# Patient Record
Sex: FEMALE | Race: WHITE | Employment: OTHER | URBAN - NONMETROPOLITAN AREA
[De-identification: names, ages, dates, MRNs, and addresses within clinical notes are randomized per-mention and may not be internally consistent; named-entity substitution may affect disease eponyms.]

---

## 2017-01-31 ENCOUNTER — OFFICE VISIT (OUTPATIENT)
Dept: PRIMARY CARE CLINIC | Age: 82
End: 2017-01-31
Payer: MEDICARE

## 2017-01-31 VITALS
HEART RATE: 83 BPM | TEMPERATURE: 98.4 F | BODY MASS INDEX: 22.15 KG/M2 | WEIGHT: 125 LBS | RESPIRATION RATE: 18 BRPM | HEIGHT: 63 IN | OXYGEN SATURATION: 97 % | SYSTOLIC BLOOD PRESSURE: 138 MMHG | DIASTOLIC BLOOD PRESSURE: 76 MMHG

## 2017-01-31 DIAGNOSIS — Z86.718 HISTORY OF BLOOD CLOTS: ICD-10-CM

## 2017-01-31 DIAGNOSIS — M79.604 PAIN OF RIGHT LOWER EXTREMITY: Primary | ICD-10-CM

## 2017-01-31 PROCEDURE — 99214 OFFICE O/P EST MOD 30 MIN: CPT | Performed by: FAMILY MEDICINE

## 2017-01-31 PROCEDURE — G8427 DOCREV CUR MEDS BY ELIG CLIN: HCPCS | Performed by: FAMILY MEDICINE

## 2017-01-31 PROCEDURE — 1090F PRES/ABSN URINE INCON ASSESS: CPT | Performed by: FAMILY MEDICINE

## 2017-01-31 PROCEDURE — G8419 CALC BMI OUT NRM PARAM NOF/U: HCPCS | Performed by: FAMILY MEDICINE

## 2017-01-31 PROCEDURE — G8484 FLU IMMUNIZE NO ADMIN: HCPCS | Performed by: FAMILY MEDICINE

## 2017-01-31 PROCEDURE — 1123F ACP DISCUSS/DSCN MKR DOCD: CPT | Performed by: FAMILY MEDICINE

## 2017-01-31 PROCEDURE — 4040F PNEUMOC VAC/ADMIN/RCVD: CPT | Performed by: FAMILY MEDICINE

## 2017-01-31 PROCEDURE — 1036F TOBACCO NON-USER: CPT | Performed by: FAMILY MEDICINE

## 2017-01-31 RX ORDER — PILOCARPINE HYDROCHLORIDE 10 MG/ML
1 SOLUTION/ DROPS OPHTHALMIC 2 TIMES DAILY
COMMUNITY
End: 2018-08-01

## 2017-01-31 ASSESSMENT — ENCOUNTER SYMPTOMS
DIARRHEA: 0
COUGH: 0
EYE DISCHARGE: 0
VOMITING: 0
WHEEZING: 0
NAUSEA: 0
ABDOMINAL PAIN: 0
COLOR CHANGE: 0
BACK PAIN: 0

## 2017-03-29 ENCOUNTER — OFFICE VISIT (OUTPATIENT)
Dept: PRIMARY CARE CLINIC | Age: 82
End: 2017-03-29
Payer: MEDICARE

## 2017-03-29 VITALS
WEIGHT: 125 LBS | SYSTOLIC BLOOD PRESSURE: 120 MMHG | HEART RATE: 52 BPM | TEMPERATURE: 98.3 F | OXYGEN SATURATION: 98 % | DIASTOLIC BLOOD PRESSURE: 72 MMHG | BODY MASS INDEX: 22.15 KG/M2 | HEIGHT: 63 IN | RESPIRATION RATE: 16 BRPM

## 2017-03-29 DIAGNOSIS — N28.9 FUNCTION KIDNEY DECREASED: ICD-10-CM

## 2017-03-29 DIAGNOSIS — L57.0 MULTIPLE ACTINIC KERATOSES: Primary | ICD-10-CM

## 2017-03-29 LAB
ANION GAP SERPL CALCULATED.3IONS-SCNC: 18 MMOL/L (ref 7–19)
BUN BLDV-MCNC: 22 MG/DL (ref 8–23)
CALCIUM SERPL-MCNC: 9.9 MG/DL (ref 8.8–10.2)
CHLORIDE BLD-SCNC: 101 MMOL/L (ref 98–111)
CO2: 26 MMOL/L (ref 22–29)
CREAT SERPL-MCNC: 0.9 MG/DL (ref 0.5–0.9)
GFR NON-AFRICAN AMERICAN: 59
GLUCOSE BLD-MCNC: 98 MG/DL (ref 74–109)
POTASSIUM SERPL-SCNC: 4.1 MMOL/L (ref 3.5–5)
SODIUM BLD-SCNC: 145 MMOL/L (ref 136–145)

## 2017-03-29 PROCEDURE — 17000 DESTRUCT PREMALG LESION: CPT | Performed by: FAMILY MEDICINE

## 2017-03-29 PROCEDURE — 1090F PRES/ABSN URINE INCON ASSESS: CPT | Performed by: FAMILY MEDICINE

## 2017-03-29 PROCEDURE — 17003 DESTRUCT PREMALG LES 2-14: CPT | Performed by: FAMILY MEDICINE

## 2017-03-29 PROCEDURE — 4040F PNEUMOC VAC/ADMIN/RCVD: CPT | Performed by: FAMILY MEDICINE

## 2017-03-29 PROCEDURE — 1036F TOBACCO NON-USER: CPT | Performed by: FAMILY MEDICINE

## 2017-03-29 PROCEDURE — G8484 FLU IMMUNIZE NO ADMIN: HCPCS | Performed by: FAMILY MEDICINE

## 2017-03-29 PROCEDURE — 1123F ACP DISCUSS/DSCN MKR DOCD: CPT | Performed by: FAMILY MEDICINE

## 2017-03-29 PROCEDURE — G8427 DOCREV CUR MEDS BY ELIG CLIN: HCPCS | Performed by: FAMILY MEDICINE

## 2017-03-29 PROCEDURE — 99212 OFFICE O/P EST SF 10 MIN: CPT | Performed by: FAMILY MEDICINE

## 2017-03-29 PROCEDURE — G8419 CALC BMI OUT NRM PARAM NOF/U: HCPCS | Performed by: FAMILY MEDICINE

## 2017-03-29 PROCEDURE — 36415 COLL VENOUS BLD VENIPUNCTURE: CPT | Performed by: FAMILY MEDICINE

## 2017-04-10 RX ORDER — METOPROLOL TARTRATE 50 MG/1
TABLET, FILM COATED ORAL
Qty: 90 TABLET | Refills: 3 | Status: SHIPPED | OUTPATIENT
Start: 2017-04-10 | End: 2018-04-12 | Stop reason: SDUPTHER

## 2017-04-25 RX ORDER — ZOLPIDEM TARTRATE 5 MG/1
TABLET ORAL
Qty: 90 TABLET | Refills: 1 | Status: SHIPPED | OUTPATIENT
Start: 2017-04-25 | End: 2018-01-18 | Stop reason: SDUPTHER

## 2017-05-03 ENCOUNTER — OFFICE VISIT (OUTPATIENT)
Dept: PRIMARY CARE CLINIC | Age: 82
End: 2017-05-03
Payer: MEDICARE

## 2017-05-03 VITALS
TEMPERATURE: 98.3 F | HEART RATE: 100 BPM | WEIGHT: 132 LBS | RESPIRATION RATE: 20 BRPM | SYSTOLIC BLOOD PRESSURE: 118 MMHG | DIASTOLIC BLOOD PRESSURE: 78 MMHG | HEIGHT: 63 IN | BODY MASS INDEX: 23.39 KG/M2

## 2017-05-03 DIAGNOSIS — G89.29 CHRONIC PAIN OF RIGHT KNEE: Primary | ICD-10-CM

## 2017-05-03 DIAGNOSIS — L72.0 EPIDERMAL CYST OF FACE: ICD-10-CM

## 2017-05-03 DIAGNOSIS — M25.561 CHRONIC PAIN OF RIGHT KNEE: Primary | ICD-10-CM

## 2017-05-03 PROCEDURE — 99213 OFFICE O/P EST LOW 20 MIN: CPT | Performed by: FAMILY MEDICINE

## 2017-05-03 PROCEDURE — 1036F TOBACCO NON-USER: CPT | Performed by: FAMILY MEDICINE

## 2017-05-03 PROCEDURE — 1090F PRES/ABSN URINE INCON ASSESS: CPT | Performed by: FAMILY MEDICINE

## 2017-05-03 PROCEDURE — 4040F PNEUMOC VAC/ADMIN/RCVD: CPT | Performed by: FAMILY MEDICINE

## 2017-05-03 PROCEDURE — G8427 DOCREV CUR MEDS BY ELIG CLIN: HCPCS | Performed by: FAMILY MEDICINE

## 2017-05-03 PROCEDURE — G8420 CALC BMI NORM PARAMETERS: HCPCS | Performed by: FAMILY MEDICINE

## 2017-05-03 PROCEDURE — 1123F ACP DISCUSS/DSCN MKR DOCD: CPT | Performed by: FAMILY MEDICINE

## 2017-05-06 ASSESSMENT — ENCOUNTER SYMPTOMS: RESPIRATORY NEGATIVE: 1

## 2017-05-26 RX ORDER — ATORVASTATIN CALCIUM 10 MG/1
TABLET, FILM COATED ORAL
Qty: 90 TABLET | Refills: 2 | Status: SHIPPED | OUTPATIENT
Start: 2017-05-26 | End: 2018-02-19 | Stop reason: SDUPTHER

## 2017-08-02 RX ORDER — GABAPENTIN 100 MG/1
CAPSULE ORAL
Qty: 180 CAPSULE | Refills: 2 | Status: SHIPPED | OUTPATIENT
Start: 2017-08-02 | End: 2018-01-18

## 2017-08-23 ENCOUNTER — OFFICE VISIT (OUTPATIENT)
Dept: PRIMARY CARE CLINIC | Age: 82
End: 2017-08-23
Payer: MEDICARE

## 2017-08-23 VITALS
HEIGHT: 63 IN | HEART RATE: 60 BPM | DIASTOLIC BLOOD PRESSURE: 80 MMHG | WEIGHT: 127.5 LBS | RESPIRATION RATE: 18 BRPM | OXYGEN SATURATION: 96 % | TEMPERATURE: 97.6 F | SYSTOLIC BLOOD PRESSURE: 112 MMHG | BODY MASS INDEX: 22.59 KG/M2

## 2017-08-23 DIAGNOSIS — Z91.81 AT HIGH RISK FOR FALLS: ICD-10-CM

## 2017-08-23 DIAGNOSIS — L57.0 ACTINIC KERATOSES: ICD-10-CM

## 2017-08-23 DIAGNOSIS — R26.89 LOSS OF BALANCE: Primary | ICD-10-CM

## 2017-08-23 PROCEDURE — 1123F ACP DISCUSS/DSCN MKR DOCD: CPT | Performed by: FAMILY MEDICINE

## 2017-08-23 PROCEDURE — G8427 DOCREV CUR MEDS BY ELIG CLIN: HCPCS | Performed by: FAMILY MEDICINE

## 2017-08-23 PROCEDURE — 99213 OFFICE O/P EST LOW 20 MIN: CPT | Performed by: FAMILY MEDICINE

## 2017-08-23 PROCEDURE — 1036F TOBACCO NON-USER: CPT | Performed by: FAMILY MEDICINE

## 2017-08-23 PROCEDURE — 1090F PRES/ABSN URINE INCON ASSESS: CPT | Performed by: FAMILY MEDICINE

## 2017-08-23 PROCEDURE — G8420 CALC BMI NORM PARAMETERS: HCPCS | Performed by: FAMILY MEDICINE

## 2017-08-23 PROCEDURE — 4040F PNEUMOC VAC/ADMIN/RCVD: CPT | Performed by: FAMILY MEDICINE

## 2017-08-23 ASSESSMENT — PATIENT HEALTH QUESTIONNAIRE - PHQ9
2. FEELING DOWN, DEPRESSED OR HOPELESS: 0
SUM OF ALL RESPONSES TO PHQ9 QUESTIONS 1 & 2: 0
SUM OF ALL RESPONSES TO PHQ QUESTIONS 1-9: 0
1. LITTLE INTEREST OR PLEASURE IN DOING THINGS: 0

## 2017-08-26 ASSESSMENT — ENCOUNTER SYMPTOMS: RESPIRATORY NEGATIVE: 1

## 2017-09-06 ENCOUNTER — HOSPITAL ENCOUNTER (OUTPATIENT)
Dept: MRI IMAGING | Age: 82
Discharge: HOME | End: 2017-09-06
Payer: MEDICARE

## 2017-09-06 DIAGNOSIS — R26.89 LOSS OF BALANCE: ICD-10-CM

## 2017-09-06 DIAGNOSIS — R90.89 ABNORMAL FINDING ON MRI OF BRAIN: Primary | ICD-10-CM

## 2017-09-06 DIAGNOSIS — Z91.81 AT HIGH RISK FOR FALLS: ICD-10-CM

## 2017-09-06 LAB
GFR NON-AFRICAN AMERICAN: 42
PERFORMED ON: ABNORMAL
POC CREATININE: 1.2 MG/DL (ref 0.3–1.3)
POC SAMPLE TYPE: ABNORMAL

## 2017-09-06 PROCEDURE — 70553 MRI BRAIN STEM W/O & W/DYE: CPT

## 2017-09-06 PROCEDURE — 6360000004 HC RX CONTRAST MEDICATION: Performed by: FAMILY MEDICINE

## 2017-09-06 PROCEDURE — A9577 INJ MULTIHANCE: HCPCS | Performed by: FAMILY MEDICINE

## 2017-09-06 PROCEDURE — 82565 ASSAY OF CREATININE: CPT

## 2017-09-06 RX ADMIN — GADOBENATE DIMEGLUMINE 10 ML: 529 INJECTION, SOLUTION INTRAVENOUS at 14:06

## 2017-09-12 ENCOUNTER — NURSE ONLY (OUTPATIENT)
Dept: PRIMARY CARE CLINIC | Age: 82
End: 2017-09-12
Payer: MEDICARE

## 2017-09-12 DIAGNOSIS — E78.00 PURE HYPERCHOLESTEROLEMIA: ICD-10-CM

## 2017-09-12 DIAGNOSIS — E03.9 ACQUIRED HYPOTHYROIDISM: ICD-10-CM

## 2017-09-12 DIAGNOSIS — I10 ESSENTIAL HYPERTENSION: Primary | ICD-10-CM

## 2017-09-12 LAB
ALBUMIN SERPL-MCNC: 4.1 G/DL (ref 3.5–5.2)
ALP BLD-CCNC: 115 U/L (ref 35–104)
ALT SERPL-CCNC: 12 U/L (ref 5–33)
ANION GAP SERPL CALCULATED.3IONS-SCNC: 14 MMOL/L (ref 7–19)
AST SERPL-CCNC: 18 U/L (ref 5–32)
BILIRUB SERPL-MCNC: 0.6 MG/DL (ref 0.2–1.2)
BUN BLDV-MCNC: 19 MG/DL (ref 8–23)
CALCIUM SERPL-MCNC: 9.9 MG/DL (ref 8.8–10.2)
CHLORIDE BLD-SCNC: 100 MMOL/L (ref 98–111)
CHOLESTEROL, TOTAL: 182 MG/DL (ref 160–199)
CO2: 29 MMOL/L (ref 22–29)
CREAT SERPL-MCNC: 0.9 MG/DL (ref 0.5–0.9)
GFR NON-AFRICAN AMERICAN: 59
GLUCOSE BLD-MCNC: 103 MG/DL (ref 74–109)
HCT VFR BLD CALC: 46.1 % (ref 37–47)
HDLC SERPL-MCNC: 81 MG/DL (ref 65–121)
HEMOGLOBIN: 14.9 G/DL (ref 12–16)
LDL CHOLESTEROL CALCULATED: 79 MG/DL
MCH RBC QN AUTO: 32.5 PG (ref 27–31)
MCHC RBC AUTO-ENTMCNC: 32.3 G/DL (ref 33–37)
MCV RBC AUTO: 100.4 FL (ref 81–99)
PDW BLD-RTO: 13.4 % (ref 11.5–14.5)
PLATELET # BLD: 283 K/UL (ref 130–400)
PMV BLD AUTO: 10.1 FL (ref 9.4–12.3)
POTASSIUM SERPL-SCNC: 4.4 MMOL/L (ref 3.5–5)
RBC # BLD: 4.59 M/UL (ref 4.2–5.4)
SODIUM BLD-SCNC: 143 MMOL/L (ref 136–145)
T4 FREE: 1.1 NG/ML (ref 0.9–1.7)
TOTAL PROTEIN: 7.2 G/DL (ref 6.6–8.7)
TRIGL SERPL-MCNC: 112 MG/DL (ref 150–199)
TSH SERPL DL<=0.05 MIU/L-ACNC: 5.51 UIU/ML (ref 0.27–4.2)
WBC # BLD: 8.9 K/UL (ref 4.8–10.8)

## 2017-09-12 PROCEDURE — 36415 COLL VENOUS BLD VENIPUNCTURE: CPT | Performed by: FAMILY MEDICINE

## 2017-09-15 RX ORDER — LEVOTHYROXINE SODIUM 0.07 MG/1
TABLET ORAL
Qty: 90 TABLET | Refills: 1 | Status: SHIPPED | OUTPATIENT
Start: 2017-09-15 | End: 2017-11-13 | Stop reason: SDUPTHER

## 2017-09-22 ENCOUNTER — HOSPITAL ENCOUNTER (OUTPATIENT)
Dept: MRI IMAGING | Age: 82
Discharge: HOME | End: 2017-09-22
Payer: MEDICARE

## 2017-09-22 DIAGNOSIS — R90.89 ABNORMAL FINDING ON MRI OF BRAIN: ICD-10-CM

## 2017-09-22 DIAGNOSIS — R26.89 LOSS OF BALANCE: ICD-10-CM

## 2017-09-22 PROCEDURE — 6360000004 HC RX CONTRAST MEDICATION: Performed by: FAMILY MEDICINE

## 2017-09-22 PROCEDURE — A9577 INJ MULTIHANCE: HCPCS | Performed by: FAMILY MEDICINE

## 2017-09-22 PROCEDURE — 72156 MRI NECK SPINE W/O & W/DYE: CPT

## 2017-09-22 RX ADMIN — GADOBENATE DIMEGLUMINE 12 ML: 529 INJECTION, SOLUTION INTRAVENOUS at 13:21

## 2017-09-25 DIAGNOSIS — R93.89 ABNORMAL MRI: Primary | ICD-10-CM

## 2017-10-23 ENCOUNTER — NURSE ONLY (OUTPATIENT)
Dept: PRIMARY CARE CLINIC | Age: 82
End: 2017-10-23
Payer: MEDICARE

## 2017-10-23 DIAGNOSIS — Z23 NEED FOR INFLUENZA VACCINATION: Primary | ICD-10-CM

## 2017-10-23 PROCEDURE — G0008 ADMIN INFLUENZA VIRUS VAC: HCPCS | Performed by: FAMILY MEDICINE

## 2017-10-23 PROCEDURE — 90688 IIV4 VACCINE SPLT 0.5 ML IM: CPT | Performed by: FAMILY MEDICINE

## 2017-11-08 ENCOUNTER — OFFICE VISIT (OUTPATIENT)
Dept: PRIMARY CARE CLINIC | Age: 82
End: 2017-11-08
Payer: MEDICARE

## 2017-11-08 VITALS
BODY MASS INDEX: 22.22 KG/M2 | SYSTOLIC BLOOD PRESSURE: 135 MMHG | HEART RATE: 73 BPM | OXYGEN SATURATION: 93 % | DIASTOLIC BLOOD PRESSURE: 82 MMHG | TEMPERATURE: 97.3 F | HEIGHT: 63 IN | WEIGHT: 125.4 LBS | RESPIRATION RATE: 18 BRPM

## 2017-11-08 DIAGNOSIS — Z00.00 ROUTINE GENERAL MEDICAL EXAMINATION AT A HEALTH CARE FACILITY: Primary | ICD-10-CM

## 2017-11-08 DIAGNOSIS — I10 ESSENTIAL HYPERTENSION: ICD-10-CM

## 2017-11-08 DIAGNOSIS — R42 DIZZINESS: ICD-10-CM

## 2017-11-08 DIAGNOSIS — Z23 NEED FOR PNEUMOCOCCAL VACCINATION: ICD-10-CM

## 2017-11-08 PROCEDURE — 36415 COLL VENOUS BLD VENIPUNCTURE: CPT | Performed by: FAMILY MEDICINE

## 2017-11-08 PROCEDURE — G0439 PPPS, SUBSEQ VISIT: HCPCS | Performed by: FAMILY MEDICINE

## 2017-11-08 PROCEDURE — 90670 PCV13 VACCINE IM: CPT | Performed by: FAMILY MEDICINE

## 2017-11-08 PROCEDURE — G0009 ADMIN PNEUMOCOCCAL VACCINE: HCPCS | Performed by: FAMILY MEDICINE

## 2017-11-08 ASSESSMENT — LIFESTYLE VARIABLES
HOW OFTEN DO YOU HAVE A DRINK CONTAINING ALCOHOL: 0
HOW OFTEN DO YOU HAVE A DRINK CONTAINING ALCOHOL: 0

## 2017-11-08 ASSESSMENT — PATIENT HEALTH QUESTIONNAIRE - PHQ9: SUM OF ALL RESPONSES TO PHQ QUESTIONS 1-9: 0

## 2017-11-08 ASSESSMENT — ANXIETY QUESTIONNAIRES: GAD7 TOTAL SCORE: 0

## 2017-11-08 NOTE — PROGRESS NOTES
Yes  Body mass index is 22.21 kg/m². Health Habits/Nutrition Interventions:  · Inadequate physical activity:  She does not exercise because if she gets out and walks a lot she is at increased risk of falling because of the dizziness. Hearing/Vision  Do you or your family notice any trouble with your hearing?: No  Do you have difficulty driving, watching TV, or doing any of your daily activities because of your eyesight?: (!) Yes  Have you had an eye exam within the past year?: Yes  Hearing/Vision Interventions:  · She has seen her eye doctor. Her family will call if it worsens and they will follow-up with him.     Safety  Do you have working smoke detectors?: Yes  Have all throw rugs been removed or fastened?: (!) No  Do you have non-slip mats in all bathtubs?: Yes  Do all of your stairways have a railing or banister?: Yes  Are your doorways, halls and stairs free of clutter?: Yes  Do you always fasten your seatbelt when you are in a car?: Yes  Safety Interventions:  · Home safety tips provided    ADLs  In the past 7 days, did you need help from others to perform any of the following everyday activities?: (!) Walking/Balance  In the past 7 days, did you need help from others to take care of any of the following?: None  ADL Interventions:  · Patient declines any further evaluation/treatment for this issue    Personalized Preventive Plan   Current Health Maintenance Status  Immunization History   Administered Date(s) Administered    DT 01/01/2005    Influenza Virus Vaccine 10/10/2012, 10/13/2014, 10/09/2015    Influenza, Intradermal, Preservative free 10/17/2013    Influenza, Joy Martinez, 3 Years and older, IM 10/23/2017    Influenza, Quadv, 3 yrs and older, IM, Preservative Free 10/12/2016    Pneumococcal 13-valent Conjugate ([id number]) 11/08/2017    Pneumococcal Polysaccharide ([id number]) 08/30/2013    Td 07/15/2015        Health Maintenance   Topic Date Due    DTaP/Tdap/Td vaccine (1 - Tdap) 07/16/2015   

## 2017-11-09 LAB
ANION GAP SERPL CALCULATED.3IONS-SCNC: 10 MMOL/L (ref 7–19)
BUN BLDV-MCNC: 23 MG/DL (ref 8–23)
CALCIUM SERPL-MCNC: 9.8 MG/DL (ref 8.8–10.2)
CHLORIDE BLD-SCNC: 102 MMOL/L (ref 98–111)
CO2: 31 MMOL/L (ref 22–29)
CREAT SERPL-MCNC: 1 MG/DL (ref 0.5–0.9)
GFR NON-AFRICAN AMERICAN: 52
GLUCOSE BLD-MCNC: 115 MG/DL (ref 74–109)
HCT VFR BLD CALC: 44.6 % (ref 37–47)
HEMOGLOBIN: 14.2 G/DL (ref 12–16)
MCH RBC QN AUTO: 31.8 PG (ref 27–31)
MCHC RBC AUTO-ENTMCNC: 31.8 G/DL (ref 33–37)
MCV RBC AUTO: 99.8 FL (ref 81–99)
PDW BLD-RTO: 12.8 % (ref 11.5–14.5)
PLATELET # BLD: 304 K/UL (ref 130–400)
PMV BLD AUTO: 10.5 FL (ref 9.4–12.3)
POTASSIUM SERPL-SCNC: 4.5 MMOL/L (ref 3.5–5)
RBC # BLD: 4.47 M/UL (ref 4.2–5.4)
SODIUM BLD-SCNC: 143 MMOL/L (ref 136–145)
WBC # BLD: 8.7 K/UL (ref 4.8–10.8)

## 2017-11-13 RX ORDER — LEVOTHYROXINE SODIUM 0.07 MG/1
TABLET ORAL
Qty: 90 TABLET | Refills: 3 | Status: SHIPPED | OUTPATIENT
Start: 2017-11-13 | End: 2018-02-05 | Stop reason: SDUPTHER

## 2018-01-18 RX ORDER — GABAPENTIN 300 MG/1
300 CAPSULE ORAL NIGHTLY
COMMUNITY
End: 2018-01-18 | Stop reason: SDUPTHER

## 2018-01-18 RX ORDER — ZOLPIDEM TARTRATE 5 MG/1
TABLET ORAL
Qty: 30 TABLET | Refills: 2 | Status: SHIPPED | OUTPATIENT
Start: 2018-01-18 | End: 2018-02-19 | Stop reason: SDUPTHER

## 2018-01-18 RX ORDER — GABAPENTIN 300 MG/1
300 CAPSULE ORAL NIGHTLY
Qty: 30 CAPSULE | Refills: 2 | Status: SHIPPED | OUTPATIENT
Start: 2018-01-18 | End: 2018-02-19 | Stop reason: SDUPTHER

## 2018-02-07 RX ORDER — LEVOTHYROXINE SODIUM 0.07 MG/1
TABLET ORAL
Qty: 90 TABLET | Refills: 0 | Status: SHIPPED | OUTPATIENT
Start: 2018-02-07 | End: 2018-02-19 | Stop reason: SDUPTHER

## 2018-02-08 ENCOUNTER — NURSE ONLY (OUTPATIENT)
Dept: PRIMARY CARE CLINIC | Age: 83
End: 2018-02-08
Payer: MEDICARE

## 2018-02-08 DIAGNOSIS — I10 ESSENTIAL HYPERTENSION: ICD-10-CM

## 2018-02-08 DIAGNOSIS — E03.9 ACQUIRED HYPOTHYROIDISM: Primary | ICD-10-CM

## 2018-02-08 LAB
ALBUMIN SERPL-MCNC: 3.9 G/DL (ref 3.5–5.2)
ALP BLD-CCNC: 127 U/L (ref 35–104)
ALT SERPL-CCNC: 11 U/L (ref 5–33)
ANION GAP SERPL CALCULATED.3IONS-SCNC: 16 MMOL/L (ref 7–19)
AST SERPL-CCNC: 16 U/L (ref 5–32)
BILIRUB SERPL-MCNC: 0.4 MG/DL (ref 0.2–1.2)
BUN BLDV-MCNC: 26 MG/DL (ref 8–23)
CALCIUM SERPL-MCNC: 9.9 MG/DL (ref 8.8–10.2)
CHLORIDE BLD-SCNC: 104 MMOL/L (ref 98–111)
CO2: 26 MMOL/L (ref 22–29)
CREAT SERPL-MCNC: 0.9 MG/DL (ref 0.5–0.9)
GFR NON-AFRICAN AMERICAN: 59
GLUCOSE BLD-MCNC: 100 MG/DL (ref 74–109)
POTASSIUM SERPL-SCNC: 4.5 MMOL/L (ref 3.5–5)
SODIUM BLD-SCNC: 146 MMOL/L (ref 136–145)
T4 TOTAL: 9.9 UG/DL (ref 4.5–11.7)
TOTAL PROTEIN: 6.8 G/DL (ref 6.6–8.7)
TSH SERPL DL<=0.05 MIU/L-ACNC: 0.02 UIU/ML (ref 0.27–4.2)

## 2018-02-08 PROCEDURE — 36415 COLL VENOUS BLD VENIPUNCTURE: CPT | Performed by: FAMILY MEDICINE

## 2018-02-19 RX ORDER — LEVOTHYROXINE SODIUM 0.05 MG/1
TABLET ORAL
Qty: 90 TABLET | Refills: 0 | Status: SHIPPED | OUTPATIENT
Start: 2018-02-19 | End: 2018-04-30 | Stop reason: SDUPTHER

## 2018-02-19 RX ORDER — ZOLPIDEM TARTRATE 5 MG/1
TABLET ORAL
Qty: 30 TABLET | Refills: 2 | Status: SHIPPED | OUTPATIENT
Start: 2018-02-19 | End: 2018-09-24 | Stop reason: SDUPTHER

## 2018-02-19 RX ORDER — GABAPENTIN 300 MG/1
300 CAPSULE ORAL NIGHTLY
Qty: 30 CAPSULE | Refills: 2 | Status: SHIPPED | OUTPATIENT
Start: 2018-02-19 | End: 2018-03-21 | Stop reason: ALTCHOICE

## 2018-02-20 RX ORDER — ATORVASTATIN CALCIUM 10 MG/1
TABLET, FILM COATED ORAL
Qty: 90 TABLET | Refills: 2 | Status: SHIPPED | OUTPATIENT
Start: 2018-02-20 | End: 2018-07-30 | Stop reason: SDUPTHER

## 2018-03-14 ENCOUNTER — OFFICE VISIT (OUTPATIENT)
Dept: PRIMARY CARE CLINIC | Age: 83
End: 2018-03-14
Payer: MEDICARE

## 2018-03-14 VITALS
HEART RATE: 76 BPM | TEMPERATURE: 98.3 F | BODY MASS INDEX: 22.32 KG/M2 | HEIGHT: 63 IN | OXYGEN SATURATION: 98 % | SYSTOLIC BLOOD PRESSURE: 145 MMHG | DIASTOLIC BLOOD PRESSURE: 88 MMHG | RESPIRATION RATE: 18 BRPM | WEIGHT: 126 LBS

## 2018-03-14 DIAGNOSIS — I48.20 CHRONIC ATRIAL FIBRILLATION (HCC): ICD-10-CM

## 2018-03-14 DIAGNOSIS — T50.905A ADVERSE EFFECT OF DRUG, INITIAL ENCOUNTER: ICD-10-CM

## 2018-03-14 DIAGNOSIS — R29.6 FALLS FREQUENTLY: Primary | ICD-10-CM

## 2018-03-14 PROCEDURE — 4040F PNEUMOC VAC/ADMIN/RCVD: CPT | Performed by: FAMILY MEDICINE

## 2018-03-14 PROCEDURE — G8482 FLU IMMUNIZE ORDER/ADMIN: HCPCS | Performed by: FAMILY MEDICINE

## 2018-03-14 PROCEDURE — 1090F PRES/ABSN URINE INCON ASSESS: CPT | Performed by: FAMILY MEDICINE

## 2018-03-14 PROCEDURE — 1123F ACP DISCUSS/DSCN MKR DOCD: CPT | Performed by: FAMILY MEDICINE

## 2018-03-14 PROCEDURE — G8427 DOCREV CUR MEDS BY ELIG CLIN: HCPCS | Performed by: FAMILY MEDICINE

## 2018-03-14 PROCEDURE — G8420 CALC BMI NORM PARAMETERS: HCPCS | Performed by: FAMILY MEDICINE

## 2018-03-14 PROCEDURE — 93000 ELECTROCARDIOGRAM COMPLETE: CPT | Performed by: FAMILY MEDICINE

## 2018-03-14 PROCEDURE — 99213 OFFICE O/P EST LOW 20 MIN: CPT | Performed by: FAMILY MEDICINE

## 2018-03-14 PROCEDURE — 1036F TOBACCO NON-USER: CPT | Performed by: FAMILY MEDICINE

## 2018-03-14 RX ORDER — SILICONE ADHESIVE 1.5" X 3"
1 SHEET (EA) TOPICAL 2 TIMES DAILY
COMMUNITY

## 2018-03-15 NOTE — PROGRESS NOTES
normal. Cognition and memory are normal.   Vitals reviewed. Assessment:      1. Falls frequently    2. Chronic atrial fibrillation (HCC)    3. Adverse effect of drug, initial encounter            Plan:      MEDICATIONS:  No orders of the defined types were placed in this encounter. ORDERS:  Orders Placed This Encounter   Procedures   [address] Ariana Simpson MD    EKG 12 Lead     EKG is equivocal. I feel that she may be in atrial fibrillation. We are going to try to get her in to see Dr. Sharron Cardoso within the next week or so. If she develops chest pain, shortness of breath or diaphoresis she is to go immediately to the emergency room. I think she already took care of some of her loss of balance by stopping the gabapentin. I feel like this was probably a side effect. If she develops new symptoms she is to let me know. Follow-up:  Return if symptoms worsen or fail to improve. PATIENT INSTRUCTIONS:  Patient Instructions       Patient Education        How to Get Up Safely After a Fall: Care Instructions  Your Care Instructions    If you have injuries, health problems, or other reasons that may make it easy for you to fall at home, it is a good idea to learn how to get up safely after a fall. Learning how to get up correctly can help you avoid making an injury worse. Also, knowing what to do if you cannot get up can help you stay safe until help arrives. Follow-up care is a key part of your treatment and safety. Be sure to make and go to all appointments, and call your doctor if you are having problems. It's also a good idea to know your test results and keep a list of the medicines you take. How can you care for yourself after a fall? If you think you can get up  First lie still for a few minutes and think about how you feel. If your body feels okay and you think you can get up safely, follow the rest of the steps below:  1.  Look for a chair or other piece of furniture that is close to you. 2. Roll onto your side and rest. Roll by turning your head in the direction you want to roll, move your shoulder and arm, then hip and leg in the same direction. 3. Lie still for a moment to let your blood pressure adjust.  4. Slowly push your upper body up, lift your head, and take a moment to rest.  5. Slowly get up on your hands and knees, and crawl to the chair or other stable piece of furniture. 6. Put your hands on the chair. 7. Move one foot forward, and place it flat on the floor. Your other leg should be bent with the knee on the floor. 8. Rise slowly, turn your body, and sit in the chair. Stay seated for a bit and think about how you feel. Call for help. Even if you feel okay, let someone know what happened to you. You might not know that you have a serious injury. If you cannot get up  1. If you think you are injured after a fall or you cannot get up, try not to panic. 2. Call out for help. 3. If you have a phone within reach or you have an emergency call device, use it to call for help. 4. If you do not have a phone within reach, try to slide yourself toward it. If you cannot get to the phone, try to slide toward a door or window or a place where you think you can be heard. 5. [facility] or use an object to make noise so someone might hear you. 6. If you can reach something that you can use for a pillow, place it under your head. Try to stay warm by covering yourself with a blanket or clothing while you wait for help. When should you call for help? Call 911 anytime you think you may need emergency care. For example, call if:  ? · You passed out (lost consciousness). ? · You cannot get up after a fall. ? · You have severe pain. ?Call your doctor now or seek immediate medical care if:  ? · You have new or worse pain. ? · You are dizzy or lightheaded. ? · You hit your head. ? Watch closely for changes in your health, and be sure to contact your doctor if:  ? · You do not get better as expected. Where can you learn more? Go to [email or web address] and sign in to your [employer] account. Enter E417 in the [facility] box to learn more about \"How to Get Up Safely After a Fall: Care Instructions. \"     If you do not have an account, please click on the \"Sign Up Now\" link. Current as of: May 12, 2017  Content Version: 11.5  © 1764-2780 Healthwise, Incorporated. Care instructions adapted under license by [facility] ([facility]). If you have questions about a medical condition or this instruction, always ask your healthcare professional. [address] any warranty or liability for your use of this information.

## 2018-03-21 ENCOUNTER — OFFICE VISIT (OUTPATIENT)
Dept: CARDIOLOGY | Age: 83
End: 2018-03-21
Payer: MEDICARE

## 2018-03-21 VITALS
DIASTOLIC BLOOD PRESSURE: 64 MMHG | HEART RATE: 70 BPM | SYSTOLIC BLOOD PRESSURE: 122 MMHG | HEIGHT: 63 IN | BODY MASS INDEX: 21.97 KG/M2 | WEIGHT: 124 LBS

## 2018-03-21 DIAGNOSIS — I48.0 PAROXYSMAL ATRIAL FIBRILLATION (HCC): Primary | ICD-10-CM

## 2018-03-21 DIAGNOSIS — R06.02 SHORTNESS OF BREATH: ICD-10-CM

## 2018-03-21 PROCEDURE — 99213 OFFICE O/P EST LOW 20 MIN: CPT | Performed by: INTERNAL MEDICINE

## 2018-03-21 PROCEDURE — 93000 ELECTROCARDIOGRAM COMPLETE: CPT | Performed by: INTERNAL MEDICINE

## 2018-03-21 ASSESSMENT — ENCOUNTER SYMPTOMS
SHORTNESS OF BREATH: 1
BACK PAIN: 0
STRIDOR: 0
NAUSEA: 0
BLOOD IN STOOL: 0
ABDOMINAL DISTENTION: 0
CHOKING: 0
CHEST TIGHTNESS: 0
WHEEZING: 0
APNEA: 0

## 2018-03-21 NOTE — PATIENT INSTRUCTIONS
treatment for an irregular heartbeat is working. How can you prepare for the test?   Before the test, talk to your doctor about all your health problems. Tell him or her about all the medicines and vitamins you take. Take a shower or bath before the discs are put onto your chest. You must not get the discs wet during the test.   Wear a loose blouse or shirt. Do not wear jewelry or clothes with metal buttons or lata. If you are a woman, do not wear an underwire bra. What happens before the test?   Areas of your chest may be shaved and cleaned. The electrode discs are attached to your chest with a paste or gel. You wear the monitor on a strap over your shoulder or around your waist. It uses batteries and does not weigh much. What happens during the test?   You need to record your activities and symptoms. You will write down the time your symptoms started. And you will write down what type of activity you were doing. You can use the clock on the monitor to help you keep track of the time your symptoms started. When you sleep, try to stay on your back with the monitor at your side. This will prevent the discs from coming off. What else should you know about the test?   When you wear the monitor, try to stay away from magnets, metal detectors, high-voltage areas, garage door openers, microwave ovens, and electric blankets. Do not use an electric toothbrush or shaver. The discs may make your skin itch a little. And your skin may look or feel irritated after the discs are removed. How long does the test take? You usually wear the monitor for 24 to 48 hours. What happens after the test?   You may return to the doctor's office or hospital to have the discs removed. Or you may be able to take them off yourself. You will return the Holter monitor to your doctor's office or hospital.   Malachi Encarnaicon can go back to your usual activities right away.

## 2018-03-21 NOTE — PROGRESS NOTES
MRN: [id number] Todays Date: 3/21/2018  Last Appointment: Visit date not found  Analy Hanson is a 80 y.o. patient  : 1928  Referring Provider: Rohit Marks MD  PCP: Анна Leach MD    History of Present Illness:   81 y/o lady with history of PAF returns with chief complaint of dizziness and fatigue with an irregular rhythm on her EKG.    Results for orders placed or performed in visit on 18   Comprehensive Metabolic Panel   Result Value Ref Range    Sodium 146 (H) 136 - 145 mmol/L    Potassium 4.5 3.5 - 5.0 mmol/L    Chloride 104 98 - 111 mmol/L    CO2 26 22 - 29 mmol/L    Anion Gap 16 7 - 19 mmol/L    Glucose 100 74 - 109 mg/dL    BUN 26 (H) 8 - 23 mg/dL    CREATININE 0.9 0.5 - 0.9 mg/dL    GFR Non- 59 (A) >60    Calcium 9.9 8.8 - 10.2 mg/dL    Total Protein 6.8 6.6 - 8.7 g/dL    Alb 3.9 3.5 - 5.2 g/dL    Total Bilirubin 0.4 0.2 - 1.2 mg/dL    Alkaline Phosphatase 127 (H) 35 - 104 U/L    ALT 11 5 - 33 U/L    AST 16 5 - 32 U/L   TSH without Reflex   Result Value Ref Range    TSH 0.017 (L) 0.270 - 4.200 uIU/mL   T4   Result Value Ref Range    T4, Total 9.9 4.5 - 11.7 ug/dL       Risk Factors:  Family History   Problem Relation Age of Onset    Heart Disease Mother     Lung Cancer Father      Counseling given: Not Answered    HTN: controlled  Lab Results   Component Value Date    CHOL 182 2017    CHOL 159 (L) 2016    CHOL 177 2014     Lab Results   Component Value Date    TRIG 112 (L) 2017    TRIG 91 (L) 2016    TRIG 87 2014     Lab Results   Component Value Date    HDL 81 2017    HDL 75 2016    HDL 84 2014     Lab Results   Component Value Date    LDLCALC 79 2017    LDLCALC 66 2016     No results found for: LABVLDL, VLDL  No results found for: CHOLHDLRATIO    No results found for: LABA1C  No results found for: EAG  Diabetes: No  none    Past Medical History:   Diagnosis Date    Burning mouth syndrome     Chronic Pulse 70   Ht 5' 3\" (1.6 m)   Wt 124 lb (56.2 kg)   BMI 21.97 kg/m²   Physical Exam   Constitutional: She is oriented to person, place, and time. She appears well-developed and well-nourished. No distress. HENT:   Head: Normocephalic. Eyes: Conjunctivae and EOM are normal. Pupils are equal, round, and reactive to light. Neck: No JVD present. Carotid bruit is not present. No thyromegaly present. No JVD at 45 degrees   Cardiovascular: Normal rate, regular rhythm, normal heart sounds, intact distal pulses and normal pulses. PMI is not displaced. Exam reveals no gallop and no friction rub. No murmur heard. Rhythm mostly regular with an occasional extrasystole   Pulmonary/Chest: No respiratory distress. She has no wheezes. She has no rales. She exhibits no tenderness. Abdominal: Soft. Bowel sounds are normal. She exhibits no distension and no mass. There is no tenderness. There is no rebound and no guarding. Musculoskeletal: She exhibits no deformity. Neurological: She is alert and oriented to person, place, and time. Skin: Skin is warm. No rash noted. She is not diaphoretic. No erythema. No pallor. Psychiatric: She has a normal mood and affect. Her behavior is normal. Judgment and thought content normal.   Vitals reviewed. No results found for this visit on 03/21/18.     Lab Results   Component Value Date    CHOL 182 09/12/2017    CHOL 159 (L) 05/12/2016    CHOL 177 05/14/2014     Lab Results   Component Value Date    TRIG 112 (L) 09/12/2017    TRIG 91 (L) 05/12/2016    TRIG 87 05/14/2014     Lab Results   Component Value Date    HDL 81 09/12/2017    HDL 75 05/12/2016    HDL 84 05/14/2014     Lab Results   Component Value Date    LDLCALC 79 09/12/2017    [address] 66 05/12/2016       Orders Placed This Encounter   Procedures    Holter Monitor 24 Hour     Standing Status:   Future     Standing Expiration Date:   3/21/2019     Order Specific Question:   Reason for Exam?     Answer: Irregular heart rate    EKG 12 lead     Order Specific Question:   Reason for Exam?     Answer: Other    ECHO Complete 2D W Doppler W Color     Standing Status:   Future     Standing Expiration Date:   3/21/2019     Order Specific Question:   Reason for exam:     Answer:   dyspnea     No orders of the defined types were placed in this encounter. ECG interpretation from today:   Sinus rhythm with irregular baseline and frequent PACs. First degree AVB (242 msec). IVCD of LBBB-type. Assessment / Plan:  Osiris 59-year-old lady who returns for dryness of her dizziness and an abnormal EKG. History of hypertension and paroxysmal atrial fibrillation. Intolerant of several drugs utilized to address in the past.  At present takes only metoprolol. Dizziness apparently was significantly worsened by gabapentin though still occasionally troublesome. No presyncope or syncope. Has had some sharp stabbing nonexertional chest discomfort. EKG reveals an abnormal baseline but interpretation is that of sinus rhythm with occasional PACs. We will obtain an echocardiogram to look at her ventricle [because of fatigue] and her left atrium. Additionally we'll obtain a 24-hour Holter monitor to exclude any recurrent atrial fibrillation.       Electronically sign by Ro Aguillon MD 3/21/2018 3:33 PM

## 2018-03-27 ENCOUNTER — HOSPITAL ENCOUNTER (OUTPATIENT)
Dept: NON INVASIVE DIAGNOSTICS | Age: 83
Discharge: HOME | End: 2018-03-27
Payer: MEDICARE

## 2018-03-27 DIAGNOSIS — I48.0 PAROXYSMAL ATRIAL FIBRILLATION (HCC): ICD-10-CM

## 2018-03-27 DIAGNOSIS — R06.02 SHORTNESS OF BREATH: ICD-10-CM

## 2018-03-27 LAB
LV EF: 58 %
LVEF MODALITY: NORMAL

## 2018-03-27 PROCEDURE — 93226 XTRNL ECG REC<48 HR SCAN A/R: CPT

## 2018-03-27 PROCEDURE — 93225 XTRNL ECG REC<48 HRS REC: CPT

## 2018-03-27 PROCEDURE — 93306 TTE W/DOPPLER COMPLETE: CPT

## 2018-04-11 ENCOUNTER — OFFICE VISIT (OUTPATIENT)
Dept: CARDIOLOGY | Age: 83
End: 2018-04-11
Payer: MEDICARE

## 2018-04-11 VITALS
DIASTOLIC BLOOD PRESSURE: 70 MMHG | HEIGHT: 63 IN | BODY MASS INDEX: 22.15 KG/M2 | HEART RATE: 68 BPM | SYSTOLIC BLOOD PRESSURE: 130 MMHG | WEIGHT: 125 LBS

## 2018-04-11 DIAGNOSIS — I48.0 PAROXYSMAL ATRIAL FIBRILLATION (HCC): Primary | ICD-10-CM

## 2018-04-11 PROCEDURE — 93000 ELECTROCARDIOGRAM COMPLETE: CPT | Performed by: INTERNAL MEDICINE

## 2018-04-11 PROCEDURE — 99213 OFFICE O/P EST LOW 20 MIN: CPT | Performed by: INTERNAL MEDICINE

## 2018-04-12 RX ORDER — VALSARTAN AND HYDROCHLOROTHIAZIDE 80; 12.5 MG/1; MG/1
TABLET, FILM COATED ORAL
Qty: 90 TABLET | Refills: 3 | Status: SHIPPED | OUTPATIENT
Start: 2018-04-12 | End: 2018-07-25 | Stop reason: CLARIF

## 2018-04-12 RX ORDER — METOPROLOL TARTRATE 50 MG/1
50 TABLET, FILM COATED ORAL DAILY
Qty: 90 TABLET | Refills: 3 | Status: SHIPPED | OUTPATIENT
Start: 2018-04-12 | End: 2018-08-01 | Stop reason: DRUGHIGH

## 2018-04-13 ENCOUNTER — OFFICE VISIT (OUTPATIENT)
Dept: PRIMARY CARE CLINIC | Age: 83
End: 2018-04-13
Payer: MEDICARE

## 2018-04-13 VITALS
WEIGHT: 127 LBS | RESPIRATION RATE: 18 BRPM | BODY MASS INDEX: 22.5 KG/M2 | OXYGEN SATURATION: 98 % | DIASTOLIC BLOOD PRESSURE: 75 MMHG | HEIGHT: 63 IN | TEMPERATURE: 97 F | HEART RATE: 68 BPM | SYSTOLIC BLOOD PRESSURE: 121 MMHG

## 2018-04-13 DIAGNOSIS — L60.0 INGROWN LEFT BIG TOENAIL: Primary | ICD-10-CM

## 2018-04-13 PROCEDURE — 11730 AVULSION NAIL PLATE SIMPLE 1: CPT | Performed by: FAMILY MEDICINE

## 2018-04-13 RX ORDER — CEPHALEXIN 500 MG/1
CAPSULE ORAL
Qty: 20 CAPSULE | Refills: 0 | Status: SHIPPED | OUTPATIENT
Start: 2018-04-13 | End: 2018-05-08

## 2018-04-17 ASSESSMENT — ENCOUNTER SYMPTOMS: RESPIRATORY NEGATIVE: 1

## 2018-04-30 RX ORDER — LEVOTHYROXINE SODIUM 0.05 MG/1
TABLET ORAL
Qty: 90 TABLET | Refills: 3 | Status: SHIPPED | OUTPATIENT
Start: 2018-04-30 | End: 2018-08-01

## 2018-05-08 ENCOUNTER — OFFICE VISIT (OUTPATIENT)
Dept: PRIMARY CARE CLINIC | Age: 83
End: 2018-05-08
Payer: MEDICARE

## 2018-05-08 VITALS
RESPIRATION RATE: 16 BRPM | BODY MASS INDEX: 22.34 KG/M2 | OXYGEN SATURATION: 97 % | DIASTOLIC BLOOD PRESSURE: 70 MMHG | TEMPERATURE: 97.2 F | WEIGHT: 126.1 LBS | SYSTOLIC BLOOD PRESSURE: 110 MMHG | HEART RATE: 64 BPM

## 2018-05-08 DIAGNOSIS — I10 ESSENTIAL HYPERTENSION: ICD-10-CM

## 2018-05-08 DIAGNOSIS — M89.8X1 PAIN OF RIGHT SCAPULA: Primary | ICD-10-CM

## 2018-05-08 DIAGNOSIS — E03.9 ACQUIRED HYPOTHYROIDISM: ICD-10-CM

## 2018-05-08 PROBLEM — R06.02 SHORTNESS OF BREATH: Status: RESOLVED | Noted: 2018-03-21 | Resolved: 2018-05-08

## 2018-05-08 LAB
ANION GAP SERPL CALCULATED.3IONS-SCNC: 15 MMOL/L (ref 7–19)
BUN BLDV-MCNC: 21 MG/DL (ref 8–23)
CALCIUM SERPL-MCNC: 10.2 MG/DL (ref 8.8–10.2)
CHLORIDE BLD-SCNC: 102 MMOL/L (ref 98–111)
CO2: 27 MMOL/L (ref 22–29)
CREAT SERPL-MCNC: 0.9 MG/DL (ref 0.5–0.9)
GFR NON-AFRICAN AMERICAN: 59
GLUCOSE BLD-MCNC: 98 MG/DL (ref 74–109)
POTASSIUM SERPL-SCNC: 4.1 MMOL/L (ref 3.5–5)
SODIUM BLD-SCNC: 144 MMOL/L (ref 136–145)
T4 TOTAL: 8.2 UG/DL (ref 4.5–11.7)
TSH SERPL DL<=0.05 MIU/L-ACNC: 0.64 UIU/ML (ref 0.27–4.2)

## 2018-05-08 PROCEDURE — 99213 OFFICE O/P EST LOW 20 MIN: CPT | Performed by: FAMILY MEDICINE

## 2018-05-08 PROCEDURE — G8420 CALC BMI NORM PARAMETERS: HCPCS | Performed by: FAMILY MEDICINE

## 2018-05-08 PROCEDURE — G8427 DOCREV CUR MEDS BY ELIG CLIN: HCPCS | Performed by: FAMILY MEDICINE

## 2018-05-08 PROCEDURE — 1090F PRES/ABSN URINE INCON ASSESS: CPT | Performed by: FAMILY MEDICINE

## 2018-05-08 PROCEDURE — 1123F ACP DISCUSS/DSCN MKR DOCD: CPT | Performed by: FAMILY MEDICINE

## 2018-05-08 PROCEDURE — 1036F TOBACCO NON-USER: CPT | Performed by: FAMILY MEDICINE

## 2018-05-08 PROCEDURE — 36415 COLL VENOUS BLD VENIPUNCTURE: CPT | Performed by: FAMILY MEDICINE

## 2018-05-08 PROCEDURE — 4040F PNEUMOC VAC/ADMIN/RCVD: CPT | Performed by: FAMILY MEDICINE

## 2018-05-08 RX ORDER — ZOLPIDEM TARTRATE 5 MG/1
5 TABLET ORAL NIGHTLY PRN
COMMUNITY
Start: 2018-04-27 | End: 2018-05-31 | Stop reason: SDUPTHER

## 2018-05-08 ASSESSMENT — ENCOUNTER SYMPTOMS
RESPIRATORY NEGATIVE: 1
BACK PAIN: 1

## 2018-05-31 DIAGNOSIS — F51.01 PRIMARY INSOMNIA: Primary | ICD-10-CM

## 2018-06-01 RX ORDER — ZOLPIDEM TARTRATE 5 MG/1
TABLET ORAL
Qty: 15 TABLET | Refills: 0 | Status: SHIPPED | OUTPATIENT
Start: 2018-06-01 | End: 2018-07-01

## 2018-06-15 ENCOUNTER — OFFICE VISIT (OUTPATIENT)
Dept: PRIMARY CARE CLINIC | Age: 83
End: 2018-06-15
Payer: MEDICARE

## 2018-06-15 VITALS
WEIGHT: 127 LBS | DIASTOLIC BLOOD PRESSURE: 76 MMHG | HEART RATE: 87 BPM | OXYGEN SATURATION: 98 % | HEIGHT: 63 IN | RESPIRATION RATE: 18 BRPM | BODY MASS INDEX: 22.5 KG/M2 | TEMPERATURE: 97 F | SYSTOLIC BLOOD PRESSURE: 125 MMHG

## 2018-06-15 DIAGNOSIS — M79.2 NEUROPATHIC PAIN, LEG, RIGHT: Primary | ICD-10-CM

## 2018-06-15 DIAGNOSIS — S76.312A LEFT HAMSTRING MUSCLE STRAIN, INITIAL ENCOUNTER: ICD-10-CM

## 2018-06-15 PROCEDURE — 1090F PRES/ABSN URINE INCON ASSESS: CPT | Performed by: FAMILY MEDICINE

## 2018-06-15 PROCEDURE — 1123F ACP DISCUSS/DSCN MKR DOCD: CPT | Performed by: FAMILY MEDICINE

## 2018-06-15 PROCEDURE — 4040F PNEUMOC VAC/ADMIN/RCVD: CPT | Performed by: FAMILY MEDICINE

## 2018-06-15 PROCEDURE — 1036F TOBACCO NON-USER: CPT | Performed by: FAMILY MEDICINE

## 2018-06-15 PROCEDURE — G8420 CALC BMI NORM PARAMETERS: HCPCS | Performed by: FAMILY MEDICINE

## 2018-06-15 PROCEDURE — 99213 OFFICE O/P EST LOW 20 MIN: CPT | Performed by: FAMILY MEDICINE

## 2018-06-15 PROCEDURE — G8427 DOCREV CUR MEDS BY ELIG CLIN: HCPCS | Performed by: FAMILY MEDICINE

## 2018-06-15 RX ORDER — GABAPENTIN 400 MG/1
CAPSULE ORAL
Qty: 30 CAPSULE | Refills: 1 | Status: SHIPPED | OUTPATIENT
Start: 2018-06-15 | End: 2018-08-01

## 2018-06-21 ENCOUNTER — TELEPHONE (OUTPATIENT)
Dept: PRIMARY CARE CLINIC | Age: 83
End: 2018-06-21

## 2018-06-23 ASSESSMENT — ENCOUNTER SYMPTOMS: RESPIRATORY NEGATIVE: 1

## 2018-06-25 DIAGNOSIS — M54.50 CHRONIC BILATERAL LOW BACK PAIN WITHOUT SCIATICA: Primary | ICD-10-CM

## 2018-06-25 DIAGNOSIS — G89.29 CHRONIC BILATERAL LOW BACK PAIN WITHOUT SCIATICA: Primary | ICD-10-CM

## 2018-06-25 RX ORDER — TRAMADOL HYDROCHLORIDE 50 MG/1
TABLET ORAL
Qty: 12 TABLET | Refills: 0 | Status: SHIPPED | OUTPATIENT
Start: 2018-06-25 | End: 2018-07-07

## 2018-07-16 RX ORDER — LEVOTHYROXINE SODIUM 75 MCG
TABLET ORAL
Qty: 90 TABLET | Refills: 0 | Status: SHIPPED | OUTPATIENT
Start: 2018-07-16 | End: 2018-09-04 | Stop reason: SDUPTHER

## 2018-07-25 RX ORDER — LOSARTAN POTASSIUM AND HYDROCHLOROTHIAZIDE 12.5; 5 MG/1; MG/1
TABLET ORAL
Qty: 30 TABLET | Refills: 3 | Status: SHIPPED | OUTPATIENT
Start: 2018-07-25 | End: 2018-08-01

## 2018-07-30 RX ORDER — ATORVASTATIN CALCIUM 10 MG/1
10 TABLET, FILM COATED ORAL DAILY
Qty: 90 TABLET | Refills: 3 | Status: SHIPPED | OUTPATIENT
Start: 2018-07-30 | End: 2018-09-21 | Stop reason: SDUPTHER

## 2018-08-01 ENCOUNTER — OFFICE VISIT (OUTPATIENT)
Dept: PRIMARY CARE CLINIC | Age: 83
End: 2018-08-01
Payer: MEDICARE

## 2018-08-01 VITALS
SYSTOLIC BLOOD PRESSURE: 125 MMHG | TEMPERATURE: 97.8 F | HEART RATE: 64 BPM | DIASTOLIC BLOOD PRESSURE: 85 MMHG | BODY MASS INDEX: 22.64 KG/M2 | WEIGHT: 127.8 LBS | HEIGHT: 63 IN | RESPIRATION RATE: 16 BRPM | OXYGEN SATURATION: 98 %

## 2018-08-01 DIAGNOSIS — I10 ESSENTIAL HYPERTENSION: Primary | ICD-10-CM

## 2018-08-01 PROCEDURE — G8427 DOCREV CUR MEDS BY ELIG CLIN: HCPCS | Performed by: FAMILY MEDICINE

## 2018-08-01 PROCEDURE — 1101F PT FALLS ASSESS-DOCD LE1/YR: CPT | Performed by: FAMILY MEDICINE

## 2018-08-01 PROCEDURE — 1090F PRES/ABSN URINE INCON ASSESS: CPT | Performed by: FAMILY MEDICINE

## 2018-08-01 PROCEDURE — 1123F ACP DISCUSS/DSCN MKR DOCD: CPT | Performed by: FAMILY MEDICINE

## 2018-08-01 PROCEDURE — 4040F PNEUMOC VAC/ADMIN/RCVD: CPT | Performed by: FAMILY MEDICINE

## 2018-08-01 PROCEDURE — 99213 OFFICE O/P EST LOW 20 MIN: CPT | Performed by: FAMILY MEDICINE

## 2018-08-01 PROCEDURE — G8420 CALC BMI NORM PARAMETERS: HCPCS | Performed by: FAMILY MEDICINE

## 2018-08-01 PROCEDURE — 1036F TOBACCO NON-USER: CPT | Performed by: FAMILY MEDICINE

## 2018-08-01 RX ORDER — METOPROLOL TARTRATE 50 MG/1
50 TABLET, FILM COATED ORAL 2 TIMES DAILY
Qty: 60 TABLET | Refills: 3 | Status: SHIPPED
Start: 2018-08-01 | End: 2018-09-21 | Stop reason: SDUPTHER

## 2018-08-01 NOTE — PATIENT INSTRUCTIONS
Please stop the losartan hydrochlorothiazide in the morning. Take the metoprolol 50 mg 1 tablet twice a day. Monitor your blood pressure and call me with the values on Friday.

## 2018-08-02 ASSESSMENT — ENCOUNTER SYMPTOMS: RESPIRATORY NEGATIVE: 1

## 2018-08-03 ENCOUNTER — TELEPHONE (OUTPATIENT)
Dept: PRIMARY CARE CLINIC | Age: 83
End: 2018-08-03

## 2018-08-03 NOTE — PROGRESS NOTES
Subjective:      Patient ID: Juan Jose Hamilton is a 80 y.o. female who comes in today with her son and daughter-in-law stating that her blood pressure is \"running all over the place\". HPI. It may be in the 130/60 to 70s in the morning but by afternoon it may be 140/85 up to 160/107. This worries her but she doesn't necessarily feel bad. Review of her medications reveals that she is taking Lopressor 50 mg but only at night. She is taking her Hyzaar in the morning. We added the Hyzaar but she says she doesn't think it is doing much. She denies any chest pain or shortness of breath. She would also like me to write a letter to her insurance company explaining why she needs to be able to get her medications locally. Review of Systems   Constitutional: Negative. Eyes: Negative for visual disturbance. Respiratory: Negative. Cardiovascular: Negative. Musculoskeletal: Positive for arthralgias. Neurological: Negative for light-headedness and headaches. Hematological: Negative. Psychiatric/Behavioral: Negative. Objective:   Physical Exam   Constitutional: She is oriented to person, place, and time. She appears well-developed and well-nourished. No distress. HENT:   Head: Normocephalic. Right Ear: External ear normal.   Left Ear: External ear normal.   Mouth/Throat: Oropharynx is clear and moist.   Eyes: Conjunctivae are normal. Pupils are equal, round, and reactive to light. Neck: Normal range of motion. Neck supple. No JVD present. Cardiovascular: Normal rate, regular rhythm and normal heart sounds. Pulmonary/Chest: Effort normal and breath sounds normal.   Abdominal: Soft. Bowel sounds are normal.   Musculoskeletal: Normal range of motion. She exhibits no edema. Lymphadenopathy:     She has no cervical adenopathy. Neurological: She is alert and oriented to person, place, and time. Skin: Skin is warm and dry. Psychiatric: She has a normal mood and affect.  Her behavior is normal. Judgment and thought content normal.   Vitals reviewed. Assessment:      1. Essential hypertension            Plan:      MEDICATIONS:  Orders Placed This Encounter   Medications    metoprolol tartrate (LOPRESSOR) 50 MG tablet     Sig: Take 1 tablet by mouth 2 times daily     Dispense:  60 tablet     Refill:  3       ORDERS:  No orders of the defined types were placed in this encounter. If her blood pressure is well controlled on this she is to let me know. If not we will add the losartan HCTZ back. I think she may need the metoprolol twice a day. Follow-up:  Return if symptoms worsen or fail to improve. PATIENT INSTRUCTIONS:  Patient Instructions   Please stop the losartan hydrochlorothiazide in the morning. Take the metoprolol 50 mg 1 tablet twice a day. Monitor your blood pressure and call me with the values on Friday.

## 2018-08-15 DIAGNOSIS — F51.01 PRIMARY INSOMNIA: Primary | ICD-10-CM

## 2018-08-15 RX ORDER — ZOLPIDEM TARTRATE 5 MG/1
5 TABLET ORAL NIGHTLY PRN
Qty: 30 TABLET | Refills: 2 | Status: SHIPPED | OUTPATIENT
Start: 2018-08-15 | End: 2018-09-24 | Stop reason: SDUPTHER

## 2018-08-15 RX ORDER — ZOLPIDEM TARTRATE 5 MG/1
5 TABLET ORAL NIGHTLY PRN
COMMUNITY
End: 2018-08-15 | Stop reason: SDUPTHER

## 2018-08-18 ENCOUNTER — HOSPITAL ENCOUNTER (EMERGENCY)
Age: 83
Discharge: HOME | End: 2018-08-18
Attending: EMERGENCY MEDICINE
Payer: MEDICARE

## 2018-08-18 VITALS
TEMPERATURE: 98.4 F | BODY MASS INDEX: 22.68 KG/M2 | HEIGHT: 63 IN | OXYGEN SATURATION: 98 % | WEIGHT: 128 LBS | HEART RATE: 90 BPM | SYSTOLIC BLOOD PRESSURE: 155 MMHG | RESPIRATION RATE: 16 BRPM | DIASTOLIC BLOOD PRESSURE: 75 MMHG

## 2018-08-18 DIAGNOSIS — I10 ESSENTIAL HYPERTENSION: Primary | ICD-10-CM

## 2018-08-18 PROCEDURE — 99283 EMERGENCY DEPT VISIT LOW MDM: CPT

## 2018-08-18 PROCEDURE — 93005 ELECTROCARDIOGRAM TRACING: CPT

## 2018-08-18 PROCEDURE — 99285 EMERGENCY DEPT VISIT HI MDM: CPT | Performed by: EMERGENCY MEDICINE

## 2018-08-18 RX ORDER — LOSARTAN POTASSIUM 25 MG/1
50 TABLET ORAL DAILY
Qty: 30 TABLET | Refills: 3 | Status: SHIPPED | OUTPATIENT
Start: 2018-08-18 | End: 2018-08-24 | Stop reason: SDUPTHER

## 2018-08-18 RX ORDER — VALSARTAN 80 MG/1
80 TABLET ORAL DAILY
Status: DISCONTINUED | OUTPATIENT
Start: 2018-08-18 | End: 2018-08-19 | Stop reason: HOSPADM

## 2018-08-18 RX ADMIN — VALSARTAN 80 MG: 80 TABLET ORAL at 21:24

## 2018-08-18 ASSESSMENT — ENCOUNTER SYMPTOMS
NAUSEA: 0
EYE PAIN: 0
SHORTNESS OF BREATH: 0
CHEST TIGHTNESS: 0
ABDOMINAL PAIN: 0
ABDOMINAL DISTENTION: 0
TROUBLE SWALLOWING: 0
COUGH: 0
RHINORRHEA: 0
BACK PAIN: 0
CONSTIPATION: 0
SORE THROAT: 0
VOMITING: 0
COLOR CHANGE: 0
PHOTOPHOBIA: 0
WHEEZING: 0
DIARRHEA: 0

## 2018-08-19 NOTE — ED PROVIDER NOTES
[facility]  eMERGENCY dEPARTMENT eNCOUnter      Pt Name: Compa Coronel  MRN: [id number]  [place] 4/21/1928  Date of evaluation: 8/18/2018  Provider: Eugenie Velázquez MD    CHIEF COMPLAINT       Chief Complaint   Patient presents with    Hypertension     Pt to ED with c/o HTN with dizziness fro approx 2 weewks          HISTORY OF PRESENT ILLNESS   (Location/Symptom, Timing/Onset, Context/Setting, Quality, Duration, Modifying Factors, Severity)  Note limiting factors. Compa Coronel is a 80 y.o. female who presents to the emergency department for elevated BP, and dizziness. Patient tells me that her blood pressure is elevated above her normal. She was sent a letter by the pharmaceutical coming up proximally 2 weeks telling her to stop taking her Diovan. Without consulting her physician patient took herself off the [address] When she did consult her physician or physician agreed with the decision. Since that time though her blood pressure has been trending upward in that 2 week timeframe. She is complaining of episodes of dizziness and some uncoordination. This evening her blood pressure is elevated at 193/98. She states that her head feels \"funny\", but she is not expressing any weakness, slurred speech, or vision changes. Patient has not had any chest pain, headache or shortness of breath. HPI    Nursing Notes were reviewed. REVIEW OF SYSTEMS    (2-9 systems for level 4, 10 or more for level 5)     Review of Systems   Constitutional: Negative for activity change, appetite change, chills, fatigue and fever. HENT: Negative for congestion, ear pain, rhinorrhea, sore throat and trouble swallowing. Eyes: Negative for photophobia, pain and visual disturbance. Respiratory: Negative for cough, chest tightness, shortness of breath and wheezing. Cardiovascular: Negative for chest pain, palpitations and leg swelling.    Gastrointestinal: Negative for abdominal distention, abdominal pain, constipation, the Diovan that she was on since the Diovan was recalled. I told her she wouldn't to follow up with her cardiologist as well as her PCP. After careful review of history, physical, and supporting data, there is very low suspicion for a life or limb threatening cause of the patients HTN. The patient's sxs have improved from presentation and appears clinically well. Patient reexamined prior to discharge and appears improved. Patient has been encouraged to follow up with and to return to the ED with any lack of improvement or worsening symptoms. The patient';s questions regarding the work up and plan were invited and answered. The patient/guardian states understanding and agreement with the plan. Patient Progress  Patient progress: stable      Reassessment      CONSULTS:  None    PROCEDURES:  Unless otherwise noted below, none     Procedures    FINAL IMPRESSION      1. Essential hypertension          DISPOSITION/PLAN   DISPOSITION Decision To Discharge 08/18/2018 10:00:35 PM      PATIENT REFERRED TO:  No follow-up provider specified.     DISCHARGE MEDICATIONS:  Discharge Medication List as of 8/18/2018 10:01 PM      START taking these medications    Details   losartan (COZAAR) 25 MG tablet Take 2 tablets by mouth daily, Disp-30 tablet, R-3Print                (Please note that portions of this note were completed with a voice recognition program.  Efforts were made to edit the dictations but occasionally words are mis-transcribed.)    Madhu Mehta MD (electronically signed)  Attending Emergency Physician          Rufus Duarte MD  08/18/18 [id number]

## 2018-08-21 ENCOUNTER — OFFICE VISIT (OUTPATIENT)
Dept: PRIMARY CARE CLINIC | Age: 83
End: 2018-08-21
Payer: MEDICARE

## 2018-08-21 VITALS
HEIGHT: 63 IN | OXYGEN SATURATION: 97 % | HEART RATE: 74 BPM | RESPIRATION RATE: 16 BRPM | WEIGHT: 128 LBS | DIASTOLIC BLOOD PRESSURE: 86 MMHG | TEMPERATURE: 97.6 F | BODY MASS INDEX: 22.68 KG/M2 | SYSTOLIC BLOOD PRESSURE: 130 MMHG

## 2018-08-21 DIAGNOSIS — I10 ESSENTIAL HYPERTENSION: Primary | ICD-10-CM

## 2018-08-21 PROCEDURE — G8427 DOCREV CUR MEDS BY ELIG CLIN: HCPCS | Performed by: FAMILY MEDICINE

## 2018-08-21 PROCEDURE — 1123F ACP DISCUSS/DSCN MKR DOCD: CPT | Performed by: FAMILY MEDICINE

## 2018-08-21 PROCEDURE — 1101F PT FALLS ASSESS-DOCD LE1/YR: CPT | Performed by: FAMILY MEDICINE

## 2018-08-21 PROCEDURE — 99213 OFFICE O/P EST LOW 20 MIN: CPT | Performed by: FAMILY MEDICINE

## 2018-08-21 PROCEDURE — G8420 CALC BMI NORM PARAMETERS: HCPCS | Performed by: FAMILY MEDICINE

## 2018-08-21 PROCEDURE — 1090F PRES/ABSN URINE INCON ASSESS: CPT | Performed by: FAMILY MEDICINE

## 2018-08-21 PROCEDURE — 1036F TOBACCO NON-USER: CPT | Performed by: FAMILY MEDICINE

## 2018-08-21 PROCEDURE — 4040F PNEUMOC VAC/ADMIN/RCVD: CPT | Performed by: FAMILY MEDICINE

## 2018-08-21 ASSESSMENT — ENCOUNTER SYMPTOMS
COUGH: 0
VOMITING: 0
COLOR CHANGE: 0
ABDOMINAL PAIN: 0
DIARRHEA: 0
WHEEZING: 0
NAUSEA: 0
BACK PAIN: 0
EYE DISCHARGE: 0

## 2018-08-21 NOTE — PROGRESS NOTES
Constitutional: Negative for activity change, appetite change and fever. HENT: Negative for congestion and nosebleeds. Eyes: Negative for discharge. Respiratory: Negative for cough and wheezing. Cardiovascular: Negative for chest pain and leg swelling. Gastrointestinal: Negative for abdominal pain, diarrhea, nausea and vomiting. Genitourinary: Negative for difficulty urinating, frequency and urgency. Musculoskeletal: Negative for back pain and gait problem. Skin: Negative for color change and rash. Neurological: Negative for dizziness and headaches. Hematological: Does not bruise/bleed easily. Psychiatric/Behavioral: Negative for sleep disturbance and suicidal ideas. OBJECTIVE:    Physical Exam   Constitutional: She is oriented to person, place, and time. She appears well-developed. No distress. HENT:   Head: Normocephalic and atraumatic. Neck: Normal range of motion. Neck supple. Cardiovascular: Normal rate, regular rhythm and normal heart sounds. No murmur heard. Pulmonary/Chest: Effort normal and breath sounds normal. No respiratory distress. Neurological: She is alert and oriented to person, place, and time. Skin: Skin is warm and dry. She is not diaphoretic. Psychiatric: She has a normal mood and affect. Her behavior is normal. Judgment and thought content normal.      /86   Pulse 74   Temp 97.6 °F (36.4 °C)   Resp 16   Ht 5' 3\" (1.6 m)   Wt 128 lb (58.1 kg)   SpO2 97%   BMI 22.67 kg/m²      ASSESSMENT:    Francia Alvarez was seen today for hypertension. Diagnoses and all orders for this visit:    Essential hypertension        PLAN:    Continue current blood pressure medications. Encouraged her to continue to monitor her blood pressure and if it is going up in the afternoons discussed that we might have her take her metoprolol twice a day. Continue losartan for now.      EMR Dragon/transcription disclaimer:  Much of this encounter note is electronic

## 2018-08-22 LAB
EKG P AXIS: NORMAL DEGREES
EKG P-R INTERVAL: NORMAL MS
EKG Q-T INTERVAL: 430 MS
EKG QRS DURATION: 114 MS
EKG QTC CALCULATION (BAZETT): 442 MS
EKG T AXIS: 80 DEGREES

## 2018-08-24 RX ORDER — LOSARTAN POTASSIUM 25 MG/1
50 TABLET ORAL DAILY
Qty: 90 TABLET | Refills: 3 | Status: SHIPPED | OUTPATIENT
Start: 2018-08-24 | End: 2018-09-21 | Stop reason: ALTCHOICE

## 2018-09-04 RX ORDER — LEVOTHYROXINE SODIUM 0.07 MG/1
75 TABLET ORAL DAILY
Qty: 90 TABLET | Refills: 3 | Status: SHIPPED | OUTPATIENT
Start: 2018-09-04 | End: 2018-09-14 | Stop reason: SDUPTHER

## 2018-09-14 ENCOUNTER — OFFICE VISIT (OUTPATIENT)
Dept: PRIMARY CARE CLINIC | Age: 83
End: 2018-09-14
Payer: MEDICARE

## 2018-09-14 VITALS
BODY MASS INDEX: 22.5 KG/M2 | OXYGEN SATURATION: 99 % | HEART RATE: 83 BPM | SYSTOLIC BLOOD PRESSURE: 150 MMHG | RESPIRATION RATE: 16 BRPM | WEIGHT: 127 LBS | TEMPERATURE: 99.1 F | DIASTOLIC BLOOD PRESSURE: 80 MMHG

## 2018-09-14 DIAGNOSIS — I10 ESSENTIAL HYPERTENSION: Primary | ICD-10-CM

## 2018-09-14 PROCEDURE — G8420 CALC BMI NORM PARAMETERS: HCPCS | Performed by: FAMILY MEDICINE

## 2018-09-14 PROCEDURE — 1101F PT FALLS ASSESS-DOCD LE1/YR: CPT | Performed by: FAMILY MEDICINE

## 2018-09-14 PROCEDURE — 99213 OFFICE O/P EST LOW 20 MIN: CPT | Performed by: FAMILY MEDICINE

## 2018-09-14 PROCEDURE — 4040F PNEUMOC VAC/ADMIN/RCVD: CPT | Performed by: FAMILY MEDICINE

## 2018-09-14 PROCEDURE — 1123F ACP DISCUSS/DSCN MKR DOCD: CPT | Performed by: FAMILY MEDICINE

## 2018-09-14 PROCEDURE — 1090F PRES/ABSN URINE INCON ASSESS: CPT | Performed by: FAMILY MEDICINE

## 2018-09-14 PROCEDURE — 1036F TOBACCO NON-USER: CPT | Performed by: FAMILY MEDICINE

## 2018-09-14 PROCEDURE — G8427 DOCREV CUR MEDS BY ELIG CLIN: HCPCS | Performed by: FAMILY MEDICINE

## 2018-09-14 RX ORDER — LEVOTHYROXINE SODIUM 0.07 MG/1
TABLET ORAL
Qty: 90 TABLET | Refills: 3 | Status: SHIPPED | OUTPATIENT
Start: 2018-09-14 | End: 2018-09-21 | Stop reason: SDUPTHER

## 2018-09-14 ASSESSMENT — PATIENT HEALTH QUESTIONNAIRE - PHQ9
1. LITTLE INTEREST OR PLEASURE IN DOING THINGS: 0
2. FEELING DOWN, DEPRESSED OR HOPELESS: 0
SUM OF ALL RESPONSES TO PHQ QUESTIONS 1-9: 0
SUM OF ALL RESPONSES TO PHQ QUESTIONS 1-9: 0
SUM OF ALL RESPONSES TO PHQ9 QUESTIONS 1 & 2: 0

## 2018-09-16 ASSESSMENT — ENCOUNTER SYMPTOMS: RESPIRATORY NEGATIVE: 1

## 2018-09-21 ENCOUNTER — OFFICE VISIT (OUTPATIENT)
Dept: PRIMARY CARE CLINIC | Age: 83
End: 2018-09-21
Payer: MEDICARE

## 2018-09-21 VITALS
BODY MASS INDEX: 21.97 KG/M2 | WEIGHT: 124 LBS | TEMPERATURE: 96.4 F | HEART RATE: 68 BPM | DIASTOLIC BLOOD PRESSURE: 68 MMHG | RESPIRATION RATE: 16 BRPM | HEIGHT: 63 IN | SYSTOLIC BLOOD PRESSURE: 122 MMHG

## 2018-09-21 DIAGNOSIS — I10 ESSENTIAL HYPERTENSION: Primary | ICD-10-CM

## 2018-09-21 PROCEDURE — 4040F PNEUMOC VAC/ADMIN/RCVD: CPT | Performed by: FAMILY MEDICINE

## 2018-09-21 PROCEDURE — 1101F PT FALLS ASSESS-DOCD LE1/YR: CPT | Performed by: FAMILY MEDICINE

## 2018-09-21 PROCEDURE — G8427 DOCREV CUR MEDS BY ELIG CLIN: HCPCS | Performed by: FAMILY MEDICINE

## 2018-09-21 PROCEDURE — 1036F TOBACCO NON-USER: CPT | Performed by: FAMILY MEDICINE

## 2018-09-21 PROCEDURE — 99213 OFFICE O/P EST LOW 20 MIN: CPT | Performed by: FAMILY MEDICINE

## 2018-09-21 PROCEDURE — G8420 CALC BMI NORM PARAMETERS: HCPCS | Performed by: FAMILY MEDICINE

## 2018-09-21 PROCEDURE — 1090F PRES/ABSN URINE INCON ASSESS: CPT | Performed by: FAMILY MEDICINE

## 2018-09-21 PROCEDURE — 1123F ACP DISCUSS/DSCN MKR DOCD: CPT | Performed by: FAMILY MEDICINE

## 2018-09-21 RX ORDER — METOPROLOL TARTRATE 50 MG/1
50 TABLET, FILM COATED ORAL 2 TIMES DAILY
Qty: 180 TABLET | Refills: 3 | Status: SHIPPED | OUTPATIENT
Start: 2018-09-21 | End: 2019-04-16 | Stop reason: DRUGHIGH

## 2018-09-21 RX ORDER — LOSARTAN POTASSIUM AND HYDROCHLOROTHIAZIDE 12.5; 5 MG/1; MG/1
1 TABLET ORAL DAILY
COMMUNITY
Start: 2018-09-15 | End: 2018-10-02 | Stop reason: SDUPTHER

## 2018-09-21 RX ORDER — LEVOTHYROXINE SODIUM 0.07 MG/1
TABLET ORAL
Qty: 90 TABLET | Refills: 3 | Status: SHIPPED | OUTPATIENT
Start: 2018-09-21 | End: 2019-01-11 | Stop reason: SDUPTHER

## 2018-09-21 RX ORDER — ATORVASTATIN CALCIUM 10 MG/1
TABLET, FILM COATED ORAL
Qty: 90 TABLET | Refills: 3 | Status: SHIPPED | OUTPATIENT
Start: 2018-09-21 | End: 2018-09-24 | Stop reason: SDUPTHER

## 2018-09-22 ASSESSMENT — ENCOUNTER SYMPTOMS: RESPIRATORY NEGATIVE: 1

## 2018-09-23 NOTE — PROGRESS NOTES
tablet by mouth for high cholesterol     Dispense:  90 tablet     Refill:  3    levothyroxine (SYNTHROID) 75 MCG tablet     Si tablet by mouth once a day for low thyroid     Dispense:  90 tablet     Refill:  3    metoprolol tartrate (LOPRESSOR) 50 MG tablet     Sig: Take 1 tablet by mouth 2 times daily     Dispense:  180 tablet     Refill:  3       ORDERS:  No orders of the defined types were placed in this encounter. She is doing very well. She has an appointment in November for a physical. Continue current medications until then. Follow-up:  Return if symptoms worsen or fail to improve. PATIENT INSTRUCTIONS:  Patient Instructions   We are committed to providing you with the best care possible. In order to help us achieve these goals please remember to bring all medications, herbal products, and over the counter supplements with you to each visit. If your provider has ordered testing for you, please be sure to follow up with our office if you have not received results within 7 days after the testing took place. *If you receive a survey after visiting one of our offices, please take time to share your experience concerning your physician office visit. These surveys are confidential and no health information about you is shared. We are eager to improve for you and we are counting on your feedback to help make that happen.                 Mahendra Stokes MD

## 2018-09-24 DIAGNOSIS — F51.01 PRIMARY INSOMNIA: Primary | ICD-10-CM

## 2018-09-24 RX ORDER — ATORVASTATIN CALCIUM 10 MG/1
TABLET, FILM COATED ORAL
Qty: 90 TABLET | Refills: 3 | Status: SHIPPED | OUTPATIENT
Start: 2018-09-24 | End: 2018-09-24 | Stop reason: SDUPTHER

## 2018-09-24 RX ORDER — ZOLPIDEM TARTRATE 5 MG/1
TABLET ORAL
Qty: 30 TABLET | Refills: 2 | Status: SHIPPED | OUTPATIENT
Start: 2018-09-24 | End: 2018-12-23

## 2018-09-24 RX ORDER — ATORVASTATIN CALCIUM 10 MG/1
TABLET, FILM COATED ORAL
Qty: 90 TABLET | Refills: 3 | Status: SHIPPED | OUTPATIENT
Start: 2018-09-24 | End: 2019-09-16 | Stop reason: SDUPTHER

## 2018-10-01 ENCOUNTER — OFFICE VISIT (OUTPATIENT)
Dept: CARDIOLOGY | Age: 83
End: 2018-10-01
Payer: MEDICARE

## 2018-10-01 VITALS
SYSTOLIC BLOOD PRESSURE: 132 MMHG | HEART RATE: 55 BPM | HEIGHT: 63 IN | BODY MASS INDEX: 22.15 KG/M2 | WEIGHT: 125 LBS | DIASTOLIC BLOOD PRESSURE: 80 MMHG

## 2018-10-01 DIAGNOSIS — I10 ESSENTIAL HYPERTENSION: Primary | ICD-10-CM

## 2018-10-01 DIAGNOSIS — I48.0 PAROXYSMAL ATRIAL FIBRILLATION (HCC): ICD-10-CM

## 2018-10-01 PROCEDURE — 99211 OFF/OP EST MAY X REQ PHY/QHP: CPT | Performed by: INTERNAL MEDICINE

## 2018-10-01 PROCEDURE — G8420 CALC BMI NORM PARAMETERS: HCPCS | Performed by: INTERNAL MEDICINE

## 2018-10-01 PROCEDURE — G8427 DOCREV CUR MEDS BY ELIG CLIN: HCPCS | Performed by: INTERNAL MEDICINE

## 2018-10-01 RX ORDER — AMLODIPINE BESYLATE 5 MG/1
2.5 TABLET ORAL DAILY
Qty: 30 TABLET | Refills: 0 | Status: SHIPPED | OUTPATIENT
Start: 2018-10-01 | End: 2019-02-12 | Stop reason: SDUPTHER

## 2018-10-01 NOTE — LETTER
Dear Andrew Stoddard MD,     Patient Active Problem List   Diagnosis    Atrial fibrillation ([facility] [address] [facility]    Low back pain    Essential hypertension    Chronic insomnia    Burning mouth syndrome    Glaucoma    Pulmonary embolism ([facility] [address]    Pure hypercholesterolemia    Acquired hypothyroidism         Mrs. Jerrod Holloway returns for blood pressure follow-up. After the last change made her pressures have clearly improved. She does however most frequently when pressures in the 140s to 150 in the afternoons. Consequently I am going to give her a trial of amlodipine 2.5 mg daily at noon. She is to let us know what her pressures run and how this is tolerated.       Sincerely yours,    Gretchen Arroyo MD  [facility]

## 2018-10-02 RX ORDER — LOSARTAN POTASSIUM AND HYDROCHLOROTHIAZIDE 12.5; 5 MG/1; MG/1
TABLET ORAL
Qty: 180 TABLET | Refills: 3 | Status: SHIPPED | OUTPATIENT
Start: 2018-10-02 | End: 2019-09-16 | Stop reason: SDUPTHER

## 2018-10-15 ENCOUNTER — NURSE ONLY (OUTPATIENT)
Dept: PRIMARY CARE CLINIC | Age: 83
End: 2018-10-15
Payer: MEDICARE

## 2018-10-15 DIAGNOSIS — Z23 NEED FOR INFLUENZA VACCINATION: Primary | ICD-10-CM

## 2018-10-15 PROCEDURE — 90688 IIV4 VACCINE SPLT 0.5 ML IM: CPT | Performed by: FAMILY MEDICINE

## 2018-10-15 PROCEDURE — G0008 ADMIN INFLUENZA VIRUS VAC: HCPCS | Performed by: FAMILY MEDICINE

## 2018-11-21 ENCOUNTER — OFFICE VISIT (OUTPATIENT)
Dept: PRIMARY CARE CLINIC | Age: 83
End: 2018-11-21
Payer: MEDICARE

## 2018-11-21 VITALS
TEMPERATURE: 96.3 F | RESPIRATION RATE: 20 BRPM | DIASTOLIC BLOOD PRESSURE: 74 MMHG | HEART RATE: 90 BPM | HEIGHT: 63 IN | SYSTOLIC BLOOD PRESSURE: 110 MMHG | OXYGEN SATURATION: 96 % | BODY MASS INDEX: 21.77 KG/M2 | WEIGHT: 122.85 LBS

## 2018-11-21 DIAGNOSIS — F51.04 CHRONIC INSOMNIA: ICD-10-CM

## 2018-11-21 DIAGNOSIS — E03.9 ACQUIRED HYPOTHYROIDISM: ICD-10-CM

## 2018-11-21 DIAGNOSIS — I10 ESSENTIAL HYPERTENSION: ICD-10-CM

## 2018-11-21 DIAGNOSIS — M54.50 CHRONIC RIGHT-SIDED LOW BACK PAIN WITHOUT SCIATICA: ICD-10-CM

## 2018-11-21 DIAGNOSIS — G89.29 CHRONIC RIGHT-SIDED LOW BACK PAIN WITHOUT SCIATICA: ICD-10-CM

## 2018-11-21 DIAGNOSIS — M79.604 RIGHT LEG PAIN: Primary | ICD-10-CM

## 2018-11-21 DIAGNOSIS — E78.00 PURE HYPERCHOLESTEROLEMIA: ICD-10-CM

## 2018-11-21 PROBLEM — M17.9 OSTEOARTHRITIS OF KNEE: Status: ACTIVE | Noted: 2018-11-21

## 2018-11-21 LAB
ALBUMIN SERPL-MCNC: 4.3 G/DL (ref 3.5–5.2)
ALP BLD-CCNC: 140 U/L (ref 35–104)
ALT SERPL-CCNC: 14 U/L (ref 5–33)
ANION GAP SERPL CALCULATED.3IONS-SCNC: 14 MMOL/L (ref 7–19)
AST SERPL-CCNC: 17 U/L (ref 5–32)
BILIRUB SERPL-MCNC: 0.5 MG/DL (ref 0.2–1.2)
BUN BLDV-MCNC: 24 MG/DL (ref 8–23)
CALCIUM SERPL-MCNC: 9.9 MG/DL (ref 8.8–10.2)
CHLORIDE BLD-SCNC: 105 MMOL/L (ref 98–111)
CHOLESTEROL, TOTAL: 165 MG/DL (ref 160–199)
CO2: 25 MMOL/L (ref 22–29)
CREAT SERPL-MCNC: 0.9 MG/DL (ref 0.5–0.9)
GFR NON-AFRICAN AMERICAN: 59
GLUCOSE BLD-MCNC: 98 MG/DL (ref 74–109)
HCT VFR BLD CALC: 43.3 % (ref 37–47)
HDLC SERPL-MCNC: 70 MG/DL (ref 65–121)
HEMOGLOBIN: 14 G/DL (ref 12–16)
LDL CHOLESTEROL CALCULATED: 71 MG/DL
MCH RBC QN AUTO: 31.1 PG (ref 27–31)
MCHC RBC AUTO-ENTMCNC: 32.3 G/DL (ref 33–37)
MCV RBC AUTO: 96.2 FL (ref 81–99)
PDW BLD-RTO: 12.9 % (ref 11.5–14.5)
PLATELET # BLD: 278 K/UL (ref 130–400)
PMV BLD AUTO: 10.1 FL (ref 9.4–12.3)
POTASSIUM SERPL-SCNC: 3.9 MMOL/L (ref 3.5–5)
RBC # BLD: 4.5 M/UL (ref 4.2–5.4)
SODIUM BLD-SCNC: 144 MMOL/L (ref 136–145)
T4 TOTAL: 10.6 UG/DL (ref 4.5–11.7)
TOTAL PROTEIN: 7.2 G/DL (ref 6.6–8.7)
TRIGL SERPL-MCNC: 122 MG/DL (ref 0–149)
TSH SERPL DL<=0.05 MIU/L-ACNC: 0.03 UIU/ML (ref 0.27–4.2)
WBC # BLD: 8.9 K/UL (ref 4.8–10.8)

## 2018-11-21 PROCEDURE — G8482 FLU IMMUNIZE ORDER/ADMIN: HCPCS | Performed by: FAMILY MEDICINE

## 2018-11-21 PROCEDURE — G8420 CALC BMI NORM PARAMETERS: HCPCS | Performed by: FAMILY MEDICINE

## 2018-11-21 PROCEDURE — 99214 OFFICE O/P EST MOD 30 MIN: CPT | Performed by: FAMILY MEDICINE

## 2018-11-21 PROCEDURE — 1036F TOBACCO NON-USER: CPT | Performed by: FAMILY MEDICINE

## 2018-11-21 PROCEDURE — 4040F PNEUMOC VAC/ADMIN/RCVD: CPT | Performed by: FAMILY MEDICINE

## 2018-11-21 PROCEDURE — 1090F PRES/ABSN URINE INCON ASSESS: CPT | Performed by: FAMILY MEDICINE

## 2018-11-21 PROCEDURE — 36415 COLL VENOUS BLD VENIPUNCTURE: CPT | Performed by: FAMILY MEDICINE

## 2018-11-21 PROCEDURE — 1123F ACP DISCUSS/DSCN MKR DOCD: CPT | Performed by: FAMILY MEDICINE

## 2018-11-21 PROCEDURE — 1101F PT FALLS ASSESS-DOCD LE1/YR: CPT | Performed by: FAMILY MEDICINE

## 2018-11-21 PROCEDURE — G8427 DOCREV CUR MEDS BY ELIG CLIN: HCPCS | Performed by: FAMILY MEDICINE

## 2018-11-21 RX ORDER — DOXEPIN HYDROCHLORIDE 25 MG/1
CAPSULE ORAL
Qty: 30 CAPSULE | Refills: 3 | Status: SHIPPED | OUTPATIENT
Start: 2018-11-21 | End: 2019-06-12 | Stop reason: DRUGHIGH

## 2018-11-26 ENCOUNTER — TELEPHONE (OUTPATIENT)
Dept: PRIMARY CARE CLINIC | Age: 83
End: 2018-11-26

## 2018-11-26 DIAGNOSIS — R26.89 LOSS OF BALANCE: ICD-10-CM
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
## 2018-11-29 ENCOUNTER — TELEPHONE (OUTPATIENT)
Dept: NEUROSURGERY | Age: 83
End: 2018-11-29

## 2018-12-01 ASSESSMENT — ENCOUNTER SYMPTOMS
RESPIRATORY NEGATIVE: 1
BACK PAIN: 1
GASTROINTESTINAL NEGATIVE: 1

## 2018-12-05 DIAGNOSIS — M79.604 RIGHT LEG PAIN: ICD-10-CM

## 2019-01-11 RX ORDER — LEVOTHYROXINE SODIUM 0.07 MG/1
TABLET ORAL
Qty: 90 TABLET | Refills: 3 | Status: SHIPPED | OUTPATIENT
Start: 2019-01-11 | End: 2020-03-26

## 2019-01-21 ENCOUNTER — OFFICE VISIT (OUTPATIENT)
Dept: NEUROSURGERY | Age: 84
End: 2019-01-21
Payer: MEDICARE

## 2019-01-21 VITALS
HEIGHT: 63 IN | OXYGEN SATURATION: 98 % | HEART RATE: 79 BPM | BODY MASS INDEX: 22.43 KG/M2 | WEIGHT: 126.6 LBS | SYSTOLIC BLOOD PRESSURE: 126 MMHG | DIASTOLIC BLOOD PRESSURE: 74 MMHG

## 2019-01-21 DIAGNOSIS — R29.6 FALLS FREQUENTLY: Primary | ICD-10-CM

## 2019-01-21 PROCEDURE — 1036F TOBACCO NON-USER: CPT | Performed by: NURSE PRACTITIONER

## 2019-01-21 PROCEDURE — G8420 CALC BMI NORM PARAMETERS: HCPCS | Performed by: NURSE PRACTITIONER

## 2019-01-21 PROCEDURE — G8427 DOCREV CUR MEDS BY ELIG CLIN: HCPCS | Performed by: NURSE PRACTITIONER

## 2019-01-21 PROCEDURE — G8482 FLU IMMUNIZE ORDER/ADMIN: HCPCS | Performed by: NURSE PRACTITIONER

## 2019-01-21 PROCEDURE — 4040F PNEUMOC VAC/ADMIN/RCVD: CPT | Performed by: NURSE PRACTITIONER

## 2019-01-21 PROCEDURE — 99204 OFFICE O/P NEW MOD 45 MIN: CPT | Performed by: NURSE PRACTITIONER

## 2019-01-21 PROCEDURE — 1123F ACP DISCUSS/DSCN MKR DOCD: CPT | Performed by: NURSE PRACTITIONER

## 2019-01-21 PROCEDURE — 1101F PT FALLS ASSESS-DOCD LE1/YR: CPT | Performed by: NURSE PRACTITIONER

## 2019-01-21 PROCEDURE — 1090F PRES/ABSN URINE INCON ASSESS: CPT | Performed by: NURSE PRACTITIONER

## 2019-01-21 RX ORDER — ZOLPIDEM TARTRATE 5 MG/1
1 TABLET ORAL NIGHTLY
COMMUNITY
Start: 2019-01-04 | End: 2019-03-06

## 2019-02-08 ENCOUNTER — HOSPITAL ENCOUNTER (OUTPATIENT)
Dept: MRI IMAGING | Age: 84
Discharge: HOME | End: 2019-02-08
Payer: MEDICARE

## 2019-02-08 DIAGNOSIS — R29.6 FALLS FREQUENTLY: ICD-10-CM

## 2019-02-08 LAB
GFR NON-AFRICAN AMERICAN: 47
PERFORMED ON: ABNORMAL
POC CREATININE: 1.1 MG/DL (ref 0.3–1.3)
POC SAMPLE TYPE: ABNORMAL

## 2019-02-08 PROCEDURE — 70553 MRI BRAIN STEM W/O & W/DYE: CPT

## 2019-02-08 PROCEDURE — 82565 ASSAY OF CREATININE: CPT

## 2019-02-08 PROCEDURE — A9577 INJ MULTIHANCE: HCPCS | Performed by: NURSE PRACTITIONER

## 2019-02-08 PROCEDURE — 6360000004 HC RX CONTRAST MEDICATION: Performed by: NURSE PRACTITIONER

## 2019-02-08 RX ADMIN — GADOBENATE DIMEGLUMINE 11 ML: 529 INJECTION, SOLUTION INTRAVENOUS at 13:21

## 2019-02-12 ENCOUNTER — OFFICE VISIT (OUTPATIENT)
Dept: CARDIOLOGY | Age: 84
End: 2019-02-12
Payer: MEDICARE

## 2019-02-12 VITALS
SYSTOLIC BLOOD PRESSURE: 146 MMHG | DIASTOLIC BLOOD PRESSURE: 84 MMHG | HEIGHT: 63 IN | WEIGHT: 125 LBS | HEART RATE: 78 BPM | BODY MASS INDEX: 22.15 KG/M2

## 2019-02-12 DIAGNOSIS — I10 ESSENTIAL HYPERTENSION: Primary | ICD-10-CM

## 2019-02-12 PROCEDURE — 93000 ELECTROCARDIOGRAM COMPLETE: CPT | Performed by: NURSE PRACTITIONER

## 2019-02-12 PROCEDURE — 99213 OFFICE O/P EST LOW 20 MIN: CPT | Performed by: NURSE PRACTITIONER

## 2019-02-12 RX ORDER — AMLODIPINE BESYLATE 5 MG/1
5 TABLET ORAL DAILY
Qty: 60 TABLET | Refills: 3 | Status: SHIPPED | OUTPATIENT
Start: 2019-02-12 | End: 2019-03-06 | Stop reason: SDUPTHER

## 2019-02-22 ENCOUNTER — TELEPHONE (OUTPATIENT)
Dept: CARDIOLOGY | Age: 84
End: 2019-02-22

## 2019-03-01 ENCOUNTER — TELEPHONE (OUTPATIENT)
Dept: CARDIOLOGY | Age: 84
End: 2019-03-01

## 2019-03-01 ENCOUNTER — TELEPHONE (OUTPATIENT)
Dept: PRIMARY CARE CLINIC | Age: 84
End: 2019-03-01

## 2019-03-06 ENCOUNTER — OFFICE VISIT (OUTPATIENT)
Dept: PRIMARY CARE CLINIC | Age: 84
End: 2019-03-06
Payer: MEDICARE

## 2019-03-06 VITALS
BODY MASS INDEX: 21.97 KG/M2 | OXYGEN SATURATION: 98 % | WEIGHT: 124 LBS | HEART RATE: 59 BPM | SYSTOLIC BLOOD PRESSURE: 138 MMHG | HEIGHT: 63 IN | DIASTOLIC BLOOD PRESSURE: 78 MMHG | TEMPERATURE: 97.8 F

## 2019-03-06 DIAGNOSIS — I10 ESSENTIAL HYPERTENSION: Primary | ICD-10-CM

## 2019-03-06 DIAGNOSIS — I48.0 PAROXYSMAL ATRIAL FIBRILLATION (HCC): ICD-10-CM

## 2019-03-06 DIAGNOSIS — M79.604 RIGHT LEG PAIN: ICD-10-CM

## 2019-03-06 LAB
ANION GAP SERPL CALCULATED.3IONS-SCNC: 11 MMOL/L (ref 7–19)
BUN BLDV-MCNC: 16 MG/DL (ref 8–23)
CALCIUM SERPL-MCNC: 9.5 MG/DL (ref 8.8–10.2)
CHLORIDE BLD-SCNC: 105 MMOL/L (ref 98–111)
CO2: 29 MMOL/L (ref 22–29)
CREAT SERPL-MCNC: 0.8 MG/DL (ref 0.5–0.9)
GFR NON-AFRICAN AMERICAN: >60
GLUCOSE BLD-MCNC: 101 MG/DL (ref 74–109)
POTASSIUM SERPL-SCNC: 4.1 MMOL/L (ref 3.5–5)
SODIUM BLD-SCNC: 145 MMOL/L (ref 136–145)

## 2019-03-06 PROCEDURE — 1090F PRES/ABSN URINE INCON ASSESS: CPT | Performed by: FAMILY MEDICINE

## 2019-03-06 PROCEDURE — G8482 FLU IMMUNIZE ORDER/ADMIN: HCPCS | Performed by: FAMILY MEDICINE

## 2019-03-06 PROCEDURE — 1036F TOBACCO NON-USER: CPT | Performed by: FAMILY MEDICINE

## 2019-03-06 PROCEDURE — 4040F PNEUMOC VAC/ADMIN/RCVD: CPT | Performed by: FAMILY MEDICINE

## 2019-03-06 PROCEDURE — 1123F ACP DISCUSS/DSCN MKR DOCD: CPT | Performed by: FAMILY MEDICINE

## 2019-03-06 PROCEDURE — G8427 DOCREV CUR MEDS BY ELIG CLIN: HCPCS | Performed by: FAMILY MEDICINE

## 2019-03-06 PROCEDURE — 99213 OFFICE O/P EST LOW 20 MIN: CPT | Performed by: FAMILY MEDICINE

## 2019-03-06 PROCEDURE — 1101F PT FALLS ASSESS-DOCD LE1/YR: CPT | Performed by: FAMILY MEDICINE

## 2019-03-06 PROCEDURE — G8420 CALC BMI NORM PARAMETERS: HCPCS | Performed by: FAMILY MEDICINE

## 2019-03-06 RX ORDER — AMLODIPINE BESYLATE 5 MG/1
TABLET ORAL
Qty: 45 TABLET | Refills: 2 | Status: SHIPPED | OUTPATIENT
Start: 2019-03-06 | End: 2019-08-27

## 2019-03-06 ASSESSMENT — PATIENT HEALTH QUESTIONNAIRE - PHQ9
SUM OF ALL RESPONSES TO PHQ QUESTIONS 1-9: 0
SUM OF ALL RESPONSES TO PHQ QUESTIONS 1-9: 0
SUM OF ALL RESPONSES TO PHQ9 QUESTIONS 1 & 2: 0
1. LITTLE INTEREST OR PLEASURE IN DOING THINGS: 0
2. FEELING DOWN, DEPRESSED OR HOPELESS: 0

## 2019-03-08 ENCOUNTER — TELEPHONE (OUTPATIENT)
Dept: CARDIOLOGY | Age: 84
End: 2019-03-08

## 2019-03-09 PROBLEM — I48.0 PAROXYSMAL ATRIAL FIBRILLATION (HCC): Status: ACTIVE | Noted: 2019-03-09

## 2019-03-09 ASSESSMENT — ENCOUNTER SYMPTOMS
GASTROINTESTINAL NEGATIVE: 1
RESPIRATORY NEGATIVE: 1

## 2019-04-16 ENCOUNTER — OFFICE VISIT (OUTPATIENT)
Dept: CARDIOLOGY | Age: 84
End: 2019-04-16
Payer: MEDICARE

## 2019-04-16 VITALS
HEIGHT: 63 IN | SYSTOLIC BLOOD PRESSURE: 152 MMHG | BODY MASS INDEX: 23.21 KG/M2 | WEIGHT: 131 LBS | DIASTOLIC BLOOD PRESSURE: 98 MMHG | HEART RATE: 62 BPM

## 2019-04-16 DIAGNOSIS — R94.31 ABNORMAL EKG: ICD-10-CM

## 2019-04-16 DIAGNOSIS — E78.00 PURE HYPERCHOLESTEROLEMIA: ICD-10-CM

## 2019-04-16 DIAGNOSIS — R53.83 OTHER FATIGUE: ICD-10-CM

## 2019-04-16 DIAGNOSIS — R06.09 DOE (DYSPNEA ON EXERTION): ICD-10-CM

## 2019-04-16 DIAGNOSIS — I10 ESSENTIAL HYPERTENSION: Primary | ICD-10-CM

## 2019-04-16 DIAGNOSIS — I48.0 PAROXYSMAL ATRIAL FIBRILLATION (HCC): ICD-10-CM

## 2019-04-16 PROCEDURE — 4040F PNEUMOC VAC/ADMIN/RCVD: CPT | Performed by: NURSE PRACTITIONER

## 2019-04-16 PROCEDURE — G8427 DOCREV CUR MEDS BY ELIG CLIN: HCPCS | Performed by: NURSE PRACTITIONER

## 2019-04-16 PROCEDURE — 1036F TOBACCO NON-USER: CPT | Performed by: NURSE PRACTITIONER

## 2019-04-16 PROCEDURE — 1090F PRES/ABSN URINE INCON ASSESS: CPT | Performed by: NURSE PRACTITIONER

## 2019-04-16 PROCEDURE — G8420 CALC BMI NORM PARAMETERS: HCPCS | Performed by: NURSE PRACTITIONER

## 2019-04-16 PROCEDURE — 93000 ELECTROCARDIOGRAM COMPLETE: CPT | Performed by: NURSE PRACTITIONER

## 2019-04-16 PROCEDURE — 99213 OFFICE O/P EST LOW 20 MIN: CPT | Performed by: NURSE PRACTITIONER

## 2019-04-16 PROCEDURE — 1123F ACP DISCUSS/DSCN MKR DOCD: CPT | Performed by: NURSE PRACTITIONER

## 2019-04-16 RX ORDER — METOPROLOL TARTRATE 50 MG/1
50 TABLET, FILM COATED ORAL 2 TIMES DAILY
COMMUNITY
End: 2019-12-06

## 2019-04-16 NOTE — PROGRESS NOTES
Dear Andrew Todd MD & Orin Murdock MD,    Thank you for allowing me to participate in the care of Ms. Hayder Davison. She presents today at the [address] [place] [address] in the [facility]. As you know, Ms. Mukesh Salas is a 80 y.o. female with history of hypertension, hyperlipidemia, glaucoma, hypothyroidism and PAF who presents with the chief complaint of 6 month follow up. She is a patient of Dr. Jarad Sales. HTN-Blood pressures at home are well controlled (see scanned doc). She had run in at dentist this am and has been upset today. HLD-PCP manages. PAF-denies palpitations but states she could never feel when she was out in the past.   C/O SOB and Fatigue with exertion that is getting worse. She noticed this change as gradual. She is wanting to get out and do yard work. Daughter states she has declined over the last year or so but states since she is wanting to get out and do more, she is more verbal now. Last Echo March of 2018 mild MR, mildly dilated left atrium, preserved LV function and normal size, diastolic dysfunction. She otherwise denies chest pain, PND, orthopnea, syncope or near syncope. She has no other complaints. Review of Systems    Constitutional: Negative for fever, chills, diaphoresis, activity change, appetite change,  and unexpected weight change. +worsening fatigue  Eyes: Negative for photophobia, pain, redness and visual disturbance. Respiratory: Negative for apnea, cough, chest tightness, +worsening shortness of breath, wheezing and stridor. Cardiovascular: Negative for chest pain, palpitations and leg swelling. Gastrointestinal: Negative for abdominal distention. Genitourinary: Negative for dysuria, urgency and frequency. Musculoskeletal: Negative for myalgias, arthralgias and gait problem. Skin: Negative for color change, pallor, rash and wound.    Neurological: Negative for dizziness, tremors, speech difficulty, weakness and numbness. Hematological: Does not bruise/bleed easily. Psychiatric/Behavioral: Negative. Past Medical History:   Diagnosis Date    Burning mouth syndrome     Chronic insomnia     Glaucoma     Hyperlipidemia     Hypertension     Hypothyroidism     Low back pain     Pure hypercholesterolemia 5/10/2016       Past Surgical History:   Procedure Laterality Date    APPENDECTOMY      BREAST BIOPSY      CARPAL TUNNEL RELEASE      COLONOSCOPY  2005        ERCP      ? Dr.Whitney Carrasco [place] HEEL SPUR SURGERY      HIP ARTHROPLASTY Left January 30, 2014    HYSTERECTOMY      REVISION TOTAL HIP ARTHROPLASTY Left February 4, 2014    UPPER GASTROINTESTINAL ENDOSCOPY      UPPER GASTROINTESTINAL ENDOSCOPY  12/2011           Family History   Problem Relation Age of Onset    Heart Disease Mother     Lung Cancer Father        Social History     Socioeconomic History    Marital status:       Spouse name: Not on file    Number of children: 2    Years of education: Not on file    Highest education level: Not on file   Occupational History    Occupation: Homemaker   Social Needs    Financial resource strain: Not on file    Food insecurity:     Worry: Not on file     Inability: Not on file    Transportation needs:     Medical: Not on file     Non-medical: Not on file   Tobacco Use    Smoking status: Never Smoker    Smokeless tobacco: Never Used   Substance and Sexual Activity    Alcohol use: No    Drug use: No    Sexual activity: Not Currently   Lifestyle    Physical activity:     Days per week: Not on file     Minutes per session: Not on file    Stress: Not on file   Relationships    Social connections:     Talks on phone: Not on file     Gets together: Not on file     Attends Islam service: Not on file     Active member of club or organization: Not on file     Attends meetings of clubs or organizations: Not on file     Relationship status: Not on file   [facility] Cardiovascular: Normal rate, regular rhythm, normal heart sounds. no murmur ascultated. No gallop and no friction rub.  no carotid bruits. no peripheral edema. Pulmonary/Chest:  Lungs clear to auscultation bilaterally without evidence of respiratory distress. She without wheezes. She without rales or ronchi. Musculoskeletal: Normal range of motion. Gait is normal no assitive device. Neurological: She is alert and oriented to person, place, and time. Skin: Skin is warm and dry without rash or pallor. Psychiatric: She has a normal mood and affect. Her behavior is normal. Thought content normal.     Lab Results   Component Value Date    CREATININE 0.8 03/06/2019    CREATININE 1.1 02/08/2019    CREATININE 0.9 11/21/2018    CREATININE 0.9 05/08/2018    CREATININE 1.0 12/20/2011    HGB 14.0 11/21/2018    HGB 14.2 11/09/2017    HGB 14.9 09/12/2017       ECG 04/16/19  Normal sinus rhythm and T wave changes compared to prior EKG Feb 2019     TTE 3/27/2018    Summary   Mitral annular calcification is present.   Mitral valve leaflet mobility is normal .   Mild mitral regurgitation is present.   Individual aortic valve leaflets are not clearly visualized.  Kelly Decent thickened aortic valve leaflets with preserved leaflet mobility.   Aortic valve appears to be tricuspid.  Kelly Decent dilated left atrium.   The left ventricle was not well visualized.   Normal left ventricular size with preserved LV function and an estimated   ejection fraction of approximately 55-60%.  E/A flow reversal noted. Suggestive of diastolic dysfunction.      Signature      ----------------------------------------------------------------   Electronically signed by Olivia Garcia MD(Interpreting   [id number]) on 03/29/2018 07:51 AM      Assessment    1. Essential hypertension    2. Paroxysmal atrial fibrillation (HCC)    3. Pure hypercholesterolemia    4. Abnormal EKG    5. GAY (dyspnea on exertion)    6.  Other fatigue

## 2019-04-16 NOTE — PATIENT INSTRUCTIONS
[place] at the [address] [facility] and [address] [facility] located on the first floor of [place] [address] through hospital main entrance and turn immediately to your left. Date: Thur May 2nd 8:00 A. M for 8 :30 A. M    Lexiscan Stress Test      Lexiscan (regadenoson injection) is a prescription drug given through an IV line that increases blood flow through the arteries of the heart during a cardiac nuclear stress test.     There are two parts to a Lexiscan stress test: the rest portion and the exercise portion. For the rest portion, a radioactive tracer is injected into your arm through the IV. After 30 to 60 minutes, the process of imaging will begin. A nuclear camera will be placed on your chest area and images are taken for the next 15 to 20 minutes. For the exercise portion, a nurse will attach EKG electrodes to your chest to monitor your heart rate. The drug Larissa Less is administered to simulate stress on the heart. Your heart rhythm will then be monitored for the next few minutes. Your blood pressure will also be monitored throughout the exercise portion. [place] through the exercise portion, a second round of radioactive tracer is injected into your body. Your heart rate and EKG will be monitored for another few minutes after administering the drug. Test Preparation:     Bring a list of your current medications. Do not take any of your medications the morning of the test, but bring all morning medications with you as you will take them after the stress portion of the test is completed.  Do not eat Bananas 24 hours prior to test.     No caffeine 24 hours prior to the testing. This includes: coffee, pop/soda, chocolate, cold medications, etc.  Any product that might contain caffeine.  No nicotine or alcohol 12 hours prior to your test.    Nothing to eat or drink 4-6 hours prior to appointment time.   It is okay to drink small amounts of water during the four hours prior to the test.   Nitroglycerin patches must be taken off 1 hour before testing.  Wear comfortable clothing.  Please refrain from any strenuous exercise or activities the day before your test, or the day of your test.   The Nuclear Lexiscan Stress test takes about 2 ½ to 3 hours to complete. If for any reason you are unable to keep this appointment, please contact Outpatient Scheduling, [phone], as soon as possible to reschedule.

## 2019-05-02 ENCOUNTER — HOSPITAL ENCOUNTER (OUTPATIENT)
Dept: NON INVASIVE DIAGNOSTICS | Age: 84
Discharge: HOME | End: 2019-05-02
Payer: MEDICARE

## 2019-05-02 ENCOUNTER — HOSPITAL ENCOUNTER (OUTPATIENT)
Dept: NUCLEAR MEDICINE | Age: 84
Discharge: HOME | End: 2019-05-04
Payer: MEDICARE

## 2019-05-02 DIAGNOSIS — R53.83 OTHER FATIGUE: ICD-10-CM

## 2019-05-02 DIAGNOSIS — R94.31 ABNORMAL EKG: ICD-10-CM

## 2019-05-02 DIAGNOSIS — R06.09 DOE (DYSPNEA ON EXERTION): ICD-10-CM

## 2019-05-02 PROCEDURE — 78452 HT MUSCLE IMAGE SPECT MULT: CPT

## 2019-05-02 PROCEDURE — 93017 CV STRESS TEST TRACING ONLY: CPT

## 2019-05-02 PROCEDURE — A9500 TC99M SESTAMIBI: HCPCS | Performed by: NURSE PRACTITIONER

## 2019-05-02 PROCEDURE — 3430000000 HC RX DIAGNOSTIC RADIOPHARMACEUTICAL: Performed by: NURSE PRACTITIONER

## 2019-05-02 PROCEDURE — 6360000002 HC RX W HCPCS: Performed by: INTERNAL MEDICINE

## 2019-05-02 RX ADMIN — REGADENOSON 0.4 MG: 0.08 INJECTION, SOLUTION INTRAVENOUS at 09:30

## 2019-05-02 RX ADMIN — TETRAKIS(2-METHOXYISOBUTYLISOCYANIDE)COPPER(I) TETRAFLUOROBORATE 30 MILLICURIE: 1 INJECTION, POWDER, LYOPHILIZED, FOR SOLUTION INTRAVENOUS at 11:12

## 2019-05-02 RX ADMIN — TETRAKIS(2-METHOXYISOBUTYLISOCYANIDE)COPPER(I) TETRAFLUOROBORATE 10 MILLICURIE: 1 INJECTION, POWDER, LYOPHILIZED, FOR SOLUTION INTRAVENOUS at 11:12

## 2019-05-08 LAB
LV EF: 86 %
LVEF MODALITY: NORMAL

## 2019-05-09 ENCOUNTER — TELEPHONE (OUTPATIENT)
Dept: CARDIOLOGY | Age: 84
End: 2019-05-09

## 2019-05-09 NOTE — TELEPHONE ENCOUNTER
Called and spoke to patient, gave the following results per Sima Nuñez;    Darryle Brave, APRN P [facility] Staff             Please notify negative or normal Lexiscan with normal wall motion and EF. Patient voiced understanding, said she would be at appt next week to review details.

## 2019-05-14 ENCOUNTER — OFFICE VISIT (OUTPATIENT)
Dept: CARDIOLOGY | Age: 84
End: 2019-05-14
Payer: MEDICARE

## 2019-05-14 VITALS
DIASTOLIC BLOOD PRESSURE: 84 MMHG | HEART RATE: 64 BPM | BODY MASS INDEX: 22.86 KG/M2 | HEIGHT: 63 IN | SYSTOLIC BLOOD PRESSURE: 136 MMHG | WEIGHT: 129 LBS

## 2019-05-14 DIAGNOSIS — I51.89 DIASTOLIC DYSFUNCTION: ICD-10-CM

## 2019-05-14 DIAGNOSIS — I48.0 PAROXYSMAL ATRIAL FIBRILLATION (HCC): ICD-10-CM

## 2019-05-14 DIAGNOSIS — E78.00 PURE HYPERCHOLESTEROLEMIA: ICD-10-CM

## 2019-05-14 DIAGNOSIS — I10 ESSENTIAL HYPERTENSION: Primary | ICD-10-CM

## 2019-05-14 PROCEDURE — 99213 OFFICE O/P EST LOW 20 MIN: CPT | Performed by: NURSE PRACTITIONER

## 2019-05-14 PROCEDURE — 4040F PNEUMOC VAC/ADMIN/RCVD: CPT | Performed by: NURSE PRACTITIONER

## 2019-05-14 PROCEDURE — G8427 DOCREV CUR MEDS BY ELIG CLIN: HCPCS | Performed by: NURSE PRACTITIONER

## 2019-05-14 PROCEDURE — 1090F PRES/ABSN URINE INCON ASSESS: CPT | Performed by: NURSE PRACTITIONER

## 2019-05-14 PROCEDURE — G8420 CALC BMI NORM PARAMETERS: HCPCS | Performed by: NURSE PRACTITIONER

## 2019-05-14 PROCEDURE — 1036F TOBACCO NON-USER: CPT | Performed by: NURSE PRACTITIONER

## 2019-05-14 PROCEDURE — 1123F ACP DISCUSS/DSCN MKR DOCD: CPT | Performed by: NURSE PRACTITIONER

## 2019-05-14 NOTE — PROGRESS NOTES
Dear Drs. No ref. provider found Xavier Bagley MD,    Thank you for allowing me to participate in the care of Ms. Radha Salazar. She presents today at the [address] [place] [address] in the [facility]. As you know, Ms. Johana Kussmaul is a 80 y.o. female with history of hypertension, hyperlipidemia, glaucoma, hypothyroidism and PAF who presents with the chief complaint of one month follow up [facility]. She is a patient of Dr. Genell Cheadle. Patient was seen by myself last month at which time she had worsening fatigue and shortness of breath. A Mary scan was ordered which was grossly negative. She continues to have fatigue and SOB that is no worse nor any better. She continues to keep active. She mowed her yard and weed eated the day after her lexiscan. Per last note:   C/O SOB and Fatigue with exertion that is getting worse. She noticed this change as gradual. She is wanting to get out and do yard work. Daughter states she has declined over the last year or so but states since she is wanting to get out and do more, she is more verbal now. Last Echo March of 2018 mild MR, mildly dilated left atrium, preserved LV function and normal size, diastolic dysfunction. She otherwise denies chest pain, PND, orthopnea, syncope or near syncope. She has no other complaints. Review of Systems    Constitutional: Negative for fever, chills, diaphoresis, activity change, appetite change,  and unexpected weight change. +worsening fatigue  Eyes: Negative for photophobia, pain, redness and visual disturbance. Respiratory: Negative for apnea, cough, chest tightness, +worsening shortness of breath, wheezing and stridor. Cardiovascular: Negative for chest pain, palpitations and leg swelling. Gastrointestinal: Negative for abdominal distention. Genitourinary: Negative for dysuria, urgency and frequency. Musculoskeletal: Negative for myalgias, arthralgias and gait problem.    Skin: Negative for color change, pallor, rash and wound. Neurological: Negative for dizziness, tremors, speech difficulty, weakness and numbness. Hematological: Does not bruise/bleed easily. Psychiatric/Behavioral: Negative. Past Medical History:   Diagnosis Date    Burning mouth syndrome     Chronic insomnia     Glaucoma     Hyperlipidemia     Hypertension     Hypothyroidism     Low back pain     Pure hypercholesterolemia 5/10/2016       Past Surgical History:   Procedure Laterality Date    APPENDECTOMY      BREAST BIOPSY      CARPAL TUNNEL RELEASE      COLONOSCOPY  2005        ERCP      ? Dr.Whitney Larry [place] HEEL SPUR SURGERY      HIP ARTHROPLASTY Left January 30, 2014    HYSTERECTOMY      REVISION TOTAL HIP ARTHROPLASTY Left February 4, 2014    UPPER GASTROINTESTINAL ENDOSCOPY      UPPER GASTROINTESTINAL ENDOSCOPY  12/2011           Family History   Problem Relation Age of Onset    Heart Disease Mother     Lung Cancer Father        Social History     Socioeconomic History    Marital status:       Spouse name: Not on file    Number of children: 2    Years of education: Not on file    Highest education level: Not on file   Occupational History    Occupation: Homemaker   Social Needs    Financial resource strain: Not on file    Food insecurity:     Worry: Not on file     Inability: Not on file    Transportation needs:     Medical: Not on file     Non-medical: Not on file   Tobacco Use    Smoking status: Never Smoker    Smokeless tobacco: Never Used   Substance and Sexual Activity    Alcohol use: No    Drug use: No    Sexual activity: Not Currently   Lifestyle    Physical activity:     Days per week: Not on file     Minutes per session: Not on file    Stress: Not on file   Relationships    Social connections:     Talks on phone: Not on file     Gets together: Not on file     Attends Sikh service: Not on file     Active member of club or organization: Not on file     Attends meetings of clubs or organizations: Not on file     Relationship status: Not on file    Intimate partner violence:     Fear of current or ex partner: Not on file     Emotionally abused: Not on file     Physically abused: Not on file     Forced sexual activity: Not on file   Other Topics Concern    Not on file   Social History Narrative    Not on file       Allergies   Allergen Reactions    Gabapentin Swelling    Stadol [Butorphanol Tartrate]          Current Outpatient Medications:     metoprolol tartrate (LOPRESSOR) 50 MG tablet, Take 50 mg by mouth 2 times daily, Disp: , Rfl:     amLODIPine (NORVASC) 5 MG tablet, Take half tablet by mouth at night. (Patient taking differently: Take 2.5 mg by mouth daily ), Disp: 45 tablet, Rfl: 2    aspirin 81 MG tablet, aspirin  daily, Disp: , Rfl:     levothyroxine (SYNTHROID) 75 MCG tablet, 1 tablet by mouth once a day for low thyroid, Disp: 90 tablet, Rfl: 3    doxepin (SINEQUAN) 25 MG capsule, 1 tablet by mouth as needed for sleep, Disp: 30 capsule, Rfl: 3    losartan-hydrochlorothiazide (HYZAAR) 50-12.5 MG per tablet, 1 tablet by mouth twice a day for labile hypertension, Disp: 180 tablet, Rfl: 3    atorvastatin (LIPITOR) 10 MG tablet, 1 tablet by mouth for high cholesterol daily, Disp: 90 tablet, Rfl: 3    Probiotic Product (PROBIOTIC-10) CAPS, Take by mouth once, Disp: , Rfl:     sodium chloride (AIDA 128) 5 % ophthalmic solution, Place 1 drop into both eyes 2 times daily, Disp: , Rfl:     timolol (TIMOPTIC) 0.5 % ophthalmic solution, , Disp: , Rfl:     ZIOPTAN 0.0015 % SOLN, , Disp: , Rfl:     PE:  Vitals:    05/14/19 1015   BP: 136/84   Pulse: 64       Estimated body mass index is 22.85 kg/m² as calculated from the following:    Height as of this encounter: 5' 3\" (1.6 m). Weight as of this encounter: 129 lb (58.5 kg). Constitutional: She is oriented to person, place, and time.  She appears well-developed and well-nourished in no acute distress. Head: Normocephalic and atraumatic. Neck:  Neck supple without JVD present. Cardiovascular: Normal rate, regular rhythm, normal heart sounds. no murmur ascultated. No gallop and no friction rub.  no carotid bruits. no peripheral edema. Pulmonary/Chest:  Lungs clear to auscultation bilaterally without evidence of respiratory distress. She without wheezes. She without rales or ronchi. Musculoskeletal: Normal range of motion. Gait is normal no assitive device. Neurological: She is alert and oriented to person, place, and time. Skin: Skin is warm and dry without rash or pallor. Psychiatric: She has a normal mood and affect. Her behavior is normal. Thought content normal.     Lab Results   Component Value Date    CREATININE 0.8 03/06/2019    CREATININE 1.1 02/08/2019    CREATININE 0.9 11/21/2018    CREATININE 0.9 05/08/2018    CREATININE 1.0 12/20/2011    HGB 14.0 11/21/2018    HGB 14.2 11/09/2017    HGB 14.9 09/12/2017     Lexiscan :   Conclusions:   Grossly negative Cardiolyte for the presence of ischemia or infarction   with normal wall motion and ejection fraction at rest.   Signed by Dr Olivier Acevedo on 5/8/2019 7:36 PM         TTE 3/27/2018    Summary   Mitral annular calcification is present.   Mitral valve leaflet mobility is normal .   Mild mitral regurgitation is present.   Individual aortic valve leaflets are not clearly visualized.  Babak Lee thickened aortic valve leaflets with preserved leaflet mobility.   Aortic valve appears to be tricuspid.  Babak Lee dilated left atrium.   The left ventricle was not well visualized.   Normal left ventricular size with preserved LV function and an estimated   ejection fraction of approximately 55-60%.  E/A flow reversal noted.  Suggestive of diastolic dysfunction.      Signature      ----------------------------------------------------------------   Electronically signed by Jordan Pandya MD(Interpreting   [id number]) on 03/29/2018 07:51 AM      Assessment    1. Essential hypertension    2. Pure hypercholesterolemia    3. Paroxysmal atrial fibrillation (HCC)    4. Diastolic dysfunction          Plan:  HTN-controlled, no change  HLD-on statin, pcp manages   PAF-on BB  EKG changes, worsening SOB, Worsening Fatigue- Mary scan was negative    Disposition - RTC in 6 months on day Dr. Cook Heart in office or sooner if needed    Please do not hesitate to contact me for any questions or concerns. Sincerely yours,    LACI Govea    This dictation was generated by voice recognition computer software. Although all attempts are made to edit dictation for accuracy, there may be errors in the transcription that are not intended.

## 2019-05-14 NOTE — Clinical Note
FYI- patient had wanted to see you and for you to go over results. I assured her I would let you know she had been seen and worked up.

## 2019-05-16 ENCOUNTER — CARE COORDINATION (OUTPATIENT)
Dept: CARE COORDINATION | Age: 84
End: 2019-05-16

## 2019-05-16 NOTE — CARE COORDINATION
This CHW had this patient's medical record jotted on a post-it note. No other documentation. Telephoned the patient as follow-up. Asked patient how she was doing. She told this CHW she was \"doin' great for a 80year old woman. \"  Patient said she was preparing to \"go mow and weed eat. \"    Asked patient about her medication costs and transportation. She stated not an issue. Wished patient well.     Submitted by Addy/EMIR

## 2019-06-04 ENCOUNTER — OFFICE VISIT (OUTPATIENT)
Dept: PRIMARY CARE CLINIC | Age: 84
End: 2019-06-04
Payer: MEDICARE

## 2019-06-04 VITALS
OXYGEN SATURATION: 97 % | HEART RATE: 62 BPM | HEIGHT: 63 IN | DIASTOLIC BLOOD PRESSURE: 75 MMHG | SYSTOLIC BLOOD PRESSURE: 125 MMHG | WEIGHT: 128.2 LBS | TEMPERATURE: 97 F | BODY MASS INDEX: 22.71 KG/M2 | RESPIRATION RATE: 18 BRPM

## 2019-06-04 DIAGNOSIS — F51.04 CHRONIC INSOMNIA: ICD-10-CM

## 2019-06-04 DIAGNOSIS — E78.00 PURE HYPERCHOLESTEROLEMIA: ICD-10-CM

## 2019-06-04 DIAGNOSIS — E03.9 ACQUIRED HYPOTHYROIDISM: ICD-10-CM

## 2019-06-04 DIAGNOSIS — Z00.00 ROUTINE GENERAL MEDICAL EXAMINATION AT A HEALTH CARE FACILITY: Primary | ICD-10-CM

## 2019-06-04 DIAGNOSIS — I10 ESSENTIAL HYPERTENSION: ICD-10-CM

## 2019-06-04 LAB
ALBUMIN SERPL-MCNC: 4.4 G/DL (ref 3.5–5.2)
ALP BLD-CCNC: 149 U/L (ref 35–104)
ALT SERPL-CCNC: 15 U/L (ref 5–33)
ANION GAP SERPL CALCULATED.3IONS-SCNC: 20 MMOL/L (ref 7–19)
AST SERPL-CCNC: 18 U/L (ref 5–32)
BILIRUB SERPL-MCNC: 0.5 MG/DL (ref 0.2–1.2)
BUN BLDV-MCNC: 21 MG/DL (ref 8–23)
CALCIUM SERPL-MCNC: 10.2 MG/DL (ref 8.2–9.6)
CHLORIDE BLD-SCNC: 105 MMOL/L (ref 98–111)
CHOLESTEROL, TOTAL: 171 MG/DL (ref 160–199)
CO2: 23 MMOL/L (ref 22–29)
CREAT SERPL-MCNC: 0.9 MG/DL (ref 0.5–0.9)
GFR NON-AFRICAN AMERICAN: 59
GLUCOSE BLD-MCNC: 106 MG/DL (ref 74–109)
HCT VFR BLD CALC: 43.9 % (ref 37–47)
HDLC SERPL-MCNC: 74 MG/DL (ref 65–121)
HEMOGLOBIN: 14.3 G/DL (ref 12–16)
LDL CHOLESTEROL CALCULATED: 73 MG/DL
MCH RBC QN AUTO: 32.9 PG (ref 27–31)
MCHC RBC AUTO-ENTMCNC: 32.6 G/DL (ref 33–37)
MCV RBC AUTO: 100.9 FL (ref 81–99)
PDW BLD-RTO: 13.1 % (ref 11.5–14.5)
PLATELET # BLD: 288 K/UL (ref 130–400)
PMV BLD AUTO: 10.3 FL (ref 9.4–12.3)
POTASSIUM SERPL-SCNC: 3.9 MMOL/L (ref 3.5–5)
RBC # BLD: 4.35 M/UL (ref 4.2–5.4)
SODIUM BLD-SCNC: 148 MMOL/L (ref 136–145)
T4 TOTAL: 9.4 UG/DL (ref 4.5–11.7)
TOTAL PROTEIN: 7.3 G/DL (ref 6.6–8.7)
TRIGL SERPL-MCNC: 118 MG/DL (ref 0–149)
TSH SERPL DL<=0.05 MIU/L-ACNC: 0.02 UIU/ML (ref 0.27–4.2)
WBC # BLD: 9.6 K/UL (ref 4.8–10.8)

## 2019-06-04 PROCEDURE — G0439 PPPS, SUBSEQ VISIT: HCPCS | Performed by: FAMILY MEDICINE

## 2019-06-04 PROCEDURE — G8420 CALC BMI NORM PARAMETERS: HCPCS | Performed by: FAMILY MEDICINE

## 2019-06-04 PROCEDURE — 36415 COLL VENOUS BLD VENIPUNCTURE: CPT | Performed by: FAMILY MEDICINE

## 2019-06-04 PROCEDURE — 1123F ACP DISCUSS/DSCN MKR DOCD: CPT | Performed by: FAMILY MEDICINE

## 2019-06-04 PROCEDURE — 1036F TOBACCO NON-USER: CPT | Performed by: FAMILY MEDICINE

## 2019-06-04 PROCEDURE — 1090F PRES/ABSN URINE INCON ASSESS: CPT | Performed by: FAMILY MEDICINE

## 2019-06-04 PROCEDURE — 99213 OFFICE O/P EST LOW 20 MIN: CPT | Performed by: FAMILY MEDICINE

## 2019-06-04 PROCEDURE — G8427 DOCREV CUR MEDS BY ELIG CLIN: HCPCS | Performed by: FAMILY MEDICINE

## 2019-06-04 PROCEDURE — 4040F PNEUMOC VAC/ADMIN/RCVD: CPT | Performed by: FAMILY MEDICINE

## 2019-06-04 ASSESSMENT — PATIENT HEALTH QUESTIONNAIRE - PHQ9
SUM OF ALL RESPONSES TO PHQ QUESTIONS 1-9: 0
SUM OF ALL RESPONSES TO PHQ QUESTIONS 1-9: 0

## 2019-06-04 ASSESSMENT — LIFESTYLE VARIABLES: HOW OFTEN DO YOU HAVE A DRINK CONTAINING ALCOHOL: 0

## 2019-06-04 ASSESSMENT — ANXIETY QUESTIONNAIRES: GAD7 TOTAL SCORE: 0

## 2019-06-04 NOTE — PATIENT INSTRUCTIONS
Personalized Preventive Plan for Skye Ridley - 6/4/2019  Medicare offers a range of preventive health benefits. Some of the tests and screenings are paid in full while other may be subject to a deductible, co-insurance, and/or copay. Some of these benefits include a comprehensive review of your medical history including lifestyle, illnesses that may run in your family, and various assessments and screenings as appropriate. After reviewing your medical record and screening and assessments performed today your provider may have ordered immunizations, labs, imaging, and/or referrals for you. A list of these orders (if applicable) as well as your Preventive Care list are included within your After Visit Summary for your review. Other Preventive Recommendations:    · A preventive eye exam performed by an eye specialist is recommended every 1-2 years to screen for glaucoma; cataracts, macular degeneration, and other eye disorders. · A preventive dental visit is recommended every 6 months. · Try to get at least 150 minutes of exercise per week or 10,000 steps per day on a pedometer . · Order or download the FREE \"Exercise & Physical Activity: Your Everyday Guide\" from The "[employer]. Call 1-871.315.8678 or search The "[employer] online. · You need 4273-3594 mg of calcium and 1174-0186 IU of vitamin D per day. It is possible to meet your calcium requirement with diet alone, but a vitamin D supplement is usually necessary to meet this goal.  · When exposed to the sun, use a sunscreen that protects against both UVA and UVB radiation with an SPF of 30 or greater. Reapply every 2 to 3 hours or after sweating, drying off with a towel, or swimming. · Always wear a seat belt when traveling in a car. Always wear a helmet when riding a bicycle or motorcycle.     Keeping Home a [facility]       As we get older, changes in balance, gait, strength, vision, hearing, and cognition make even the most youthful senior more prone to accidents. Falls are one of the leading health risks for older people. This increased risk of falling is related to:   Aging process (eg, decreased muscle strength, slowed reflexes)   Higher incidence of chronic health problems (eg, arthritis, diabetes) that may limit mobility, agility or sensory awareness   Side effects of medicine (eg, dizziness, blurred vision)especially medicines like prescription pain medicines and drugs used to treat mental health conditions   Depending on the brittleness of your bones, the consequences of a fall can be serious and long lasting. Home Life   Research by the Association of Aging [facility]) shows that some home accidents among older adults can be prevented by making simple lifestyle changes and basic modifications and repairs to the home environment. Here are some lifestyle changes that experts recommend:   Have your hearing and vision checked regularly. Be sure to wear prescription glasses that are right for you. Speak to your doctor or pharmacist about the possible side effects of your medicines. A number of medicines can cause dizziness. If you have problems with sleep, talk to your doctor. Limit your intake of alcohol. If necessary, use a cane or walker to help maintain your balance. Wear supportive, rubber-soled shoes, even at home. If you live in a region that gets wintry weather, you may want to put special cleats on your shoes to prevent you from slipping on the snow and ice. Exercise regularly to help maintain muscle tone, agility, and balance. Always hold the banister when going up or down stairs. Also, use  bars when getting in or out of the bath or shower, or using the toilet. To avoid dizziness, get up slowly from a lying down position. Sit up first, dangling your legs for a minute or two before rising to a standing position.    Overall Home Safety Check   According to the Consumer Product Safety Commision's \"Older Consumer Home Safety Checklist,\" it is important to check for potential hazards in each room. And remember, proper lighting is an essential factor in home safety. If you cannot see clearly, you are more likely to fall. Important questions to ask yourself include:   Are lamp, electric, extension, and telephone cords placed out of the flow of traffic and maintained in good condition? Have frayed cords been replaced? Are all small rugs and runners slip resistant? If not, you can secure them to the floor with a special double-sided carpet tape. Are smoke detectors properly locatedone on every floor of your home and one outside of every sleeping area? Are they in good working order? Are batteries replaced at least once a year? Do you have a well-maintained carbon monoxide detector outside every sleeping are in your home? Does your furniture layout leave plenty of space to maneuver between and around chairs, tables, beds, and sofas? Are hallways, stairs and passages between rooms well lit? Can you reach a lamp without getting out of bed? Are floor surfaces well maintained? Shag rugs, high-pile carpeting, tile floors, and polished wood floors can be particularly slippery. Stairs should always have handrails and be carpeted or fitted with a non-skid tread. Is your telephone easily reachable. Is the cord safely tucked away? Room by Room   According to the Association of Aging, bathrooms and huyen are the two most potentially hazardous rooms in your home. In the Kitchen    Be sure your stove is in proper working order and always make sure burners and the oven are off before you go out or go to sleep. Keep pots on the back burners, turn handles away from the front of the stove, and keep stove clean and free of grease build-up. Kitchen ventilation systems and range exhausts should be working properly.     Keep flammable objects such as towels and pot holders away from the cooking area except when in use. Make sure kitchen curtains are tied back. Move cords and appliances away from the sink and hot surfaces. If extension cords are needed, install wiring guides so they do not hang over the sink, range, or working areas. Look for coffee pots, kettles and toaster ovens with automatic shut-offs. Keep a mop handy in the kitchen so you can wipe up spills instantly. You should also have a small fire extinguisher. Arrange your kitchen with frequently used items on lower shelves to avoid the need to stand on a stepstool to reach them. Make sure countertops are well-lit to avoid injuries while cutting and preparing food. In the Bathroom    Use a non-slip mat or decals in the tub and shower, since wet, soapy tile or porcelain surfaces are extremely slippery. Make sure bathroom rugs are non-skid or tape them firmly to the floor. Bathtubs should have at least one, preferably two, grab bars, firmly attached to structural supports in the wall. (Do not use built-in soap holders or glass shower doors as grab bars.)    Tub seats fitted with non-slip material on the legs allow you to wash sitting down. For people with limited mobility, bathtub transfer benches allow you to slide safely into the tub. Raised toilet seats and toilet safety rails are helpful for those with knee or hip problems. In the [facility]    Make sure you use a nightlight and that the area around your bed is clear of potential obstacles. Be careful with electric blankets and never go to sleep with a heating pad, which can cause serious burns even if on a low setting. Use fire-resistant mattress covers and pillows, and NEVER smoke in bed. Keep a phone next to the bed that is programmed to dial 911 at the push of a button. If you have a chronic condition, you may want to sign on with an automatic call-in service.  Typically the system includes a small pendant that connects directly to an emergency medical

## 2019-06-04 NOTE — PROGRESS NOTES
Subjective:      Patient ID: Shonna Pedroza is a 80 y.o. female who came in today for Medicare annual wellness but she has multiple problems we follow including hypertension, hypercholesterolemia and hypothyroidism. HPI. She denies any problems with her atorvastatin. She's having no memory problems or leg cramps. She still has dizziness but says she has good days and bad days. She is complaining of right shin pain. She did see the orthopedist but the creams they gave her did not help. Dr. Ella Walsh will give her shots in her right knee and that helps her shin pain for a few weeks. She still having trouble sleeping on doxepin 25 mg. She is no longer taking her amlodipine. She denies any chest pain or shortness of breath. Review of Systems   Constitutional: Positive for activity change. HENT: Negative. Eyes: Negative for visual disturbance. Respiratory: Negative. Cardiovascular: Negative. Gastrointestinal: Negative. Musculoskeletal: Positive for arthralgias and myalgias. Neurological: Positive for dizziness and light-headedness. Negative for weakness. Hematological: Negative for adenopathy. Bruises/bleeds easily. Psychiatric/Behavioral: Negative. Objective:   Physical Exam   Constitutional: She is oriented to person, place, and time. She appears well-developed and well-nourished. No distress. HENT:   Head: Normocephalic. Right Ear: External ear normal.   Left Ear: External ear normal.   Mouth/Throat: Oropharynx is clear and moist.   Eyes: Pupils are equal, round, and reactive to light. Conjunctivae are normal.   Neck: Normal range of motion. Neck supple. No JVD present. Cardiovascular: Normal rate, regular rhythm, normal heart sounds and intact distal pulses. Pulmonary/Chest: Effort normal and breath sounds normal.   Abdominal: Soft. Bowel sounds are normal.   Musculoskeletal: Normal range of motion. She exhibits no edema.    Her right shin appears normal with no rash, increased warmth, mass or redness. Dorsalis pedis pulses 1+ and equal. Chronic changes of osteoarthritis of hands. Lymphadenopathy:     She has no cervical adenopathy. Neurological: She is alert and oriented to person, place, and time. Skin: Skin is warm and dry. Psychiatric: She has a normal mood and affect. Her behavior is normal. Judgment and thought content normal.   Vitals reviewed. Assessment:      1. Routine general medical examination at a health care facility    2. Essential hypertension    3. Pure hypercholesterolemia    4. Acquired hypothyroidism    5. Chronic insomnia            Plan:      MEDICATIONS:  No orders of the defined types were placed in this encounter. Continue current medications. We will refill when needed. ORDERS:  Orders Placed This Encounter   Procedures    Comprehensive Metabolic Panel    Lipid Panel    TSH without Reflex    T4    CBC     We will contact her only get results of her blood work. We did discuss diet and exercise. If her dizziness worsens or she starts to fall more she is to call. I wonder if her shin pain is referred from her knee. If it worsens she should follow-up with Dr. Agustin Ro. Follow-up:  Return in 1 year (on 2020) for Medicare Annual Wellness Visit in 1 year. Boby Velasquez MD    EMR Dragon/transcription disclaimer:  Muchof this encounter note is electronic transcription/translation of spoken language to printed texts. The electronic translation of spoken language may be erroneous, or at times, nonsensical words or phrases may beinadvertently transcribed. Although I have reviewed the note for such errors, some may still exist.Medicare Annual Wellness Visit  Name: Naresh Schmitt Date: 2019   MRN: [id number] Sex: Female   Age: 80 y.o.  Ethnicity: Non-/Non    : 1928 Race: Rosalba Gipson is here for Medicare AWV    Screenings for behavioral, psychosocial and functional/safety risks, and cognitive dysfunction are all negative except as indicated below. These results, as well as other patient data from the [address] form, are documented in Flowsheets linked to this Encounter. Allergies   Allergen Reactions    Gabapentin Swelling    Stadol [Butorphanol Tartrate]      Prior to Visit Medications    Medication Sig Taking? Authorizing Provider   metoprolol tartrate (LOPRESSOR) 50 MG tablet Take 50 mg by mouth 2 times daily Yes Historical Provider, MD   amLODIPine (NORVASC) 5 MG tablet Take half tablet by mouth at night. Patient taking differently: Take 2.5 mg by mouth daily  Yes Laine Bueno MD   aspirin 81 MG tablet aspirin   daily Yes Historical Provider, MD   levothyroxine (SYNTHROID) 75 MCG tablet 1 tablet by mouth once a day for low thyroid Yes Laine Bueno MD   doxepin (SINEQUAN) 25 MG capsule 1 tablet by mouth as needed for sleep Yes Laine Bueno MD   losartan-hydrochlorothiazide (HYZAAR) 50-12.5 MG per tablet 1 tablet by mouth twice a day for labile hypertension Yes Laine Bueno MD   atorvastatin (LIPITOR) 10 MG tablet 1 tablet by mouth for high cholesterol daily Yes Gonzalo Vega MD   Probiotic Product (PROBIOTIC-10) CAPS Take by mouth once Yes Historical Provider, MD   sodium chloride (AIDA 128) 5 % ophthalmic solution Place 1 drop into both eyes 2 times daily Yes Historical Provider, MD   timolol (TIMOPTIC) 0.5 % ophthalmic solution  Yes Historical Provider, MD   ZIOPTAN 0.0015 % SOLN  Yes Historical Provider, MD     Past Medical History:   Diagnosis Date    Burning mouth syndrome     Chronic insomnia     Glaucoma     Hyperlipidemia     Hypertension     Hypothyroidism     Low back pain     Pure hypercholesterolemia 5/10/2016     Past Surgical History:   Procedure Laterality Date    APPENDECTOMY      BREAST BIOPSY      CARPAL TUNNEL RELEASE      COLONOSCOPY  2005        ERCP      ?  [facility] HEEL SPUR SURGERY      HIP ARTHROPLASTY Left January 30, 2014    HYSTERECTOMY      REVISION TOTAL HIP ARTHROPLASTY Left February 4, 2014    UPPER GASTROINTESTINAL ENDOSCOPY      UPPER GASTROINTESTINAL ENDOSCOPY  12/2011         Family History   Problem Relation Age of Onset    Heart Disease Mother    Patience Favorite Father        CareTeam (Including outside providers/suppliers regularly involved in providing care):   Patient Care Team:  Vu Singh MD as PCP - Rosalie Miranda MD as PCP - [facility] Empaneled Provider  Sheilda Lombard, MD as Consulting Physician (Cardiology)  LACI Denise as Advanced Practice Nurse (Neurology)    Wt Readings from Last 3 Encounters:   06/04/19 128 lb 3.2 oz (58.2 kg)   05/14/19 129 lb (58.5 kg)   04/16/19 131 lb (59.4 kg)     Vitals:    06/04/19 0938   BP: 125/75   Site: Left Upper Arm   Position: Sitting   Cuff Size: Medium Adult   Pulse: 62   Resp: 18   Temp: 97 °F (36.1 °C)   TempSrc: Temporal   SpO2: 97%   Weight: 128 lb 3.2 oz (58.2 kg)   Height: 5' 3\" (1.6 m)     Body mass index is 22.71 kg/m². Based upon direct observation of the patient, evaluation of cognition reveals recent and remote memory intact. Patient's complete Health Risk Assessment and screening values have been reviewed and are found in Flowsheets. The following problems were reviewed today and where indicated follow up appointments were made and/or referrals ordered.     Positive Risk Factor Screenings with Interventions:     Safety:  Safety  Do you have working smoke detectors?: Yes  Have all throw rugs been removed or fastened?: (!) No  Do you have non-slip mats in all bathtubs?: Yes  Do all of your stairways have a railing or banister?: Yes  Are your doorways, halls and stairs free of clutter?: Yes  Do you always fasten your seatbelt when you are in a car?: Yes  Safety Interventions:  · Home safety tips provided    Personalized Preventive Plan   Current Health Maintenance Status  Immunization History   Administered Date(s) Administered    DT 01/01/2005    Influenza Virus Vaccine 10/10/2012, 10/13/2014, 10/09/2015    Influenza, Intradermal, Preservative free 10/17/2013    Influenza, [place], 3 Years and older, IM (Fluzone 3 yrs and older or Afluria 5 yrs and older) 10/23/2017, 10/15/2018    Influenza, [place], 3 yrs and older, IM, PF (Fluzone 3 yrs and older or Afluria 5 yrs and older) 10/12/2016    Pneumococcal 13-valent Conjugate ([id number]) 11/08/2017    Pneumococcal Polysaccharide ([id number]) 08/30/2013    Td, unspecified formulation 07/15/2015        Health Maintenance   Topic Date Due    Shingles Vaccine (1 of 2) 04/21/1978    TSH testing  11/21/2019    Potassium monitoring  03/06/2020    Creatinine monitoring  03/06/2020    DTaP/Tdap/Td vaccine (3 - Tdap) 07/15/2025    Flu vaccine  Completed    Pneumococcal 65+ years Vaccine  Completed     Recommendations for Preventive Services Due: see orders and patient instructions/AVS.  .   Recommended screening schedule for the next 5-10 years is provided to the patient in written form: see Patient Instructions/AVS.

## 2019-06-09 ASSESSMENT — ENCOUNTER SYMPTOMS
GASTROINTESTINAL NEGATIVE: 1
RESPIRATORY NEGATIVE: 1

## 2019-06-11 ENCOUNTER — TELEPHONE (OUTPATIENT)
Dept: PRIMARY CARE CLINIC | Age: 84
End: 2019-06-11

## 2019-06-11 NOTE — TELEPHONE ENCOUNTER
Pt Daughter N  called stating Pt was last week and discussed increasing sleep med.   Pt states it works and needs new dose sent to Pharmacy

## 2019-06-12 RX ORDER — DOXEPIN HYDROCHLORIDE 50 MG/1
CAPSULE ORAL
Qty: 90 CAPSULE | Refills: 3 | Status: SHIPPED | OUTPATIENT
Start: 2019-06-12 | End: 2020-07-24

## 2019-07-08 ENCOUNTER — OFFICE VISIT (OUTPATIENT)
Dept: PRIMARY CARE CLINIC | Age: 84
End: 2019-07-08
Payer: MEDICARE

## 2019-07-08 VITALS
SYSTOLIC BLOOD PRESSURE: 128 MMHG | TEMPERATURE: 96.7 F | OXYGEN SATURATION: 97 % | HEIGHT: 63 IN | BODY MASS INDEX: 23.21 KG/M2 | RESPIRATION RATE: 18 BRPM | DIASTOLIC BLOOD PRESSURE: 78 MMHG | WEIGHT: 131 LBS | HEART RATE: 67 BPM

## 2019-07-08 DIAGNOSIS — R60.9 PERIPHERAL EDEMA: ICD-10-CM

## 2019-07-08 DIAGNOSIS — R26.89 LOSS OF BALANCE: Primary | ICD-10-CM

## 2019-07-08 DIAGNOSIS — M25.472 ANKLE SWELLING, LEFT: ICD-10-CM

## 2019-07-08 DIAGNOSIS — E87.0 HYPERNATREMIA: ICD-10-CM

## 2019-07-08 PROCEDURE — G8420 CALC BMI NORM PARAMETERS: HCPCS | Performed by: FAMILY MEDICINE

## 2019-07-08 PROCEDURE — 4040F PNEUMOC VAC/ADMIN/RCVD: CPT | Performed by: FAMILY MEDICINE

## 2019-07-08 PROCEDURE — 1123F ACP DISCUSS/DSCN MKR DOCD: CPT | Performed by: FAMILY MEDICINE

## 2019-07-08 PROCEDURE — 99214 OFFICE O/P EST MOD 30 MIN: CPT | Performed by: FAMILY MEDICINE

## 2019-07-08 PROCEDURE — G8427 DOCREV CUR MEDS BY ELIG CLIN: HCPCS | Performed by: FAMILY MEDICINE

## 2019-07-08 PROCEDURE — 1036F TOBACCO NON-USER: CPT | Performed by: FAMILY MEDICINE

## 2019-07-08 PROCEDURE — 1090F PRES/ABSN URINE INCON ASSESS: CPT | Performed by: FAMILY MEDICINE

## 2019-07-17 NOTE — PROGRESS NOTES
The results are in Pt chart.
Oropharynx is clear and moist.   Eyes: Pupils are equal, round, and reactive to light. Conjunctivae are normal.   Neck: Normal range of motion. Neck supple. No JVD present. Carotid bruit is not present. Cardiovascular: Normal rate, regular rhythm and normal heart sounds. No murmur heard. Pulmonary/Chest: Effort normal and breath sounds normal. No respiratory distress. Musculoskeletal: Normal range of motion. She exhibits edema (1+ edema in ankles). Lymphadenopathy:     She has no cervical adenopathy. Neurological: She is alert and oriented to person, place, and time. She displays normal reflexes. No cranial nerve deficit or sensory deficit. She exhibits normal muscle tone. Coordination abnormal.   She does have difficulty walking down the ramsay and is  off balance, touches walls to stay upright   Skin: Skin is warm, dry and intact. Psychiatric: She has a normal mood and affect. Her speech is normal and behavior is normal. Judgment and thought content normal. Cognition and memory are normal.   Vitals reviewed. Assessment:      1. Loss of balance    2. Peripheral edema    3. Ankle swelling, left    4. Hypernatremia            Plan:      MEDICATIONS:  No orders of the defined types were placed in this encounter. Continue current medications. We will refill when needed. ORDERS:  Orders Placed This Encounter   Procedures    XR ANKLE LEFT (2 VIEWS)    Basic Metabolic Panel    External Referral To Neurology   I will check a BMP because I want to look at her renal function again along with her sodium. I am going to call to [facility] and find a neurologist that specializes in balance problems. we will x-ray her left ankle at the swelling is still there. I advised her to elevate it for at least an hour every afternoon and put on a compression stocking. She really doesn't want to wear that.     I encouraged her to use her walker until we figure out if there is anything that can be done about

## 2019-08-12 ENCOUNTER — HOSPITAL ENCOUNTER (OUTPATIENT)
Dept: NON INVASIVE DIAGNOSTICS | Age: 84
Discharge: HOME | End: 2019-08-12
Payer: MEDICARE

## 2019-08-12 ENCOUNTER — OFFICE VISIT (OUTPATIENT)
Dept: PRIMARY CARE CLINIC | Age: 84
End: 2019-08-12
Payer: MEDICARE

## 2019-08-12 VITALS
SYSTOLIC BLOOD PRESSURE: 106 MMHG | TEMPERATURE: 98.1 F | WEIGHT: 129 LBS | HEART RATE: 71 BPM | OXYGEN SATURATION: 92 % | HEIGHT: 63 IN | BODY MASS INDEX: 22.86 KG/M2 | DIASTOLIC BLOOD PRESSURE: 70 MMHG | RESPIRATION RATE: 22 BRPM

## 2019-08-12 DIAGNOSIS — M25.472 ANKLE SWELLING, LEFT: ICD-10-CM

## 2019-08-12 DIAGNOSIS — R60.0 LEG EDEMA, LEFT: Primary | ICD-10-CM

## 2019-08-12 DIAGNOSIS — R60.0 LEG EDEMA, LEFT: ICD-10-CM

## 2019-08-12 DIAGNOSIS — I48.0 PAROXYSMAL ATRIAL FIBRILLATION (HCC): ICD-10-CM

## 2019-08-12 LAB
ALBUMIN SERPL-MCNC: 4 G/DL (ref 3.5–5.2)
ALP BLD-CCNC: 163 U/L (ref 35–104)
ALT SERPL-CCNC: 10 U/L (ref 5–33)
ANION GAP SERPL CALCULATED.3IONS-SCNC: 14 MMOL/L (ref 7–19)
AST SERPL-CCNC: 15 U/L (ref 5–32)
BASOPHILS ABSOLUTE: 0.1 K/UL (ref 0–0.2)
BASOPHILS RELATIVE PERCENT: 0.5 % (ref 0–1)
BILIRUB SERPL-MCNC: 0.4 MG/DL (ref 0.2–1.2)
BUN BLDV-MCNC: 22 MG/DL (ref 8–23)
CALCIUM SERPL-MCNC: 9.7 MG/DL (ref 8.2–9.6)
CHLORIDE BLD-SCNC: 105 MMOL/L (ref 98–111)
CO2: 26 MMOL/L (ref 22–29)
CREAT SERPL-MCNC: 0.9 MG/DL (ref 0.5–0.9)
EOSINOPHILS ABSOLUTE: 0.6 K/UL (ref 0–0.6)
EOSINOPHILS RELATIVE PERCENT: 6 % (ref 0–5)
GFR NON-AFRICAN AMERICAN: 59
GLUCOSE BLD-MCNC: 92 MG/DL (ref 74–109)
HCT VFR BLD CALC: 42.1 % (ref 37–47)
HEMOGLOBIN: 13.5 G/DL (ref 12–16)
LYMPHOCYTES ABSOLUTE: 3 K/UL (ref 1.1–4.5)
LYMPHOCYTES RELATIVE PERCENT: 31.2 % (ref 20–40)
MCH RBC QN AUTO: 32 PG (ref 27–31)
MCHC RBC AUTO-ENTMCNC: 32.1 G/DL (ref 33–37)
MCV RBC AUTO: 99.8 FL (ref 81–99)
MONOCYTES ABSOLUTE: 0.8 K/UL (ref 0–0.9)
MONOCYTES RELATIVE PERCENT: 8.7 % (ref 0–10)
NEUTROPHILS ABSOLUTE: 5.2 K/UL (ref 1.5–7.5)
NEUTROPHILS RELATIVE PERCENT: 53.3 % (ref 50–65)
PDW BLD-RTO: 12.5 % (ref 11.5–14.5)
PLATELET # BLD: 272 K/UL (ref 130–400)
PMV BLD AUTO: 10.5 FL (ref 9.4–12.3)
POTASSIUM SERPL-SCNC: 3.9 MMOL/L (ref 3.5–5)
PRO-BNP: 1147 PG/ML (ref 0–1800)
RBC # BLD: 4.22 M/UL (ref 4.2–5.4)
SODIUM BLD-SCNC: 145 MMOL/L (ref 136–145)
TOTAL PROTEIN: 7.1 G/DL (ref 6.6–8.7)
WBC # BLD: 9.7 K/UL (ref 4.8–10.8)

## 2019-08-12 PROCEDURE — 36415 COLL VENOUS BLD VENIPUNCTURE: CPT | Performed by: FAMILY MEDICINE

## 2019-08-12 PROCEDURE — 1090F PRES/ABSN URINE INCON ASSESS: CPT | Performed by: FAMILY MEDICINE

## 2019-08-12 PROCEDURE — G8427 DOCREV CUR MEDS BY ELIG CLIN: HCPCS | Performed by: FAMILY MEDICINE

## 2019-08-12 PROCEDURE — 1036F TOBACCO NON-USER: CPT | Performed by: FAMILY MEDICINE

## 2019-08-12 PROCEDURE — 93971 EXTREMITY STUDY: CPT

## 2019-08-12 PROCEDURE — 4040F PNEUMOC VAC/ADMIN/RCVD: CPT | Performed by: FAMILY MEDICINE

## 2019-08-12 PROCEDURE — 1123F ACP DISCUSS/DSCN MKR DOCD: CPT | Performed by: FAMILY MEDICINE

## 2019-08-12 PROCEDURE — 99213 OFFICE O/P EST LOW 20 MIN: CPT | Performed by: FAMILY MEDICINE

## 2019-08-12 PROCEDURE — G8420 CALC BMI NORM PARAMETERS: HCPCS | Performed by: FAMILY MEDICINE

## 2019-08-13 ASSESSMENT — ENCOUNTER SYMPTOMS
BACK PAIN: 0
NAUSEA: 0
DIARRHEA: 0
COLOR CHANGE: 0
WHEEZING: 0
VOMITING: 0
EYE DISCHARGE: 0
COUGH: 0
ABDOMINAL PAIN: 0

## 2019-08-13 NOTE — PROGRESS NOTES
SUBJECTIVE:    Patient ID: Espinoza Bran is a 80 y.o. female. HPI:   Patient presents today with complaints of left leg swelling. She states that this started a few days ago. She states it is mostly located from her mid shin down. She states that she has had a history of blood clots in the past.  She has had pulmonary emboli. She states that she is also had a left hip surgery but this was 5 or 6 years ago. She states that she is not having any pain in that leg. She states that she has not injured that leg that she is aware of. She denies any shortness of breath. She does have a history of paroxysmal A. Fib. She states that she has had a stress test and everything was good on the stress test.  This was done a few months ago. She does see cardiology. She states that she is urinating normally. She denies any chest pain. Past Medical History:   Diagnosis Date    Burning mouth syndrome     Chronic insomnia     Glaucoma     Hyperlipidemia     Hypertension     Hypothyroidism     Low back pain     Pure hypercholesterolemia 5/10/2016      Current Outpatient Medications   Medication Sig Dispense Refill    doxepin (SINEQUAN) 50 MG capsule 1 capsule by mouth at bedtime for chronic insomnia 90 capsule 3    metoprolol tartrate (LOPRESSOR) 50 MG tablet Take 50 mg by mouth 2 times daily      amLODIPine (NORVASC) 5 MG tablet Take half tablet by mouth at night.  (Patient taking differently: Take 2.5 mg by mouth daily ) 45 tablet 2    aspirin 81 MG tablet aspirin   daily      levothyroxine (SYNTHROID) 75 MCG tablet 1 tablet by mouth once a day for low thyroid 90 tablet 3    losartan-hydrochlorothiazide (HYZAAR) 50-12.5 MG per tablet 1 tablet by mouth twice a day for labile hypertension 180 tablet 3    atorvastatin (LIPITOR) 10 MG tablet 1 tablet by mouth for high cholesterol daily 90 tablet 3    Probiotic Product (PROBIOTIC-10) CAPS Take by mouth once      sodium chloride (AIDA 128) 5 % ophthalmic 22.85 kg/m²      ASSESSMENT:    Woodrow Baker was seen today for leg swelling. Diagnoses and all orders for this visit:    Leg edema, left  -     US DUP LOWER EXTREMITY LEFT VIJAYA; Future  -     Brain Natriuretic Peptide  -     CBC Auto Differential  -     Comprehensive Metabolic Panel    Ankle swelling, left  -     Brain Natriuretic Peptide  -     CBC Auto Differential  -     Comprehensive Metabolic Panel    Paroxysmal atrial fibrillation (HCC)  -     Brain Natriuretic Peptide  -     CBC Auto Differential  -     Comprehensive Metabolic Panel        PLAN:    See lab orders. We'll notify results. Follow-up with us if symptoms are not improving. Will notify of results of U/S as well. EMR Dragon/transcription disclaimer:  Much of this encounter note is electronic transcription/translation of spoken language toprinted texts. The electronic translation of spoken language may be erroneous, or at times, nonsensical words or phrases may be inadvertently transcribed.   Although I have reviewed the note for such errors, some may stillexist.

## 2019-08-27 ENCOUNTER — OFFICE VISIT (OUTPATIENT)
Dept: CARDIOLOGY | Age: 84
End: 2019-08-27
Payer: MEDICARE

## 2019-08-27 VITALS
BODY MASS INDEX: 23.04 KG/M2 | SYSTOLIC BLOOD PRESSURE: 150 MMHG | HEIGHT: 63 IN | HEART RATE: 66 BPM | DIASTOLIC BLOOD PRESSURE: 70 MMHG | WEIGHT: 130 LBS

## 2019-08-27 DIAGNOSIS — I10 ESSENTIAL HYPERTENSION: ICD-10-CM

## 2019-08-27 DIAGNOSIS — I48.0 PAROXYSMAL ATRIAL FIBRILLATION (HCC): ICD-10-CM

## 2019-08-27 DIAGNOSIS — R60.0 LOWER EXTREMITY EDEMA: Primary | ICD-10-CM

## 2019-08-27 PROCEDURE — 1090F PRES/ABSN URINE INCON ASSESS: CPT | Performed by: INTERNAL MEDICINE

## 2019-08-27 PROCEDURE — G8427 DOCREV CUR MEDS BY ELIG CLIN: HCPCS | Performed by: INTERNAL MEDICINE

## 2019-08-27 PROCEDURE — 1036F TOBACCO NON-USER: CPT | Performed by: INTERNAL MEDICINE

## 2019-08-27 PROCEDURE — 4040F PNEUMOC VAC/ADMIN/RCVD: CPT | Performed by: INTERNAL MEDICINE

## 2019-08-27 PROCEDURE — 0296T PR EXT ECG > 48HR TO 21 DAY RCRD W/CONECT INTL RCRD: CPT | Performed by: INTERNAL MEDICINE

## 2019-08-27 PROCEDURE — G8420 CALC BMI NORM PARAMETERS: HCPCS | Performed by: INTERNAL MEDICINE

## 2019-08-27 PROCEDURE — 1123F ACP DISCUSS/DSCN MKR DOCD: CPT | Performed by: INTERNAL MEDICINE

## 2019-08-27 PROCEDURE — 93000 ELECTROCARDIOGRAM COMPLETE: CPT | Performed by: INTERNAL MEDICINE

## 2019-08-27 PROCEDURE — 99213 OFFICE O/P EST LOW 20 MIN: CPT | Performed by: INTERNAL MEDICINE

## 2019-08-27 RX ORDER — AMLODIPINE BESYLATE 2.5 MG/1
2.5 TABLET ORAL
Qty: 30 TABLET | Refills: 3
Start: 2019-08-27 | End: 2020-03-03

## 2019-09-16 RX ORDER — LOSARTAN POTASSIUM AND HYDROCHLOROTHIAZIDE 12.5; 5 MG/1; MG/1
TABLET ORAL
Qty: 180 TABLET | Refills: 3 | Status: SHIPPED | OUTPATIENT
Start: 2019-09-16 | End: 2020-03-06 | Stop reason: SDUPTHER

## 2019-09-16 RX ORDER — ATORVASTATIN CALCIUM 10 MG/1
TABLET, FILM COATED ORAL
Qty: 90 TABLET | Refills: 3 | Status: SHIPPED | OUTPATIENT
Start: 2019-09-16 | End: 2020-08-24

## 2019-09-27 ENCOUNTER — TELEPHONE (OUTPATIENT)
Dept: CARDIOLOGY | Age: 84
End: 2019-09-27

## 2019-10-01 ENCOUNTER — OFFICE VISIT (OUTPATIENT)
Dept: PRIMARY CARE CLINIC | Age: 84
End: 2019-10-01
Payer: MEDICARE

## 2019-10-01 VITALS
TEMPERATURE: 98.2 F | RESPIRATION RATE: 18 BRPM | BODY MASS INDEX: 22.64 KG/M2 | DIASTOLIC BLOOD PRESSURE: 68 MMHG | HEART RATE: 84 BPM | WEIGHT: 127.8 LBS | HEIGHT: 63 IN | OXYGEN SATURATION: 98 % | SYSTOLIC BLOOD PRESSURE: 110 MMHG

## 2019-10-01 DIAGNOSIS — Z23 NEED FOR INFLUENZA VACCINATION: ICD-10-CM

## 2019-10-01 DIAGNOSIS — L98.9 SKIN LESION OF CHEEK: Primary | ICD-10-CM

## 2019-10-01 PROCEDURE — 1036F TOBACCO NON-USER: CPT | Performed by: FAMILY MEDICINE

## 2019-10-01 PROCEDURE — G0008 ADMIN INFLUENZA VIRUS VAC: HCPCS | Performed by: FAMILY MEDICINE

## 2019-10-01 PROCEDURE — G8427 DOCREV CUR MEDS BY ELIG CLIN: HCPCS | Performed by: FAMILY MEDICINE

## 2019-10-01 PROCEDURE — 4040F PNEUMOC VAC/ADMIN/RCVD: CPT | Performed by: FAMILY MEDICINE

## 2019-10-01 PROCEDURE — 1090F PRES/ABSN URINE INCON ASSESS: CPT | Performed by: FAMILY MEDICINE

## 2019-10-01 PROCEDURE — 99212 OFFICE O/P EST SF 10 MIN: CPT | Performed by: FAMILY MEDICINE

## 2019-10-01 PROCEDURE — 90686 IIV4 VACC NO PRSV 0.5 ML IM: CPT | Performed by: FAMILY MEDICINE

## 2019-10-01 PROCEDURE — 1123F ACP DISCUSS/DSCN MKR DOCD: CPT | Performed by: FAMILY MEDICINE

## 2019-10-01 PROCEDURE — G8420 CALC BMI NORM PARAMETERS: HCPCS | Performed by: FAMILY MEDICINE

## 2019-10-01 PROCEDURE — G8482 FLU IMMUNIZE ORDER/ADMIN: HCPCS | Performed by: FAMILY MEDICINE

## 2019-10-17 DIAGNOSIS — G89.29 CHRONIC RIGHT-SIDED LOW BACK PAIN WITHOUT SCIATICA: ICD-10-CM

## 2019-10-17 DIAGNOSIS — R26.89 LOSS OF BALANCE: Primary | ICD-10-CM
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
## 2019-10-17 DIAGNOSIS — M54.50 CHRONIC RIGHT-SIDED LOW BACK PAIN WITHOUT SCIATICA: ICD-10-CM

## 2019-11-21 DIAGNOSIS — I10 ESSENTIAL HYPERTENSION: ICD-10-CM

## 2019-11-21 RX ORDER — AMLODIPINE BESYLATE 5 MG/1
2.5 TABLET ORAL DAILY
Qty: 90 TABLET | Refills: 3 | Status: SHIPPED | OUTPATIENT
Start: 2019-11-21 | End: 2020-11-02

## 2019-12-06 RX ORDER — METOPROLOL TARTRATE 50 MG/1
50 TABLET, FILM COATED ORAL 2 TIMES DAILY
Qty: 180 TABLET | Refills: 3 | Status: SHIPPED | OUTPATIENT
Start: 2019-12-06 | End: 2020-11-02

## 2020-02-09 ENCOUNTER — APPOINTMENT (OUTPATIENT)
Dept: CT IMAGING | Facility: HOSPITAL | Age: 85
End: 2020-02-09

## 2020-02-09 ENCOUNTER — HOSPITAL ENCOUNTER (EMERGENCY)
Facility: HOSPITAL | Age: 85
Discharge: HOME | End: 2020-02-09
Attending: EMERGENCY MEDICINE | Admitting: EMERGENCY MEDICINE

## 2020-02-09 VITALS
TEMPERATURE: 98.3 F | WEIGHT: 126 LBS | DIASTOLIC BLOOD PRESSURE: 99 MMHG | OXYGEN SATURATION: 97 % | SYSTOLIC BLOOD PRESSURE: 129 MMHG | RESPIRATION RATE: 16 BRPM | HEART RATE: 76 BPM | HEIGHT: 61 IN | BODY MASS INDEX: 23.79 KG/M2

## 2020-02-09 DIAGNOSIS — G44.209 ACUTE NON INTRACTABLE TENSION-TYPE HEADACHE: Primary | ICD-10-CM

## 2020-02-09 LAB
ALBUMIN SERPL-MCNC: 4.2 G/DL (ref 3.5–5.2)
ALBUMIN/GLOB SERPL: 1.5 G/DL
ALP SERPL-CCNC: 135 U/L (ref 39–117)
ALT SERPL W P-5'-P-CCNC: 10 U/L (ref 1–33)
ANION GAP SERPL CALCULATED.3IONS-SCNC: 12 MMOL/L (ref 5–15)
APTT PPP: 31.9 SECONDS (ref 24.1–35)
AST SERPL-CCNC: 16 U/L (ref 1–32)
BASOPHILS # BLD AUTO: 0.05 10*3/MM3 (ref 0–0.2)
BASOPHILS NFR BLD AUTO: 0.4 % (ref 0–1.5)
BILIRUB SERPL-MCNC: 0.5 MG/DL (ref 0.2–1.2)
BUN BLD-MCNC: 17 MG/DL (ref 8–23)
BUN/CREAT SERPL: 21.8 (ref 7–25)
CALCIUM SPEC-SCNC: 9.6 MG/DL (ref 8.2–9.6)
CHLORIDE SERPL-SCNC: 104 MMOL/L (ref 98–107)
CO2 SERPL-SCNC: 26 MMOL/L (ref 22–29)
CREAT BLD-MCNC: 0.78 MG/DL (ref 0.57–1)
DEPRECATED RDW RBC AUTO: 41.2 FL (ref 37–54)
EOSINOPHIL # BLD AUTO: 0.6 10*3/MM3 (ref 0–0.4)
EOSINOPHIL NFR BLD AUTO: 5.2 % (ref 0.3–6.2)
ERYTHROCYTE [DISTWIDTH] IN BLOOD BY AUTOMATED COUNT: 12.5 % (ref 12.3–15.4)
ERYTHROCYTE [SEDIMENTATION RATE] IN BLOOD: 10 MM/HR (ref 0–20)
GFR SERPL CREATININE-BSD FRML MDRD: 69 ML/MIN/1.73
GLOBULIN UR ELPH-MCNC: 2.8 GM/DL
GLUCOSE BLD-MCNC: 103 MG/DL (ref 65–99)
HCT VFR BLD AUTO: 40.9 % (ref 34–46.6)
HGB BLD-MCNC: 14.1 G/DL (ref 12–15.9)
IMM GRANULOCYTES # BLD AUTO: 0.03 10*3/MM3 (ref 0–0.05)
IMM GRANULOCYTES NFR BLD AUTO: 0.3 % (ref 0–0.5)
INR PPP: 0.88 (ref 0.91–1.09)
LYMPHOCYTES # BLD AUTO: 3.97 10*3/MM3 (ref 0.7–3.1)
LYMPHOCYTES NFR BLD AUTO: 34.3 % (ref 19.6–45.3)
MCH RBC QN AUTO: 31.7 PG (ref 26.6–33)
MCHC RBC AUTO-ENTMCNC: 34.5 G/DL (ref 31.5–35.7)
MCV RBC AUTO: 91.9 FL (ref 79–97)
MONOCYTES # BLD AUTO: 0.78 10*3/MM3 (ref 0.1–0.9)
MONOCYTES NFR BLD AUTO: 6.7 % (ref 5–12)
NEUTROPHILS # BLD AUTO: 6.13 10*3/MM3 (ref 1.7–7)
NEUTROPHILS NFR BLD AUTO: 53.1 % (ref 42.7–76)
NRBC BLD AUTO-RTO: 0 /100 WBC (ref 0–0.2)
PLATELET # BLD AUTO: 276 10*3/MM3 (ref 140–450)
PMV BLD AUTO: 9.6 FL (ref 6–12)
POTASSIUM BLD-SCNC: 3.6 MMOL/L (ref 3.5–5.2)
PROT SERPL-MCNC: 7 G/DL (ref 6–8.5)
PROTHROMBIN TIME: 12.2 SECONDS (ref 11.9–14.6)
RBC # BLD AUTO: 4.45 10*6/MM3 (ref 3.77–5.28)
SODIUM BLD-SCNC: 142 MMOL/L (ref 136–145)
WBC NRBC COR # BLD: 11.56 10*3/MM3 (ref 3.4–10.8)

## 2020-02-09 PROCEDURE — 85730 THROMBOPLASTIN TIME PARTIAL: CPT | Performed by: EMERGENCY MEDICINE

## 2020-02-09 PROCEDURE — 96374 THER/PROPH/DIAG INJ IV PUSH: CPT

## 2020-02-09 PROCEDURE — 85025 COMPLETE CBC W/AUTO DIFF WBC: CPT | Performed by: EMERGENCY MEDICINE

## 2020-02-09 PROCEDURE — 80053 COMPREHEN METABOLIC PANEL: CPT | Performed by: EMERGENCY MEDICINE

## 2020-02-09 PROCEDURE — 25010000002 KETOROLAC TROMETHAMINE PER 15 MG: Performed by: EMERGENCY MEDICINE

## 2020-02-09 PROCEDURE — 85651 RBC SED RATE NONAUTOMATED: CPT | Performed by: EMERGENCY MEDICINE

## 2020-02-09 PROCEDURE — 25010000002 ONDANSETRON PER 1 MG: Performed by: EMERGENCY MEDICINE

## 2020-02-09 PROCEDURE — 99284 EMERGENCY DEPT VISIT MOD MDM: CPT

## 2020-02-09 PROCEDURE — 96375 TX/PRO/DX INJ NEW DRUG ADDON: CPT

## 2020-02-09 PROCEDURE — 85610 PROTHROMBIN TIME: CPT | Performed by: EMERGENCY MEDICINE

## 2020-02-09 PROCEDURE — 25010000002 MORPHINE SULFATE (PF) 2 MG/ML SOLUTION: Performed by: EMERGENCY MEDICINE

## 2020-02-09 PROCEDURE — 25010000002 METOCLOPRAMIDE PER 10 MG: Performed by: EMERGENCY MEDICINE

## 2020-02-09 PROCEDURE — 70450 CT HEAD/BRAIN W/O DYE: CPT

## 2020-02-09 RX ORDER — ONDANSETRON 4 MG/1
4 TABLET, ORALLY DISINTEGRATING ORAL EVERY 8 HOURS PRN
Qty: 12 TABLET | Refills: 0 | OUTPATIENT
Start: 2020-02-09 | End: 2022-12-02

## 2020-02-09 RX ORDER — ACETAMINOPHEN AND CODEINE PHOSPHATE 300; 30 MG/1; MG/1
1 TABLET ORAL EVERY 6 HOURS PRN
Qty: 12 TABLET | Refills: 0 | Status: ON HOLD | OUTPATIENT
Start: 2020-02-09 | End: 2023-01-02

## 2020-02-09 RX ORDER — KETOROLAC TROMETHAMINE 15 MG/ML
15 INJECTION, SOLUTION INTRAMUSCULAR; INTRAVENOUS ONCE
Status: COMPLETED | OUTPATIENT
Start: 2020-02-09 | End: 2020-02-09

## 2020-02-09 RX ORDER — ONDANSETRON 2 MG/ML
4 INJECTION INTRAMUSCULAR; INTRAVENOUS ONCE
Status: COMPLETED | OUTPATIENT
Start: 2020-02-09 | End: 2020-02-09

## 2020-02-09 RX ORDER — MORPHINE SULFATE 2 MG/ML
2 INJECTION, SOLUTION INTRAMUSCULAR; INTRAVENOUS ONCE
Status: COMPLETED | OUTPATIENT
Start: 2020-02-09 | End: 2020-02-09

## 2020-02-09 RX ORDER — METOCLOPRAMIDE HYDROCHLORIDE 5 MG/ML
5 INJECTION INTRAMUSCULAR; INTRAVENOUS ONCE
Status: COMPLETED | OUTPATIENT
Start: 2020-02-09 | End: 2020-02-09

## 2020-02-09 RX ADMIN — KETOROLAC TROMETHAMINE 15 MG: 15 INJECTION, SOLUTION INTRAMUSCULAR; INTRAVENOUS at 13:02

## 2020-02-09 RX ADMIN — METOCLOPRAMIDE 5 MG: 5 INJECTION, SOLUTION INTRAMUSCULAR; INTRAVENOUS at 13:03

## 2020-02-09 RX ADMIN — MORPHINE SULFATE 2 MG: 2 INJECTION, SOLUTION INTRAMUSCULAR; INTRAVENOUS at 14:37

## 2020-02-09 RX ADMIN — ONDANSETRON HYDROCHLORIDE 4 MG: 2 SOLUTION INTRAMUSCULAR; INTRAVENOUS at 14:37

## 2020-02-09 NOTE — ED PROVIDER NOTES
Subjective   This 89-year-old independent female patient present today because of a headache which started yesterday around noon and is just been constant and dull since that time.  Yesterday she also noticed her blood pressure was elevated and took some extra blood pressure medications.  She normally takes 4 pills for blood pressure daily and states that since yesterday she has taken 7 of her blood pressure pills.  Patient denies visual problem except she has photophobia.  Her family states she has been having worsening trouble with her vision in the last 24 hours.  She also seemed unsteady walking according to the family.  She was nauseated but no vomiting.  The pain is located in the occipital and bitemporal region.  She is not had any urinary symptoms she denied chest pain shortness of breath coughing sinus congestion or any kind of problem with her bowels.          Review of Systems   Constitutional: Positive for activity change and appetite change. Negative for chills and fever.   HENT: Negative for congestion, rhinorrhea and sinus pain.    Eyes: Positive for photophobia and visual disturbance.   Respiratory: Negative for cough, chest tightness and shortness of breath.    Cardiovascular: Negative for chest pain and palpitations.   Gastrointestinal: Negative for abdominal pain, constipation, diarrhea, nausea and vomiting.   Genitourinary: Negative.    Musculoskeletal: Negative.    Neurological: Positive for headaches.   Psychiatric/Behavioral: Negative.        No past medical history on file.    Allergies   Allergen Reactions   • Stadol Ns [Butorphanol] Anaphylaxis       No past surgical history on file.    No family history on file.    Social History     Socioeconomic History   • Marital status:      Spouse name: Not on file   • Number of children: Not on file   • Years of education: Not on file   • Highest education level: Not on file           Objective   Physical Exam   Constitutional: She is  oriented to person, place, and time. She appears well-developed and well-nourished.   HENT:   Head: Normocephalic and atraumatic.   Right Ear: External ear normal.   Left Ear: External ear normal.   Eyes: Pupils are equal, round, and reactive to light. EOM are normal.   Patient has had bilateral cataract surgery.   Neck: Normal range of motion. Neck supple.   Cardiovascular: Normal rate, regular rhythm and normal heart sounds.   Pulmonary/Chest: Effort normal and breath sounds normal.   Abdominal: Soft. Bowel sounds are normal. There is no tenderness.   Musculoskeletal: Normal range of motion.   Neurological: She is alert and oriented to person, place, and time. No cranial nerve deficit or sensory deficit. She exhibits normal muscle tone. Coordination normal.   Skin: Skin is warm and dry. Capillary refill takes less than 2 seconds.   Psychiatric: She has a normal mood and affect.   Nursing note and vitals reviewed.      Procedures           ED Course                                               MDM  Number of Diagnoses or Management Options  Diagnosis management comments: The patient's headache was not relieved initially with Toradol and Reglan.  So added for morphine and some Zofran which did relieve the headache and then she had some abdominal cramps.  This point she was given times milligrams of Tylenol p.o.  Her head CT came back normal.  Her sed rate was 10 and the rest of her labs were essentially normal.  She finally got complete relief of her headache and her stomach cramps eased up though she feels ready to go home.  She does have a history of prior migraines although she says this did not feel like a migraine.  She feels ready to go home.      Final diagnoses:   Acute non intractable tension-type headache            Moreno Magdaleno DO  02/09/20 9490

## 2020-02-10 ENCOUNTER — TELEPHONE (OUTPATIENT)
Dept: PRIMARY CARE CLINIC | Age: 85
End: 2020-02-10

## 2020-02-10 NOTE — TELEPHONE ENCOUNTER
Pt DIL states pt has finally agreed to a Walker and would like to have an Rx for one. Call Son Singh.

## 2020-03-03 ENCOUNTER — OFFICE VISIT (OUTPATIENT)
Dept: CARDIOLOGY | Age: 85
End: 2020-03-03
Payer: MEDICARE

## 2020-03-03 VITALS
HEART RATE: 68 BPM | WEIGHT: 128.2 LBS | SYSTOLIC BLOOD PRESSURE: 122 MMHG | DIASTOLIC BLOOD PRESSURE: 68 MMHG | BODY MASS INDEX: 22.71 KG/M2 | HEIGHT: 63 IN

## 2020-03-03 PROCEDURE — 1090F PRES/ABSN URINE INCON ASSESS: CPT | Performed by: NURSE PRACTITIONER

## 2020-03-03 PROCEDURE — G8427 DOCREV CUR MEDS BY ELIG CLIN: HCPCS | Performed by: NURSE PRACTITIONER

## 2020-03-03 PROCEDURE — 1036F TOBACCO NON-USER: CPT | Performed by: NURSE PRACTITIONER

## 2020-03-03 PROCEDURE — 4040F PNEUMOC VAC/ADMIN/RCVD: CPT | Performed by: NURSE PRACTITIONER

## 2020-03-03 PROCEDURE — G8482 FLU IMMUNIZE ORDER/ADMIN: HCPCS | Performed by: NURSE PRACTITIONER

## 2020-03-03 PROCEDURE — 93000 ELECTROCARDIOGRAM COMPLETE: CPT | Performed by: NURSE PRACTITIONER

## 2020-03-03 PROCEDURE — 99214 OFFICE O/P EST MOD 30 MIN: CPT | Performed by: NURSE PRACTITIONER

## 2020-03-03 PROCEDURE — G8420 CALC BMI NORM PARAMETERS: HCPCS | Performed by: NURSE PRACTITIONER

## 2020-03-03 PROCEDURE — 1123F ACP DISCUSS/DSCN MKR DOCD: CPT | Performed by: NURSE PRACTITIONER

## 2020-03-06 RX ORDER — LOSARTAN POTASSIUM AND HYDROCHLOROTHIAZIDE 12.5; 5 MG/1; MG/1
TABLET ORAL
Qty: 180 TABLET | Refills: 3 | Status: SHIPPED | OUTPATIENT
Start: 2020-03-06 | End: 2020-03-10 | Stop reason: ALTCHOICE

## 2020-03-10 ENCOUNTER — TELEPHONE (OUTPATIENT)
Dept: PRIMARY CARE CLINIC | Age: 85
End: 2020-03-10

## 2020-03-10 RX ORDER — OLMESARTAN MEDOXOMIL AND HYDROCHLOROTHIAZIDE 20/12.5 20; 12.5 MG/1; MG/1
TABLET ORAL
Qty: 90 TABLET | Refills: 1 | Status: SHIPPED | OUTPATIENT
Start: 2020-03-10 | End: 2020-10-06 | Stop reason: ALTCHOICE

## 2020-03-10 NOTE — TELEPHONE ENCOUNTER
[facility] called stating Losartan/HCTZ is on Manufacture backorder.   Needs replacement

## 2020-03-20 ENCOUNTER — TELEPHONE (OUTPATIENT)
Dept: PRIMARY CARE CLINIC | Age: 85
End: 2020-03-20

## 2020-03-20 NOTE — TELEPHONE ENCOUNTER
Patient's son Michael Guallpa called and had a question about the patient's medication that they picked up at the pharmacy. Please call him back at [phone]. Thank you.

## 2020-03-26 RX ORDER — LEVOTHYROXINE SODIUM 0.07 MG/1
TABLET ORAL
Qty: 90 TABLET | Refills: 3 | Status: SHIPPED | OUTPATIENT
Start: 2020-03-26 | End: 2020-11-21 | Stop reason: DRUGHIGH

## 2020-04-01 ENCOUNTER — TELEPHONE (OUTPATIENT)
Dept: CARDIOLOGY | Age: 85
End: 2020-04-01

## 2020-06-08 ENCOUNTER — OFFICE VISIT (OUTPATIENT)
Dept: PRIMARY CARE CLINIC | Age: 85
End: 2020-06-08
Payer: MEDICARE

## 2020-06-08 VITALS
HEART RATE: 62 BPM | HEIGHT: 63 IN | TEMPERATURE: 97 F | OXYGEN SATURATION: 97 % | DIASTOLIC BLOOD PRESSURE: 78 MMHG | SYSTOLIC BLOOD PRESSURE: 128 MMHG | WEIGHT: 128 LBS | RESPIRATION RATE: 18 BRPM | BODY MASS INDEX: 22.68 KG/M2

## 2020-06-08 LAB
ALBUMIN SERPL-MCNC: 4.1 G/DL (ref 3.5–5.2)
ALP BLD-CCNC: 164 U/L (ref 35–104)
ALT SERPL-CCNC: 14 U/L (ref 5–33)
ANION GAP SERPL CALCULATED.3IONS-SCNC: 14 MMOL/L (ref 7–19)
AST SERPL-CCNC: 17 U/L (ref 5–32)
BILIRUB SERPL-MCNC: 0.5 MG/DL (ref 0.2–1.2)
BILIRUBIN, POC: NORMAL
BLOOD URINE, POC: NORMAL
BUN BLDV-MCNC: 15 MG/DL (ref 8–23)
CALCIUM SERPL-MCNC: 9.5 MG/DL (ref 8.2–9.6)
CHLORIDE BLD-SCNC: 103 MMOL/L (ref 98–111)
CHOLESTEROL, TOTAL: 153 MG/DL (ref 160–199)
CLARITY, POC: CLEAR
CO2: 26 MMOL/L (ref 22–29)
COLOR, POC: YELLOW
CREAT SERPL-MCNC: 0.9 MG/DL (ref 0.5–0.9)
GFR NON-AFRICAN AMERICAN: 59
GLUCOSE BLD-MCNC: 97 MG/DL (ref 74–109)
GLUCOSE URINE, POC: NORMAL
HCT VFR BLD CALC: 43.5 % (ref 37–47)
HDLC SERPL-MCNC: 75 MG/DL (ref 65–121)
HEMOGLOBIN: 13.7 G/DL (ref 12–16)
KETONES, POC: NORMAL
LDL CHOLESTEROL CALCULATED: 60 MG/DL
LEUKOCYTE EST, POC: NORMAL
MCH RBC QN AUTO: 31.4 PG (ref 27–31)
MCHC RBC AUTO-ENTMCNC: 31.5 G/DL (ref 33–37)
MCV RBC AUTO: 99.8 FL (ref 81–99)
NITRITE, POC: NORMAL
PDW BLD-RTO: 12.9 % (ref 11.5–14.5)
PH, POC: NORMAL
PLATELET # BLD: 284 K/UL (ref 130–400)
PMV BLD AUTO: 9.8 FL (ref 9.4–12.3)
POTASSIUM SERPL-SCNC: 3.9 MMOL/L (ref 3.5–5)
PROTEIN, POC: NORMAL
RBC # BLD: 4.36 M/UL (ref 4.2–5.4)
SODIUM BLD-SCNC: 143 MMOL/L (ref 136–145)
SPECIFIC GRAVITY, POC: NORMAL
T4 FREE: 1.6 NG/DL (ref 0.93–1.7)
TOTAL PROTEIN: 6.7 G/DL (ref 6.6–8.7)
TRIGL SERPL-MCNC: 89 MG/DL (ref 0–149)
TSH SERPL DL<=0.05 MIU/L-ACNC: 0.02 UIU/ML (ref 0.27–4.2)
UROBILINOGEN, POC: NORMAL
WBC # BLD: 8.9 K/UL (ref 4.8–10.8)

## 2020-06-08 PROCEDURE — 36415 COLL VENOUS BLD VENIPUNCTURE: CPT | Performed by: FAMILY MEDICINE

## 2020-06-08 PROCEDURE — G8420 CALC BMI NORM PARAMETERS: HCPCS | Performed by: FAMILY MEDICINE

## 2020-06-08 PROCEDURE — 1036F TOBACCO NON-USER: CPT | Performed by: FAMILY MEDICINE

## 2020-06-08 PROCEDURE — 4040F PNEUMOC VAC/ADMIN/RCVD: CPT | Performed by: FAMILY MEDICINE

## 2020-06-08 PROCEDURE — 99214 OFFICE O/P EST MOD 30 MIN: CPT | Performed by: FAMILY MEDICINE

## 2020-06-08 PROCEDURE — G8427 DOCREV CUR MEDS BY ELIG CLIN: HCPCS | Performed by: FAMILY MEDICINE

## 2020-06-08 PROCEDURE — 1123F ACP DISCUSS/DSCN MKR DOCD: CPT | Performed by: FAMILY MEDICINE

## 2020-06-08 PROCEDURE — G0439 PPPS, SUBSEQ VISIT: HCPCS | Performed by: FAMILY MEDICINE

## 2020-06-08 PROCEDURE — 1090F PRES/ABSN URINE INCON ASSESS: CPT | Performed by: FAMILY MEDICINE

## 2020-06-08 PROCEDURE — 81002 URINALYSIS NONAUTO W/O SCOPE: CPT | Performed by: FAMILY MEDICINE

## 2020-06-08 ASSESSMENT — ENCOUNTER SYMPTOMS
COUGH: 0
SHORTNESS OF BREATH: 0
ABDOMINAL PAIN: 0
CHEST TIGHTNESS: 0

## 2020-06-08 ASSESSMENT — LIFESTYLE VARIABLES: HOW OFTEN DO YOU HAVE A DRINK CONTAINING ALCOHOL: 0

## 2020-06-08 ASSESSMENT — PATIENT HEALTH QUESTIONNAIRE - PHQ9
SUM OF ALL RESPONSES TO PHQ QUESTIONS 1-9: 0
SUM OF ALL RESPONSES TO PHQ QUESTIONS 1-9: 0

## 2020-06-08 NOTE — PROGRESS NOTES
SUBJECTIVE:    Lulu Waters is 80 y. o.female who comes in complaining of Medicare AWV   .       HPI: She came in for a Medicare annual wellness but she has other problems we follow including hypercholesterolemia, hypertension and hypothyroidism. She is still dealing with falls. She says it is hard to use her walker when you are out mowing, weed eating and working in the garden. She is trying to use her walker in the house. Occasionally her atrial fibrillation will flareup. She is usually asymptomatic. 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 She has never been a good sleeper but does not need anything else for it. Her balance is still bad but she is not willing to do anything else about it. 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 She is also complaining of increasing pain in her left thumb. Movement is hard. ROM decreased. Past Medical History:   Diagnosis Date    Burning mouth syndrome     Chronic insomnia     Glaucoma     Hyperlipidemia     Hypertension     Hypothyroidism     Low back pain     Pure hypercholesterolemia 5/10/2016           Allergies   Allergen Reactions    Gabapentin Swelling    Morphine     Stadol [Butorphanol Tartrate]        Social History     Socioeconomic History    Marital status:       Spouse name: Not on file    Number of children: 2    Years of education: Not on file    Highest education level: Not on file   Occupational History    Occupation: Homemaker   Social Needs    Financial resource strain: Not on file    Food insecurity     Worry: Not on file     Inability: Not on file   [employer] needs     Medical: Not on file     Non-medical: Not on file   Tobacco Use    Smoking status: Never Smoker    Smokeless tobacco: Never Used   Substance and Sexual Activity    Alcohol use: No    Drug use: No    Sexual activity: Not Currently   Lifestyle    Physical If you have problems with sleep, talk to your doctor. Limit your intake of alcohol. If necessary, use a cane or walker to help maintain your balance. Wear supportive, rubber-soled shoes, even at home. If you live in a region that gets wintry weather, you may want to put special cleats on your shoes to prevent you from slipping on the snow and ice. Exercise regularly to help maintain muscle tone, agility, and balance. Always hold the banister when going up or down stairs. Also, use  bars when getting in or out of the bath or shower, or using the toilet. To avoid dizziness, get up slowly from a lying down position. Sit up first, dangling your legs for a minute or two before rising to a standing position. Overall Home Safety Check   According to the Consumer Product Safety Commision's \"Older Consumer Home Safety Checklist,\" it is important to check for potential hazards in each room. And remember, proper lighting is an essential factor in home safety. If you cannot see clearly, you are more likely to fall. Important questions to ask yourself include:   Are lamp, electric, extension, and telephone cords placed out of the flow of traffic and maintained in good condition? Have frayed cords been replaced? Are all small rugs and runners slip resistant? If not, you can secure them to the floor with a special double-sided carpet tape. Are smoke detectors properly locatedone on every floor of your home and one outside of every sleeping area? Are they in good working order? Are batteries replaced at least once a year? Do you have a well-maintained carbon monoxide detector outside every sleeping are in your home? Does your furniture layout leave plenty of space to maneuver between and around chairs, tables, beds, and sofas? Are hallways, stairs and passages between rooms well lit? Can you reach a lamp without getting out of bed? Are floor surfaces well maintained?  Shag rugs, high-pile carpeting, people with limited mobility, bathtub transfer benches allow you to slide safely into the tub. Raised toilet seats and toilet safety rails are helpful for those with knee or hip problems. In the [facility]    Make sure you use a nightlight and that the area around your bed is clear of potential obstacles. Be careful with electric blankets and never go to sleep with a heating pad, which can cause serious burns even if on a low setting. Use fire-resistant mattress covers and pillows, and NEVER smoke in bed. Keep a phone next to the bed that is programmed to dial 911 at the push of a button. If you have a chronic condition, you may want to sign on with an automatic call-in service. Typically the system includes a small pendant that connects directly to an emergency medical voice-response system. You should also make arrangements to stay in contact with someonefriend, neighbor, family memberon a regular schedule. Fire Prevention   According to the [employer]. (Smoke Alarms for Every) [address] [place] [address], senior citizens are one of the two highest risk groups for death and serious injuries due to residential fires. When cooking, wear short-sleeved items, never a bulky long-sleeved robe. The [facility]'s Safety Checklist for Older Consumers emphasizes the importance of checking basements, garages, workshops and storage areas for fire hazards, such as volatile liquids, piles of old rags or clothing and overloaded circuits. Never smoke in bed or when lying down on a couch or recliner chair. Small portable electric or kerosene heaters are responsible for many home fires and should be used cautiously if at all. If you do use one, be sure to keep them away from flammable materials. In case of fire, make sure you have a pre-established emergency exit plan. Have a professional check your fireplace and other fuel-burning appliances yearly.     [employer]   Baby boomers entering the barron years

## 2020-06-08 NOTE — PATIENT INSTRUCTIONS
Personalized Preventive Plan for Areli Biswas - 6/8/2020  Medicare offers a range of preventive health benefits. Some of the tests and screenings are paid in full while other may be subject to a deductible, co-insurance, and/or copay. Some of these benefits include a comprehensive review of your medical history including lifestyle, illnesses that may run in your family, and various assessments and screenings as appropriate. After reviewing your medical record and screening and assessments performed today your provider may have ordered immunizations, labs, imaging, and/or referrals for you. A list of these orders (if applicable) as well as your Preventive Care list are included within your After Visit Summary for your review. Other Preventive Recommendations:    · A preventive eye exam performed by an eye specialist is recommended every 1-2 years to screen for glaucoma; cataracts, macular degeneration, and other eye disorders. · A preventive dental visit is recommended every 6 months. · Try to get at least 150 minutes of exercise per week or 10,000 steps per day on a pedometer . · Order or download the FREE \"Exercise & Physical Activity: Your Everyday Guide\" from The [employer]. Call 0-867.641.2690 or search The [employer] online. · You need 3883-2315 mg of calcium and 7546-6699 IU of vitamin D per day. It is possible to meet your calcium requirement with diet alone, but a vitamin D supplement is usually necessary to meet this goal.  · When exposed to the sun, use a sunscreen that protects against both UVA and UVB radiation with an SPF of 30 or greater. Reapply every 2 to 3 hours or after sweating, drying off with a towel, or swimming. · Always wear a seat belt when traveling in a car. Always wear a helmet when riding a bicycle or motorcycle.     Keeping Home a [facility]       As we get older, changes in balance, gait, strength, vision, hearing, and cognition Commision's \"Older Consumer Home Safety Checklist,\" it is important to check for potential hazards in each room. And remember, proper lighting is an essential factor in home safety. If you cannot see clearly, you are more likely to fall. Important questions to ask yourself include:   Are lamp, electric, extension, and telephone cords placed out of the flow of traffic and maintained in good condition? Have frayed cords been replaced? Are all small rugs and runners slip resistant? If not, you can secure them to the floor with a special double-sided carpet tape. Are smoke detectors properly locatedone on every floor of your home and one outside of every sleeping area? Are they in good working order? Are batteries replaced at least once a year? Do you have a well-maintained carbon monoxide detector outside every sleeping are in your home? Does your furniture layout leave plenty of space to maneuver between and around chairs, tables, beds, and sofas? Are hallways, stairs and passages between rooms well lit? Can you reach a lamp without getting out of bed? Are floor surfaces well maintained? Shag rugs, high-pile carpeting, tile floors, and polished wood floors can be particularly slippery. Stairs should always have handrails and be carpeted or fitted with a non-skid tread. Is your telephone easily reachable. Is the cord safely tucked away? Room by Room   According to the Association of Aging, bathrooms and huyen are the two most potentially hazardous rooms in your home. In the Kitchen    Be sure your stove is in proper working order and always make sure burners and the oven are off before you go out or go to sleep. Keep pots on the back burners, turn handles away from the front of the stove, and keep stove clean and free of grease build-up. Kitchen ventilation systems and range exhausts should be working properly.     Keep flammable objects such as towels and pot holders away from the cooking area except when in use. Make sure kitchen curtains are tied back. Move cords and appliances away from the sink and hot surfaces. If extension cords are needed, install wiring guides so they do not hang over the sink, range, or working areas. Look for coffee pots, kettles and toaster ovens with automatic shut-offs. Keep a mop handy in the kitchen so you can wipe up spills instantly. You should also have a small fire extinguisher. Arrange your kitchen with frequently used items on lower shelves to avoid the need to stand on a stepstool to reach them. Make sure countertops are well-lit to avoid injuries while cutting and preparing food. In the Bathroom    Use a non-slip mat or decals in the tub and shower, since wet, soapy tile or porcelain surfaces are extremely slippery. Make sure bathroom rugs are non-skid or tape them firmly to the floor. Bathtubs should have at least one, preferably two, grab bars, firmly attached to structural supports in the wall. (Do not use built-in soap holders or glass shower doors as grab bars.)    Tub seats fitted with non-slip material on the legs allow you to wash sitting down. For people with limited mobility, bathtub transfer benches allow you to slide safely into the tub. Raised toilet seats and toilet safety rails are helpful for those with knee or hip problems. In the [facility]    Make sure you use a nightlight and that the area around your bed is clear of potential obstacles. Be careful with electric blankets and never go to sleep with a heating pad, which can cause serious burns even if on a low setting. Use fire-resistant mattress covers and pillows, and NEVER smoke in bed. Keep a phone next to the bed that is programmed to dial 911 at the push of a button. If you have a chronic condition, you may want to sign on with an automatic call-in service.  Typically the system includes a small pendant that connects directly to an emergency medical

## 2020-06-15 ENCOUNTER — TELEPHONE (OUTPATIENT)
Dept: CARDIOLOGY | Age: 85
End: 2020-06-15

## 2020-06-15 NOTE — TELEPHONE ENCOUNTER
Called to reschedule 09/01 Dr Jose Mckay apt, left message for patient to ret call & reschedule.  Per pt called daughter in law elizabeth(transportation)

## 2020-06-17 ENCOUNTER — TELEPHONE (OUTPATIENT)
Dept: PRIMARY CARE CLINIC | Age: 85
End: 2020-06-17

## 2020-06-17 NOTE — TELEPHONE ENCOUNTER
Brie Larios daughter in law called requesting print out of all medications. wants to  in office.  Please call Etta Smith to clarify

## 2020-07-24 RX ORDER — DOXEPIN HYDROCHLORIDE 50 MG/1
CAPSULE ORAL
Qty: 90 CAPSULE | Refills: 3 | Status: SHIPPED | OUTPATIENT
Start: 2020-07-24 | End: 2021-04-19

## 2020-08-24 RX ORDER — ATORVASTATIN CALCIUM 10 MG/1
TABLET, FILM COATED ORAL
Qty: 90 TABLET | Refills: 3 | Status: SHIPPED | OUTPATIENT
Start: 2020-08-24 | End: 2021-08-09

## 2020-10-06 ENCOUNTER — OFFICE VISIT (OUTPATIENT)
Dept: CARDIOLOGY | Age: 85
End: 2020-10-06
Payer: MEDICARE

## 2020-10-06 VITALS
WEIGHT: 128 LBS | HEIGHT: 65 IN | BODY MASS INDEX: 21.33 KG/M2 | HEART RATE: 74 BPM | SYSTOLIC BLOOD PRESSURE: 112 MMHG | DIASTOLIC BLOOD PRESSURE: 62 MMHG

## 2020-10-06 PROCEDURE — 93000 ELECTROCARDIOGRAM COMPLETE: CPT | Performed by: INTERNAL MEDICINE

## 2020-10-06 PROCEDURE — G8484 FLU IMMUNIZE NO ADMIN: HCPCS | Performed by: INTERNAL MEDICINE

## 2020-10-06 PROCEDURE — 1036F TOBACCO NON-USER: CPT | Performed by: INTERNAL MEDICINE

## 2020-10-06 PROCEDURE — 1123F ACP DISCUSS/DSCN MKR DOCD: CPT | Performed by: INTERNAL MEDICINE

## 2020-10-06 PROCEDURE — 1090F PRES/ABSN URINE INCON ASSESS: CPT | Performed by: INTERNAL MEDICINE

## 2020-10-06 PROCEDURE — 99212 OFFICE O/P EST SF 10 MIN: CPT | Performed by: INTERNAL MEDICINE

## 2020-10-06 PROCEDURE — G8427 DOCREV CUR MEDS BY ELIG CLIN: HCPCS | Performed by: INTERNAL MEDICINE

## 2020-10-06 PROCEDURE — 4040F PNEUMOC VAC/ADMIN/RCVD: CPT | Performed by: INTERNAL MEDICINE

## 2020-10-06 PROCEDURE — G8420 CALC BMI NORM PARAMETERS: HCPCS | Performed by: INTERNAL MEDICINE

## 2020-10-06 RX ORDER — LOSARTAN POTASSIUM AND HYDROCHLOROTHIAZIDE 12.5; 5 MG/1; MG/1
1 TABLET ORAL 2 TIMES DAILY
COMMUNITY
End: 2021-04-21

## 2020-10-06 NOTE — PATIENT INSTRUCTIONS
[facility] and [address] located on the first floor of [place] [address] through hospital main entrance and turn immediately to your left. Date/Time:     Patient's contact number:  [phone] (home)     Echocardiogram -  No prep. Takes approximately 30 min. An echocardiogram uses sound waves to produce images of your heart. This commonly used test allows your doctor to see how your heart is beating and pumping blood. Your doctor can use the images from an echocardiogram to identify various abnormalities in the heart muscle and valves. This test has 2 parts:   Ø You will be asked to disrobe from the waist up and given a gown to wear. The technologist will then hook up an EKG monitor to you for the entire exam.   Ø You will then have an ultrasound of your heart (echocardiogram) to assess the heart muscle, heart valves and heart function. You may eat and take any medicines before the exam.     If you need to change your appointment, please call outpatient scheduling at [phone].

## 2020-10-08 ENCOUNTER — HOSPITAL ENCOUNTER (OUTPATIENT)
Dept: NON INVASIVE DIAGNOSTICS | Age: 85
Discharge: HOME | End: 2020-10-08
Payer: MEDICARE

## 2020-10-08 ENCOUNTER — APPOINTMENT (OUTPATIENT)
Dept: GENERAL RADIOLOGY | Age: 85
End: 2020-10-08
Payer: MEDICARE

## 2020-10-08 ENCOUNTER — HOSPITAL ENCOUNTER (EMERGENCY)
Age: 85
Discharge: HOME | End: 2020-10-08
Attending: EMERGENCY MEDICINE
Payer: MEDICARE

## 2020-10-08 VITALS
DIASTOLIC BLOOD PRESSURE: 77 MMHG | SYSTOLIC BLOOD PRESSURE: 154 MMHG | WEIGHT: 128 LBS | BODY MASS INDEX: 22.68 KG/M2 | HEIGHT: 63 IN | HEART RATE: 82 BPM | RESPIRATION RATE: 16 BRPM | OXYGEN SATURATION: 98 % | TEMPERATURE: 98.8 F

## 2020-10-08 LAB
LV EF: 55 %
LVEF MODALITY: NORMAL

## 2020-10-08 PROCEDURE — 72170 X-RAY EXAM OF PELVIS: CPT

## 2020-10-08 PROCEDURE — 6360000002 HC RX W HCPCS: Performed by: EMERGENCY MEDICINE

## 2020-10-08 PROCEDURE — 99999 PR OFFICE/OUTPT VISIT,PROCEDURE ONLY: CPT | Performed by: EMERGENCY MEDICINE

## 2020-10-08 PROCEDURE — 96372 THER/PROPH/DIAG INJ SC/IM: CPT

## 2020-10-08 PROCEDURE — 72100 X-RAY EXAM L-S SPINE 2/3 VWS: CPT

## 2020-10-08 PROCEDURE — 93306 TTE W/DOPPLER COMPLETE: CPT

## 2020-10-08 PROCEDURE — 99281 EMR DPT VST MAYX REQ PHY/QHP: CPT

## 2020-10-08 PROCEDURE — 99282 EMERGENCY DEPT VISIT SF MDM: CPT

## 2020-10-08 RX ORDER — HYDROCODONE BITARTRATE AND ACETAMINOPHEN 5; 325 MG/1; MG/1
1 TABLET ORAL EVERY 6 HOURS PRN
Qty: 10 TABLET | Refills: 0 | Status: SHIPPED | OUTPATIENT
Start: 2020-10-08 | End: 2020-10-11

## 2020-10-08 RX ORDER — DEXAMETHASONE SODIUM PHOSPHATE 10 MG/ML
8 INJECTION, SOLUTION INTRAMUSCULAR; INTRAVENOUS ONCE
Status: COMPLETED | OUTPATIENT
Start: 2020-10-08 | End: 2020-10-08

## 2020-10-08 RX ORDER — KETOROLAC TROMETHAMINE 30 MG/ML
15 INJECTION, SOLUTION INTRAMUSCULAR; INTRAVENOUS ONCE
Status: COMPLETED | OUTPATIENT
Start: 2020-10-08 | End: 2020-10-08

## 2020-10-08 RX ORDER — MEPERIDINE HYDROCHLORIDE 50 MG/ML
25 INJECTION INTRAMUSCULAR; INTRAVENOUS; SUBCUTANEOUS ONCE
Status: COMPLETED | OUTPATIENT
Start: 2020-10-08 | End: 2020-10-08

## 2020-10-08 RX ADMIN — MEPERIDINE HYDROCHLORIDE 25 MG: 50 INJECTION, SOLUTION INTRAMUSCULAR; INTRAVENOUS; SUBCUTANEOUS at 13:38

## 2020-10-08 RX ADMIN — DEXAMETHASONE SODIUM PHOSPHATE 8 MG: 10 INJECTION, SOLUTION INTRAMUSCULAR; INTRAVENOUS at 13:37

## 2020-10-08 RX ADMIN — KETOROLAC TROMETHAMINE 15 MG: 30 INJECTION, SOLUTION INTRAMUSCULAR at 13:38

## 2020-10-08 ASSESSMENT — PAIN SCALES - GENERAL
PAINLEVEL_OUTOF10: 8
PAINLEVEL_OUTOF10: 8

## 2020-10-08 NOTE — ED PROVIDER NOTES
[facility] - [facility] EMERGENCY DEPT  eMERGENCY dEPARTMENT eNCOUnter      Pt Name: Vi Tolentino  MRN: [id number]  [place] 4/21/1928  Date of evaluation: 10/8/2020  Provider: Jez Culver MD    CHIEF COMPLAINT       Chief Complaint   Patient presents with    Back Pain     has hx if back issues         HISTORY OF PRESENT ILLNESS   (Location/Symptom, Timing/Onset,Context/Setting, Quality, Duration, Modifying Factors, Severity)  Note limiting factors. Vi Tolentino is a 80 y.o. female who presents to the emergency department with back pain. Pt points at her R SI joint. She can stand and walk. Has flares like this every \"decade or so. \"     Needs some pain meds to sleep. No fever no fall or trauma. Hurts to walk but not in hip but in SI joint. Her lower back not really painful, urine and stool normal    No hematuria or dysuria    Family here with her. The history is provided by the patient and a relative. NursingNotes were reviewed. REVIEW OF SYSTEMS    (2-9 systems for level 4, 10 or more for level 5)     Review of Systems   Constitutional: Negative for fever. Respiratory: Negative for shortness of breath. Cardiovascular: Negative for chest pain. Gastrointestinal: Negative for abdominal pain and vomiting. Genitourinary: Negative for dysuria, pelvic pain and vaginal pain. Musculoskeletal: Positive for gait problem. Negative for back pain and neck stiffness. Skin: Negative for rash. Neurological: Negative for weakness. Psychiatric/Behavioral: Negative for confusion. A complete review of systems was performed and is negative except as noted above in the HPI.        PAST MEDICAL HISTORY     Past Medical History:   Diagnosis Date    Burning mouth syndrome     Chronic insomnia     Glaucoma     Hyperlipidemia     Hypertension     Hypothyroidism     Low back pain     Pure hypercholesterolemia 5/10/2016         SURGICAL HISTORY       Past Surgical History:   Procedure Laterality Date    APPENDECTOMY      BREAST BIOPSY      CARPAL TUNNEL RELEASE      COLONOSCOPY  2005        ERCP      ? Dr.Whitney Najera Maker HEEL SPUR SURGERY      HIP ARTHROPLASTY Left January 30, 2014    HYSTERECTOMY      REVISION TOTAL HIP ARTHROPLASTY Left February 4, 2014    UPPER GASTROINTESTINAL ENDOSCOPY      UPPER GASTROINTESTINAL ENDOSCOPY  12/2011    [address]         CURRENT MEDICATIONS       Discharge Medication List as of 10/8/2020  1:59 PM      CONTINUE these medications which have NOT CHANGED    Details   losartan-hydroCHLOROthiazide (HYZAAR) 50-12.5 MG per tablet Take 1 tablet by mouth 2 times dailyHistorical Med      atorvastatin (LIPITOR) 10 MG tablet TAKE 1 TABLET DAILY FOR    HIGH CHOLESTEROL, Disp-90 tablet,R-3Normal      doxepin (SINEQUAN) 50 MG capsule TAKE 1 CAPSULE AT BEDTIME  FOR CHRONIC INSOMNIA, Disp-90 capsule,R-3Normal      levothyroxine (SYNTHROID) 75 MCG tablet TAKE 1 TABLET DAILY FOR LOWTHYROID, Disp-90 tablet, R-3Normal      metoprolol tartrate (LOPRESSOR) 50 MG tablet Take 1 tablet by mouth 2 times daily, Disp-180 tablet, R-3Normal      amLODIPine (NORVASC) 5 MG tablet Take 0.5 tablets by mouth daily, Disp-90 tablet, R-3Normal      aspirin 81 MG tablet aspirin   dailyHistorical Med      Probiotic Product (PROBIOTIC-10) CAPS Take by mouth onceHistorical Med      sodium chloride (AIDA 128) 5 % ophthalmic solution Place 1 drop into both eyes 2 times dailyHistorical Med      timolol (TIMOPTIC) 0.5 % ophthalmic solution Historical Med      ZIOPTAN 0.0015 % SOLN Historical Med             ALLERGIES     Gabapentin; Morphine; and Stadol [butorphanol tartrate]    FAMILY HISTORY       Family History   Problem Relation Age of Onset    Heart Disease Mother     Lung Cancer Father           SOCIAL HISTORY       Social History     Socioeconomic History    Marital status:       Spouse name: None    Number of children: 2    Years of education: None    Highest education level: None   Occupational History    Occupation: Homemaker   Social Needs    Financial resource strain: None    Food insecurity     Worry: None     Inability: None    Transportation needs     Medical: None     Non-medical: None   Tobacco Use    Smoking status: Never Smoker    Smokeless tobacco: Never Used   Substance and Sexual Activity    Alcohol use: No    Drug use: No    Sexual activity: Not Currently   Lifestyle    Physical activity     Days per week: None     Minutes per session: None    Stress: None   Relationships    Social connections     Talks on phone: None     Gets together: None     Attends Confucianist service: None     Active member of club or organization: None     Attends meetings of clubs or organizations: None     Relationship status: None    Intimate partner violence     Fear of current or ex partner: None     Emotionally abused: None     Physically abused: None     Forced sexual activity: None   Other Topics Concern    None   Social History Narrative    None       SCREENINGS    [place] Coma Scale  Eye Opening: Spontaneous  Best Verbal Response: Oriented  Best Motor Response: Obeys commands  [place] Coma Scale Score: 15        PHYSICAL EXAM    (up to 7 for level 4, 8 or more for level 5)     ED Triage Vitals [10/08/20 1248]   BP Temp Temp src Pulse Resp SpO2 Height Weight   (!) 167/79 98.8 °F (37.1 °C) -- 84 18 98 % 5' 3\" (1.6 m) 128 lb (58.1 kg)       Physical Exam  Vitals signs and nursing note reviewed. Constitutional:       General: She is not in acute distress. Appearance: Normal appearance. She is not ill-appearing, toxic-appearing or diaphoretic. Comments: Well appearing   HENT:      Head: Normocephalic and atraumatic. Eyes:      Extraocular Movements: Extraocular movements intact. Pupils: Pupils are equal, round, and reactive to light. Neck:      Musculoskeletal: Normal range of motion and neck supple.    Cardiovascular:      Rate and Rhythm: Normal rate and regular rhythm. Pulmonary:      Effort: Pulmonary effort is normal.      Breath sounds: Normal breath sounds. Abdominal:      General: Abdomen is flat. Tenderness: There is no abdominal tenderness. Skin:     General: Skin is warm and dry. Capillary Refill: Capillary refill takes less than 2 seconds. Neurological:      General: No focal deficit present. Mental Status: She is alert and oriented to person, place, and time. Comments: Pt stood and walked very well about 40 feet and there is no hip pain, she hurts in R si joint    Lumbar spine not tender   Psychiatric:         Mood and Affect: Mood normal.         Behavior: Behavior normal.         DIAGNOSTIC RESULTS     EKG: All EKG's are interpreted by the Emergency Department Physician who either signs or Co-signs this chart in the absence of a cardiologist.        RADIOLOGY:   Non-plain film images such as CT, Ultrasound and MRI are read by the radiologist. Plainradiographic images are visualized and preliminarily interpreted by the emergency physician with the below findings:        Interpretation per the Radiologist below, if available at the time of this note:    XR LUMBAR SPINE (2-3 VIEWS)   Final Result   No acute fracture or malalignment. Diffuse osteopenia. A small calcification in the right paravertebral area which may   represent a right ureteral calculus? . Further evaluation may be   obtained. Signed by Dr Derrick Pitt on 10/8/2020 1:18 PM      XR PELVIS (1-2 VIEWS)   Final Result   1. Evidence of previous left total hip arthroplasty with no prosthesis   loosening, fracture or acute abnormality. 2. Advanced osteoarthritis of the right hip. Signed by Dr Duane Engel. Adrianna on 10/8/2020 1:18 PM            ED BEDSIDE ULTRASOUND:   Performed by ED Physician - none    LABS:  Labs Reviewed - No data to display    All other labs were within normal range or not returned as of this dictation.     EMERGENCY DEPARTMENT COURSE and DIFFERENTIALDIAGNOSIS/MDM:   Vitals:    Vitals:    10/08/20 1248 10/08/20 1412   BP: (!) 167/79 (!) 154/77   Pulse: 84 82   Resp: 18 16   Temp: 98.8 °F (37.1 °C)    SpO2: 98% 98%   Weight: 128 lb (58.1 kg)    Height: 5' 3\" (1.6 m)        MDM  Number of Diagnoses or Management Options  Lumbar pain:   SI (sacroiliac) pain:   Diagnosis management comments: Given age did some imaging to see if there was obvious fx or compression    Her lumbar spine actually looks pretty good for 90s    She doesn't have a kidney stone clinically    This is not a hip issue    Is either SI joint issue or sciatica     Treat with some meds see if improves    If not follow up ortho    If returns to er do ct scan or pelvis and or lumbar spine and obtain labs but I think this is reasonable for today and hopefully she improves; pt in agreement and so is family     They just want something for pain        Amount and/or Complexity of Data Reviewed  Tests in the radiology section of CPT®: reviewed and ordered  Obtain history from someone other than the patient: yes    Patient Progress  Patient progress: stable        CONSULTS:  None    PROCEDURES:  Unless otherwise notedbelow, none     Procedures    FINAL IMPRESSION     1. SI (sacroiliac) pain    2. Lumbar pain          DISPOSITION/PLAN   DISPOSITION Decision To Discharge 10/08/2020 01:47:49 PM      PATIENT REFERRED TO:  Sumit Raymundo MD  [address] [place], [place] [id number] [phone]    Schedule an appointment as soon as possible for a visit in 1 week      Alli Muir MD  [place] [address]  [phone]    Schedule an appointment as soon as possible for a visit in 1 week  if not better or return to er      DISCHARGE MEDICATIONS:  Discharge Medication List as of 10/8/2020  1:59 PM      START taking these medications    Details   HYDROcodone-acetaminophen (NORCO) 5-325 MG per tablet Take 1 tablet by mouth every 6 hours as needed for Pain for up to 3 days. Intended supply: 3 days. Take lowest dose possible to manage pain, Disp-10 tablet,R-0Print           Attestation: The Prescription Monitoring Report for this patient was reviewed today. Dafne Mitchell [id number]) Nanda Capps MD)  Periodic Controlled Substance Monitoring: Possible medication side effects, risk of tolerance/dependence & alternative treatments discussed., No signs of potential drug abuse or diversion identified.  Nanda Capps MD)    (Please note that portions of this note were completed with a voice recognition program.  Efforts were made to edit the dictations butoccasionally words are mis-transcribed.)    Nanda Capps MD (electronically signed)  AttendingEmergency Physician         Nanda Capps MD  10/10/20 4253

## 2020-10-10 ASSESSMENT — ENCOUNTER SYMPTOMS
ABDOMINAL PAIN: 0
VOMITING: 0
BACK PAIN: 0
SHORTNESS OF BREATH: 0

## 2020-10-12 ENCOUNTER — OFFICE VISIT (OUTPATIENT)
Dept: PRIMARY CARE CLINIC | Age: 85
End: 2020-10-12
Payer: MEDICARE

## 2020-10-12 VITALS
BODY MASS INDEX: 25.09 KG/M2 | SYSTOLIC BLOOD PRESSURE: 122 MMHG | WEIGHT: 127.8 LBS | RESPIRATION RATE: 16 BRPM | OXYGEN SATURATION: 98 % | HEIGHT: 60 IN | HEART RATE: 52 BPM | TEMPERATURE: 97.3 F | DIASTOLIC BLOOD PRESSURE: 86 MMHG

## 2020-10-12 LAB
BILIRUBIN, POC: ABNORMAL
BLOOD URINE, POC: ABNORMAL
CLARITY, POC: CLEAR
COLOR, POC: YELLOW
GLUCOSE URINE, POC: ABNORMAL
KETONES, POC: ABNORMAL
LEUKOCYTE EST, POC: ABNORMAL
NITRITE, POC: ABNORMAL
PH, POC: 5
PROTEIN, POC: ABNORMAL
SPECIFIC GRAVITY, POC: 1020
UROBILINOGEN, POC: 0.2

## 2020-10-12 PROCEDURE — 1090F PRES/ABSN URINE INCON ASSESS: CPT | Performed by: FAMILY MEDICINE

## 2020-10-12 PROCEDURE — G0008 ADMIN INFLUENZA VIRUS VAC: HCPCS | Performed by: FAMILY MEDICINE

## 2020-10-12 PROCEDURE — 1036F TOBACCO NON-USER: CPT | Performed by: FAMILY MEDICINE

## 2020-10-12 PROCEDURE — 96372 THER/PROPH/DIAG INJ SC/IM: CPT | Performed by: FAMILY MEDICINE

## 2020-10-12 PROCEDURE — 4040F PNEUMOC VAC/ADMIN/RCVD: CPT | Performed by: FAMILY MEDICINE

## 2020-10-12 PROCEDURE — G8484 FLU IMMUNIZE NO ADMIN: HCPCS | Performed by: FAMILY MEDICINE

## 2020-10-12 PROCEDURE — G8420 CALC BMI NORM PARAMETERS: HCPCS | Performed by: FAMILY MEDICINE

## 2020-10-12 PROCEDURE — 1123F ACP DISCUSS/DSCN MKR DOCD: CPT | Performed by: FAMILY MEDICINE

## 2020-10-12 PROCEDURE — 81002 URINALYSIS NONAUTO W/O SCOPE: CPT | Performed by: FAMILY MEDICINE

## 2020-10-12 PROCEDURE — 90694 VACC AIIV4 NO PRSRV 0.5ML IM: CPT | Performed by: FAMILY MEDICINE

## 2020-10-12 PROCEDURE — G8427 DOCREV CUR MEDS BY ELIG CLIN: HCPCS | Performed by: FAMILY MEDICINE

## 2020-10-12 PROCEDURE — 99213 OFFICE O/P EST LOW 20 MIN: CPT | Performed by: FAMILY MEDICINE

## 2020-10-12 RX ORDER — TRIAMCINOLONE ACETONIDE 40 MG/ML
40 INJECTION, SUSPENSION INTRA-ARTICULAR; INTRAMUSCULAR ONCE
Status: COMPLETED | OUTPATIENT
Start: 2020-10-12 | End: 2020-10-12

## 2020-10-12 RX ORDER — KETOROLAC TROMETHAMINE 30 MG/ML
30 INJECTION, SOLUTION INTRAMUSCULAR; INTRAVENOUS ONCE
Status: COMPLETED | OUTPATIENT
Start: 2020-10-12 | End: 2020-10-12

## 2020-10-12 RX ADMIN — TRIAMCINOLONE ACETONIDE 40 MG: 40 INJECTION, SUSPENSION INTRA-ARTICULAR; INTRAMUSCULAR at 12:01

## 2020-10-12 RX ADMIN — KETOROLAC TROMETHAMINE 30 MG: 30 INJECTION, SOLUTION INTRAMUSCULAR; INTRAVENOUS at 12:00

## 2020-10-12 NOTE — PROGRESS NOTES
SUBJECTIVE:    Patient ID: Darryl Clemente is a 80 y.o. female. HPI:   Back Pain: Patient is seen today for complaints of low back pain. She states that it started a few weeks ago and was on the left side. She states that it is now moved to the right side. She does have frequent falls. She states that she has not had any fall with injury that she is aware of in the last couple of weeks. She states the pain got so bad that she was seen in the ER last night. They did x-rays which showed no acute fractures. They recommended that she follow-up with us to discuss possible CT or MRI. She states that she is having difficulty getting up due to the pain. She states that her mobility is limited because of the pain. They did give her a prescription for pain medication however she has not had it filled because she had an appointment scheduled here. She is not having any pelvic numbness or tingling. She denies any numbness or tingling in her legs or feet. She states that she is not having any trouble with bowel or bladder incontinence.   She has never had back surgeries before but has had self-limited issues with back pain in the past.        Past Medical History:   Diagnosis Date    Burning mouth syndrome     Chronic insomnia     Glaucoma     Hyperlipidemia     Hypertension     Hypothyroidism     Low back pain     Pure hypercholesterolemia 5/10/2016      Current Outpatient Medications   Medication Sig Dispense Refill    losartan-hydroCHLOROthiazide (HYZAAR) 50-12.5 MG per tablet Take 1 tablet by mouth 2 times daily      atorvastatin (LIPITOR) 10 MG tablet TAKE 1 TABLET DAILY FOR    HIGH CHOLESTEROL 90 tablet 3    doxepin (SINEQUAN) 50 MG capsule TAKE 1 CAPSULE AT BEDTIME  FOR CHRONIC INSOMNIA 90 capsule 3    levothyroxine (SYNTHROID) 75 MCG tablet TAKE 1 TABLET DAILY FOR LOWTHYROID 90 tablet 3    metoprolol tartrate (LOPRESSOR) 50 MG tablet Take 1 tablet by mouth 2 times daily 180 tablet 3    amLODIPine (NORVASC) 5 MG tablet Take 0.5 tablets by mouth daily 90 tablet 3    aspirin 81 MG tablet aspirin   daily      Probiotic Product (PROBIOTIC-10) CAPS Take by mouth once      sodium chloride (AIDA 128) 5 % ophthalmic solution Place 1 drop into both eyes 2 times daily      timolol (TIMOPTIC) 0.5 % ophthalmic solution       ZIOPTAN 0.0015 % SOLN        No current facility-administered medications for this visit. Allergies   Allergen Reactions    Gabapentin Swelling    Morphine     Stadol [Butorphanol Tartrate]        Review of Systems   Constitutional: Negative for activity change, appetite change and fever. HENT: Negative for congestion and nosebleeds. Eyes: Negative for discharge. Respiratory: Negative for cough and wheezing. Cardiovascular: Negative for chest pain and leg swelling. Gastrointestinal: Negative for abdominal pain, diarrhea, nausea and vomiting. Genitourinary: Negative for difficulty urinating, frequency and urgency. Musculoskeletal: Positive for arthralgias and back pain. Negative for gait problem. Skin: Negative for color change and rash. Neurological: Negative for dizziness and headaches. Hematological: Does not bruise/bleed easily. Psychiatric/Behavioral: Negative for sleep disturbance and suicidal ideas. OBJECTIVE:     Physical Exam  Vitals signs reviewed. Constitutional:       General: She is not in acute distress. Appearance: Normal appearance. She is well-developed. She is not diaphoretic. HENT:      Head: Normocephalic and atraumatic. Right Ear: External ear normal.      Left Ear: External ear normal.   Neck:      Musculoskeletal: Normal range of motion and neck supple. Cardiovascular:      Rate and Rhythm: Normal rate and regular rhythm. Pulses: Normal pulses. Heart sounds: Normal heart sounds. No murmur. Pulmonary:      Effort: Pulmonary effort is normal. No respiratory distress.       Breath sounds: Normal breath sounds. Skin:     General: Skin is warm and dry. Neurological:      General: No focal deficit present. Mental Status: She is alert and oriented to person, place, and time. Mental status is at baseline. Psychiatric:         Mood and Affect: Mood normal.         Behavior: Behavior normal.         Thought Content: Thought content normal.         Judgment: Judgment normal.        /86 (Site: Right Upper Arm, Position: Sitting, Cuff Size: Medium Adult)   Pulse 52   Temp 97.3 °F (36.3 °C) (Temporal)   Resp 16   Ht 5' (1.524 m)   Wt 127 lb 12.8 oz (58 kg)   SpO2 98%   BMI 24.96 kg/m²      ASSESSMENT:    Maryjane Parham was seen today for back pain. Diagnoses and all orders for this visit:    Low back pain, unspecified back pain laterality, unspecified chronicity, unspecified whether sciatica present  -     POCT urinalysis dipstick  -     triamcinolone acetonide (KENALOG-40) injection 40 mg  -     ketorolac (TORADOL) injection 30 mg    Need for influenza vaccination  -     INFLUENZA, QUADV, ADJUVANTED, 65 YRS =, IM, PF, PREFILL SYR, 0.5ML (FLUAD)        PLAN:    Toradol and Kenalog injection were given in office today. I reviewed her ER records today. We are going to give her symptoms until the middle of the week and if she is not better she is to call us and discussed that we may need to look at getting a CT or an MRI at that point. I given her stretching exercises to do at home. EMR Dragon/transcription disclaimer:  Much of this encounter note is electronic transcription/translation of spoken language toprinted texts. The electronic translation of spoken language may be erroneous, or at times, nonsensical words or phrases may be inadvertently transcribed.   Although I have reviewed the note for such errors, some may stillexist.

## 2020-10-12 NOTE — PATIENT INSTRUCTIONS
Patient Education        Back Stretches: Exercises  Introduction  Here are some examples of exercises for stretching your back. Start each exercise slowly. Ease off the exercise if you start to have pain. Your doctor or physical therapist will tell you when you can start these exercises and which ones will work best for you. How to do the exercises  Overhead stretch   1. Stand comfortably with your feet shoulder-width apart. 2. Looking straight ahead, raise both arms over your head and reach toward the ceiling. Do not allow your head to tilt back. 3. Hold for 15 to 30 seconds, then lower your arms to your sides. 4. Repeat 2 to 4 times. Side stretch   1. Stand comfortably with your feet shoulder-width apart. 2. Raise one arm over your head, and then lean to the other side. 3. Slide your hand down your leg as you let the weight of your arm gently stretch your side muscles. Hold for 15 to 30 seconds. 4. Repeat 2 to 4 times on each side. Press-up   1. Lie on your stomach, supporting your body with your forearms. 2. Press your elbows down into the floor to raise your upper back. As you do this, relax your stomach muscles and allow your back to arch without using your back muscles. As your press up, do not let your hips or pelvis come off the floor. 3. Hold for 15 to 30 seconds, then relax. 4. Repeat 2 to 4 times. Relax and rest   1. Lie on your back with a rolled towel under your neck and a pillow under your knees. Extend your arms comfortably to your sides. 2. Relax and breathe normally. 3. Remain in this position for about 10 minutes. 4. If you can, do this 2 or 3 times each day. Follow-up care is a key part of your treatment and safety. Be sure to make and go to all appointments, and call your doctor if you are having problems. It's also a good idea to know your test results and keep a list of the medicines you take. Where can you learn more? Go to https://sierra.healthBooksmart Technologies. org and sign in to your Atigeo account. Enter H305 in the Bazaart box to learn more about \"Back Stretches: Exercises. \"     If you do not have an account, please click on the \"Sign Up Now\" link. Current as of: March 2, 2020               Content Version: 12.6  © 2531-7741 [employer]. Care instructions adapted under license by [facility] ([facility]). If you have questions about a medical condition or this instruction, always ask your healthcare professional. [address] any warranty or liability for your use of this information. Patient Education        Low Back Pain: Exercises  Introduction  Here are some examples of exercises for you to try. The exercises may be suggested for a condition or for rehabilitation. Start each exercise slowly. Ease off the exercises if you start to have pain. You will be told when to start these exercises and which ones will work best for you. How to do the exercises  Press-up   5. Lie on your stomach, supporting your body with your forearms. 6. Press your elbows down into the floor to raise your upper back. As you do this, relax your stomach muscles and allow your back to arch without using your back muscles. As your press up, do not let your hips or pelvis come off the floor. 7. Hold for 15 to 30 seconds, then relax. 8. Repeat 2 to 4 times. Alternate arm and leg (bird dog) exercise   Do this exercise slowly. Try to keep your body straight at all times, and do not let one hip drop lower than the other. 5. Start on the floor, on your hands and knees. 6. Tighten your belly muscles. 7. Raise one leg off the floor, and hold it straight out behind you. Be careful not to let your hip drop down, because that will twist your trunk. 8. Hold for about 6 seconds, then lower your leg and switch to the other leg. 9. Repeat 8 to 12 times on each leg. 10. Over time, work up to holding for 10 to 30 seconds each time.   11. If you feel until your shoulders, hips, and knees are all in a straight line. 3. Hold for about 6 seconds as you continue to breathe normally, and then slowly lower your hips back down to the floor and rest for up to 10 seconds. 4. Do 8 to 12 repetitions. Hamstring stretch in doorway   1. Lie on your back in a doorway, with one leg through the open door. 2. Slide your leg up the wall to straighten your knee. You should feel a gentle stretch down the back of your leg. 3. Hold the stretch for at least 15 to 30 seconds. Do not arch your back, point your toes, or bend either knee. Keep one heel touching the floor and the other heel touching the wall. 4. Repeat with your other leg. 5. Do 2 to 4 times for each leg. Hip flexor stretch   1. Kneel on the floor with one knee bent and one leg behind you. Place your forward knee over your foot. Keep your other knee touching the floor. 2. Slowly push your hips forward until you feel a stretch in the upper thigh of your rear leg. 3. Hold the stretch for at least 15 to 30 seconds. Repeat with your other leg. 4. Do 2 to 4 times on each side. Wall sit   1. Stand with your back 10 to 12 inches away from a wall. 2. Lean into the wall until your back is flat against it. 3. Slowly slide down until your knees are slightly bent, pressing your lower back into the wall. 4. Hold for about 6 seconds, then slide back up the wall. 5. Repeat 8 to 12 times. Follow-up care is a key part of your treatment and safety. Be sure to make and go to all appointments, and call your doctor if you are having problems. It's also a good idea to know your test results and keep a list of the medicines you take. Where can you learn more? Go to https://PharmAbcinepeAGILE customer insight.aSmallWorld. org and sign in to your [employer] account. Enter J181 in the [employer] Technologies box to learn more about \"Low Back Pain: Exercises. \"     If you do not have an account, please click on the \"Sign Up Now\" link.   Current as of: March 2, 2020               Content Version: 12.6  © 2006-2020 [employer]. Care instructions adapted under license by [facility] ([facility]). If you have questions about a medical condition or this instruction, always ask your healthcare professional. [address] any warranty or liability for your use of this information. Patient Education        Sacroiliac Pain: Exercises  Introduction  Here are some examples of exercises for you to try. The exercises may be suggested for a condition or for rehabilitation. Start each exercise slowly. Ease off the exercises if you start to have pain. You will be told when to start these exercises and which ones will work best for you. How to do the exercises  Knee-to-chest stretch   9. Do not do the knee-to-chest exercise if it causes or increases back or leg pain. 10. Lie on your back with your knees bent and your feet flat on the floor. You can put a small pillow under your head and neck if it is more comfortable. 11. Grasp your hands under one knee and bring the knee to your chest, keeping the other foot flat on the floor. 12. Keep your lower back pressed to the floor. Hold for at least 15 to 30 seconds. 13. Relax and lower the knee to the starting position. Repeat with the other leg. 14. Repeat 2 to 4 times with each leg. 15. To get more stretch, keep your other leg flat on the floor while pulling your knee to your chest.    Bridging   12. Lie on your back with both knees bent. Your knees should be bent about 90 degrees. 15. Tighten your belly muscles by pulling in your belly button toward your spine. Then push your feet into the floor, squeeze your buttocks, and lift your hips off the floor until your shoulders, hips, and knees are all in a straight line. 14. Hold for about 6 seconds as you continue to breathe normally, and then slowly lower your hips back down to the floor and rest for up to 10 seconds.   15. Repeat 8 to 12 times.    Hip extension   11. Get down on your hands and knees on the floor. 12. Keeping your back and neck straight, lift one leg straight out behind you. When you lift your leg, keep your hips level. Don't let your back twist, and don't let your hip drop toward the floor. 13. Hold for 6 seconds. Repeat 8 to 12 times with each leg. 14. If you feel steady and strong when you do this exercise, you can make it more difficult. To do this, when you lift your leg, also lift the opposite arm straight out in front of you. For example, lift the left leg and the right arm at the same time. (This is sometimes called the \"bird dog exercise. \") Hold for 6 seconds, and repeat 8 to 12 times on each side. Clamshell   11. Lie on your side with a pillow under your head. Keep your feet and knees together and your knees bent. 12. Raise your top knee, but keep your feet together. Do not let your hips roll back. Your legs should open up like a clamshell. 13. Hold for 6 seconds. 14. Slowly lower your knee back down. Rest for 10 seconds. 15. Repeat 8 to 12 times. 16. Switch to your other side and repeat steps 1 through 5. Hamstring wall stretch   5. Lie on your back in a doorway, with one leg through the open door. 6. Slide your affected leg up the wall to straighten your knee. You should feel a gentle stretch down the back of your leg. 7. Hold the stretch for at least 1 minute to begin. Then try to lengthen the time you hold the stretch to as long as 6 minutes. 8. Switch legs, and repeat steps 1 through 3.  9. Repeat 2 to 4 times. 10. If you do not have a place to do this exercise in a doorway, there is another way to do it:  11. Lie on your back, and bend one knee. 12. Loop a towel under the ball and toes of that foot, and hold the ends of the towel in your hands. 13. Straighten your knee, and slowly pull back on the towel. You should feel a gentle stretch down the back of your leg.   14. Switch legs, and repeat steps 1 through 3.  15. Repeat 2 to 4 times. 5. Do not arch your back. 6. Do not bend either knee. 7. Keep one heel touching the floor and the other heel touching the wall. Do not point your toes. Lower abdominal strengthening   6. Lie on your back with your knees bent and your feet flat on the floor. 7. Tighten your belly muscles by pulling your belly button in toward your spine. 8. Lift one foot off the floor and bring your knee toward your chest, so that your knee is straight above your hip and your leg is bent like the letter \"L. \"  9. Lift the other knee up to the same position. 10. Lower one leg at a time to the starting position. 11. Keep alternating legs until you have lifted each leg 8 to 12 times. 12. Be sure to keep your belly muscles tight and your back still as you are moving your legs. Be sure to breathe normally. Piriformis stretch   5. Lie on your back with your legs straight. 6. Lift your affected leg, and bend your knee. With your opposite hand, reach across your body, and then gently pull your knee toward your opposite shoulder. 7. Hold the stretch for 15 to 30 seconds. 8. Switch legs and repeat steps 1 through 3.  9. Repeat 2 to 4 times. Follow-up care is a key part of your treatment and safety. Be sure to make and go to all appointments, and call your doctor if you are having problems. It's also a good idea to know your test results and keep a list of the medicines you take. Where can you learn more? Go to https://InstaJoberika.Poikos. org and sign in to your Invodo account. Enter I174 in the [employer] box to learn more about \"Sacroiliac Pain: Exercises. \"     If you do not have an account, please click on the \"Sign Up Now\" link. Current as of: March 2, 2020               Content Version: 12.6  © 5361-1611 [employer]orporated. Care instructions adapted under license by [facility] ([facility]).  If you have questions about a medical condition or this instruction,

## 2020-10-13 ASSESSMENT — ENCOUNTER SYMPTOMS
EYE DISCHARGE: 0
ABDOMINAL PAIN: 0
BACK PAIN: 1
COLOR CHANGE: 0
DIARRHEA: 0
NAUSEA: 0
COUGH: 0
VOMITING: 0
WHEEZING: 0

## 2020-10-29 ENCOUNTER — TELEPHONE (OUTPATIENT)
Dept: PRIMARY CARE CLINIC | Age: 85
End: 2020-10-29

## 2020-10-29 ENCOUNTER — TRANSCRIBE ORDERS (OUTPATIENT)
Dept: ADMINISTRATIVE | Facility: HOSPITAL | Age: 85
End: 2020-10-29

## 2020-10-29 DIAGNOSIS — M54.40 CHRONIC LOW BACK PAIN WITH SCIATICA, SCIATICA LATERALITY UNSPECIFIED, UNSPECIFIED BACK PAIN LATERALITY: Primary | ICD-10-CM

## 2020-10-29 DIAGNOSIS — G89.29 CHRONIC LOW BACK PAIN WITH SCIATICA, SCIATICA LATERALITY UNSPECIFIED, UNSPECIFIED BACK PAIN LATERALITY: Primary | ICD-10-CM

## 2020-10-29 NOTE — TELEPHONE ENCOUNTER
Pt daughter in law states that pt was seen for back pain and had injections that helped some, but now her back is hurting again.  She has had a back x-ray, but pt daughter in law feels there needs to be another scan done or something

## 2020-11-02 ENCOUNTER — HOSPITAL ENCOUNTER (OUTPATIENT)
Dept: MRI IMAGING | Facility: HOSPITAL | Age: 85
Discharge: HOME | End: 2020-11-02
Admitting: FAMILY MEDICINE

## 2020-11-02 DIAGNOSIS — G89.29 CHRONIC LOW BACK PAIN WITH SCIATICA, SCIATICA LATERALITY UNSPECIFIED, UNSPECIFIED BACK PAIN LATERALITY: ICD-10-CM

## 2020-11-02 DIAGNOSIS — M54.40 CHRONIC LOW BACK PAIN WITH SCIATICA, SCIATICA LATERALITY UNSPECIFIED, UNSPECIFIED BACK PAIN LATERALITY: ICD-10-CM

## 2020-11-02 PROCEDURE — 72148 MRI LUMBAR SPINE W/O DYE: CPT

## 2020-11-02 RX ORDER — METHYLPREDNISOLONE 4 MG/1
TABLET ORAL
Qty: 1 KIT | Refills: 0 | Status: SHIPPED | OUTPATIENT
Start: 2020-11-02 | End: 2020-11-20 | Stop reason: ALTCHOICE

## 2020-11-02 RX ORDER — AMLODIPINE BESYLATE 5 MG/1
TABLET ORAL
Qty: 45 TABLET | Refills: 3 | Status: SHIPPED | OUTPATIENT
Start: 2020-11-02 | End: 2021-04-26 | Stop reason: SDUPTHER

## 2020-11-02 RX ORDER — METOPROLOL TARTRATE 50 MG/1
TABLET, FILM COATED ORAL
Qty: 180 TABLET | Refills: 3 | Status: SHIPPED | OUTPATIENT
Start: 2020-11-02 | End: 2021-07-02

## 2020-11-03 ENCOUNTER — OFFICE VISIT (OUTPATIENT)
Dept: PRIMARY CARE CLINIC | Age: 85
End: 2020-11-03
Payer: MEDICARE

## 2020-11-03 VITALS
TEMPERATURE: 97.2 F | SYSTOLIC BLOOD PRESSURE: 124 MMHG | HEIGHT: 60 IN | HEART RATE: 82 BPM | WEIGHT: 127 LBS | DIASTOLIC BLOOD PRESSURE: 70 MMHG | OXYGEN SATURATION: 96 % | BODY MASS INDEX: 24.94 KG/M2

## 2020-11-03 LAB
APPEARANCE FLUID: NORMAL
BACTERIA: NEGATIVE /HPF
BILIRUBIN URINE: ABNORMAL
BILIRUBIN, POC: NORMAL
BLOOD URINE, POC: NORMAL
BLOOD, URINE: NEGATIVE
CLARITY, POC: NORMAL
CLARITY: ABNORMAL
COLOR, POC: NORMAL
COLOR: ABNORMAL
CRYSTALS, UA: ABNORMAL /HPF
EPITHELIAL CELLS, UA: 2 /HPF (ref 0–5)
GLUCOSE URINE, POC: NORMAL
GLUCOSE URINE: NEGATIVE MG/DL
HYALINE CASTS: 19 /HPF (ref 0–8)
KETONES, POC: NORMAL
KETONES, URINE: ABNORMAL MG/DL
LEUKOCYTE EST, POC: NORMAL
LEUKOCYTE ESTERASE, URINE: ABNORMAL
NITRITE, POC: NORMAL
NITRITE, URINE: NEGATIVE
PH UA: 5 (ref 5–8)
PH, POC: 5
PROTEIN UA: ABNORMAL MG/DL
PROTEIN, POC: NORMAL
RBC UA: 6 /HPF (ref 0–4)
SPECIFIC GRAVITY UA: 1.03 (ref 1–1.03)
SPECIFIC GRAVITY, POC: 1.03
UROBILINOGEN, POC: NORMAL
UROBILINOGEN, URINE: 1 E.U./DL
WBC UA: 87 /HPF (ref 0–5)

## 2020-11-03 PROCEDURE — 81003 URINALYSIS AUTO W/O SCOPE: CPT | Performed by: NURSE PRACTITIONER

## 2020-11-03 PROCEDURE — 99213 OFFICE O/P EST LOW 20 MIN: CPT | Performed by: NURSE PRACTITIONER

## 2020-11-03 RX ORDER — TIZANIDINE 2 MG/1
2 TABLET ORAL NIGHTLY PRN
Qty: 10 TABLET | Refills: 0 | Status: SHIPPED | OUTPATIENT
Start: 2020-11-03 | End: 2021-04-19

## 2020-11-03 RX ORDER — PHENAZOPYRIDINE HYDROCHLORIDE 100 MG/1
100 TABLET, FILM COATED ORAL 3 TIMES DAILY PRN
Qty: 9 TABLET | Refills: 0 | Status: SHIPPED | OUTPATIENT
Start: 2020-11-03 | End: 2020-11-06

## 2020-11-03 RX ORDER — NITROFURANTOIN MACROCRYSTALS 100 MG/1
100 CAPSULE ORAL 4 TIMES DAILY
Qty: 28 CAPSULE | Refills: 0 | Status: SHIPPED | OUTPATIENT
Start: 2020-11-03 | End: 2020-11-10

## 2020-11-03 RX ORDER — HYDROCODONE BITARTRATE AND ACETAMINOPHEN 7.5; 325 MG/1; MG/1
1 TABLET ORAL EVERY 6 HOURS PRN
Qty: 28 TABLET | Refills: 0 | Status: SHIPPED | OUTPATIENT
Start: 2020-11-03 | End: 2020-11-10

## 2020-11-03 ASSESSMENT — ENCOUNTER SYMPTOMS
SHORTNESS OF BREATH: 0
COUGH: 0
RHINORRHEA: 1
WHEEZING: 0
VOMITING: 0
ABDOMINAL DISTENTION: 0
DIARRHEA: 0
NAUSEA: 0
ABDOMINAL PAIN: 0
EYE PAIN: 0
BACK PAIN: 1
EYE ITCHING: 0
CONSTIPATION: 1
SORE THROAT: 0
EYE DISCHARGE: 0

## 2020-11-03 NOTE — PROGRESS NOTES
[facility] PHYSICIAN SERVICES  [facility]  [address] [place] [address]  [address] [place] [address]  Dept: [phone]  Dept Fax: [phone]  Loc: [phone]    Monica Vance is a 80 y.o. female who presents today for her medical conditions/complaints as noted below. Monica Vance is c/o of Lower Back Pain (Pt is having severe lower back pain x 3 weeks. Pt denies any injury. )        HPI:     HPI       Chief Complaint   Patient presents with    Lower Back Pain     Pt is having severe lower back pain x 3 weeks. Pt denies any injury. pt presents today with ongoing severe back pain that started on the left and then shifted to the right. She has had xray and MRI which indicated no acute injury. She reports having lifelong constipation that has worsened recently. She has been taking 3 senocot and miralax but still is not going well. She reports no change to her urination patterns. Past Medical History:   Diagnosis Date    Burning mouth syndrome     Chronic insomnia     Glaucoma     Hyperlipidemia     Hypertension     Hypothyroidism     Low back pain     Pure hypercholesterolemia 5/10/2016      Past Surgical History:   Procedure Laterality Date    APPENDECTOMY      BREAST BIOPSY      CARPAL TUNNEL RELEASE      COLONOSCOPY  2005        ERCP      ?   Jennie El HEEL SPUR SURGERY      HIP ARTHROPLASTY Left January 30, 2014    HYSTERECTOMY      REVISION TOTAL HIP ARTHROPLASTY Left February 4, 2014    UPPER GASTROINTESTINAL ENDOSCOPY      UPPER GASTROINTESTINAL ENDOSCOPY  12/2011           Vitals 11/3/2020 10/12/2020 10/8/2020 10/8/2020 10/8/2020 35/6/4423   SYSTOLIC 743 745 644 - - 295   DIASTOLIC 70 86 77 - - 79   Site Left Upper Arm Right Upper Arm - - - -   Position Sitting Sitting - - - -   Cuff Size Medium Adult Medium Adult - - - -   Pulse 82 52 82 - - 84   Temp 97.2 97.3 - - - 98.8   Resp - 16 16 - - 18   SpO2 96 98 98 - - 98   Weight 127 lb 127 lb 12.8 oz - - - 128 lb   Height 5' 0\" 5' 0\" - - - 5' 3\"   Body mass index 24.8 kg/m2 24.96 kg/m2 - - - 22.67 kg/m2   Pain Level - - - 8 8 -   Some recent data might be hidden       Family History   Problem Relation Age of Onset    Heart Disease Mother     Lung Cancer Father        Social History     Tobacco Use    Smoking status: Never Smoker    Smokeless tobacco: Never Used   Substance Use Topics    Alcohol use: No      Current Outpatient Medications   Medication Sig Dispense Refill    nitrofurantoin (MACRODANTIN) 100 MG capsule Take 1 capsule by mouth 4 times daily for 7 days 28 capsule 0    phenazopyridine (PYRIDIUM) 100 MG tablet Take 1 tablet by mouth 3 times daily as needed for Pain 9 tablet 0    tiZANidine (ZANAFLEX) 2 MG tablet Take 1 tablet by mouth nightly as needed (low back pain) 10 tablet 0    HYDROcodone-acetaminophen (NORCO) 7.5-325 MG per tablet Take 1 tablet by mouth every 6 hours as needed for Pain for up to 7 days. Intended supply: 7 days.  Take lowest dose possible to manage pain 28 tablet 0    metoprolol tartrate (LOPRESSOR) 50 MG tablet TAKE 1 TABLET TWICE A  tablet 3    amLODIPine (NORVASC) 5 MG tablet TAKE 1/2 TABLET DAILY 45 tablet 3    methylPREDNISolone (MEDROL DOSEPACK) 4 MG tablet Use as directed for inflammation and pain 1 kit 0    losartan-hydroCHLOROthiazide (HYZAAR) 50-12.5 MG per tablet Take 1 tablet by mouth 2 times daily      atorvastatin (LIPITOR) 10 MG tablet TAKE 1 TABLET DAILY FOR    HIGH CHOLESTEROL 90 tablet 3    doxepin (SINEQUAN) 50 MG capsule TAKE 1 CAPSULE AT BEDTIME  FOR CHRONIC INSOMNIA 90 capsule 3    levothyroxine (SYNTHROID) 75 MCG tablet TAKE 1 TABLET DAILY FOR LOWTHYROID 90 tablet 3    aspirin 81 MG tablet aspirin   daily      Probiotic Product (PROBIOTIC-10) CAPS Take by mouth once      sodium chloride (AIDA 128) 5 % ophthalmic solution Place 1 drop into both eyes 2 times daily      timolol (TIMOPTIC) 0.5 % ophthalmic solution       ZIOPTAN back pain laterality, unspecified chronicity, unspecified whether sciatica present  POCT Urinalysis no Micro    Urinalysis Reflex to Culture   2. Urinary tract infection without hematuria, site unspecified     3. Constipation, unspecified constipation type     4. At high risk for falls           Plan:   More than 50% of the time was spent counseling and coordinating care for a total time of 15 min face to face. 1. MRI negative for acute issues. Norco 7.5 mg   tizanidine 2 mg nightly. Heat and ice to low back. Voltaren cream to low back as directed. 2. Macrodantin as directed until done. Pyridium three times a day as needed. 3. Miralax three times a day until BM is clear. Then decrease to daily while on pain medication. Patient given educational materials -see patient instructions. Discussed use, benefit, and side effects of prescribed medications. All patient questions answered. Pt voiced understanding. Reviewed health maintenance. Instructed to continue currentmedications, diet and exercise. Patient agreed with treatment plan. Follow up as directed. MEDICATIONS:  Orders Placed This Encounter   Medications    nitrofurantoin (MACRODANTIN) 100 MG capsule     Sig: Take 1 capsule by mouth 4 times daily for 7 days     Dispense:  28 capsule     Refill:  0    phenazopyridine (PYRIDIUM) 100 MG tablet     Sig: Take 1 tablet by mouth 3 times daily as needed for Pain     Dispense:  9 tablet     Refill:  0    tiZANidine (ZANAFLEX) 2 MG tablet     Sig: Take 1 tablet by mouth nightly as needed (low back pain)     Dispense:  10 tablet     Refill:  0         ORDERS:  Orders Placed This Encounter   Procedures    Urinalysis Reflex to Culture    POCT Urinalysis no Micro       Follow-up:  Return in about 2 weeks (around 11/17/2020). PATIENT INSTRUCTIONS:  Patient Instructions   Voltaren cream apply to back as directed. Continue steroids as directed. Use alternating heat and ice. MIralax three times a day with plenty of water till bowel movements are clear. Then back down to once a day while on pain medication . Electronically signed by LACI Houston NP on 11/3/2020 at 3:25 PM    EMR Dragon/transcription disclaimer:  Much of thisencounter note is electronic transcription/translation of spoken language to printed texts. The electronic translation of spoken language may be erroneous, or at times, nonsensical words or phrases may be inadvertentlytranscribed. Although I have reviewed the note for such errors, some may still exist.  On the basis of positive falls risk screening, assessment and plan is as follows: home safety tips provided.

## 2020-11-03 NOTE — PATIENT INSTRUCTIONS
Voltaren cream apply to back as directed. Continue steroids as directed. Use alternating heat and ice. MIralax three times a day with plenty of water till bowel movements are clear. Then back down to once a day while on pain medication .

## 2020-11-05 LAB — URINE CULTURE, ROUTINE: NORMAL

## 2020-11-05 RX ORDER — OLMESARTAN MEDOXOMIL AND HYDROCHLOROTHIAZIDE 20/12.5 20; 12.5 MG/1; MG/1
TABLET ORAL
Qty: 90 TABLET | Refills: 3 | Status: SHIPPED | OUTPATIENT
Start: 2020-11-05 | End: 2021-11-08

## 2020-11-11 ENCOUNTER — TELEPHONE (OUTPATIENT)
Dept: PRIMARY CARE CLINIC | Age: 85
End: 2020-11-11

## 2020-11-12 ENCOUNTER — HOSPITAL ENCOUNTER (OUTPATIENT)
Dept: CT IMAGING | Age: 85
Discharge: HOME | End: 2020-11-12
Payer: MEDICARE

## 2020-11-12 ENCOUNTER — TELEPHONE (OUTPATIENT)
Dept: CARDIOLOGY | Age: 85
End: 2020-11-12

## 2020-11-12 PROCEDURE — 72131 CT LUMBAR SPINE W/O DYE: CPT

## 2020-11-12 NOTE — TELEPHONE ENCOUNTER
Swathi requests that someone return their call. The best time to reach her is Anytime. The would like the results of the ECHO. Thank you.

## 2020-11-12 NOTE — TELEPHONE ENCOUNTER
Patient was supposed to have a follow up for results. Please schedule one for her, she can see an APRN and do a VV or phone visit if she would like to. Thank you!

## 2020-11-12 NOTE — TELEPHONE ENCOUNTER
Pt is wanting results of echo she had done about 1 month ago. Please call daughter in law, Alvaro Purdy with results.

## 2020-11-16 ENCOUNTER — TELEPHONE (OUTPATIENT)
Dept: NEUROSURGERY | Age: 85
End: 2020-11-16

## 2020-11-16 ENCOUNTER — OFFICE VISIT (OUTPATIENT)
Dept: NEUROSURGERY | Age: 85
End: 2020-11-16
Payer: MEDICARE

## 2020-11-16 VITALS
SYSTOLIC BLOOD PRESSURE: 121 MMHG | DIASTOLIC BLOOD PRESSURE: 73 MMHG | HEIGHT: 63 IN | HEART RATE: 75 BPM | WEIGHT: 128 LBS | BODY MASS INDEX: 22.68 KG/M2

## 2020-11-16 PROBLEM — M53.3 SI (SACROILIAC) JOINT DYSFUNCTION: Status: ACTIVE | Noted: 2020-11-16

## 2020-11-16 PROCEDURE — 1123F ACP DISCUSS/DSCN MKR DOCD: CPT | Performed by: NEUROLOGICAL SURGERY

## 2020-11-16 PROCEDURE — G8484 FLU IMMUNIZE NO ADMIN: HCPCS | Performed by: NEUROLOGICAL SURGERY

## 2020-11-16 PROCEDURE — G8420 CALC BMI NORM PARAMETERS: HCPCS | Performed by: NEUROLOGICAL SURGERY

## 2020-11-16 PROCEDURE — G8427 DOCREV CUR MEDS BY ELIG CLIN: HCPCS | Performed by: NEUROLOGICAL SURGERY

## 2020-11-16 PROCEDURE — 99203 OFFICE O/P NEW LOW 30 MIN: CPT | Performed by: NEUROLOGICAL SURGERY

## 2020-11-16 PROCEDURE — 4040F PNEUMOC VAC/ADMIN/RCVD: CPT | Performed by: NEUROLOGICAL SURGERY

## 2020-11-16 PROCEDURE — 1036F TOBACCO NON-USER: CPT | Performed by: NEUROLOGICAL SURGERY

## 2020-11-16 PROCEDURE — 1090F PRES/ABSN URINE INCON ASSESS: CPT | Performed by: NEUROLOGICAL SURGERY

## 2020-11-16 NOTE — TELEPHONE ENCOUNTER
1st attempt to call patient to schedule appointment. Left voicemail with call back number [phone].

## 2020-11-16 NOTE — PROGRESS NOTES
high blood pressure, Disp: 90 tablet, Rfl: 3    tiZANidine (ZANAFLEX) 2 MG tablet, Take 1 tablet by mouth nightly as needed (low back pain), Disp: 10 tablet, Rfl: 0    metoprolol tartrate (LOPRESSOR) 50 MG tablet, TAKE 1 TABLET TWICE A DAY, Disp: 180 tablet, Rfl: 3    amLODIPine (NORVASC) 5 MG tablet, TAKE 1/2 TABLET DAILY, Disp: 45 tablet, Rfl: 3    methylPREDNISolone (MEDROL DOSEPACK) 4 MG tablet, Use as directed for inflammation and pain, Disp: 1 kit, Rfl: 0    losartan-hydroCHLOROthiazide (HYZAAR) 50-12.5 MG per tablet, Take 1 tablet by mouth 2 times daily, Disp: , Rfl:     atorvastatin (LIPITOR) 10 MG tablet, TAKE 1 TABLET DAILY FOR    HIGH CHOLESTEROL, Disp: 90 tablet, Rfl: 3    doxepin (SINEQUAN) 50 MG capsule, TAKE 1 CAPSULE AT BEDTIME  FOR CHRONIC INSOMNIA, Disp: 90 capsule, Rfl: 3    levothyroxine (SYNTHROID) 75 MCG tablet, TAKE 1 TABLET DAILY FOR LOWTHYROID, Disp: 90 tablet, Rfl: 3    aspirin 81 MG tablet, aspirin  daily, Disp: , Rfl:     Probiotic Product (PROBIOTIC-10) CAPS, Take by mouth once, Disp: , Rfl:     sodium chloride (AIDA 128) 5 % ophthalmic solution, Place 1 drop into both eyes 2 times daily, Disp: , Rfl:     timolol (TIMOPTIC) 0.5 % ophthalmic solution, , Disp: , Rfl:     ZIOPTAN 0.0015 % SOLN, , Disp: , Rfl:     Allergies:  Gabapentin; Morphine; and Stadol [butorphanol tartrate]    Social History:   Social History     Tobacco Use   Smoking Status Never Smoker   Smokeless Tobacco Never Used     Social History     Substance and Sexual Activity   Alcohol Use No         Family History:   Family History   Problem Relation Age of Onset    Heart Disease Mother     Lung Cancer Father        REVIEW OF SYSTEMS:    Constitutional: Negative. HENT: Negative. Eyes: Negative. Respiratory: Negative. Cardiovascular: Negative. Gastrointestinal: Negative. Genitourinary: Negative. Musculoskeletal: Positive for back pain and joint pain. Skin: Negative. Neurological: Negative. Endo/Heme/Allergies: Negative. Psychiatric/Behavioral: Negative. Review of Systems was obtained by the medical assistant and reviewed by myself. PHYSICAL EXAM:    Vitals:    11/16/20 1317   BP: 121/73   Pulse: 75       Constitutional: Appears well-developed and well-nourished. Eyes - conjunctiva normal.  Pupils react to light  Ear, nose,throat -Normal pinna and tragus, No scars, or lesions over external nose or ears, no obvious atrophy of tongue  Neck- symmetric, trachea midline, no jugular vein distension  Respiration- chest wall symmetric, normal effort without use of accessory muscles  Musculoskeletal - no significant wasting of muscles noted, no bony deformities, symmetric bulk  Extremities- no clubbing, cyanosis or edema  Skin - warm, dry, and intact. No rash,erythema, or pallor.   Psychiatric - mood, affect, and behavior appear normal.       NEUROLOGIC EXAM:    MENTAL STATUS: Alert, oriented, thought content appropriate    CRANIAL NERVES: PERRL, EOMI, symmetric facies, tongue midline    MOTOR:     Right Upper Extremity:    Deltoid: 5/5  Biceps: 5/5  Triceps: 5/5  Wrist Extension: 5/5  Finger Abduction: 5/5    Left Upper Extremity:    Deltoid: 5/5  Biceps: 5/5  Triceps: 5/5  Wrist Extension: 5/5  Finger Abduction: 5/5    Right Lower Extremity:    Hip Flexion: 5/5  Knee Extension: 5/5  Ankle Plantarflexion: 5/5  Ankle Dorsiflexion: 5/5  EHL: 5/5      Left Lower Extremity:    Hip Flexion: 5/5  Knee Extension: 5/5  Ankle Plantarflexion: 5/5  Ankle Dorsiflexion: 5/5  EHL: 5/5      SOMATOSENSORY:     Right Upper Extremity: normal light touch sensation  Left Upper Extremity: normal light touch sensation  Right Lower Extremity: normal light touch sensation  Left Lower Extremity: normal light touch sensation      REFLEXES:    Biceps: 2+  Patella: 2+    Robbins's: Negative      COORDINATION and GAIT:    Gait: Antalgic    Provocative tests:    Straight leg raise: Negative bilaterally  Toro Finger test: Positive on the right  CR: Strongly positive on the right      IMAGING:    My interpretation of imaging studies:     CT of the lumbar spine reviewed and compared to the report for an MRI obtained at [facility]. There is moderate degenerative disc disease at L4/5 and L5/S1 with minimal L4/5 spondylolisthesis. There is modest central canal stenosis and foraminal stenosis. There is modest bilateral foraminal stenosis at L5/S1 without central canal stenosis. ASSESSMENT AND PLAN:  This is a 80 y.o. female who presents for neurosurgical evaluation of back pain. She has no radicular symptoms and her neurologic exam is normal.  Her pain appears to localize to the sacroiliac joints, presently on the right but with recent left-sided involvement as well. I have recommended that she see pain management for a right (and possibly left) SI joint injection. If the injection improves her pain, I would recommend a dedicated course of physical therapy focused on SI joint dysfunction. I will plan to see her again in 1 month after her injection(s) to assess her response.             Abdullahi Bennett MD

## 2020-11-20 ENCOUNTER — OFFICE VISIT (OUTPATIENT)
Dept: CARDIOLOGY | Age: 85
End: 2020-11-20
Payer: MEDICARE

## 2020-11-20 ENCOUNTER — OFFICE VISIT (OUTPATIENT)
Dept: PRIMARY CARE CLINIC | Age: 85
End: 2020-11-20
Payer: MEDICARE

## 2020-11-20 VITALS
BODY MASS INDEX: 21.97 KG/M2 | HEIGHT: 63 IN | DIASTOLIC BLOOD PRESSURE: 70 MMHG | SYSTOLIC BLOOD PRESSURE: 120 MMHG | WEIGHT: 124 LBS

## 2020-11-20 VITALS
SYSTOLIC BLOOD PRESSURE: 130 MMHG | WEIGHT: 124.8 LBS | TEMPERATURE: 98.7 F | RESPIRATION RATE: 18 BRPM | BODY MASS INDEX: 22.11 KG/M2 | DIASTOLIC BLOOD PRESSURE: 70 MMHG | HEIGHT: 63 IN | OXYGEN SATURATION: 93 %

## 2020-11-20 LAB
ALBUMIN SERPL-MCNC: 3.6 G/DL (ref 3.5–5.2)
ALP BLD-CCNC: 161 U/L (ref 35–104)
ALT SERPL-CCNC: 12 U/L (ref 5–33)
ANION GAP SERPL CALCULATED.3IONS-SCNC: 12 MMOL/L (ref 7–19)
AST SERPL-CCNC: 14 U/L (ref 5–32)
BASOPHILS ABSOLUTE: 0.1 K/UL (ref 0–0.2)
BASOPHILS RELATIVE PERCENT: 0.4 % (ref 0–1)
BILIRUB SERPL-MCNC: 0.3 MG/DL (ref 0.2–1.2)
BUN BLDV-MCNC: 26 MG/DL (ref 8–23)
CALCIUM SERPL-MCNC: 9.6 MG/DL (ref 8.2–9.6)
CHLORIDE BLD-SCNC: 106 MMOL/L (ref 98–111)
CO2: 26 MMOL/L (ref 22–29)
CREAT SERPL-MCNC: 0.9 MG/DL (ref 0.5–0.9)
EOSINOPHILS ABSOLUTE: 0.4 K/UL (ref 0–0.6)
EOSINOPHILS RELATIVE PERCENT: 3.3 % (ref 0–5)
GFR AFRICAN AMERICAN: >59
GFR NON-AFRICAN AMERICAN: 58
GLUCOSE BLD-MCNC: 113 MG/DL (ref 74–109)
HCT VFR BLD CALC: 43.9 % (ref 37–47)
HEMOGLOBIN: 13.4 G/DL (ref 12–16)
IMMATURE GRANULOCYTES #: 0 K/UL
LYMPHOCYTES ABSOLUTE: 2.8 K/UL (ref 1.1–4.5)
LYMPHOCYTES RELATIVE PERCENT: 24.6 % (ref 20–40)
MCH RBC QN AUTO: 30.9 PG (ref 27–31)
MCHC RBC AUTO-ENTMCNC: 30.5 G/DL (ref 33–37)
MCV RBC AUTO: 101.4 FL (ref 81–99)
MONOCYTES ABSOLUTE: 0.7 K/UL (ref 0–0.9)
MONOCYTES RELATIVE PERCENT: 6.1 % (ref 0–10)
NEUTROPHILS ABSOLUTE: 7.4 K/UL (ref 1.5–7.5)
NEUTROPHILS RELATIVE PERCENT: 65.2 % (ref 50–65)
PDW BLD-RTO: 13.2 % (ref 11.5–14.5)
PLATELET # BLD: 370 K/UL (ref 130–400)
PMV BLD AUTO: 10.2 FL (ref 9.4–12.3)
POTASSIUM SERPL-SCNC: 3.5 MMOL/L (ref 3.5–5)
RBC # BLD: 4.33 M/UL (ref 4.2–5.4)
SODIUM BLD-SCNC: 144 MMOL/L (ref 136–145)
T4 FREE: 1.89 NG/DL (ref 0.93–1.7)
TOTAL PROTEIN: 6.7 G/DL (ref 6.6–8.7)
TSH SERPL DL<=0.05 MIU/L-ACNC: 0.01 UIU/ML (ref 0.27–4.2)
VITAMIN B-12: 378 PG/ML (ref 211–946)
WBC # BLD: 11.4 K/UL (ref 4.8–10.8)

## 2020-11-20 PROCEDURE — 4040F PNEUMOC VAC/ADMIN/RCVD: CPT | Performed by: FAMILY MEDICINE

## 2020-11-20 PROCEDURE — G8427 DOCREV CUR MEDS BY ELIG CLIN: HCPCS | Performed by: FAMILY MEDICINE

## 2020-11-20 PROCEDURE — G8484 FLU IMMUNIZE NO ADMIN: HCPCS | Performed by: CLINICAL NURSE SPECIALIST

## 2020-11-20 PROCEDURE — 4040F PNEUMOC VAC/ADMIN/RCVD: CPT | Performed by: CLINICAL NURSE SPECIALIST

## 2020-11-20 PROCEDURE — 1123F ACP DISCUSS/DSCN MKR DOCD: CPT | Performed by: CLINICAL NURSE SPECIALIST

## 2020-11-20 PROCEDURE — G8484 FLU IMMUNIZE NO ADMIN: HCPCS | Performed by: FAMILY MEDICINE

## 2020-11-20 PROCEDURE — 1090F PRES/ABSN URINE INCON ASSESS: CPT | Performed by: CLINICAL NURSE SPECIALIST

## 2020-11-20 PROCEDURE — 99213 OFFICE O/P EST LOW 20 MIN: CPT | Performed by: CLINICAL NURSE SPECIALIST

## 2020-11-20 PROCEDURE — G8427 DOCREV CUR MEDS BY ELIG CLIN: HCPCS | Performed by: CLINICAL NURSE SPECIALIST

## 2020-11-20 PROCEDURE — 1090F PRES/ABSN URINE INCON ASSESS: CPT | Performed by: FAMILY MEDICINE

## 2020-11-20 PROCEDURE — 1036F TOBACCO NON-USER: CPT | Performed by: CLINICAL NURSE SPECIALIST

## 2020-11-20 PROCEDURE — 1036F TOBACCO NON-USER: CPT | Performed by: FAMILY MEDICINE

## 2020-11-20 PROCEDURE — 1123F ACP DISCUSS/DSCN MKR DOCD: CPT | Performed by: FAMILY MEDICINE

## 2020-11-20 PROCEDURE — 93000 ELECTROCARDIOGRAM COMPLETE: CPT | Performed by: CLINICAL NURSE SPECIALIST

## 2020-11-20 PROCEDURE — 99214 OFFICE O/P EST MOD 30 MIN: CPT | Performed by: FAMILY MEDICINE

## 2020-11-20 PROCEDURE — G8420 CALC BMI NORM PARAMETERS: HCPCS | Performed by: CLINICAL NURSE SPECIALIST

## 2020-11-20 PROCEDURE — 36415 COLL VENOUS BLD VENIPUNCTURE: CPT | Performed by: FAMILY MEDICINE

## 2020-11-20 PROCEDURE — G8420 CALC BMI NORM PARAMETERS: HCPCS | Performed by: FAMILY MEDICINE

## 2020-11-20 NOTE — PROGRESS NOTES
denies exertional chest pain, resting shortness of breath, orthopnea, paroxysmal nocturnal dyspnea, syncope, presyncope, arrhythmia, edema and fatigue. The patient denies numbness or weakness to suggest cerebrovascular accident or transient ischemic attack. Bonilla Isbell MD is PCP and follows labs including lipids and thyroid. Austin Turner has the following history as recorded in [facility]:    Patient Active Problem List    Diagnosis Date Noted    SI (sacroiliac) joint dysfunction 11/16/2020    Paroxysmal atrial fibrillation ([address] 03/09/2019    Osteoarthritis of knee 11/21/2018    Acquired hypothyroidism 08/29/2016    Pure hypercholesterolemia 05/10/2016    Low back pain     Essential hypertension     Chronic insomnia     Burning mouth syndrome     Glaucoma      Past Medical History:   Diagnosis Date    Burning mouth syndrome     Chronic insomnia     Glaucoma     Hyperlipidemia     Hypertension     Hypothyroidism     Low back pain     Pure hypercholesterolemia 5/10/2016     Past Surgical History:   Procedure Laterality Date    APPENDECTOMY      BREAST BIOPSY      CARPAL TUNNEL RELEASE      COLONOSCOPY  2005        ERCP      ?  Dr.Whitney Ferrer Rerobert HEEL SPUR SURGERY      HIP ARTHROPLASTY Left January 30, 2014    HYSTERECTOMY      REVISION TOTAL HIP ARTHROPLASTY Left February 4, 2014    UPPER GASTROINTESTINAL ENDOSCOPY      UPPER GASTROINTESTINAL ENDOSCOPY  12/2011         Family History   Problem Relation Age of Onset    Heart Disease Mother     Lung Cancer Father      Social History     Tobacco Use    Smoking status: Never Smoker    Smokeless tobacco: Never Used   Substance Use Topics    Alcohol use: No      Current Outpatient Medications   Medication Sig Dispense Refill    olmesartan-hydroCHLOROthiazide (BENICAR HCT) 20-12.5 MG per tablet 1 tablet by mouth once a day for high blood pressure 90 tablet 3    metoprolol tartrate (LOPRESSOR) 50 MG tablet TAKE 1 TABLET TWICE A  tablet 3    amLODIPine (NORVASC) 5 MG tablet TAKE 1/2 TABLET DAILY 45 tablet 3    atorvastatin (LIPITOR) 10 MG tablet TAKE 1 TABLET DAILY FOR    HIGH CHOLESTEROL 90 tablet 3    levothyroxine (SYNTHROID) 75 MCG tablet TAKE 1 TABLET DAILY FOR LOWTHYROID 90 tablet 3    aspirin 81 MG tablet aspirin   daily      Probiotic Product (PROBIOTIC-10) CAPS Take by mouth once      sodium chloride (AIDA 128) 5 % ophthalmic solution Place 1 drop into both eyes 2 times daily      timolol (TIMOPTIC) 0.5 % ophthalmic solution       ZIOPTAN 0.0015 % SOLN       tiZANidine (ZANAFLEX) 2 MG tablet Take 1 tablet by mouth nightly as needed (low back pain) (Patient not taking: Reported on 11/20/2020) 10 tablet 0    losartan-hydroCHLOROthiazide (HYZAAR) 50-12.5 MG per tablet Take 1 tablet by mouth 2 times daily      doxepin (SINEQUAN) 50 MG capsule TAKE 1 CAPSULE AT BEDTIME  FOR CHRONIC INSOMNIA (Patient not taking: Reported on 11/20/2020) 90 capsule 3     No current facility-administered medications for this visit. Allergies: Gabapentin; Morphine; and Stadol [butorphanol tartrate]    Review of Systems  Constitutional - no significant activity change, appetite change, or unexpected weight change. No fever, chills or diaphoresis. + fatigue. HEENT - no significant rhinorrhea or epistaxis. No tinnitus or significant hearing loss. Eyes - no sudden vision change or amaurosis. Respiratory - no significant wheezing, stridor, apnea or cough. + dyspnea on exertion   No resting shortness of breath. Cardiovascular - no exertional chest pain, orthopnea or PND. No sensation of arrhythmia or slow heart rate. No claudication or leg edema. Gastrointestinal - no abdominal swelling or pain. No blood in stool. No severe constipation, diarrhea, nausea, or vomiting. Genitourinary - no difficulty urinating, dysuria, frequency, or urgency. No flank pain or hematuria.    Musculoskeletal - no back pain, gait disturbance, or myalgia. Skin - no color change or rash. No pallor. No new surgical incision. Neurologic - no speech difficulty, facial asymmetry or lateralizing weakness. No seizures, presyncope, syncope, or significant dizziness. Hematologic - no easy bruising or excessive bleeding. Psychiatric - no severe anxiety + insomnia. No confusion. All other review of systems are negative. Objective  Vital Signs - /70   Ht 5' 3\" (1.6 m)   Wt 124 lb (56.2 kg)   BMI 21.97 kg/m²   General - Florian Backer is alert, cooperative, and pleasant. Well groomed. No acute distress. Body habitus is normal.  HEENT - The head is normocephalic. No circumoral cyanosis. Dentition is normal.   EYES -  No Xanthelasma, no arcus senilis, no conjunctival hemorrhages or discharge. Neck - Supple, without increased jugular venous pressures. No carotid bruits. No mass. Respiratory - Lungs are clear bilaterally. No wheezes or rales. Normal effort without use of accessory muscles. Cardiovascular - Heart has regular rhythm and rate. No murmurs, rubs or gallops. + pedal pulses and no varicosities. Abdominal -  Soft, nontender, nondistended. Bowel sounds are intact. Extremities - No clubbing, cyanosis, or  edema. Musculoskeletal -  No clubbing . No Osler's nodes. Gait- in W/C. No kyphosis or scoliosis. Skin -  no statis ulcers or dermatitis. Neurological - No focal signs are identified. Oriented to person, place and time. Psychiatric -  Appropriate affect and mood. Assessment:     Diagnosis Orders   1. Paroxysmal atrial fibrillation (HCC)  EKG 12 lead   2. Diastolic dysfunction     3. Essential hypertension     4.  Other fatigue       Data:  BP Readings from Last 3 Encounters:   11/20/20 120/70   11/16/20 121/73   11/03/20 124/70    Pulse Readings from Last 3 Encounters:   11/16/20 75   11/03/20 82   10/12/20 52        Wt Readings from Last 3 Encounters:   11/20/20 124 lb (56.2 kg)   11/16/20 128 lb (58.1 kg)   11/03/20 127 lb (57.6 kg)     Preserved LV function with some diastolic dysfunction noted. No significant valvular disease. Compared to EKG and 2018 is stable    Overall blood pressure heart rate are controlled. Weight is stable. Medical manage includes beta-blocker therapy/HCTZ, CCB aspirin and statin. She is on thyroid replacement-TSH was low in June  Lipids well controlled    Patient did note she does not sleep well. This could be contributing to her ongoing activity decline and fatigue as well as age, pain and recent infection. Has follow-up with primary care later today. Should further discuss ongoing symptoms      States taking medications as prescribed  Stable cardiovascular status. No evidence of overt heart failure, angina or dysrhythmia. Plan    Follow up in 6 mos With Dr Curtis Willingham   Call with any questions or concerns  Follow up with Theresa Oneal MD for non cardiac problems  Report any new problems  Cardiovascular Fitness-Exercise as tolerated. Cardiac / Healthy Diet  Continue current medications as directed  Continue plan of treatment  It is always recommended that you bring your medications bottles with you to each visit - this is for your safety! LACI Fitzgerald    EMR dragon/transcription disclaimer: Much of this encounter note is electronic transcription/translation of spoken language to printed tach. Electronic translation of spoken language may be erroneous, or at times, nonsensical words or phrases may be inadvertently transcribed.  Although, I have reviewed the note for such errors, some may still exist.

## 2020-11-20 NOTE — PATIENT INSTRUCTIONS
Follow up in 6 mos With Dr Ester Corcoran   Call with any questions or concerns  Follow up with Lana Wiggins MD for non cardiac problems  Report any new problems  Cardiovascular Fitness-Exercise as tolerated. Cardiac / Healthy Diet  Continue current medications as directed  Continue plan of treatment  It is always recommended that you bring your medications bottles with you to each visit - this is for your safety!

## 2020-11-20 NOTE — PROGRESS NOTES
SUBJECTIVE:    Pedro Stevenson is 80 y. o.female who comes in complaining of Follow-up (two week back )   . HPI: When this was originally scheduled I was going to follow-up with her back pain but we have since done a CT and she is now seeing [facility] neurosurgery and they have referred her to Dr. Romi Jordan for an ejection. She says today that she is just tired. She just does not have the energy that she used to. She tires easily when she works in her yard or does housework. This is extremely frustrating to her. She is still hurting but it is better. She did see cardiology yesterday for follow-up of her hypertension and atrial fibrillation. She had a 2D echo done on 10/8/2020. This revealed no significant problems except for mild valve thickening and mild to moderate tricuspid regurgitation. She did have mild concentric left ventricular hypertrophy with probable moderate grade 2 diastolic dysfunction. She says she does not have chest pain she just gets tired. Her dizziness is a little bit better. Past Medical History:   Diagnosis Date    Burning mouth syndrome     Chronic insomnia     Glaucoma     Hyperlipidemia     Hypertension     Hypothyroidism     Low back pain     Pure hypercholesterolemia 5/10/2016       Past Surgical History:   Procedure Laterality Date    APPENDECTOMY      BREAST BIOPSY      CARPAL TUNNEL RELEASE      COLONOSCOPY  2005        ERCP      ?  [facility] HEEL SPUR SURGERY      HIP ARTHROPLASTY Left January 30, 2014    HYSTERECTOMY      REVISION TOTAL HIP ARTHROPLASTY Left February 4, 2014    UPPER GASTROINTESTINAL ENDOSCOPY      UPPER GASTROINTESTINAL ENDOSCOPY  12/2011               Allergies   Allergen Reactions    Gabapentin Swelling    Morphine     Stadol [Butorphanol Tartrate]        Social History     Socioeconomic History    Marital status:       Spouse name: None    Number of children: 2    Years of education: None    Highest education level: None   Occupational History    Occupation: Homemaker   Social Needs    Financial resource strain: None    Food insecurity     Worry: None     Inability: None    Transportation needs     Medical: None     Non-medical: None   Tobacco Use    Smoking status: Never Smoker    Smokeless tobacco: Never Used   Substance and Sexual Activity    Alcohol use: No    Drug use: No    Sexual activity: Not Currently   Lifestyle    Physical activity     Days per week: None     Minutes per session: None    Stress: None   Relationships    Social connections     Talks on phone: None     Gets together: None     Attends Oriental orthodox service: None     Active member of club or organization: None     Attends meetings of clubs or organizations: None     Relationship status: None    Intimate partner violence     Fear of current or ex partner: None     Emotionally abused: None     Physically abused: None     Forced sexual activity: None   Other Topics Concern    None   Social History Narrative    None       Review of Systems      OBJECTIVE:    Wt Readings from Last 3 Encounters:   11/20/20 124 lb 12.8 oz (56.6 kg)   11/20/20 124 lb (56.2 kg)   11/16/20 128 lb (58.1 kg)       /70   Temp 98.7 °F (37.1 °C)   Resp 18   Ht 5' 3\" (1.6 m)   Wt 124 lb 12.8 oz (56.6 kg)   SpO2 93%   BMI 22.11 kg/m²     Physical Exam  Vitals signs and nursing note reviewed. Constitutional:       General: She is not in acute distress. Appearance: Normal appearance. She is well-developed. She is not ill-appearing. Comments: Thin, starting to become a little frail   HENT:      Head: Normocephalic. Right Ear: Tympanic membrane, ear canal and external ear normal.      Left Ear: Tympanic membrane, ear canal and external ear normal.      Nose: Nose normal. No rhinorrhea. Eyes:      Extraocular Movements: Extraocular movements intact.       Conjunctiva/sclera: Conjunctivae normal.      Pupils: Pupils are equal, round, and reactive to light. Neck:      Musculoskeletal: Normal range of motion and neck supple. Vascular: No carotid bruit. Cardiovascular:      Rate and Rhythm: Normal rate and regular rhythm. Pulses: Normal pulses. Heart sounds: Normal heart sounds. No murmur. Pulmonary:      Effort: Pulmonary effort is normal. No respiratory distress. Breath sounds: Normal breath sounds. Abdominal:      General: Bowel sounds are normal.      Palpations: Abdomen is soft. Tenderness: There is no abdominal tenderness. Comments: No aortic bruits   Musculoskeletal: Normal range of motion. Comments: Chronic changes of osteoarthritis in hands but no increased warmth or redness. Lymphadenopathy:      Cervical: No cervical adenopathy. Skin:     General: Skin is warm and dry. Neurological:      Mental Status: She is alert and oriented to person, place, and time. Psychiatric:         Mood and Affect: Mood normal.         Speech: Speech normal.         Behavior: Behavior normal.         Thought Content: Thought content normal.         Judgment: Judgment normal.         ASSESSMENT:    1. Chronic fatigue    2. Essential hypertension    3. Acquired hypothyroidism    4. Chronic bilateral low back pain, unspecified whether sciatica present          PLAN:    MEDICATIONS:  No orders of the defined types were placed in this encounter. Continue current medications. We will refill when needed. ORDERS:  Orders Placed This Encounter   Procedures    Comprehensive Metabolic Panel    TSH without Reflex    T4, Free    CBC Auto Differential    Vitamin B12     I am going to look for causes of her fatigue such as hypothyroidism, anemia or B12 deficiency. We will also make sure she does not have worsening renal insufficiency. I think a lot of it is age-related. The pain may have played a role. She will continue to follow-up with Dr. Aman Raman.   Depending on her lab work I may see her sooner. She has a Medicare annual wellness already scheduled. Blood pressure is controlled. Follow-up:  No follow-ups on file. PATIENT INSTRUCTIONS:  There are no Patient Instructions on file for this visit. EMR Dragon/transcription disclaimer:  Much of this encounter note is electronic transcription/translation of spoken language to printed texts. The electronic translation of spoken language may be erroneous, or at times, nonsensical words or phrases may beinadvertently transcribed.   Although I have reviewed the note for such errors, some may still exist.

## 2020-11-21 RX ORDER — LEVOTHYROXINE SODIUM 0.05 MG/1
50 TABLET ORAL DAILY
Qty: 30 TABLET | Refills: 5 | Status: SHIPPED | OUTPATIENT
Start: 2020-11-21 | End: 2020-11-25 | Stop reason: SDUPTHER

## 2020-11-25 RX ORDER — LEVOTHYROXINE SODIUM 0.05 MG/1
50 TABLET ORAL DAILY
Qty: 30 TABLET | Refills: 5 | Status: SHIPPED | OUTPATIENT
Start: 2020-11-25 | End: 2021-03-03 | Stop reason: SDUPTHER

## 2020-12-10 ENCOUNTER — HOSPITAL ENCOUNTER (OUTPATIENT)
Dept: PAIN MANAGEMENT | Age: 85
Discharge: HOME | End: 2020-12-10
Payer: MEDICARE

## 2020-12-10 VITALS
HEART RATE: 1 BPM | SYSTOLIC BLOOD PRESSURE: 186 MMHG | DIASTOLIC BLOOD PRESSURE: 77 MMHG | OXYGEN SATURATION: 100 % | RESPIRATION RATE: 18 BRPM | TEMPERATURE: 97 F

## 2020-12-10 PROCEDURE — 20611 DRAIN/INJ JOINT/BURSA W/US: CPT

## 2020-12-10 PROCEDURE — 20552 NJX 1/MLT TRIGGER POINT 1/2: CPT | Performed by: NURSE PRACTITIONER

## 2020-12-10 PROCEDURE — 6360000002 HC RX W HCPCS

## 2020-12-10 PROCEDURE — 2500000003 HC RX 250 WO HCPCS

## 2020-12-10 PROCEDURE — 76942 ECHO GUIDE FOR BIOPSY: CPT | Performed by: NURSE PRACTITIONER

## 2020-12-10 RX ORDER — BUPIVACAINE HYDROCHLORIDE 5 MG/ML
1 INJECTION, SOLUTION EPIDURAL; INTRACAUDAL ONCE
Status: DISCONTINUED | OUTPATIENT
Start: 2020-12-10 | End: 2020-12-12 | Stop reason: HOSPADM

## 2020-12-10 RX ORDER — TRIAMCINOLONE ACETONIDE 40 MG/ML
40 INJECTION, SUSPENSION INTRA-ARTICULAR; INTRAMUSCULAR ONCE
Status: DISCONTINUED | OUTPATIENT
Start: 2020-12-10 | End: 2020-12-12 | Stop reason: HOSPADM

## 2020-12-10 RX ORDER — LIDOCAINE HYDROCHLORIDE 10 MG/ML
1 INJECTION, SOLUTION EPIDURAL; INFILTRATION; INTRACAUDAL; PERINEURAL ONCE
Status: DISCONTINUED | OUTPATIENT
Start: 2020-12-10 | End: 2020-12-12 | Stop reason: HOSPADM

## 2020-12-10 NOTE — PROGRESS NOTES
Procedure:  Level of Consciousness: [x]Alert [x]Oriented []Disoriented []Lethargic  Anxiety Level: [x]Calm []Anxious []Depressed []Other  Skin: [x]Warm [x]Dry []Cool []Moist []Intact []Other  Cardiovascular: [x]Palpitations: [x]Never []Occasionally []Frequently  Chest Pain: [x]No []Yes  Respiratory:  [x]Unlabored []Labored []Cough ([] Productive []Unproductive)  HCG Required: [x]No []Yes   Results: []Negative []Positive  Knowledge Level:        [x]Patient/Other verbalized understanding of pre-procedure instructions. [x]Assessment of post-op care needs (transportation, responsible caregiver)        [x]Able to discuss health care problems and how to deal with it.   Factors that Affect Teaching:        Language Barrier: [x]No []Yes - why:        Hearing Loss:        [x]No []Yes            Corrective Device:  []Yes [x]No        Vision Loss:           []No [x]Yes            Corrective Device:  []Yes [x]No        Memory Loss:       [x]No []Yes            []Short Term []Long Term  Motivational Level:  [x]Asks Questions                  []Extremely Anxious       [x]Seems Interested               []Seems Uninterested                  [x]Denies need for Education  Risk for Injury:  [x]Patient oriented to person, place and time  []History of frequent falls/loss of balance  Nutritional:  []Change in appetite   []Weight Gain   []Weight Loss  Functional:  []Requires assistance with ADL's

## 2020-12-12 NOTE — PROCEDURES
[facility] Physical and Pain Medicine      Patient Name: Mary Beth Leo    : 1928    Age: 80 y.o. Sex: female    Date: 2020    Pre-op Diagnosis: [x]  Right  [] Left  [] Bilateral  Sacroiliac Joint(s) Dysfunction/ Sacroiliitis    Post-op Diagnosis: [x] Right   [] Left  [] Bilateral Sacroiliac Joint(s) Dysfunction/ Sacroliliits    Procedure: Ultrasound Guided Injection of  [x] Right  [] Left  [] Bilateral Sacroiliac Joint(s)     Performing Procedure:  Reggie Herrmann, MSN, APRN, FNP-C    Previously Had Procedure:  [] Yes   [x] No    No data found. Description of Procedure:    After a brief physical assessment and failure to improve with conservative measures the patient presented for an injection of the  [x] Right  [] Left   [] Bilateral Sacroiliac Joint(s). The indications, limitations and possible complications were discussed with the patient and the patient elected to proceed with the procedure. [x] Right Sacroiliac Joint    After voluntary, informed and signed consent obtained the patient was placed in the prone position. Appropriate time out was obtained per policy. The area of maximal tenderness was palpated over the Right Sacroiliac Joint and the skin overlying this area marked with a skin marker. The skin was then sprayed with Gebauer's Solution and prepped in a sterile fashion with Prevantics swab. The ultrasound transducer was brought in and the Right Sacroiliac Joint was identified with ultrasound. Under sterile technique and direct ultrasound visualization a 22 gauge 3 inch spinal needle was introduced into the Right Sacroiliac Joint.  After a negative aspiration a solution of    [x] Kenalog 1 ml (40mg/ml), 1 ml of 0.5% Marcaine Plain and 1 ml of 1% Lidocaine Plain     [] Toradol 1 ml (30 mg/ml), 1 ml of 0.5% Marcaine Plain and 1 ml of 1% Lidocaine Plain was injected into the Right Sacroiliac Joint. The needle was withdrawn and a sterile dressing applied. [] Left Sacroiliac Joint    After voluntary, informed and signed consent obtained the patient was placed in the prone position. Appropriate time out was obtained per policy. The area of maximal tenderness was palpated over the Left Sacroiliac Joint and the skin overlying this area marked with a skin marker. The skin was then sprayed with Gebauer's Solution and prepped in a sterile fashion with Prevantics swab. The ultrasound transducer was brought in and the Left Sacroiliac Joint was identified with ultrasound. Under sterile technique and direct ultrasound visualization a 22 gauge 3 inch spinal needle was introduced into the Left Sacroiliac Joint. After a negative aspiration a solution of    [] Kenalog 1 ml (40mg/ml), 1 ml of 0.5% Marcaine Plain and 1 ml of 1% Lidocaine Plain     [] Toradol 1 ml (30 mg/ml), 1 ml of 0.5% Marcaine Plain and 1 ml of 1% Lidocaine Plain      was injected into the Left Sacroiliac Joint. The needle was withdrawn and a sterile dressing applied. Discharge: The patient tolerated the procedure well. There were no complications during the procedure and the patient was discharged home with discharge instructions. The patient has been instructed to contact the office should there be any complications or questions to arise between today and their next appointment. Plan:  [x] Will return to the office of Dr. Urmila Bosch for follow-up.        LACI Crouch, 12/12/2020 at 12:09 PM

## 2020-12-13 NOTE — H&P
Lung: Clear to ausculation in all lobes anterior and posterior. Heart[de-identified] Regular rate and rhythm, no gallops, rubs or murmurs, no edema  Extremities: No rash, cyanosis or bruising  Musculoskeletal: No joint swelling or deformity   Back Exam: Tenderness with palpation over right SI joint   Mood and affect: Normal     Active Problem(s)  Active Problems:    SI (sacroiliac) joint dysfunction  Resolved Problems:    * No resolved hospital problems. *                                                                                                                            PLAN:  1. Patient is to call the office with any questions or concerns that may arise prior. 2. Will perform a right SI joint injection  3. Patient is to return to the office of Julio Cesar Brady for follow-up    Education Provided:  [x] Post-procedure instructions given to patient. Patient verbalized understanding.     CC:  Dr. Charlene Litten, LACI, 12/13/2020 at 7:21 AM

## 2020-12-14 ENCOUNTER — TELEPHONE (OUTPATIENT)
Dept: PAIN MANAGEMENT | Age: 85
End: 2020-12-14

## 2021-01-26 ENCOUNTER — NURSE ONLY (OUTPATIENT)
Dept: PRIMARY CARE CLINIC | Age: 86
End: 2021-01-26
Payer: MEDICARE

## 2021-01-26 DIAGNOSIS — E03.9 ACQUIRED HYPOTHYROIDISM: Primary | ICD-10-CM

## 2021-01-26 DIAGNOSIS — R53.82 CHRONIC FATIGUE: ICD-10-CM

## 2021-01-26 LAB
BASOPHILS ABSOLUTE: 0.1 K/UL (ref 0–0.2)
BASOPHILS RELATIVE PERCENT: 0.6 % (ref 0–1)
EOSINOPHILS ABSOLUTE: 0.4 K/UL (ref 0–0.6)
EOSINOPHILS RELATIVE PERCENT: 2.6 % (ref 0–5)
HCT VFR BLD CALC: 51 % (ref 37–47)
HEMOGLOBIN: 16.4 G/DL (ref 12–16)
IMMATURE GRANULOCYTES #: 0.1 K/UL
LYMPHOCYTES ABSOLUTE: 3.4 K/UL (ref 1.1–4.5)
LYMPHOCYTES RELATIVE PERCENT: 23.4 % (ref 20–40)
MCH RBC QN AUTO: 31.9 PG (ref 27–31)
MCHC RBC AUTO-ENTMCNC: 32.2 G/DL (ref 33–37)
MCV RBC AUTO: 99.2 FL (ref 81–99)
MONOCYTES ABSOLUTE: 0.8 K/UL (ref 0–0.9)
MONOCYTES RELATIVE PERCENT: 5.4 % (ref 0–10)
NEUTROPHILS ABSOLUTE: 9.7 K/UL (ref 1.5–7.5)
NEUTROPHILS RELATIVE PERCENT: 67.3 % (ref 50–65)
PDW BLD-RTO: 14.2 % (ref 11.5–14.5)
PLATELET # BLD: 259 K/UL (ref 130–400)
PLATELET SLIDE REVIEW: ABNORMAL
PMV BLD AUTO: 11 FL (ref 9.4–12.3)
RBC # BLD: 5.14 M/UL (ref 4.2–5.4)
T4 FREE: 1.27 NG/DL (ref 0.93–1.7)
TSH SERPL DL<=0.05 MIU/L-ACNC: 0.21 UIU/ML (ref 0.27–4.2)
WBC # BLD: 14.4 K/UL (ref 4.8–10.8)

## 2021-01-26 PROCEDURE — 36415 COLL VENOUS BLD VENIPUNCTURE: CPT | Performed by: FAMILY MEDICINE

## 2021-01-30 DIAGNOSIS — D58.2 ELEVATED HEMOGLOBIN (HCC): ICD-10-CM

## 2021-01-30 DIAGNOSIS — D72.829 LEUKOCYTOSIS, UNSPECIFIED TYPE: Primary | ICD-10-CM

## 2021-02-03 ENCOUNTER — NURSE ONLY (OUTPATIENT)
Dept: PRIMARY CARE CLINIC | Age: 86
End: 2021-02-03
Payer: MEDICARE

## 2021-02-03 DIAGNOSIS — D58.2 ELEVATED HEMOGLOBIN (HCC): Primary | ICD-10-CM

## 2021-02-03 LAB
HCT VFR BLD CALC: 41.3 % (ref 37–47)
HEMOGLOBIN: 13.3 G/DL (ref 12–16)
MCH RBC QN AUTO: 31.7 PG (ref 27–31)
MCHC RBC AUTO-ENTMCNC: 32.2 G/DL (ref 33–37)
MCV RBC AUTO: 98.6 FL (ref 81–99)
PDW BLD-RTO: 14.6 % (ref 11.5–14.5)
PLATELET # BLD: 260 K/UL (ref 130–400)
PMV BLD AUTO: 10.4 FL (ref 9.4–12.3)
RBC # BLD: 4.19 M/UL (ref 4.2–5.4)
WBC # BLD: 11.1 K/UL (ref 4.8–10.8)

## 2021-02-03 PROCEDURE — 36415 COLL VENOUS BLD VENIPUNCTURE: CPT | Performed by: FAMILY MEDICINE

## 2021-03-03 ENCOUNTER — NURSE ONLY (OUTPATIENT)
Dept: PRIMARY CARE CLINIC | Age: 86
End: 2021-03-03
Payer: MEDICARE

## 2021-03-03 DIAGNOSIS — D72.829 LEUKOCYTOSIS, UNSPECIFIED TYPE: Primary | ICD-10-CM

## 2021-03-03 LAB
HCT VFR BLD CALC: 46.8 % (ref 37–47)
HEMOGLOBIN: 14.8 G/DL (ref 12–16)
MCH RBC QN AUTO: 32.3 PG (ref 27–31)
MCHC RBC AUTO-ENTMCNC: 31.6 G/DL (ref 33–37)
MCV RBC AUTO: 102.2 FL (ref 81–99)
PDW BLD-RTO: 13.7 % (ref 11.5–14.5)
PLATELET # BLD: 285 K/UL (ref 130–400)
PMV BLD AUTO: 10.2 FL (ref 9.4–12.3)
RBC # BLD: 4.58 M/UL (ref 4.2–5.4)
WBC # BLD: 12.2 K/UL (ref 4.8–10.8)

## 2021-03-03 PROCEDURE — 36415 COLL VENOUS BLD VENIPUNCTURE: CPT | Performed by: FAMILY MEDICINE

## 2021-03-03 RX ORDER — LEVOTHYROXINE SODIUM 0.05 MG/1
50 TABLET ORAL DAILY
Qty: 90 TABLET | Refills: 3 | Status: SHIPPED | OUTPATIENT
Start: 2021-03-03 | End: 2021-03-07 | Stop reason: SDUPTHER

## 2021-03-07 RX ORDER — LEVOTHYROXINE SODIUM 0.05 MG/1
50 TABLET ORAL DAILY
Qty: 90 TABLET | Refills: 3 | Status: SHIPPED | OUTPATIENT
Start: 2021-03-07 | End: 2021-06-13 | Stop reason: SDUPTHER

## 2021-04-19 ENCOUNTER — APPOINTMENT (OUTPATIENT)
Dept: GENERAL RADIOLOGY | Age: 86
End: 2021-04-19
Payer: MEDICARE

## 2021-04-19 ENCOUNTER — HOSPITAL ENCOUNTER (EMERGENCY)
Age: 86
Discharge: HOME | End: 2021-04-20
Payer: MEDICARE

## 2021-04-19 DIAGNOSIS — E83.42 HYPOMAGNESEMIA: ICD-10-CM

## 2021-04-19 DIAGNOSIS — I48.91 ATRIAL FIBRILLATION, UNSPECIFIED TYPE (HCC): Primary | ICD-10-CM

## 2021-04-19 DIAGNOSIS — I51.89 DIASTOLIC DYSFUNCTION: ICD-10-CM

## 2021-04-19 LAB
ALBUMIN SERPL-MCNC: 4.2 G/DL (ref 3.5–5.2)
ALP BLD-CCNC: 137 U/L (ref 35–104)
ALT SERPL-CCNC: 14 U/L (ref 5–33)
ANION GAP SERPL CALCULATED.3IONS-SCNC: 11 MMOL/L (ref 7–19)
AST SERPL-CCNC: 17 U/L (ref 5–32)
ATYPICAL LYMPHOCYTE RELATIVE PERCENT: 5 % (ref 0–8)
BASOPHILS ABSOLUTE: 0 K/UL (ref 0–0.2)
BASOPHILS RELATIVE PERCENT: 0 % (ref 0–1)
BILIRUB SERPL-MCNC: <0.2 MG/DL (ref 0.2–1.2)
BUN BLDV-MCNC: 22 MG/DL (ref 8–23)
CALCIUM SERPL-MCNC: 9.9 MG/DL (ref 8.2–9.6)
CHLORIDE BLD-SCNC: 105 MMOL/L (ref 98–111)
CO2: 27 MMOL/L (ref 22–29)
CREAT SERPL-MCNC: 1 MG/DL (ref 0.5–0.9)
D DIMER: 0.28 UG/ML FEU (ref 0–0.48)
EOSINOPHILS ABSOLUTE: 0.95 K/UL (ref 0–0.6)
EOSINOPHILS RELATIVE PERCENT: 6 % (ref 0–5)
GFR AFRICAN AMERICAN: >59
GFR NON-AFRICAN AMERICAN: 52
GLUCOSE BLD-MCNC: 118 MG/DL (ref 74–109)
HCT VFR BLD CALC: 45 % (ref 37–47)
HEMOGLOBIN: 14.5 G/DL (ref 12–16)
IMMATURE GRANULOCYTES #: 0.1 K/UL
INR BLD: 0.93 (ref 0.88–1.18)
LYMPHOCYTES ABSOLUTE: 6.2 K/UL (ref 1.1–4.5)
LYMPHOCYTES RELATIVE PERCENT: 34 % (ref 20–40)
MACROCYTES: ABNORMAL
MAGNESIUM: 1.6 MG/DL (ref 1.7–2.3)
MCH RBC QN AUTO: 32.3 PG (ref 27–31)
MCHC RBC AUTO-ENTMCNC: 32.2 G/DL (ref 33–37)
MCV RBC AUTO: 100.2 FL (ref 81–99)
MONOCYTES ABSOLUTE: 0 K/UL (ref 0–0.9)
MONOCYTES RELATIVE PERCENT: 0 % (ref 0–10)
NEUTROPHILS ABSOLUTE: 8.7 K/UL (ref 1.5–7.5)
NEUTROPHILS RELATIVE PERCENT: 55 % (ref 50–65)
OVALOCYTES: ABNORMAL
PDW BLD-RTO: 13.1 % (ref 11.5–14.5)
PLATELET # BLD: 307 K/UL (ref 130–400)
PLATELET SLIDE REVIEW: ADEQUATE
PMV BLD AUTO: 9.6 FL (ref 9.4–12.3)
POTASSIUM REFLEX MAGNESIUM: 3.5 MMOL/L (ref 3.5–5)
PRO-BNP: 6347 PG/ML (ref 0–1800)
PROTHROMBIN TIME: 12.4 SEC (ref 12–14.6)
RBC # BLD: 4.49 M/UL (ref 4.2–5.4)
SARS-COV-2, NAAT: NOT DETECTED
SODIUM BLD-SCNC: 143 MMOL/L (ref 136–145)
TEAR DROP CELLS: ABNORMAL
TOTAL PROTEIN: 7 G/DL (ref 6.6–8.7)
TROPONIN: <0.01 NG/ML (ref 0–0.03)
TSH REFLEX FT4: 0.79 UIU/ML (ref 0.35–5.5)
WBC # BLD: 15.8 K/UL (ref 4.8–10.8)

## 2021-04-19 PROCEDURE — 85025 COMPLETE CBC W/AUTO DIFF WBC: CPT

## 2021-04-19 PROCEDURE — 71045 X-RAY EXAM CHEST 1 VIEW: CPT

## 2021-04-19 PROCEDURE — 83735 ASSAY OF MAGNESIUM: CPT

## 2021-04-19 PROCEDURE — 96365 THER/PROPH/DIAG IV INF INIT: CPT

## 2021-04-19 PROCEDURE — 84484 ASSAY OF TROPONIN QUANT: CPT

## 2021-04-19 PROCEDURE — 87635 SARS-COV-2 COVID-19 AMP PRB: CPT

## 2021-04-19 PROCEDURE — 36415 COLL VENOUS BLD VENIPUNCTURE: CPT

## 2021-04-19 PROCEDURE — 96375 TX/PRO/DX INJ NEW DRUG ADDON: CPT

## 2021-04-19 PROCEDURE — 84443 ASSAY THYROID STIM HORMONE: CPT

## 2021-04-19 PROCEDURE — 99284 EMERGENCY DEPT VISIT MOD MDM: CPT

## 2021-04-19 PROCEDURE — 96366 THER/PROPH/DIAG IV INF ADDON: CPT

## 2021-04-19 PROCEDURE — 85379 FIBRIN DEGRADATION QUANT: CPT

## 2021-04-19 PROCEDURE — 83880 ASSAY OF NATRIURETIC PEPTIDE: CPT

## 2021-04-19 PROCEDURE — 93005 ELECTROCARDIOGRAM TRACING: CPT | Performed by: NURSE PRACTITIONER

## 2021-04-19 PROCEDURE — 85610 PROTHROMBIN TIME: CPT

## 2021-04-19 PROCEDURE — 6360000002 HC RX W HCPCS: Performed by: NURSE PRACTITIONER

## 2021-04-19 PROCEDURE — 80053 COMPREHEN METABOLIC PANEL: CPT

## 2021-04-19 RX ORDER — MAGNESIUM SULFATE IN WATER 40 MG/ML
2000 INJECTION, SOLUTION INTRAVENOUS ONCE
Status: COMPLETED | OUTPATIENT
Start: 2021-04-19 | End: 2021-04-20

## 2021-04-19 RX ORDER — FUROSEMIDE 10 MG/ML
20 INJECTION INTRAMUSCULAR; INTRAVENOUS ONCE
Status: COMPLETED | OUTPATIENT
Start: 2021-04-19 | End: 2021-04-19

## 2021-04-19 RX ORDER — FUROSEMIDE 20 MG/1
20 TABLET ORAL DAILY
Qty: 5 TABLET | Refills: 0 | Status: SHIPPED | OUTPATIENT
Start: 2021-04-19 | End: 2021-04-27

## 2021-04-19 RX ADMIN — FUROSEMIDE 20 MG: 10 INJECTION, SOLUTION INTRAVENOUS at 22:54

## 2021-04-19 RX ADMIN — MAGNESIUM SULFATE HEPTAHYDRATE 2000 MG: 40 INJECTION, SOLUTION INTRAVENOUS at 22:53

## 2021-04-19 ASSESSMENT — ENCOUNTER SYMPTOMS
SHORTNESS OF BREATH: 1
ABDOMINAL PAIN: 0

## 2021-04-20 VITALS
DIASTOLIC BLOOD PRESSURE: 66 MMHG | HEART RATE: 87 BPM | TEMPERATURE: 97.8 F | RESPIRATION RATE: 26 BRPM | SYSTOLIC BLOOD PRESSURE: 116 MMHG | BODY MASS INDEX: 22.15 KG/M2 | WEIGHT: 125 LBS | OXYGEN SATURATION: 91 % | HEIGHT: 63 IN

## 2021-04-20 LAB
EKG P AXIS: NORMAL DEGREES
EKG P-R INTERVAL: NORMAL MS
EKG Q-T INTERVAL: 396 MS
EKG QRS DURATION: 114 MS
EKG QTC CALCULATION (BAZETT): 448 MS
EKG T AXIS: 99 DEGREES

## 2021-04-20 PROCEDURE — 93010 ELECTROCARDIOGRAM REPORT: CPT | Performed by: INTERNAL MEDICINE

## 2021-04-20 NOTE — ED PROVIDER NOTES
[facility]  eMERGENCY dEPARTMENT eNCOUnter      Pt Name: Ab Maldonado  MRN: [id number]  Bria 4/21/1928  Date of evaluation: 4/19/2021  Provider: Bryson Robles, [address]       Chief Complaint   Patient presents with    Atrial Fibrillation    Shortness of Breath         HISTORY OF PRESENT ILLNESS   (Location/Symptom, Timing/Onset,Context/Setting, Quality, Duration, Modifying Factors, Severity)  Note limiting factors. Joesph Joseph a 80 y.o. female who presents to the emergency department for evaluation of atrial fibrillation and shortness of breath. Pt tells me that she has history of atrial fibrillation reporting last fall being out of rhythm sent by cardiologist office for echocardiogram in fall. She relates that today she has had increased shortness of breath from baseline reporting concern for afib. She denies any associated chest pain. She has had no abdominal pain. She denies syncope. She tells me that she has had no fevers or cough. She takes aspirin daily. She denies missing any medications. She reports elevated blood pressure with systolic number in the 198H today. Per review of EMR patient with history of diastolic dysfunction. HPI    Nursing Notes were reviewed. REVIEW OF SYSTEMS    (2-9 systems for level 4, 10 or more for level 5)     Review of Systems   Constitutional: Positive for activity change. Respiratory: Positive for shortness of breath. Cardiovascular: Negative for chest pain. Gastrointestinal: Negative for abdominal pain. Neurological: Positive for light-headedness (gives long history of problems with imbalance). All other systems reviewed and are negative. A complete review of systems was performed and is negative except as noted above in the HPI.        PAST MEDICAL HISTORY     Past Medical History:   Diagnosis Date    Burning mouth syndrome     Chronic insomnia     Glaucoma     Hyperlipidemia     Hypertension     Hypothyroidism     Low warm and dry. Neurological:      Mental Status: She is alert and oriented to person, place, and time. DIAGNOSTIC RESULTS     EKG: All EKG's are interpreted by the Emergency Department Physician who either signs or Co-signs this chart in the absence of acardiologist.    afib with hr 93b/min Normal QRS interval. Normal QT interval. No obvious ST elevation or ST depression. RADIOLOGY:   Non-plain film images such as CT, Ultrasound andMRI are read by the radiologist. Plain radiographic images are visualized and preliminarily interpreted by the emergency physician with the below findings:        Interpretation per the Radiologist below, if available at the time of this note:    XR CHEST PORTABLE   Final Result   No evidence of acute cardiopulmonary process.    Signed by Dr Donte Lara on 4/19/2021 8:09 PM            ED BEDSIDE ULTRASOUND:   Performed by ED Physician - none    LABS:  Labs Reviewed   CBC WITH AUTO DIFFERENTIAL - Abnormal; Notable for the following components:       Result Value    WBC 15.8 (*)     .2 (*)     MCH 32.3 (*)     MCHC 32.2 (*)     Eosinophils % 6.0 (*)     Neutrophils Absolute 8.7 (*)     Lymphocytes Absolute 6.2 (*)     Eosinophils Absolute 0.95 (*)     Macrocytes 1+ (*)     Ovalocytes Occasional (*)     Tear Drop Cells Occasional (*)     All other components within normal limits   COMPREHENSIVE METABOLIC PANEL W/ REFLEX TO MG FOR LOW K - Abnormal; Notable for the following components:    Glucose 118 (*)     CREATININE 1.0 (*)     GFR Non-African American 52 (*)     Calcium 9.9 (*)     Alkaline Phosphatase 137 (*)     All other components within normal limits   BRAIN NATRIURETIC PEPTIDE - Abnormal; Notable for the following components:    Pro-BNP 6,347 (*)     All other components within normal limits   MAGNESIUM - Abnormal; Notable for the following components:    Magnesium 1.6 (*)     All other components within normal limits   COVID-19, RAPID   TROPONIN   PROTIME-INR D-DIMER, QUANTITATIVE   TSH WITH REFLEX TO FT4       All other labs were within normal range or not returned as of this dictation. RE-ASSESSMENT     Pt with normal cxr, normal sp02 and remains in no respiratory distress. Discussed adjusting diuretic therapy and having patient follow up with cardiology. Pt is in agreement with plan. EMERGENCY DEPARTMENT COURSE and DIFFERENTIALDIAGNOSIS/MDM:   Vitals:    Vitals:    04/19/21 1916   BP: (!) 169/90   Pulse: 75   Temp: 97.8 °F (36.6 °C)   TempSrc: Infrared   SpO2: 100%   Weight: 125 lb (56.7 kg)   Height: 5' 3\" (1.6 m)       MDM      CONSULTS:  None    PROCEDURES:  Unless otherwise notedbelow, none     Procedures    FINAL IMPRESSION     1. Atrial fibrillation, unspecified type ([address]    2. Diastolic dysfunction    3.  Hypomagnesemia          DISPOSITION/PLAN   DISPOSITION Discharge - Pending Orders Complete 04/19/2021 10:49:35 PM      PATIENT REFERRED TO:  Primo Pate MD  [address] [id number] [phone]    Schedule an appointment as soon as possible for a visit       Aiyana Guzman MD  [address] [address]  [address] [id number] [phone]    Schedule an appointment as soon as possible for a visit         DISCHARGE MEDICATIONS:       Current Discharge Medication List           Medication List      START taking these medications    furosemide 20 MG tablet  Commonly known as: Lasix  Take 1 tablet by mouth daily for 5 days        STOP taking these medications    doxepin 50 MG capsule  Commonly known as: SINEQUAN     tiZANidine 2 MG tablet  Commonly known as: Kaz Render your doctor about these medications    amLODIPine 5 MG tablet  Commonly known as: NORVASC  TAKE 1/2 TABLET DAILY     aspirin 81 MG tablet     atorvastatin 10 MG tablet  Commonly known as: LIPITOR  TAKE 1 TABLET DAILY FOR    HIGH CHOLESTEROL     levothyroxine 50 MCG tablet  Commonly known as: SYNTHROID  Take 1 tablet by mouth daily For low thyroid losartan-hydroCHLOROthiazide 50-12.5 MG per tablet  Commonly known as: HYZAAR     metoprolol tartrate 50 MG tablet  Commonly known as: LOPRESSOR  TAKE 1 TABLET TWICE A DAY     olmesartan-hydroCHLOROthiazide 20-12.5 MG per tablet  Commonly known as: BENICAR HCT  1 tablet by mouth once a day for high blood pressure     Probiotic-10 Caps     sodium chloride 5 % ophthalmic solution  Commonly known as: AIDA 128     timolol 0.5 % ophthalmic solution  Commonly known as: TIMOPTIC     Zioptan 0.0015 % Soln  Generic drug: Tafluprost (PF)           Where to Get Your Medications      You can get these medications from any pharmacy    Bring a paper prescription for each of these medications  · furosemide 20 MG tablet         (Pleasenote that portions of this note were completed with a voice recognition program.  Efforts were made to edit the dictations but occasionally words are mis-transcribed.)              Jeromy Rodriguez, LACI  04/19/21 3220

## 2021-04-21 ENCOUNTER — OFFICE VISIT (OUTPATIENT)
Dept: CARDIOLOGY CLINIC | Age: 86
End: 2021-04-21
Payer: MEDICARE

## 2021-04-21 VITALS
BODY MASS INDEX: 22.15 KG/M2 | DIASTOLIC BLOOD PRESSURE: 60 MMHG | SYSTOLIC BLOOD PRESSURE: 90 MMHG | HEIGHT: 63 IN | WEIGHT: 125 LBS | HEART RATE: 72 BPM

## 2021-04-21 DIAGNOSIS — I51.89 DIASTOLIC DYSFUNCTION: ICD-10-CM

## 2021-04-21 DIAGNOSIS — D72.829 LEUKOCYTOSIS, UNSPECIFIED TYPE: ICD-10-CM

## 2021-04-21 DIAGNOSIS — I48.0 PAROXYSMAL ATRIAL FIBRILLATION (HCC): Primary | ICD-10-CM

## 2021-04-21 DIAGNOSIS — I10 ESSENTIAL HYPERTENSION: ICD-10-CM

## 2021-04-21 DIAGNOSIS — I50.31 ACUTE DIASTOLIC CONGESTIVE HEART FAILURE (HCC): ICD-10-CM

## 2021-04-21 PROCEDURE — 4040F PNEUMOC VAC/ADMIN/RCVD: CPT | Performed by: CLINICAL NURSE SPECIALIST

## 2021-04-21 PROCEDURE — 1123F ACP DISCUSS/DSCN MKR DOCD: CPT | Performed by: CLINICAL NURSE SPECIALIST

## 2021-04-21 PROCEDURE — 1036F TOBACCO NON-USER: CPT | Performed by: CLINICAL NURSE SPECIALIST

## 2021-04-21 PROCEDURE — G8427 DOCREV CUR MEDS BY ELIG CLIN: HCPCS | Performed by: CLINICAL NURSE SPECIALIST

## 2021-04-21 PROCEDURE — G8420 CALC BMI NORM PARAMETERS: HCPCS | Performed by: CLINICAL NURSE SPECIALIST

## 2021-04-21 PROCEDURE — 1090F PRES/ABSN URINE INCON ASSESS: CPT | Performed by: CLINICAL NURSE SPECIALIST

## 2021-04-21 PROCEDURE — 99214 OFFICE O/P EST MOD 30 MIN: CPT | Performed by: CLINICAL NURSE SPECIALIST

## 2021-04-21 PROCEDURE — 93000 ELECTROCARDIOGRAM COMPLETE: CPT | Performed by: CLINICAL NURSE SPECIALIST

## 2021-04-21 NOTE — PROGRESS NOTES
[address] [address], [address] [id number]  Phone: [phone]  Fax: [phone]    OFFICE VISIT:  2021    Nikolas Holly - : 1928    Reason For Visit:  Yogi Emmanuel is a 80 y.o. female who is here for Follow-up (patient has issues with hypotension ) and Atrial Fibrillation  History of dyslipidemia, hypertension, prior pulmonary embolus, and paroxysmal atrial fibrillation. She underwent nuclear stress testing May 2019 that was negative for ischemia with preserved LV function  2-week monitor showed sinus rhythm with short frequent little's bursts of SVT longest lasting 35 seconds. 2D echo 2020 showed moderate concentric LVH with probable grade 2 diastolic dysfunction. Normal LV size and EF. Mild AI, mild MR and mild to moderate TR with elevated RVSP at 35 mmHg    Patient was recently seen in the emergency room with hypertension and atrial fibrillation. EKG shows atrial fibrillation with rate at 90    Patient is here as a follow-up. She states she is usually pretty active. Last week she was mowing and weed eating. She has to rest more often but overall she has been doing well. On  she became extremely fatigued and short of breath. Was feeling quite bad so went to the emergency room. She states her heart rate was very high and so was her blood pressure. They gave her Lasix in the emergency room and she urinated quite a bit. She was given a prescription for Lasix but took 1 yesterday and did not urinate. She has been very weak since she has been home  Her blood pressures been low. She usually takes her amlodipine at noon but did not take it today due to low blood pressure  She states she is been going in and out of [facility] for years.   States she usually does not know what      Subjective  Yogi Emmanuel denies exertional chest pain, resting shortness of breath, orthopnea, paroxysmal nocturnal dyspnea, syncope, presyncope,edema   The patient denies numbness or  atorvastatin (LIPITOR) 10 MG tablet TAKE 1 TABLET DAILY FOR    HIGH CHOLESTEROL 90 tablet 3    aspirin 81 MG tablet aspirin   daily      Probiotic Product (PROBIOTIC-10) CAPS Take by mouth once      sodium chloride (AIDA 128) 5 % ophthalmic solution Place 1 drop into both eyes 2 times daily      timolol (TIMOPTIC) 0.5 % ophthalmic solution       ZIOPTAN 0.0015 % SOLN       furosemide (LASIX) 20 MG tablet Take 1 tablet by mouth daily for 5 days (Patient not taking: Reported on 4/21/2021) 5 tablet 0     No current facility-administered medications for this visit. Allergies: Gabapentin, Morphine, and Stadol [butorphanol tartrate]    Review of Systems  Constitutional - + significant activity change, no appetite change, or unexpected weight change. No fever, chills or diaphoresis. + fatigue. HEENT - no significant rhinorrhea or epistaxis. No tinnitus or significant hearing loss. Eyes - no sudden vision change or amaurosis. Respiratory - no significant wheezing, stridor, apnea or cough. + dyspnea on exertion no resting  shortness of breath. Cardiovascular - no exertional chest pain, orthopnea or PND. No sensation of arrhythmia or slow heart rate. No claudication or leg edema. Gastrointestinal - no abdominal swelling or pain. No blood in stool. No severe constipation, diarrhea, nausea, or vomiting. Genitourinary - no difficulty urinating, dysuria, frequency, or urgency. No flank pain or hematuria. Musculoskeletal - no back pain, gait-unsteady, has had falling. Skin - no color change or rash. No pallor. No new surgical incision. Neurologic - no speech difficulty, facial asymmetry or lateralizing weakness. No seizures, presyncope, syncope, or significant dizziness. Hematologic - no easy bruising or excessive bleeding. Psychiatric - no severe anxiety or insomnia. No confusion. All other review of systems are negative.       Objective  Vital Signs - BP 90/60   Pulse 72   Ht 5' 3\" (1.6 m)   Wt 125 lb (56.7 kg)   BMI 22.14 kg/m²   General - Almita Martinez is alert, cooperative, and pleasant. Well groomed. No acute distress. Body habitus is normal.  HEENT - The head is normocephalic. No circumoral cyanosis. Dentition is normal.   EYES -  No Xanthelasma, no arcus senilis, no conjunctival hemorrhages or discharge. Neck - Supple, without increased jugular venous pressures. No carotid bruits. No mass. Respiratory - Lungs are clear bilaterally. No wheezes or rales. Normal effort without use of accessory muscles. Cardiovascular - Heart has regular rhythm and rate. No murmurs, rubs or gallops. + pedal pulses and no varicosities. Abdominal -  Soft, nontender, nondistended. Bowel sounds are intact. Extremities - No clubbing, cyanosis, or  edema. Musculoskeletal -  No clubbing . No Osler's nodes. Gait -in transport wheelchair. No kyphosis or scoliosis. Skin -  no statis ulcers or dermatitis. Neurological - No focal signs are identified. Oriented to person, place and time. Psychiatric -  Appropriate affect and mood. Assessment:     Diagnosis Orders   1. Paroxysmal atrial fibrillation (HCC)  EKG 12 lead    SC EXTERNAL ECG REC>48HR<7D RECORDING   2. Essential hypertension     3. Diastolic dysfunction     4. Leukocytosis, unspecified type     5. Acute diastolic congestive heart failure ([address]       Data:  BP Readings from Last 3 Encounters:   04/21/21 90/60   04/20/21 116/66   12/10/20 (!) 186/77    Pulse Readings from Last 3 Encounters:   04/21/21 72   04/20/21 87   12/10/20 (!) 1        Wt Readings from Last 3 Encounters:   04/21/21 125 lb (56.7 kg)   04/19/21 125 lb (56.7 kg)   11/20/20 124 lb 12.8 oz (56.6 kg)     EKG today shows sinus rhythm with a rate of 72. Very small P waves  Previous monitor just showed short SVT episodes no frequent. No sustained atrial fibrillation.   She remains on low-dose beta-blocker    Reviewed records and labs from the emergency reviewed the note for such errors, some may still exist.

## 2021-04-21 NOTE — PATIENT INSTRUCTIONS
Wear monitor for 7 days   Stay off the lasix for now  BP goal < 130/80 at rest  If staying < 100 then hold the losartan  Follow up in May with DR Ashwini Bowens     Call with any questions or concerns  Follow up with Mahamed Love MD for non cardiac problems  Report any new problems  Cardiovascular Fitness-Exercise as tolerated. Fall precautions- use walker if feeling week. Cardiac / Healthy Diet  Continue current medications as directed  Continue plan of treatment  It is always recommended that you bring your medications bottles with you to each visit - this is for your safety!

## 2021-04-26 DIAGNOSIS — I10 ESSENTIAL HYPERTENSION: ICD-10-CM

## 2021-04-26 RX ORDER — AMLODIPINE BESYLATE 5 MG/1
5 TABLET ORAL DAILY
Qty: 45 TABLET | Refills: 3 | Status: SHIPPED | OUTPATIENT
Start: 2021-04-26 | End: 2021-10-11

## 2021-04-27 ENCOUNTER — OFFICE VISIT (OUTPATIENT)
Dept: PRIMARY CARE CLINIC | Age: 86
End: 2021-04-27
Payer: MEDICARE

## 2021-04-27 VITALS
TEMPERATURE: 97.4 F | DIASTOLIC BLOOD PRESSURE: 80 MMHG | WEIGHT: 126.8 LBS | SYSTOLIC BLOOD PRESSURE: 124 MMHG | BODY MASS INDEX: 22.47 KG/M2 | RESPIRATION RATE: 18 BRPM | OXYGEN SATURATION: 99 % | HEART RATE: 67 BPM | HEIGHT: 63 IN

## 2021-04-27 DIAGNOSIS — R42 DIZZINESS: ICD-10-CM

## 2021-04-27 DIAGNOSIS — R29.6 FALLS FREQUENTLY: ICD-10-CM

## 2021-04-27 DIAGNOSIS — E83.42 HYPOMAGNESEMIA: ICD-10-CM

## 2021-04-27 DIAGNOSIS — R79.89 ELEVATED BRAIN NATRIURETIC PEPTIDE (BNP) LEVEL: ICD-10-CM

## 2021-04-27 DIAGNOSIS — I10 ESSENTIAL HYPERTENSION: Primary | ICD-10-CM

## 2021-04-27 LAB
ANION GAP SERPL CALCULATED.3IONS-SCNC: 12 MMOL/L (ref 7–19)
BUN BLDV-MCNC: 29 MG/DL (ref 8–23)
CALCIUM SERPL-MCNC: 9.9 MG/DL (ref 8.2–9.6)
CHLORIDE BLD-SCNC: 107 MMOL/L (ref 98–111)
CO2: 25 MMOL/L (ref 22–29)
CREAT SERPL-MCNC: 1 MG/DL (ref 0.5–0.9)
GFR AFRICAN AMERICAN: >59
GFR NON-AFRICAN AMERICAN: 52
GLUCOSE BLD-MCNC: 103 MG/DL (ref 74–109)
MAGNESIUM: 1.7 MG/DL (ref 1.7–2.3)
POTASSIUM SERPL-SCNC: 4.3 MMOL/L (ref 3.5–5)
PRO-BNP: 1543 PG/ML (ref 0–1800)
SODIUM BLD-SCNC: 144 MMOL/L (ref 136–145)

## 2021-04-27 PROCEDURE — 36415 COLL VENOUS BLD VENIPUNCTURE: CPT | Performed by: FAMILY MEDICINE

## 2021-04-27 PROCEDURE — G8420 CALC BMI NORM PARAMETERS: HCPCS | Performed by: FAMILY MEDICINE

## 2021-04-27 PROCEDURE — 1090F PRES/ABSN URINE INCON ASSESS: CPT | Performed by: FAMILY MEDICINE

## 2021-04-27 PROCEDURE — 1036F TOBACCO NON-USER: CPT | Performed by: FAMILY MEDICINE

## 2021-04-27 PROCEDURE — G8427 DOCREV CUR MEDS BY ELIG CLIN: HCPCS | Performed by: FAMILY MEDICINE

## 2021-04-27 PROCEDURE — 99214 OFFICE O/P EST MOD 30 MIN: CPT | Performed by: FAMILY MEDICINE

## 2021-04-27 PROCEDURE — 1123F ACP DISCUSS/DSCN MKR DOCD: CPT | Performed by: FAMILY MEDICINE

## 2021-04-27 PROCEDURE — 4040F PNEUMOC VAC/ADMIN/RCVD: CPT | Performed by: FAMILY MEDICINE

## 2021-04-27 ASSESSMENT — PATIENT HEALTH QUESTIONNAIRE - PHQ9
2. FEELING DOWN, DEPRESSED OR HOPELESS: 0
SUM OF ALL RESPONSES TO PHQ QUESTIONS 1-9: 0
SUM OF ALL RESPONSES TO PHQ9 QUESTIONS 1 & 2: 0
SUM OF ALL RESPONSES TO PHQ QUESTIONS 1-9: 0

## 2021-04-27 NOTE — PROGRESS NOTES
abused: None     Physically abused: None     Forced sexual activity: None   Other Topics Concern    None   Social History Narrative    None       Review of Systems   Constitutional: Positive for activity change and fatigue. HENT: Negative. Respiratory: Negative. Cardiovascular: Negative. Gastrointestinal: Negative. Musculoskeletal: Positive for arthralgias and gait problem. Neurological: Positive for dizziness and weakness. Negative for light-headedness and headaches. Hematological: Bruises/bleeds easily. Psychiatric/Behavioral: Negative. OBJECTIVE:    Wt Readings from Last 3 Encounters:   04/27/21 126 lb 12.8 oz (57.5 kg)   04/21/21 125 lb (56.7 kg)   04/19/21 125 lb (56.7 kg)       /80   Pulse 67   Temp 97.4 °F (36.3 °C)   Resp 18   Ht 5' 3\" (1.6 m)   Wt 126 lb 12.8 oz (57.5 kg)   SpO2 99%   BMI 22.46 kg/m²     Physical Exam  Vitals signs and nursing note reviewed. Constitutional:       General: She is not in acute distress. Appearance: She is well-developed. Comments: Frail female appears younger than stated age   HENT:      Head: Normocephalic. Right Ear: External ear normal.      Left Ear: External ear normal.   Eyes:      Conjunctiva/sclera: Conjunctivae normal.      Pupils: Pupils are equal, round, and reactive to light. Neck:      Musculoskeletal: Normal range of motion and neck supple. Vascular: No JVD. Cardiovascular:      Rate and Rhythm: Normal rate and regular rhythm. Heart sounds: Normal heart sounds. Pulmonary:      Effort: Pulmonary effort is normal.      Breath sounds: Normal breath sounds. Musculoskeletal: Normal range of motion. Comments: Chronic changes of osteoarthritis of hands and knees   Lymphadenopathy:      Cervical: No cervical adenopathy. Skin:     General: Skin is warm and dry. Neurological:      Mental Status: She is alert and oriented to person, place, and time.    Psychiatric:         Mood and Affect: Mood normal.         Behavior: Behavior normal.         Thought Content: Thought content normal.         Judgment: Judgment normal.         ASSESSMENT:    1. Essential hypertension    2. Falls frequently    3. Dizziness    4. Hypomagnesemia    5. Elevated brain natriuretic peptide (BNP) level          PLAN:    MEDICATIONS:  No orders of the defined types were placed in this encounter. Continue current medications. We will refill when needed. ORDERS:  Orders Placed This Encounter   Procedures    Basic Metabolic Panel    Brain Natriuretic Peptide    Magnesium     BNP was also elevated at her hospital visit 4/19/2021 at 6347. I am going to recheck a BMP BNP and magnesium. We will contact her when we get results of her blood work. She did agree to go to physical therapy. We are going to put in a referral to bio kinetics in [place]. She will go for 2 weeks and we will see if this will help her balance and improve her ambulation. Follow-up:  No follow-ups on file. PATIENT INSTRUCTIONS:  There are no Patient Instructions on file for this visit. EMR Dragon/transcription disclaimer:  Much of this encounter note is electronic transcription/translation of spoken language to printed texts. The electronic translation of spoken language may be erroneous, or at times, nonsensical words or phrases may beinadvertently transcribed.   Although I have reviewed the note for such errors, some may still exist.

## 2021-05-03 DIAGNOSIS — R29.6 FREQUENT FALLS: Primary | ICD-10-CM

## 2021-05-03 DIAGNOSIS — R26.89 LOSS OF BALANCE: ICD-10-CM

## 2021-05-11 ASSESSMENT — ENCOUNTER SYMPTOMS
GASTROINTESTINAL NEGATIVE: 1
RESPIRATORY NEGATIVE: 1

## 2021-05-21 ENCOUNTER — TELEPHONE (OUTPATIENT)
Dept: CARDIOLOGY CLINIC | Age: 86
End: 2021-05-21

## 2021-05-24 ENCOUNTER — OFFICE VISIT (OUTPATIENT)
Dept: CARDIOLOGY CLINIC | Age: 86
End: 2021-05-24
Payer: MEDICARE

## 2021-05-24 VITALS
OXYGEN SATURATION: 98 % | WEIGHT: 126 LBS | HEART RATE: 64 BPM | SYSTOLIC BLOOD PRESSURE: 122 MMHG | HEIGHT: 63 IN | DIASTOLIC BLOOD PRESSURE: 72 MMHG | BODY MASS INDEX: 22.32 KG/M2

## 2021-05-24 DIAGNOSIS — I48.0 PAF (PAROXYSMAL ATRIAL FIBRILLATION) (HCC): Primary | ICD-10-CM

## 2021-05-24 DIAGNOSIS — E78.5 HYPERLIPIDEMIA, UNSPECIFIED HYPERLIPIDEMIA TYPE: ICD-10-CM

## 2021-05-24 DIAGNOSIS — I10 ESSENTIAL HYPERTENSION: ICD-10-CM

## 2021-05-24 PROCEDURE — 4040F PNEUMOC VAC/ADMIN/RCVD: CPT | Performed by: NURSE PRACTITIONER

## 2021-05-24 PROCEDURE — 1090F PRES/ABSN URINE INCON ASSESS: CPT | Performed by: NURSE PRACTITIONER

## 2021-05-24 PROCEDURE — 93000 ELECTROCARDIOGRAM COMPLETE: CPT | Performed by: NURSE PRACTITIONER

## 2021-05-24 PROCEDURE — G8420 CALC BMI NORM PARAMETERS: HCPCS | Performed by: NURSE PRACTITIONER

## 2021-05-24 PROCEDURE — 1123F ACP DISCUSS/DSCN MKR DOCD: CPT | Performed by: NURSE PRACTITIONER

## 2021-05-24 PROCEDURE — G8427 DOCREV CUR MEDS BY ELIG CLIN: HCPCS | Performed by: NURSE PRACTITIONER

## 2021-05-24 PROCEDURE — 99214 OFFICE O/P EST MOD 30 MIN: CPT | Performed by: NURSE PRACTITIONER

## 2021-05-24 PROCEDURE — 1036F TOBACCO NON-USER: CPT | Performed by: NURSE PRACTITIONER

## 2021-05-24 ASSESSMENT — ENCOUNTER SYMPTOMS
COUGH: 0
SORE THROAT: 0
SHORTNESS OF BREATH: 0
CHEST TIGHTNESS: 0
WHEEZING: 0

## 2021-05-24 NOTE — PROGRESS NOTES
[facility] Cardiology   Established Patient Office Visit   [address]        OFFICE VISIT:  2021    Dossie Deal - : 1928    Reason For Visit:  Tory Deleon is a 80 y.o. female who is here for 6 Month Follow-Up (No cardiac symptoms. )    1. PAF (paroxysmal atrial fibrillation) ([address]    2. Essential hypertension    3. Hyperlipidemia, unspecified hyperlipidemia type        Patient with a history of dyslipidemia, hypertension, prior PE and paroxysmal atrial fib    She is a patient of Dr. Maximino Fung. Patient presented to clinic on 2021 ZIO Patch was placed to evaluate A. fib burden. Patient wore for 1 week. Sinus rhythm with a first-degree AV block. A. fib burden 3%. The longest was 4 hours and 35 minutes. 106 SVT episodes. Patient is not on a blood thinner due to frequent falls. Patient presents to clinic today for follow-up of the aforementioned ZIO Patch. Patient denies any chest pain, pressure or tightness. There is no shortness of breath, orthopnea or PND. Patient denies any lightheadedness, dizziness or syncope. DATA:  10/8/2020 2D echo  Summary   Normal left ventricular size and systolic function   Mild concentric left ventricular hypertrophy with probable moderate [grade 2] diastolic dysfunction   Mild left atrial enlargement. Mild thickening of a tricuspid aortic valve with adequate cusp separation, insignificant gradient, 9/6 mmHg], and mild insufficiency. Mild thickening of the mitral valve with minimally reduced mobility and   mild regurgitation. Mild to moderate tricuspid regurgitation with RVSP estimate of 35 mmHg   Normal IVC dimension and respiratory motion consistent with normal right atrial filling pressures of 5-10 mmHg. No significant pericardial effusion and aortic dimensions are within normal limits.       2019 Lexiscan    Grossly negative Cardiolyte for the presence of ischemia or infarction   with normal wall SPUR SURGERY      HIP ARTHROPLASTY Left January 30, 2014    HYSTERECTOMY      REVISION TOTAL HIP ARTHROPLASTY Left February 4, 2014    UPPER GASTROINTESTINAL ENDOSCOPY      UPPER GASTROINTESTINAL ENDOSCOPY  12/2011         Family History   Problem Relation Age of Onset    Heart Disease Mother     Lung Cancer Father      Social History     Tobacco Use    Smoking status: Never Smoker    Smokeless tobacco: Never Used   Substance Use Topics    Alcohol use: No          Review of Systems:    Review of Systems   Constitutional: Negative for activity change, chills, diaphoresis and fever. HENT: Negative for congestion and sore throat. Respiratory: Negative for cough, chest tightness, shortness of breath and wheezing. Cardiovascular: Negative for chest pain, palpitations and leg swelling. Neurological: Negative for dizziness, syncope, light-headedness and headaches. Psychiatric/Behavioral: Negative for confusion. The patient is not nervous/anxious. Objective:    /72   Pulse 64   Ht 5' 3\" (1.6 m)   Wt 126 lb (57.2 kg)   SpO2 98%   BMI 22.32 kg/m²     GENERAL - well developed and well nourished, conversant  HEENT -  PERRLA, Hearing appears normal, gentleman lids are normal.  External inspection of ears and nose appear normal.  NECK - no thyromegaly, no JVD, trachea is in the midline  CARDIOVASCULAR - PMI is in the mid line clavicular position, Normal S1 and S2 with a no systolic murmur. No S3 or S4    PULMONARY - no respiratory distress. No wheezes or rales. Lungs are clear to ausculation, normal respiratory effort. ABDOMEN  - soft, non tender, no rebound  MUSCULOSKELETAL  - range of motion of the upper and lower extermites appears normal and equal and is without pain   EXTREMITIES - no significant edema   NEUROLOGIC - gait and station are normal  SKIN - turgor is normal, can warm and dry.   PSYCHIATRIC - normal mood and affect, alert and orientated x 3,      ASSESSMENT:    ALL THE CARDIOLOGY PROBLEMS ARE LISTED ABOVE; HOWEVER, THE FOLLOWING SPECIFIC CARDIAC PROBLEMS / CONDITIONS WERE ADDRESSED AND TREATED DURING THE OFFICE VISIT TODAY:                                                                                            MEDICAL DECISION MAKING             Cardiac Specific Problem / Diagnosis  Discussion and Data Reviewed Diagnostic Procedures Ordered Management Options Selected           1. PAF  Stable   Review and summation of old records:    EKG in the office showing sinus rhythm with a first-degree AV block heart rate 64 bpm.  Patient is on Lopressor 50 mg twice a day. Patient is not on a blood thinner due to frequent falls. Yes Continue current medications:     Yes           2. Hypertension  Well Controlled   Blood pressure in the office today 122/72. O2 sat 98%. Patient is on Benicar HCT 20-12 0.5. No Continue current medications:    Yes           3. Hyperlipidemia Stable  patient is on Lipitor 10 mg daily. No Continue current medications: Yes                     Orders Placed This Encounter   Procedures    EKG 12 lead     Order Specific Question:   Reason for Exam?     Answer:   Irregular heart rate     Comments:   A Fib     No orders of the defined types were placed in this encounter. Discussed with patient and adult child. Return in about 6 months (around 11/24/2021) for Dr Irena Pulido . I greatly appreciate the opportunity to meet Nikolas Holly and your confidence in allowing me to participate in her cardiovascular care. LACI Salomon NP  5/24/2021 10:56 AM CDT                    This dictation was generated by voice recognition computer software. Although all attempts are made to edit dictation for accuracy, there may be errors in the transcription that are not intended.

## 2021-06-02 ASSESSMENT — PATIENT HEALTH QUESTIONNAIRE - PHQ9
2. FEELING DOWN, DEPRESSED OR HOPELESS: 0
SUM OF ALL RESPONSES TO PHQ QUESTIONS 1-9: 0
SUM OF ALL RESPONSES TO PHQ9 QUESTIONS 1 & 2: 0
SUM OF ALL RESPONSES TO PHQ QUESTIONS 1-9: 0
1. LITTLE INTEREST OR PLEASURE IN DOING THINGS: 0
SUM OF ALL RESPONSES TO PHQ QUESTIONS 1-9: 0

## 2021-06-02 ASSESSMENT — LIFESTYLE VARIABLES
AUDIT TOTAL SCORE: INCOMPLETE
HOW OFTEN DO YOU HAVE A DRINK CONTAINING ALCOHOL: 0
AUDIT-C TOTAL SCORE: INCOMPLETE
HOW OFTEN DO YOU HAVE A DRINK CONTAINING ALCOHOL: NEVER

## 2021-06-09 ENCOUNTER — OFFICE VISIT (OUTPATIENT)
Dept: PRIMARY CARE CLINIC | Age: 86
End: 2021-06-09
Payer: MEDICARE

## 2021-06-09 VITALS
HEART RATE: 68 BPM | OXYGEN SATURATION: 97 % | BODY MASS INDEX: 23.04 KG/M2 | HEIGHT: 63 IN | WEIGHT: 130 LBS | DIASTOLIC BLOOD PRESSURE: 80 MMHG | TEMPERATURE: 97 F | RESPIRATION RATE: 18 BRPM | SYSTOLIC BLOOD PRESSURE: 128 MMHG

## 2021-06-09 DIAGNOSIS — E78.00 PURE HYPERCHOLESTEROLEMIA: ICD-10-CM

## 2021-06-09 DIAGNOSIS — M17.0 PRIMARY OSTEOARTHRITIS OF BOTH KNEES: ICD-10-CM

## 2021-06-09 DIAGNOSIS — E03.9 ACQUIRED HYPOTHYROIDISM: ICD-10-CM

## 2021-06-09 DIAGNOSIS — I48.0 PAROXYSMAL ATRIAL FIBRILLATION (HCC): ICD-10-CM

## 2021-06-09 DIAGNOSIS — R26.89 LOSS OF BALANCE: ICD-10-CM

## 2021-06-09 DIAGNOSIS — Z00.00 ROUTINE GENERAL MEDICAL EXAMINATION AT A HEALTH CARE FACILITY: Primary | ICD-10-CM

## 2021-06-09 DIAGNOSIS — R29.6 FALLS FREQUENTLY: ICD-10-CM

## 2021-06-09 DIAGNOSIS — M48.061 SPINAL STENOSIS OF LUMBAR REGION, UNSPECIFIED WHETHER NEUROGENIC CLAUDICATION PRESENT: ICD-10-CM

## 2021-06-09 DIAGNOSIS — I10 ESSENTIAL HYPERTENSION: ICD-10-CM

## 2021-06-09 LAB
ALBUMIN SERPL-MCNC: 4.1 G/DL (ref 3.5–5.2)
ALP BLD-CCNC: 132 U/L (ref 35–104)
ALT SERPL-CCNC: 8 U/L (ref 5–33)
ANION GAP SERPL CALCULATED.3IONS-SCNC: 10 MMOL/L (ref 7–19)
AST SERPL-CCNC: 16 U/L (ref 5–32)
BILIRUB SERPL-MCNC: 0.5 MG/DL (ref 0.2–1.2)
BUN BLDV-MCNC: 17 MG/DL (ref 8–23)
CALCIUM SERPL-MCNC: 9.4 MG/DL (ref 8.2–9.6)
CHLORIDE BLD-SCNC: 103 MMOL/L (ref 98–111)
CHOLESTEROL, TOTAL: 155 MG/DL (ref 160–199)
CO2: 28 MMOL/L (ref 22–29)
CREAT SERPL-MCNC: 1 MG/DL (ref 0.5–0.9)
GFR AFRICAN AMERICAN: >59
GFR NON-AFRICAN AMERICAN: 52
GLUCOSE BLD-MCNC: 96 MG/DL (ref 74–109)
HCT VFR BLD CALC: 42.3 % (ref 37–47)
HDLC SERPL-MCNC: 70 MG/DL (ref 65–121)
HEMOGLOBIN: 13.5 G/DL (ref 12–16)
LDL CHOLESTEROL CALCULATED: 65 MG/DL
MCH RBC QN AUTO: 32.9 PG (ref 27–31)
MCHC RBC AUTO-ENTMCNC: 31.9 G/DL (ref 33–37)
MCV RBC AUTO: 103.2 FL (ref 81–99)
PDW BLD-RTO: 13.2 % (ref 11.5–14.5)
PLATELET # BLD: 243 K/UL (ref 130–400)
PMV BLD AUTO: 10.5 FL (ref 9.4–12.3)
POTASSIUM SERPL-SCNC: 4.5 MMOL/L (ref 3.5–5)
RBC # BLD: 4.1 M/UL (ref 4.2–5.4)
SODIUM BLD-SCNC: 141 MMOL/L (ref 136–145)
T4 FREE: 1.23 NG/DL (ref 0.93–1.7)
TOTAL PROTEIN: 6.7 G/DL (ref 6.6–8.7)
TRIGL SERPL-MCNC: 102 MG/DL (ref 0–149)
TSH SERPL DL<=0.05 MIU/L-ACNC: 0.64 UIU/ML (ref 0.27–4.2)
VITAMIN B-12: 1361 PG/ML (ref 211–946)
WBC # BLD: 10.1 K/UL (ref 4.8–10.8)

## 2021-06-09 PROCEDURE — 99214 OFFICE O/P EST MOD 30 MIN: CPT | Performed by: FAMILY MEDICINE

## 2021-06-09 PROCEDURE — 1123F ACP DISCUSS/DSCN MKR DOCD: CPT | Performed by: FAMILY MEDICINE

## 2021-06-09 PROCEDURE — G8427 DOCREV CUR MEDS BY ELIG CLIN: HCPCS | Performed by: FAMILY MEDICINE

## 2021-06-09 PROCEDURE — 1036F TOBACCO NON-USER: CPT | Performed by: FAMILY MEDICINE

## 2021-06-09 PROCEDURE — 1090F PRES/ABSN URINE INCON ASSESS: CPT | Performed by: FAMILY MEDICINE

## 2021-06-09 PROCEDURE — G8420 CALC BMI NORM PARAMETERS: HCPCS | Performed by: FAMILY MEDICINE

## 2021-06-09 PROCEDURE — G0439 PPPS, SUBSEQ VISIT: HCPCS | Performed by: FAMILY MEDICINE

## 2021-06-09 PROCEDURE — 4040F PNEUMOC VAC/ADMIN/RCVD: CPT | Performed by: FAMILY MEDICINE

## 2021-06-09 PROCEDURE — 36415 COLL VENOUS BLD VENIPUNCTURE: CPT | Performed by: FAMILY MEDICINE

## 2021-06-09 NOTE — PROGRESS NOTES
Medicare Annual Wellness Visit  Name: Thi Duong Date: 2021   MRN: [id number] Sex: Female   Age: 80 y.o. Ethnicity: Non-/Non    : 1928 Race: Laura Borges is here for Medicare AWV    Screenings for behavioral, psychosocial and functional/safety risks, and cognitive dysfunction are all negative except as indicated below. These results, as well as other patient data from the [address] form, are documented in Flowsheets linked to this Encounter. Allergies   Allergen Reactions    Gabapentin Swelling    Morphine     Stadol [Butorphanol Tartrate]        Prior to Visit Medications    Medication Sig Taking?  Authorizing Provider   amLODIPine (NORVASC) 5 MG tablet Take 1 tablet by mouth daily Yes Jenn Wen MD   levothyroxine (SYNTHROID) 50 MCG tablet Take 1 tablet by mouth daily For low thyroid Yes Jenn Wen MD   olmesartan-hydroCHLOROthiazide (BENICAR HCT) 20-12.5 MG per tablet 1 tablet by mouth once a day for high blood pressure Yes Jenn Wen MD   metoprolol tartrate (LOPRESSOR) 50 MG tablet TAKE 1 TABLET TWICE A DAY Yes Jenn Wen MD   atorvastatin (LIPITOR) 10 MG tablet TAKE 1 TABLET DAILY FOR    HIGH CHOLESTEROL Yes Jenn Wen MD   aspirin 81 MG tablet aspirin   daily Yes Historical Provider, MD   Probiotic Product (PROBIOTIC-10) CAPS Take by mouth once Yes Historical Provider, MD   sodium chloride (AIDA 128) 5 % ophthalmic solution Place 1 drop into both eyes 2 times daily Yes Historical Provider, MD   timolol (TIMOPTIC) 0.5 % ophthalmic solution  Yes Historical Provider, MD   ZIOPTAN 0.0015 % SOLN  Yes Historical Provider, MD       Past Medical History:   Diagnosis Date    Burning mouth syndrome     Chronic insomnia     Glaucoma     Hyperlipidemia     Hypertension     Hypothyroidism     Low back pain     Pure hypercholesterolemia 5/10/2016       Past Surgical History:   Procedure Laterality Date    APPENDECTOMY      BREAST BIOPSY      CARPAL TUNNEL RELEASE      COLONOSCOPY  2005        ERCP      ? Dr.Whitney Marli Yee HEEL SPUR SURGERY      HIP ARTHROPLASTY Left January 30, 2014    HYSTERECTOMY      REVISION TOTAL HIP ARTHROPLASTY Left February 4, 2014    UPPER GASTROINTESTINAL ENDOSCOPY      UPPER GASTROINTESTINAL ENDOSCOPY  12/2011           Family History   Problem Relation Age of Onset    Heart Disease Mother     Lung Cancer Father        CareTeam (Including outside providers/suppliers regularly involved in providing care):   Patient Care Team:  Linda Marquis MD as PCP - Amy Holcomb MD as PCP - [facility]  LACI Bro as Advanced Practice Nurse (Neurology)  Fe Chatman MD as Consulting Physician (Cardiology)  Devin Park MD as Consulting Physician (Neurosurgery)    Wt Readings from Last 3 Encounters:   06/09/21 130 lb (59 kg)   05/24/21 126 lb (57.2 kg)   04/27/21 126 lb 12.8 oz (57.5 kg)     Vitals:    06/09/21 0932   BP: 128/80   Pulse: 68   Resp: 18   Temp: 97 °F (36.1 °C)   SpO2: 97%   Weight: 130 lb (59 kg)   Height: 5' 3\" (1.6 m)     Body mass index is 23.03 kg/m². Based upon direct observation of the patient, evaluation of cognition reveals recent and remote memory intact. Patient's complete Health Risk Assessment and screening values have been reviewed and are found in Flowsheets. The following problems were reviewed today and where indicated follow up appointments were made and/or referrals ordered. Positive Risk Factor Screenings with Interventions:     Fall Risk:  2 or more falls in past year?: (!) yes  Fall with injury in past year?: no  Fall Risk Interventions:    · Patient has been having falls forever. She is worried about normal pressure hydrocephalus.   She has seen physical therapy and the neurologist.        [address] [facility] and ACP:  General  In general, how would you say your health is?: Fair  In the past 7 days, have you experienced any of the following? New or Increased Pain, New or Increased Fatigue, Loneliness, Social Isolation, Stress or Anger?: (!) New or Increased Pain, New or Increased Fatigue  Do you get the social and emotional support that you need?: Yes  Do you have a Living Will?: Yes  Advance Directives     Power of  Living Will ACP-Advance Directive ACP-Power of     Not on File Filed on 08/18/18 Filed Not on File      General Health Risk Interventions:  · Pain issues: She says this is due to her arthritis. She does not want to do physical therapy at the present time. She says she is just tired because of her age. · Fatigue: See above    Health Habits/Nutrition:  Health Habits/Nutrition  Do you exercise for at least 20 minutes 2-3 times per week?: (!) No  Have you lost any weight without trying in the past 3 months?: No  Do you eat only one meal per day?: No  Have you seen the dentist within the past year?: Yes  Body mass index: 23.03  Health Habits/Nutrition Interventions:  · Inadequate physical activity:  She cannot exercise because of her falls.     Hearing/Vision:  No exam data present  Hearing/Vision  Do you or your family notice any trouble with your hearing that hasn't been managed with hearing aids?: No  Do you have difficulty driving, watching TV, or doing any of your daily activities because of your eyesight?: (!) Yes  Have you had an eye exam within the past year?: Yes  Hearing/Vision Interventions:  · Vision concerns:  patient encouraged to make appointment with his/her eye specialist    Safety:  Safety  Do you have working smoke detectors?: (!) No  Have all throw rugs been removed or fastened?: Yes  Do you have non-slip mats or surfaces in all bathtubs/showers?: Yes  Do all of your stairways have a railing or banister?: Yes  Are your doorways, halls and stairs free of clutter?: Yes  Do you always fasten your seatbelt when you are in a car?: Yes  Safety Interventions:  · Home safety tips provided    ADL:  ADLs  In the past 7 days, did you need help from others to perform any of the following everyday activities? Eating, dressing, grooming, bathing, toileting, or walking/balance?: None  In the past 7 days, did you need help from others to take care of any of the following? Laundry, housekeeping, banking/finances, shopping, telephone use, food preparation, transportation, or taking medications?: [employer], Transportation  ADL Interventions:  · Patient declines any further evaluation/treatment for this issue  · Her daughter in law and her son help her.     Personalized Preventive Plan   Current Health Maintenance Status  Immunization History   Administered Date(s) Administered    DT (pediatric) 01/01/2005    Influenza Virus Vaccine 10/10/2012, 10/13/2014, 10/09/2015, 10/01/2016    Influenza, Intradermal, Preservative free 10/17/2013    Influenza, [place], IM, (6 mo and older Fluzone, Flulaval, Fluarix and 3 yrs and older Afluria) 10/23/2017, 10/15/2018    Influenza, Quadv, IM, PF (6 mo and older Fluzone, Flulaval, Fluarix, and 3 yrs and older Afluria) 10/12/2016, 10/01/2019    Influenza, Quadv, adjuvanted, 65 yrs +, IM, PF (Fluad) 10/12/2020    Pneumococcal Conjugate 13-valent ([id number]) 11/08/2017    Pneumococcal Polysaccharide ([id number]) 10/01/2011, 08/30/2013    Td, unspecified formulation 07/15/2015        Health Maintenance   Topic Date Due    COVID-19 Vaccine (1) Never done   [employer] Visit (AWV)  Never done    Lipid screen  06/08/2021    Shingles Vaccine (1 of 2) 10/12/2021 (Originally 4/21/1978)    TSH testing  04/19/2022    Potassium monitoring  04/27/2022    Creatinine monitoring  04/27/2022    DTaP/Tdap/Td vaccine (3 - Tdap) 07/15/2025    Flu vaccine  Completed    Pneumococcal 65+ years Vaccine  Completed    Hepatitis A vaccine  Aged Out    Hepatitis B vaccine  Aged Out    Hib vaccine  Aged Out    Meningococcal (ACWY) vaccine  Aged Out Recommendations for Preventive Services Due: see orders and patient instructions/AVS.  . Recommended screening schedule for the next 5-10 years is provided to the patient in written form: see Patient Instructions/AVS.    There are no diagnoses linked to this encounter.

## 2021-06-13 RX ORDER — LEVOTHYROXINE SODIUM 0.05 MG/1
50 TABLET ORAL DAILY
Qty: 90 TABLET | Refills: 3 | Status: SHIPPED | OUTPATIENT
Start: 2021-06-13 | End: 2022-07-25

## 2021-06-13 ASSESSMENT — ENCOUNTER SYMPTOMS
GASTROINTESTINAL NEGATIVE: 1
BACK PAIN: 1
RESPIRATORY NEGATIVE: 1

## 2021-06-14 NOTE — PROGRESS NOTES
SUBJECTIVE:    Bryson Jauregui is 80 y. o.female who comes in complaining of Medicare AWV   .       HPI: Mrs. Yariel Green comes in today for a Medicare annual wellness which was done but she has multiple other problems we follow including essential hypertension, acquired hypothyroidism, hypercholesterolemia and arthritis. She also has chronic back pain. She sees the cardiologist for her atrial fibrillation but says she has had no recent flareups. She is still struggling with falling and loss of balance. Now she is very concerned that she could have normal pressure hydrocephalus. She has had MRIs in the past which did not show this. She has no problems taking her atorvastatin. Allergies   Allergen Reactions    Gabapentin Swelling    Morphine     Stadol [Butorphanol Tartrate]        Social History     Socioeconomic History    Marital status:      Spouse name: None    Number of children: 2    Years of education: None    Highest education level: None   Occupational History    Occupation: Homemaker   Tobacco Use    Smoking status: Never Smoker    Smokeless tobacco: Never Used   Vaping Use    Vaping Use: Never used   Substance and Sexual Activity    Alcohol use: No    Drug use: No    Sexual activity: Not Currently   Other Topics Concern    None   Social History Narrative    None     Social Determinants of Health     Financial Resource Strain:     Difficulty of Paying Living Expenses:    Food Insecurity:     Worried About Running Out of Food in the Last Year:     Ran Out of Food in the Last Year:    Transportation Needs:     Lack of Transportation (Medical):      Lack of Transportation (Non-Medical):    Physical Activity:     Days of Exercise per Week:     Minutes of Exercise per Session:    Stress:     Feeling of Stress :    Social Connections:     Frequency of Communication with Friends and Family:     Frequency of Social Gatherings with Friends and Family:     Attends [employer] Services:     Active Member of Clubs or Organizations:     Attends Club or Organization Meetings:     Marital Status:    Intimate Partner Violence:     Fear of Current or Ex-Partner:     Emotionally Abused:     Physically Abused:     Sexually Abused:        Review of Systems   Constitutional: Positive for activity change and fatigue. HENT: Positive for hearing loss. Eyes: Negative for visual disturbance. Respiratory: Negative. Cardiovascular: Positive for palpitations (rarely). Gastrointestinal: Negative. Musculoskeletal: Positive for arthralgias and back pain. Neurological: Negative. Hematological: Bruises/bleeds easily. Psychiatric/Behavioral: Negative. Current Outpatient Medications on File Prior to Visit   Medication Sig Dispense Refill    amLODIPine (NORVASC) 5 MG tablet Take 1 tablet by mouth daily 45 tablet 3    olmesartan-hydroCHLOROthiazide (BENICAR HCT) 20-12.5 MG per tablet 1 tablet by mouth once a day for high blood pressure 90 tablet 3    metoprolol tartrate (LOPRESSOR) 50 MG tablet TAKE 1 TABLET TWICE A  tablet 3    atorvastatin (LIPITOR) 10 MG tablet TAKE 1 TABLET DAILY FOR    HIGH CHOLESTEROL 90 tablet 3    aspirin 81 MG tablet aspirin   daily      Probiotic Product (PROBIOTIC-10) CAPS Take by mouth once      sodium chloride (AIDA 128) 5 % ophthalmic solution Place 1 drop into both eyes 2 times daily      timolol (TIMOPTIC) 0.5 % ophthalmic solution       ZIOPTAN 0.0015 % SOLN        No current facility-administered medications on file prior to visit. OBJECTIVE:    Wt Readings from Last 3 Encounters:   06/09/21 130 lb (59 kg)   05/24/21 126 lb (57.2 kg)   04/27/21 126 lb 12.8 oz (57.5 kg)       /80   Pulse 68   Temp 97 °F (36.1 °C)   Resp 18   Ht 5' 3\" (1.6 m)   Wt 130 lb (59 kg)   SpO2 97%   BMI 23.03 kg/m²     Physical Exam  Vitals and nursing note reviewed. Constitutional:       General: She is not in acute distress. Appearance: Normal appearance. She is well-developed. Comments: Appears younger than stated age   HENT:      Head: Normocephalic. Right Ear: Tympanic membrane, ear canal and external ear normal.      Left Ear: Tympanic membrane, ear canal and external ear normal.      Nose: Nose normal. No rhinorrhea. Mouth/Throat:      Mouth: Mucous membranes are moist.      Pharynx: No posterior oropharyngeal erythema. Eyes:      Extraocular Movements: Extraocular movements intact. Conjunctiva/sclera: Conjunctivae normal.      Pupils: Pupils are equal, round, and reactive to light. Neck:      Vascular: No carotid bruit. Cardiovascular:      Rate and Rhythm: Normal rate and regular rhythm. Pulses: Normal pulses. Heart sounds: Normal heart sounds. No murmur heard. Pulmonary:      Effort: Pulmonary effort is normal. No respiratory distress. Breath sounds: Normal breath sounds. Abdominal:      General: Bowel sounds are normal.      Palpations: Abdomen is soft. Tenderness: There is no abdominal tenderness. Comments: No abdominal bruits   Musculoskeletal:         General: Normal range of motion. Cervical back: Normal range of motion and neck supple. Right lower leg: No edema. Left lower leg: No edema. Lymphadenopathy:      Cervical: No cervical adenopathy. Skin:     General: Skin is warm and dry. Neurological:      Mental Status: She is alert and oriented to person, place, and time. Psychiatric:         Mood and Affect: Mood normal.         Speech: Speech normal.         Behavior: Behavior normal.         Thought Content: Thought content normal.         Judgment: Judgment normal.         ASSESSMENT:    1. Routine general medical examination at a health care facility    2. Essential hypertension Controlled   3. Acquired hypothyroidism Controlled   4. Loss of balance    5. Pure hypercholesterolemia    6. Primary osteoarthritis of both knees    7.  Spinal Recommendations:    · A preventive eye exam performed by an eye specialist is recommended every 1-2 years to screen for glaucoma; cataracts, macular degeneration, and other eye disorders. · A preventive dental visit is recommended every 6 months. · Try to get at least 150 minutes of exercise per week or 10,000 steps per day on a pedometer . · Order or download the FREE \"Exercise & Physical Activity: Your Everyday Guide\" from [employer]. Call 6-180.201.8265 or search [employer] online. · You need 8204-4242 mg of calcium and 8022-1463 IU of vitamin D per day. It is possible to meet your calcium requirement with diet alone, but a vitamin D supplement is usually necessary to meet this goal.  · When exposed to the sun, use a sunscreen that protects against both UVA and UVB radiation with an SPF of 30 or greater. Reapply every 2 to 3 hours or after sweating, drying off with a towel, or swimming. · Always wear a seat belt when traveling in a car. Always wear a helmet when riding a bicycle or motorcycle. Keeping Home a [facility]       As we get older, changes in balance, gait, strength, vision, hearing, and cognition make even the most youthful senior more prone to accidents. Falls are one of the leading health risks for older people. This increased risk of falling is related to:   Aging process (eg, decreased muscle strength, slowed reflexes)   Higher incidence of chronic health problems (eg, arthritis, diabetes) that may limit mobility, agility or sensory awareness   Side effects of medicine (eg, dizziness, blurred vision)especially medicines like prescription pain medicines and drugs used to treat mental health conditions   Depending on the brittleness of your bones, the consequences of a fall can be serious and long lasting.    Home Life   Research by the Association of Aging [facility]) shows that some home accidents among older adults can be prevented by making simple lifestyle changes and basic modifications and repairs to the home environment. Here are some lifestyle changes that experts recommend:   Have your hearing and vision checked regularly. Be sure to wear prescription glasses that are right for you. Speak to your doctor or pharmacist about the possible side effects of your medicines. A number of medicines can cause dizziness. If you have problems with sleep, talk to your doctor. Limit your intake of alcohol. If necessary, use a cane or walker to help maintain your balance. Wear supportive, rubber-soled shoes, even at home. If you live in a region that gets wintry weather, you may want to put special cleats on your shoes to prevent you from slipping on the snow and ice. Exercise regularly to help maintain muscle tone, agility, and balance. Always hold the banister when going up or down stairs. Also, use  bars when getting in or out of the bath or shower, or using the toilet. To avoid dizziness, get up slowly from a lying down position. Sit up first, dangling your legs for a minute or two before rising to a standing position. Overall Home Safety Check   According to the Consumer Product Safety Commision's \"Older Consumer Home Safety Checklist,\" it is important to check for potential hazards in each room. And remember, proper lighting is an essential factor in home safety. If you cannot see clearly, you are more likely to fall. Important questions to ask yourself include:   Are lamp, electric, extension, and telephone cords placed out of the flow of traffic and maintained in good condition? Have frayed cords been replaced? Are all small rugs and runners slip resistant? If not, you can secure them to the floor with a special double-sided carpet tape. Are smoke detectors properly locatedone on every floor of your home and one outside of every sleeping area? Are they in good working order? Are batteries replaced at least once a year?    Do you have surfaces are extremely slippery. Make sure bathroom rugs are non-skid or tape them firmly to the floor. Bathtubs should have at least one, preferably two, grab bars, firmly attached to structural supports in the wall. (Do not use built-in soap holders or glass shower doors as grab bars.)    Tub seats fitted with non-slip material on the legs allow you to wash sitting down. For people with limited mobility, bathtub transfer benches allow you to slide safely into the tub. Raised toilet seats and toilet safety rails are helpful for those with knee or hip problems. In the [facility]    Make sure you use a nightlight and that the area around your bed is clear of potential obstacles. Be careful with electric blankets and never go to sleep with a heating pad, which can cause serious burns even if on a low setting. Use fire-resistant mattress covers and pillows, and NEVER smoke in bed. Keep a phone next to the bed that is programmed to dial 911 at the push of a button. If you have a chronic condition, you may want to sign on with an automatic call-in service. Typically the system includes a small pendant that connects directly to an emergency medical voice-response system. You should also make arrangements to stay in contact with someonefriend, neighbor, family memberon a regular schedule. Fire Prevention   According to the [place]. (Smoke Alarms for Every) [address] [place] [address], senior citizens are one of the two highest risk groups for death and serious injuries due to residential fires. When cooking, wear short-sleeved items, never a bulky long-sleeved robe. The [facility]'s Safety Checklist for Older Consumers emphasizes the importance of checking basements, garages, workshops and storage areas for fire hazards, such as volatile liquids, piles of old rags or clothing and overloaded circuits. Never smoke in bed or when lying down on a couch or recliner chair.     Small portable electric or kerosene heaters are responsible for many home fires and should be used cautiously if at all. If you do use one, be sure to keep them away from flammable materials. In case of fire, make sure you have a pre-established emergency exit plan. Have a professional check your fireplace and other fuel-burning appliances yearly. [employer]   Baby boomers entering the barron years will continue to see the development of new products to help older adults live safely and independently in spite of age-related changes. Making Life More Livable  , by [employer], lists over 1,000 products for \"living well in the mature years,\" such as bathing and mobility aids, household security devices, ergonomically designed knives and peelers, and faucet valves and knobs for temperature control. Medical supply stores and organizations are good sources of information about products that improve your quality of life and insure your safety. Last Reviewed: November 2009 Domenica Reese MD   Updated: 3/7/2011     ·       EMR Dragon/transcription disclaimer:  Much of this encounter note is electronic transcription/translation of spoken language to printed texts. The electronic translation of spoken language may be erroneous, or at times, nonsensical words or phrases may beinadvertently transcribed.   Although I have reviewed the note for such errors, some may still exist.

## 2021-06-20 ENCOUNTER — APPOINTMENT (OUTPATIENT)
Dept: GENERAL RADIOLOGY | Age: 86
End: 2021-06-20
Payer: MEDICARE

## 2021-06-20 ENCOUNTER — HOSPITAL ENCOUNTER (EMERGENCY)
Age: 86
Discharge: HOME | End: 2021-06-20
Attending: EMERGENCY MEDICINE
Payer: MEDICARE

## 2021-06-20 ENCOUNTER — OFFICE VISIT (OUTPATIENT)
Dept: URGENT CARE | Age: 86
End: 2021-06-20

## 2021-06-20 VITALS
OXYGEN SATURATION: 95 % | HEIGHT: 63 IN | DIASTOLIC BLOOD PRESSURE: 82 MMHG | TEMPERATURE: 98 F | BODY MASS INDEX: 22.15 KG/M2 | WEIGHT: 125 LBS | SYSTOLIC BLOOD PRESSURE: 166 MMHG | RESPIRATION RATE: 20 BRPM | HEART RATE: 89 BPM

## 2021-06-20 VITALS
OXYGEN SATURATION: 98 % | RESPIRATION RATE: 16 BRPM | HEART RATE: 78 BPM | WEIGHT: 128 LBS | DIASTOLIC BLOOD PRESSURE: 89 MMHG | HEIGHT: 63 IN | SYSTOLIC BLOOD PRESSURE: 143 MMHG | BODY MASS INDEX: 22.68 KG/M2 | TEMPERATURE: 97.2 F

## 2021-06-20 DIAGNOSIS — M71.21 SYNOVIAL CYST OF RIGHT POPLITEAL SPACE: Primary | ICD-10-CM

## 2021-06-20 DIAGNOSIS — M25.461 EFFUSION OF RIGHT KNEE: ICD-10-CM

## 2021-06-20 DIAGNOSIS — M79.604 PAIN OF RIGHT LOWER EXTREMITY: Primary | ICD-10-CM

## 2021-06-20 LAB
ALBUMIN SERPL-MCNC: 3.7 G/DL (ref 3.5–5.2)
ALP BLD-CCNC: 152 U/L (ref 35–104)
ALT SERPL-CCNC: 10 U/L (ref 5–33)
ANION GAP SERPL CALCULATED.3IONS-SCNC: 10 MMOL/L (ref 7–19)
APTT: 27.2 SEC (ref 26–36.2)
AST SERPL-CCNC: 15 U/L (ref 5–32)
BASOPHILS ABSOLUTE: 0 K/UL (ref 0–0.2)
BASOPHILS RELATIVE PERCENT: 0.3 % (ref 0–1)
BILIRUB SERPL-MCNC: 0.6 MG/DL (ref 0.2–1.2)
BUN BLDV-MCNC: 17 MG/DL (ref 8–23)
C-REACTIVE PROTEIN: 3.03 MG/DL (ref 0–0.5)
CALCIUM SERPL-MCNC: 9.3 MG/DL (ref 8.2–9.6)
CHLORIDE BLD-SCNC: 101 MMOL/L (ref 98–111)
CO2: 29 MMOL/L (ref 22–29)
CREAT SERPL-MCNC: 1 MG/DL (ref 0.5–0.9)
EOSINOPHILS ABSOLUTE: 0.6 K/UL (ref 0–0.6)
EOSINOPHILS RELATIVE PERCENT: 5.2 % (ref 0–5)
GFR AFRICAN AMERICAN: >59
GFR NON-AFRICAN AMERICAN: 52
GLUCOSE BLD-MCNC: 100 MG/DL (ref 74–109)
HCT VFR BLD CALC: 41.8 % (ref 37–47)
HEMOGLOBIN: 13.4 G/DL (ref 12–16)
IMMATURE GRANULOCYTES #: 0.1 K/UL
INR BLD: 0.98 (ref 0.88–1.18)
LYMPHOCYTES ABSOLUTE: 2.4 K/UL (ref 1.1–4.5)
LYMPHOCYTES RELATIVE PERCENT: 20.1 % (ref 20–40)
MCH RBC QN AUTO: 32.4 PG (ref 27–31)
MCHC RBC AUTO-ENTMCNC: 32.1 G/DL (ref 33–37)
MCV RBC AUTO: 101 FL (ref 81–99)
MONOCYTES ABSOLUTE: 1.3 K/UL (ref 0–0.9)
MONOCYTES RELATIVE PERCENT: 11.4 % (ref 0–10)
NEUTROPHILS ABSOLUTE: 7.4 K/UL (ref 1.5–7.5)
NEUTROPHILS RELATIVE PERCENT: 62.6 % (ref 50–65)
PDW BLD-RTO: 12.8 % (ref 11.5–14.5)
PLATELET # BLD: 241 K/UL (ref 130–400)
PMV BLD AUTO: 9.7 FL (ref 9.4–12.3)
POTASSIUM REFLEX MAGNESIUM: 4.3 MMOL/L (ref 3.5–5)
PROTHROMBIN TIME: 12.9 SEC (ref 12–14.6)
RBC # BLD: 4.14 M/UL (ref 4.2–5.4)
SEDIMENTATION RATE, ERYTHROCYTE: 25 MM/HR (ref 0–25)
SODIUM BLD-SCNC: 140 MMOL/L (ref 136–145)
TOTAL PROTEIN: 6.7 G/DL (ref 6.6–8.7)
WBC # BLD: 11.8 K/UL (ref 4.8–10.8)

## 2021-06-20 PROCEDURE — 85652 RBC SED RATE AUTOMATED: CPT

## 2021-06-20 PROCEDURE — 36415 COLL VENOUS BLD VENIPUNCTURE: CPT

## 2021-06-20 PROCEDURE — 2500000003 HC RX 250 WO HCPCS: Performed by: EMERGENCY MEDICINE

## 2021-06-20 PROCEDURE — 85025 COMPLETE CBC W/AUTO DIFF WBC: CPT

## 2021-06-20 PROCEDURE — 80053 COMPREHEN METABOLIC PANEL: CPT

## 2021-06-20 PROCEDURE — 85610 PROTHROMBIN TIME: CPT

## 2021-06-20 PROCEDURE — 86140 C-REACTIVE PROTEIN: CPT

## 2021-06-20 PROCEDURE — 99282 EMERGENCY DEPT VISIT SF MDM: CPT

## 2021-06-20 PROCEDURE — 6360000002 HC RX W HCPCS: Performed by: EMERGENCY MEDICINE

## 2021-06-20 PROCEDURE — 85730 THROMBOPLASTIN TIME PARTIAL: CPT

## 2021-06-20 PROCEDURE — 96374 THER/PROPH/DIAG INJ IV PUSH: CPT

## 2021-06-20 PROCEDURE — 93971 EXTREMITY STUDY: CPT

## 2021-06-20 PROCEDURE — 99999 PR OFFICE/OUTPT VISIT,PROCEDURE ONLY: CPT | Performed by: PHYSICIAN ASSISTANT

## 2021-06-20 PROCEDURE — 96376 TX/PRO/DX INJ SAME DRUG ADON: CPT

## 2021-06-20 PROCEDURE — 73560 X-RAY EXAM OF KNEE 1 OR 2: CPT

## 2021-06-20 RX ORDER — HYDROMORPHONE HYDROCHLORIDE 1 MG/ML
0.5 INJECTION, SOLUTION INTRAMUSCULAR; INTRAVENOUS; SUBCUTANEOUS ONCE
Status: COMPLETED | OUTPATIENT
Start: 2021-06-20 | End: 2021-06-20

## 2021-06-20 RX ORDER — HYDROCODONE BITARTRATE AND ACETAMINOPHEN 5; 325 MG/1; MG/1
1 TABLET ORAL EVERY 6 HOURS PRN
Qty: 12 TABLET | Refills: 0 | Status: SHIPPED | OUTPATIENT
Start: 2021-06-20 | End: 2021-09-17 | Stop reason: SDUPTHER

## 2021-06-20 RX ADMIN — HYDROMORPHONE HYDROCHLORIDE 0.5 MG: 1 INJECTION, SOLUTION INTRAMUSCULAR; INTRAVENOUS; SUBCUTANEOUS at 14:00

## 2021-06-20 RX ADMIN — HYDROMORPHONE HYDROCHLORIDE 0.5 MG: 1 INJECTION, SOLUTION INTRAMUSCULAR; INTRAVENOUS; SUBCUTANEOUS at 12:33

## 2021-06-20 ASSESSMENT — ENCOUNTER SYMPTOMS
BACK PAIN: 0
NAUSEA: 0
RHINORRHEA: 0
VOMITING: 0
SHORTNESS OF BREATH: 0
DIARRHEA: 0
SORE THROAT: 0
ABDOMINAL PAIN: 0

## 2021-06-20 ASSESSMENT — PAIN SCALES - GENERAL
PAINLEVEL_OUTOF10: 9
PAINLEVEL_OUTOF10: 8
PAINLEVEL_OUTOF10: 7

## 2021-06-20 NOTE — PROGRESS NOTES
PRELIMINARY REPORT    NO DVT OR SVT NOTED IN RLE AT THIS TIME  BAKERS CYST NOTED IN RT POPLITEAL SPACE= 5 X 1 CM    PENDING FINAL REPORT

## 2021-06-20 NOTE — ED PROVIDER NOTES
[facility] EMERGENCY DEPT  eMERGENCY dEPARTMENT eNCOUnter      Pt Name: Otis See  MRN: [id number]  [place] 4/21/1928  Date of evaluation: 6/20/2021  Provider: Ace Muñoz MD    [address] [place] [address]       Chief Complaint   Patient presents with    Leg Swelling     right leg pain and swelling         HISTORY OF PRESENT ILLNESS   (Location/Symptom, Timing/Onset,Context/Setting, Quality, Duration, Modifying Factors, Severity)  Note limiting factors. Otis See is a 80 y.o. female who presents to the emergency department with right leg swelling and pain. Patient states she went to bed with no problems and woke up this morning with pain in her posterior knee and now her whole right leg is swollen. She does have a history of blood clots. She says is been several years. She not sure if the pain is similar. She had no fall or injury to the area. HPI    NursingNotes were reviewed. REVIEW OF SYSTEMS    (2-9 systems for level 4, 10 or more for level 5)     Review of Systems   Constitutional: Negative for chills and fever. HENT: Negative for rhinorrhea and sore throat. Respiratory: Negative for shortness of breath. Cardiovascular: Negative for chest pain and leg swelling. Gastrointestinal: Negative for abdominal pain, diarrhea, nausea and vomiting. Genitourinary: Negative for difficulty urinating. Musculoskeletal: Positive for gait problem and joint swelling. Negative for back pain and neck pain. Skin: Negative for rash. Neurological: Negative for weakness and headaches. Psychiatric/Behavioral: Negative for confusion. A complete review of systems was performed and is negative except as noted above in the HPI.        PAST MEDICAL HISTORY     Past Medical History:   Diagnosis Date    Burning mouth syndrome     Chronic insomnia     Glaucoma     Hyperlipidemia     Hypertension     Hypothyroidism     Low back pain     Pure hypercholesterolemia 5/10/2016         SURGICAL HISTORY Past Surgical History:   Procedure Laterality Date    APPENDECTOMY      BREAST BIOPSY      CARPAL TUNNEL RELEASE      COLONOSCOPY  2005        ERCP      ? Dr.Whitney Paulino Dials HEEL SPUR SURGERY      HIP ARTHROPLASTY Left January 30, 2014    HYSTERECTOMY      REVISION TOTAL HIP ARTHROPLASTY Left February 4, 2014    UPPER GASTROINTESTINAL ENDOSCOPY      UPPER GASTROINTESTINAL ENDOSCOPY  12/2011    [address]         CURRENT MEDICATIONS       Discharge Medication List as of 6/20/2021  2:07 PM      CONTINUE these medications which have NOT CHANGED    Details   levothyroxine (SYNTHROID) 50 MCG tablet Take 1 tablet by mouth daily For low thyroid, Disp-90 tablet, R-3Note change in doseNormal      amLODIPine (NORVASC) 5 MG tablet Take 1 tablet by mouth daily, Disp-45 tablet, R-3Normal      olmesartan-hydroCHLOROthiazide (BENICAR HCT) 20-12.5 MG per tablet 1 tablet by mouth once a day for high blood pressure, Disp-90 tablet,R-3Replaces losartan HCTZNormal      metoprolol tartrate (LOPRESSOR) 50 MG tablet TAKE 1 TABLET TWICE A DAY, Disp-180 tablet,R-3Normal      atorvastatin (LIPITOR) 10 MG tablet TAKE 1 TABLET DAILY FOR    HIGH CHOLESTEROL, Disp-90 tablet,R-3Normal      aspirin 81 MG tablet aspirin   dailyHistorical Med      Probiotic Product (PROBIOTIC-10) CAPS Take by mouth onceHistorical Med      sodium chloride (AIDA 128) 5 % ophthalmic solution Place 1 drop into both eyes 2 times dailyHistorical Med      timolol (TIMOPTIC) 0.5 % ophthalmic solution Historical Med      ZIOPTAN 0.0015 % SOLN Historical Med             ALLERGIES     Gabapentin, Morphine, and Stadol [butorphanol tartrate]    FAMILY HISTORY       Family History   Problem Relation Age of Onset    Heart Disease Mother     Lung Cancer Father           SOCIAL HISTORY       Social History     Socioeconomic History    Marital status:       Spouse name: None    Number of children: 2    Years of education: None    Highest education level: None   Occupational History    Occupation: Homemaker   Tobacco Use    Smoking status: Never Smoker    Smokeless tobacco: Never Used   Vaping Use    Vaping Use: Never used   Substance and Sexual Activity    Alcohol use: No    Drug use: No    Sexual activity: Not Currently   Other Topics Concern    None   Social History Narrative    None     Social Determinants of Health     Financial Resource Strain:     Difficulty of Paying Living Expenses:    Food Insecurity:     Worried About Running Out of Food in the Last Year:     Ran Out of Food in the Last Year:    Transportation Needs:     Lack of Transportation (Medical):  Lack of Transportation (Non-Medical):    Physical Activity:     Days of Exercise per Week:     Minutes of Exercise per Session:    Stress:     Feeling of Stress :    Social Connections:     Frequency of Communication with Friends and Family:     Frequency of Social Gatherings with Friends and Family:     Attends Judaism Services:     Active Member of Clubs or Organizations:     Attends Club or Organization Meetings:     Marital Status:    Intimate Partner Violence:     Fear of Current or Ex-Partner:     Emotionally Abused:     Physically Abused:     Sexually Abused:        SCREENINGS             PHYSICAL EXAM    (up to 7 for level 4, 8 or more for level 5)     ED Triage Vitals [06/20/21 1138]   BP Temp Temp src Pulse Resp SpO2 Height Weight   (!) 166/82 98 °F (36.7 °C) -- 89 20 95 % 5' 3\" (1.6 m) 125 lb (56.7 kg)       Physical Exam  Vitals and nursing note reviewed. Constitutional:       General: She is not in acute distress. Appearance: She is well-developed. She is not diaphoretic. HENT:      Head: Normocephalic and atraumatic. Eyes:      Pupils: Pupils are equal, round, and reactive to light. Cardiovascular:      Rate and Rhythm: Normal rate and regular rhythm. Heart sounds: Normal heart sounds.    Pulmonary:      Effort: Pulmonary effort is normal. No respiratory distress. Breath sounds: Normal breath sounds. Abdominal:      General: Bowel sounds are normal. There is no distension. Palpations: Abdomen is soft. Tenderness: There is no abdominal tenderness. Musculoskeletal:         General: Swelling and tenderness present. Normal range of motion. Cervical back: Normal range of motion and neck supple. Comments: Joint effusion to right knee with pain primarily in the posterior aspect of her right knee   Skin:     General: Skin is warm and dry. Findings: No rash. Neurological:      Mental Status: She is alert and oriented to person, place, and time. Cranial Nerves: No cranial nerve deficit. Motor: No abnormal muscle tone. Coordination: Coordination normal.   Psychiatric:         Behavior: Behavior normal.         DIAGNOSTIC RESULTS     EKG: All EKG's are interpreted by the Emergency Department Physician who either signs or Co-signs this chart in the absence of a cardiologist.        RADIOLOGY:   Non-plain film images such as CT, Ultrasound and MRI are read by the radiologist. Glee Self images are visualized and preliminarily interpreted by the emergency physician with the below findings:        Interpretation per the Radiologist below, if available at the time of this note:    VL DUP LOWER EXTREMITY VENOUS RIGHT   Final Result      XR KNEE RIGHT (1-2 VIEWS)   Final Result   1. Narrowing of all 3 compartments of the knee. 2. Moderate suprapatellar joint effusion.    Signed by Dr Kp Culp REPORT     NO DVT OR SVT NOTED IN RLE AT [facility] RT POPLITEAL SPACE= 5 X 1 CM     PENDING FINAL REPORT    ED BEDSIDE ULTRASOUND:   Performed by ED Physician - none    LABS:  Labs Reviewed   CBC WITH AUTO DIFFERENTIAL - Abnormal; Notable for the following components:       Result Value    WBC 11.8 (*)     RBC 4.14 (*)     .0 (*)     MCH 32.4 (*) (Please note that portions of this note were completed with a voice recognition program.  Efforts were made to edit the dictations butoccasionally words are mis-transcribed.)    Javy Walters MD (electronically signed)  AttendingEmergency Physician         Javy Walters MD  06/20/21 [phone]

## 2021-06-22 DIAGNOSIS — M25.561 CHRONIC PAIN OF RIGHT KNEE: Primary | ICD-10-CM

## 2021-06-22 DIAGNOSIS — G89.29 CHRONIC PAIN OF RIGHT KNEE: Primary | ICD-10-CM

## 2021-07-02 ENCOUNTER — OFFICE VISIT (OUTPATIENT)
Dept: CARDIOLOGY CLINIC | Age: 86
End: 2021-07-02
Payer: MEDICARE

## 2021-07-02 VITALS
BODY MASS INDEX: 23.04 KG/M2 | HEART RATE: 114 BPM | DIASTOLIC BLOOD PRESSURE: 88 MMHG | SYSTOLIC BLOOD PRESSURE: 110 MMHG | HEIGHT: 63 IN | WEIGHT: 130 LBS

## 2021-07-02 DIAGNOSIS — I48.0 PAF (PAROXYSMAL ATRIAL FIBRILLATION) (HCC): ICD-10-CM

## 2021-07-02 DIAGNOSIS — I10 ESSENTIAL HYPERTENSION: Primary | ICD-10-CM

## 2021-07-02 DIAGNOSIS — R00.0 TACHYCARDIA: ICD-10-CM

## 2021-07-02 PROCEDURE — G8427 DOCREV CUR MEDS BY ELIG CLIN: HCPCS | Performed by: CLINICAL NURSE SPECIALIST

## 2021-07-02 PROCEDURE — G8420 CALC BMI NORM PARAMETERS: HCPCS | Performed by: CLINICAL NURSE SPECIALIST

## 2021-07-02 PROCEDURE — 1036F TOBACCO NON-USER: CPT | Performed by: CLINICAL NURSE SPECIALIST

## 2021-07-02 PROCEDURE — 4040F PNEUMOC VAC/ADMIN/RCVD: CPT | Performed by: CLINICAL NURSE SPECIALIST

## 2021-07-02 PROCEDURE — 93000 ELECTROCARDIOGRAM COMPLETE: CPT | Performed by: CLINICAL NURSE SPECIALIST

## 2021-07-02 PROCEDURE — 1090F PRES/ABSN URINE INCON ASSESS: CPT | Performed by: CLINICAL NURSE SPECIALIST

## 2021-07-02 PROCEDURE — 99213 OFFICE O/P EST LOW 20 MIN: CPT | Performed by: CLINICAL NURSE SPECIALIST

## 2021-07-02 PROCEDURE — 1123F ACP DISCUSS/DSCN MKR DOCD: CPT | Performed by: CLINICAL NURSE SPECIALIST

## 2021-07-02 RX ORDER — METOPROLOL TARTRATE 50 MG/1
75 TABLET, FILM COATED ORAL 2 TIMES DAILY
Qty: 180 TABLET | Refills: 3
Start: 2021-07-02 | End: 2021-11-03

## 2021-07-02 NOTE — PROGRESS NOTES
[address] [place], [address] [address]  Phone: [phone]  Fax: [phone]    OFFICE VISIT:  2021    Nahum Claudio - : 1928    Reason For Visit:  Celeste Rich is a 80 y.o. female who is here for Follow-up (patient has fast heart rates with fatigue) and Atrial Fibrillation  History of dyslipidemia, hypertension, prior pulmonary embolus, and paroxysmal atrial fibrillation. She underwent nuclear stress testing May 2019 that was negative for ischemia with preserved LV function  2-week monitor showed sinus rhythm with short frequent little's bursts of SVT longest lasting 35 seconds. 2D echo 2020 showed moderate concentric LVH with probable grade 2 diastolic dysfunction. Normal LV size and EF. Mild AI, mild MR and mild to moderate TR with elevated RVSP at 35 mmHg  Zio patch monitor was placed for 1 week in April to assess A. fib burden. Monitor showed sinus rhythm first-degree AV block. AV of burden 3% with longest episode lasting for hours and 35 minutes. 106 SVT episodes. Not anticoagulated due to frequent falling    Patient was seen in the emergency room 2021 for knee pain and found to have synovial cyst and effusion. Patient's daughter called yesterday complaining of her heart rate being elevated  Accompanied with her daughter. She states that over the last week her heart rate has stayed above 100. Yesterday was 116. Did have her right knee drained and was given a steroid shot but no oral steroids      Subjective  Celeste Rich denies exertional chest pain. Had some left scapular pain yesterday but none today. Has GAY that is unchanged. No resting shortness of breath, orthopnea, paroxysmal nocturnal dyspnea, syncope, presyncope, arrhythmia  Leg edema has improved  The patient denies numbness or weakness to suggest cerebrovascular accident or transient ischemic attack. Briana Crane MD is PCP and follows labs including lipids and thyroid.   Celeste Rich Erika Allred has the following history as recorded in [facility]:    Patient Active Problem List    Diagnosis Date Noted    SI (sacroiliac) joint dysfunction 11/16/2020    Paroxysmal atrial fibrillation ([address] 03/09/2019    Osteoarthritis of knee 11/21/2018    Acquired hypothyroidism 08/29/2016    Pure hypercholesterolemia 05/10/2016    Low back pain     Essential hypertension     Chronic insomnia     Burning mouth syndrome     Glaucoma      Past Medical History:   Diagnosis Date    Burning mouth syndrome     Chronic insomnia     Glaucoma     Hyperlipidemia     Hypertension     Hypothyroidism     Low back pain     Pure hypercholesterolemia 5/10/2016     Past Surgical History:   Procedure Laterality Date    APPENDECTOMY      BREAST BIOPSY      CARPAL TUNNEL RELEASE      COLONOSCOPY  2005        ERCP      ?   Jennie Elm HEEL SPUR SURGERY      HIP ARTHROPLASTY Left January 30, 2014    HYSTERECTOMY      REVISION TOTAL HIP ARTHROPLASTY Left February 4, 2014    UPPER GASTROINTESTINAL ENDOSCOPY      UPPER GASTROINTESTINAL ENDOSCOPY  12/2011         Family History   Problem Relation Age of Onset    Heart Disease Mother     Lung Cancer Father      Social History     Tobacco Use    Smoking status: Never Smoker    Smokeless tobacco: Never Used   Substance Use Topics    Alcohol use: No      Current Outpatient Medications   Medication Sig Dispense Refill    metoprolol tartrate (LOPRESSOR) 50 MG tablet Take 1.5 tablets by mouth 2 times daily 180 tablet 3    levothyroxine (SYNTHROID) 50 MCG tablet Take 1 tablet by mouth daily For low thyroid 90 tablet 3    amLODIPine (NORVASC) 5 MG tablet Take 1 tablet by mouth daily 45 tablet 3    olmesartan-hydroCHLOROthiazide (BENICAR HCT) 20-12.5 MG per tablet 1 tablet by mouth once a day for high blood pressure 90 tablet 3    atorvastatin (LIPITOR) 10 MG tablet TAKE 1 TABLET DAILY FOR    HIGH CHOLESTEROL 90 tablet 3    aspirin 81 MG tablet aspirin   daily      Probiotic Product (PROBIOTIC-10) CAPS Take by mouth once      sodium chloride (AIDA 128) 5 % ophthalmic solution Place 1 drop into both eyes 2 times daily      timolol (TIMOPTIC) 0.5 % ophthalmic solution       ZIOPTAN 0.0015 % SOLN        No current facility-administered medications for this visit. Allergies: Gabapentin, Morphine, and Stadol [butorphanol tartrate]    Review of Systems  Constitutional - no significant activity change, appetite change, or unexpected weight change. No fever, chills or diaphoresis. No fatigue. HEENT - no significant rhinorrhea or epistaxis. No tinnitus or significant hearing loss. Eyes - no sudden vision change or amaurosis. Respiratory - no significant wheezing, stridor, apnea or cough. + dyspnea on exertion no resting shortness of breath. Cardiovascular - no exertional chest pain, orthopnea or PND. No sensation of arrhythmia or slow heart rate. No claudication or leg edema. Gastrointestinal - no abdominal swelling or pain. No blood in stool. No severe constipation, diarrhea, nausea, or vomiting. Genitourinary - no difficulty urinating, dysuria, frequency, or urgency. No flank pain or hematuria. Musculoskeletal - no back pain, + right knee pain. .   Skin - no color change or rash. No pallor. No new surgical incision. Neurologic - no speech difficulty, facial asymmetry or lateralizing weakness. No seizures, presyncope, syncope, or significant dizziness. Hematologic - no easy bruising or excessive bleeding. Psychiatric - no severe anxiety or insomnia. No confusion. All other review of systems are negative. Objective  Vital Signs - /88   Pulse 114   Ht 5' 3\" (1.6 m)   Wt 130 lb (59 kg)   BMI 23.03 kg/m²   General - Alexys Madrid is alert, cooperative, and pleasant. Well groomed. No acute distress. Body habitus is . HEENT - The head is normocephalic. No circumoral cyanosis.   Dentition is normal.   EYES -  No Xanthelasma, no arcus senilis, no conjunctival hemorrhages or discharge. Neck - Supple, without increased jugular venous pressures. No carotid bruits. No mass. Respiratory - Lungs are clear bilaterally. No wheezes or rales. Normal effort without use of accessory muscles. Cardiovascular - Heart has regular rhythm and tachycardic rate. No murmurs, rubs or gallops. + pedal pulses and no varicosities. Abdominal -  Soft, nontender, nondistended. Bowel sounds are intact. Extremities - No clubbing, cyanosis, or  edema. Musculoskeletal -  No clubbing . No Osler's nodes. Gait-in wheelchair. No kyphosis or scoliosis. Skin -  no statis ulcers or dermatitis. Neurological - No focal signs are identified. Oriented to person, place and time. Psychiatric -  Appropriate affect and mood. Assessment:     Diagnosis Orders   1. Essential hypertension  EKG 12 lead   2. PAF (paroxysmal atrial fibrillation) ([facility])  EKG 12 lead   3. Tachycardia       Data:  BP Readings from Last 3 Encounters:   07/02/21 110/88   06/20/21 (!) 166/82   06/20/21 (!) 143/89    Pulse Readings from Last 3 Encounters:   07/02/21 114   06/20/21 89   06/20/21 78        Wt Readings from Last 3 Encounters:   07/02/21 130 lb (59 kg)   06/20/21 125 lb (56.7 kg)   06/20/21 128 lb (58.1 kg)   EKG today shows tachycardia with a rate of 114. No visible P waves but appears regular in rhythm. Known paroxysmal atrial fibrillation but not anticoagulated due to frequent falling   Reviewed PCP recent notes   Reviewed recent labs      Patient on metoprolol 50 mg twice a day. Will increase to 75 mg twice a day to get better rate control. Due to frequent falling would not initiate anticoagulation at this time. Did review recent labs and all appear to be stable  Checks her heart rate and blood pressure daily.   She will touch base with us mid next week and let us know how rate is going   States taking medications as prescribed     20 minutes were spent preparing, reviewing and seeing patient. All questions answered    Plan  Increase your metoprolol to 75mg twice a day  You can take 1 and 1/2 of the 50mg tablets   Call and let us know how heart rate and BP is running next week    Call with any questions or concerns  Follow up with Rich Hernandez MD for non cardiac problems  Report any new problems  Cardiovascular Fitness-Exercise as tolerated. Cardiac / Healthy Diet  Continue current medications as directed  Continue plan of treatment  It is always recommended that you bring your medications bottles with you to each visit - this is for your safety! LACI Mackay    EMR dragon/transcription disclaimer: Much of this encounter note is electronic transcription/translation of spoken language to printed tach. Electronic translation of spoken language may be erroneous, or at times, nonsensical words or phrases may be inadvertently transcribed.  Although, I have reviewed the note for such errors, some may still exist.

## 2021-07-02 NOTE — PATIENT INSTRUCTIONS
Increase your metoprolol to 75mg twice a day  You can take 1 and 1/2 of the 50mg tablets   Call and let us know how heart rate and BP is running next week    Call with any questions or concerns  Follow up with Enmanuel Verduzco MD for non cardiac problems  Report any new problems  Cardiovascular Fitness-Exercise as tolerated. Cardiac / Healthy Diet  Continue current medications as directed  Continue plan of treatment  It is always recommended that you bring your medications bottles with you to each visit - this is for your safety!

## 2021-07-06 RX ORDER — DOXEPIN HYDROCHLORIDE 50 MG/1
CAPSULE ORAL
Qty: 90 CAPSULE | Refills: 3 | Status: SHIPPED | OUTPATIENT
Start: 2021-07-06 | End: 2022-06-27

## 2021-07-16 ENCOUNTER — HOSPITAL ENCOUNTER (OUTPATIENT)
Dept: MRI IMAGING | Age: 86
Discharge: HOME | End: 2021-07-16
Payer: MEDICARE

## 2021-07-16 DIAGNOSIS — R26.89 LOSS OF BALANCE: ICD-10-CM

## 2021-07-16 DIAGNOSIS — R29.6 FALLS FREQUENTLY: ICD-10-CM

## 2021-07-16 PROCEDURE — 70551 MRI BRAIN STEM W/O DYE: CPT

## 2021-08-09 RX ORDER — ATORVASTATIN CALCIUM 10 MG/1
TABLET, FILM COATED ORAL
Qty: 90 TABLET | Refills: 3 | Status: SHIPPED | OUTPATIENT
Start: 2021-08-09 | End: 2021-08-10 | Stop reason: SDUPTHER

## 2021-08-10 RX ORDER — ATORVASTATIN CALCIUM 10 MG/1
TABLET, FILM COATED ORAL
Qty: 90 TABLET | Refills: 3 | Status: SHIPPED | OUTPATIENT
Start: 2021-08-10 | End: 2022-08-02

## 2021-09-14 ENCOUNTER — OFFICE VISIT (OUTPATIENT)
Dept: PRIMARY CARE CLINIC | Age: 86
End: 2021-09-14
Payer: MEDICARE

## 2021-09-14 VITALS
OXYGEN SATURATION: 98 % | DIASTOLIC BLOOD PRESSURE: 70 MMHG | HEART RATE: 76 BPM | HEIGHT: 63 IN | BODY MASS INDEX: 23.03 KG/M2 | TEMPERATURE: 98 F | SYSTOLIC BLOOD PRESSURE: 130 MMHG

## 2021-09-14 DIAGNOSIS — R07.81 RIB PAIN ON LEFT SIDE: Primary | ICD-10-CM

## 2021-09-14 PROCEDURE — 1090F PRES/ABSN URINE INCON ASSESS: CPT | Performed by: NURSE PRACTITIONER

## 2021-09-14 PROCEDURE — 99213 OFFICE O/P EST LOW 20 MIN: CPT | Performed by: NURSE PRACTITIONER

## 2021-09-14 PROCEDURE — G8427 DOCREV CUR MEDS BY ELIG CLIN: HCPCS | Performed by: NURSE PRACTITIONER

## 2021-09-14 PROCEDURE — 4040F PNEUMOC VAC/ADMIN/RCVD: CPT | Performed by: NURSE PRACTITIONER

## 2021-09-14 PROCEDURE — G8420 CALC BMI NORM PARAMETERS: HCPCS | Performed by: NURSE PRACTITIONER

## 2021-09-14 PROCEDURE — 96372 THER/PROPH/DIAG INJ SC/IM: CPT | Performed by: NURSE PRACTITIONER

## 2021-09-14 PROCEDURE — 1036F TOBACCO NON-USER: CPT | Performed by: NURSE PRACTITIONER

## 2021-09-14 PROCEDURE — 1123F ACP DISCUSS/DSCN MKR DOCD: CPT | Performed by: NURSE PRACTITIONER

## 2021-09-14 RX ORDER — KETOROLAC TROMETHAMINE 30 MG/ML
60 INJECTION, SOLUTION INTRAMUSCULAR; INTRAVENOUS ONCE
Status: COMPLETED | OUTPATIENT
Start: 2021-09-14 | End: 2021-09-14

## 2021-09-14 RX ORDER — TIMOLOL 5.12 MG/ML
SOLUTION/ DROPS OPHTHALMIC
COMMUNITY

## 2021-09-14 RX ORDER — KETOROLAC TROMETHAMINE 10 MG/1
10 TABLET, FILM COATED ORAL EVERY 6 HOURS PRN
Qty: 10 TABLET | Refills: 0 | Status: SHIPPED | OUTPATIENT
Start: 2021-09-14 | End: 2022-09-14

## 2021-09-14 RX ORDER — LIDOCAINE 50 MG/G
1 PATCH TOPICAL DAILY
Qty: 30 PATCH | Refills: 0 | Status: SHIPPED | OUTPATIENT
Start: 2021-09-14 | End: 2022-02-21

## 2021-09-14 RX ADMIN — KETOROLAC TROMETHAMINE 60 MG: 30 INJECTION, SOLUTION INTRAMUSCULAR; INTRAVENOUS at 16:36

## 2021-09-14 NOTE — PATIENT INSTRUCTIONS
Splint side when coughing or deep breathing  Make sure you are taking deep breaths  May use the lidocaine patches on 12 hours then off 12 hours  May use the Toradol as needed for pain for a short amount of time  You may use Tylenol for pain as well  Use safety precautions when up around in your house you may try to use a walker and an clutter your pathways.

## 2021-09-14 NOTE — PROGRESS NOTES
[facility] PHYSICIAN SERVICES  [facility]  [address]  [address] [address]  Dept: [phone]  Dept Fax: [phone]  Loc: [phone]    Emiliano Daley is a 80 y.o. female who presents today for her medical conditions/complaints as noted below. Emiliano Daley is c/o of Chest Pain (from a recent fall. Left sided. [place] this morning.  )        HPI:     HPI   Chief Complaint   Patient presents with    Chest Pain     from a recent fall. Left sided. Fell this morning. lost balance and fell into dresser with left side. Didn't lose consciousness. Daughter brought her in  Past Medical History:   Diagnosis Date    Burning mouth syndrome     Chronic insomnia     Glaucoma     Hyperlipidemia     Hypertension     Hypothyroidism     Low back pain     Pure hypercholesterolemia 5/10/2016      Past Surgical History:   Procedure Laterality Date    APPENDECTOMY      BREAST BIOPSY      CARPAL TUNNEL RELEASE      COLONOSCOPY  2005        ERCP      ?  Dr.Whitney Crews [facility] HEEL SPUR SURGERY      HIP ARTHROPLASTY Left January 30, 2014    HYSTERECTOMY      REVISION TOTAL HIP ARTHROPLASTY Left February 4, 2014    UPPER GASTROINTESTINAL ENDOSCOPY      UPPER GASTROINTESTINAL ENDOSCOPY  12/2011           Vitals 9/14/2021 7/2/2021 6/20/2021 6/20/2021 6/20/2021 3/17/0388   SYSTOLIC 185 064 - - 343 519   DIASTOLIC 70 88 - - 82 89   Pulse 76 114 - - 89 78   Temp 98 - - - 98 97.2   Resp - - - - 20 16   SpO2 98 - - - 95 98   Weight - 130 lb - - 125 lb 128 lb   Height 5' 3\" 5' 3\" - - 5' 3\" 5' 3\"   Body mass index - 23.03 kg/m2 - - 22.14 kg/m2 22.67 kg/m2   Pain Level - - 7 9 8 -   Some recent data might be hidden       Family History   Problem Relation Age of Onset    Heart Disease Mother     Lung Cancer Father        Social History     Tobacco Use    Smoking status: Never Smoker    Smokeless tobacco: Never Used   Substance Use Topics    Alcohol use: No      Current Outpatient Medications on File Prior to Visit   Medication Sig Dispense Refill    timolol (BETIMOL) 0.5 % ophthalmic solution Apply to eye      atorvastatin (LIPITOR) 10 MG tablet Take one tablet daily for cholestrol 90 tablet 3    doxepin (SINEQUAN) 50 MG capsule TAKE 1 CAPSULE AT BEDTIME  FOR CHRONIC INSOMNIA 90 capsule 3    metoprolol tartrate (LOPRESSOR) 50 MG tablet Take 1.5 tablets by mouth 2 times daily 180 tablet 3    levothyroxine (SYNTHROID) 50 MCG tablet Take 1 tablet by mouth daily For low thyroid 90 tablet 3    amLODIPine (NORVASC) 5 MG tablet Take 1 tablet by mouth daily 45 tablet 3    olmesartan-hydroCHLOROthiazide (BENICAR HCT) 20-12.5 MG per tablet 1 tablet by mouth once a day for high blood pressure 90 tablet 3    aspirin 81 MG tablet aspirin   daily      Probiotic Product (PROBIOTIC-10) CAPS Take by mouth once      sodium chloride (AIDA 128) 5 % ophthalmic solution Place 1 drop into both eyes 2 times daily      timolol (TIMOPTIC) 0.5 % ophthalmic solution       ZIOPTAN 0.0015 % SOLN        No current facility-administered medications on file prior to visit. Allergies   Allergen Reactions    Gabapentin Swelling    Morphine     Stadol [Butorphanol Tartrate]        Health Maintenance   Topic Date Due    Flu vaccine (1) 09/01/2021    Shingles Vaccine (1 of 2) 10/12/2021 (Originally 4/21/1978)    Lipid screen  06/09/2022    TSH testing  06/09/2022    Annual Wellness Visit (AWV)  06/10/2022    Potassium monitoring  06/20/2022    Creatinine monitoring  06/20/2022    DTaP/Tdap/Td vaccine (3 - Tdap) 07/15/2025    Pneumococcal 65+ years Vaccine  Completed    COVID-19 Vaccine  Completed    Hepatitis A vaccine  Aged Out    Hepatitis B vaccine  Aged Out    Hib vaccine  Aged Out    Meningococcal (ACWY) vaccine  Aged Out       Subjective:      Review of Systems   Constitutional: Negative.     Respiratory:        Left rib pain       Objective:     Physical Exam  Vitals and nursing Bernabe Epperson     Patient given educational materials -see patient instructions. Discussed use, benefit, and side effects of prescribed medications. All patient questions answered. Pt voiced understanding. Reviewed health maintenance. Instructed to continue currentmedications, diet and exercise. Patient agreed with treatment plan. Follow up as directed. MEDICATIONS:  Orders Placed This Encounter   Medications    lidocaine (LIDODERM) 5 %     Sig: Place 1 patch onto the skin daily 12 hours on, 12 hours off. Dispense:  30 patch     Refill:  0    ketorolac (TORADOL) 10 MG tablet     Sig: Take 1 tablet by mouth every 6 hours as needed for Pain     Dispense:  10 tablet     Refill:  0    ketorolac (TORADOL) injection 60 mg         ORDERS:  Orders Placed This Encounter   Procedures    XR RIBS LEFT INCLUDE CHEST (MIN 3 VIEWS)       Follow-up:  Return if symptoms worsen or fail to improve. PATIENT INSTRUCTIONS:  Patient Instructions   Splint side when coughing or deep breathing  Make sure you are taking deep breaths  May use the lidocaine patches on 12 hours then off 12 hours  May use the Toradol as needed for pain for a short amount of time  You may use Tylenol for pain as well  Use safety precautions when up around in your house you may try to use a walker and an clutter your pathways. Electronically signed by LACI Ochoa CNP on 9/16/2021 at 4:32 PM    EMR Dragon/transcription disclaimer:  Much of thisencounter note is electronic transcription/translation of spoken language to printed texts. The electronic translation of spoken language may be erroneous, or at times, nonsensical words or phrases may be inadvertentlytranscribed.   Although I have reviewed the note for such errors, some may still exist.

## 2021-09-16 ENCOUNTER — TELEPHONE (OUTPATIENT)
Dept: PRIMARY CARE CLINIC | Age: 86
End: 2021-09-16

## 2021-09-16 NOTE — TELEPHONE ENCOUNTER
Pt's daughter in law states the toradol tablet May prescribed tore up pt's stomach and the lidocain patch \"lit her up\" to the point of needing a shower. She is asking if something else can be given or if the  OTC salonpas patch would help.

## 2021-09-17 ENCOUNTER — PATIENT MESSAGE (OUTPATIENT)
Dept: PRIMARY CARE CLINIC | Age: 86
End: 2021-09-17

## 2021-09-17 DIAGNOSIS — M25.461 EFFUSION OF RIGHT KNEE: ICD-10-CM

## 2021-09-17 DIAGNOSIS — M71.21 SYNOVIAL CYST OF RIGHT POPLITEAL SPACE: ICD-10-CM

## 2021-09-17 RX ORDER — HYDROCODONE BITARTRATE AND ACETAMINOPHEN 5; 325 MG/1; MG/1
1 TABLET ORAL EVERY 6 HOURS PRN
Qty: 12 TABLET | Refills: 0 | Status: SHIPPED | OUTPATIENT
Start: 2021-09-17 | End: 2021-09-20

## 2021-09-17 NOTE — TELEPHONE ENCOUNTER
From: Jose A Llanes  To: Carlos Curiel APRN - CNP  Sent: 9/17/2021 8:12 AM CDT  Subject: Non-Urgent Medical Question    Ken Toure is not doing well. The Rx makes her stomach cramp and gives her \"the runs\". The patches burn her skin and she had to get up at 3am and take a shower. I called and left a message yesterday. She had a few hydro cone pills that she has taken occasionally. My neighbor has used the OTC Salonpas patches. Do you have other suggestions? Of course she did not use the ice. I am sure the healing process is just slower than any of us, especially Komal Martin, wanted. We are scheduled to fly to [facility] [place] Sunday and be gone for 10 days. We are apprehensive about leaving her. Do you have suggestions?   Thanks  Sawyer Velazquez and Komal Martin

## 2021-10-11 DIAGNOSIS — I10 ESSENTIAL HYPERTENSION: ICD-10-CM

## 2021-10-11 RX ORDER — AMLODIPINE BESYLATE 5 MG/1
TABLET ORAL
Qty: 45 TABLET | Refills: 3 | Status: SHIPPED | OUTPATIENT
Start: 2021-10-11 | End: 2022-03-01 | Stop reason: SDUPTHER

## 2021-10-14 ENCOUNTER — IMMUNIZATION (OUTPATIENT)
Dept: PRIMARY CARE CLINIC | Age: 86
End: 2021-10-14
Payer: MEDICARE

## 2021-10-14 DIAGNOSIS — Z23 NEED FOR INFLUENZA VACCINATION: Primary | ICD-10-CM

## 2021-10-14 PROCEDURE — G0008 ADMIN INFLUENZA VIRUS VAC: HCPCS | Performed by: FAMILY MEDICINE

## 2021-10-14 PROCEDURE — 90694 VACC AIIV4 NO PRSRV 0.5ML IM: CPT | Performed by: FAMILY MEDICINE

## 2021-11-08 RX ORDER — OLMESARTAN MEDOXOMIL AND HYDROCHLOROTHIAZIDE 20/12.5 20; 12.5 MG/1; MG/1
TABLET ORAL
Qty: 90 TABLET | Refills: 3 | Status: SHIPPED | OUTPATIENT
Start: 2021-11-08 | End: 2021-12-13 | Stop reason: ALTCHOICE

## 2021-11-17 ENCOUNTER — TELEPHONE (OUTPATIENT)
Dept: CARDIOLOGY CLINIC | Age: 86
End: 2021-11-17

## 2021-11-17 NOTE — TELEPHONE ENCOUNTER
Called patient to reschedule appt with Dr. Stewart Guan on 12/7 @ 11:30 due to provider out, Left voicemail, patient can see [address] 11/17

## 2021-11-23 RX ORDER — AMOXICILLIN 500 MG/1
500 CAPSULE ORAL 3 TIMES DAILY
Qty: 21 CAPSULE | Refills: 0 | Status: SHIPPED | OUTPATIENT
Start: 2021-11-23 | End: 2021-11-30

## 2021-12-09 ENCOUNTER — OFFICE VISIT (OUTPATIENT)
Dept: PRIMARY CARE CLINIC | Age: 86
End: 2021-12-09
Payer: MEDICARE

## 2021-12-09 VITALS
DIASTOLIC BLOOD PRESSURE: 78 MMHG | BODY MASS INDEX: 22.57 KG/M2 | TEMPERATURE: 96.8 F | HEIGHT: 63 IN | HEART RATE: 61 BPM | WEIGHT: 127.4 LBS | OXYGEN SATURATION: 97 % | RESPIRATION RATE: 18 BRPM | SYSTOLIC BLOOD PRESSURE: 124 MMHG

## 2021-12-09 DIAGNOSIS — I10 ESSENTIAL HYPERTENSION: Primary | ICD-10-CM

## 2021-12-09 DIAGNOSIS — D72.829 LEUKOCYTOSIS, UNSPECIFIED TYPE: ICD-10-CM

## 2021-12-09 DIAGNOSIS — N28.9 DECREASED RENAL FUNCTION: ICD-10-CM

## 2021-12-09 PROBLEM — M17.11 OSTEOARTHRITIS OF RIGHT KNEE: Status: ACTIVE | Noted: 2018-11-21

## 2021-12-09 LAB
ALBUMIN SERPL-MCNC: 4 G/DL (ref 3.5–5.2)
ALP BLD-CCNC: 132 U/L (ref 35–104)
ALT SERPL-CCNC: 15 U/L (ref 5–33)
ANION GAP SERPL CALCULATED.3IONS-SCNC: 10 MMOL/L (ref 7–19)
AST SERPL-CCNC: 20 U/L (ref 5–32)
BILIRUB SERPL-MCNC: 0.4 MG/DL (ref 0.2–1.2)
BUN BLDV-MCNC: 28 MG/DL (ref 8–23)
CALCIUM SERPL-MCNC: 9.5 MG/DL (ref 8.2–9.6)
CHLORIDE BLD-SCNC: 105 MMOL/L (ref 98–111)
CO2: 27 MMOL/L (ref 22–29)
CREAT SERPL-MCNC: 1.1 MG/DL (ref 0.5–0.9)
GFR AFRICAN AMERICAN: 56
GFR NON-AFRICAN AMERICAN: 46
GLUCOSE BLD-MCNC: 114 MG/DL (ref 74–109)
HCT VFR BLD CALC: 45.8 % (ref 37–47)
HEMOGLOBIN: 14.6 G/DL (ref 12–16)
MCH RBC QN AUTO: 33 PG (ref 27–31)
MCHC RBC AUTO-ENTMCNC: 31.9 G/DL (ref 33–37)
MCV RBC AUTO: 103.4 FL (ref 81–99)
PDW BLD-RTO: 13.4 % (ref 11.5–14.5)
PLATELET # BLD: 378 K/UL (ref 130–400)
PMV BLD AUTO: 10.1 FL (ref 9.4–12.3)
POTASSIUM SERPL-SCNC: 4.3 MMOL/L (ref 3.5–5)
RBC # BLD: 4.43 M/UL (ref 4.2–5.4)
SODIUM BLD-SCNC: 142 MMOL/L (ref 136–145)
TOTAL PROTEIN: 6.8 G/DL (ref 6.6–8.7)
WBC # BLD: 12.2 K/UL (ref 4.8–10.8)

## 2021-12-09 PROCEDURE — 1123F ACP DISCUSS/DSCN MKR DOCD: CPT | Performed by: FAMILY MEDICINE

## 2021-12-09 PROCEDURE — 4040F PNEUMOC VAC/ADMIN/RCVD: CPT | Performed by: FAMILY MEDICINE

## 2021-12-09 PROCEDURE — 1090F PRES/ABSN URINE INCON ASSESS: CPT | Performed by: FAMILY MEDICINE

## 2021-12-09 PROCEDURE — G8427 DOCREV CUR MEDS BY ELIG CLIN: HCPCS | Performed by: FAMILY MEDICINE

## 2021-12-09 PROCEDURE — 1036F TOBACCO NON-USER: CPT | Performed by: FAMILY MEDICINE

## 2021-12-09 PROCEDURE — 99213 OFFICE O/P EST LOW 20 MIN: CPT | Performed by: FAMILY MEDICINE

## 2021-12-09 PROCEDURE — G8484 FLU IMMUNIZE NO ADMIN: HCPCS | Performed by: FAMILY MEDICINE

## 2021-12-09 PROCEDURE — G8420 CALC BMI NORM PARAMETERS: HCPCS | Performed by: FAMILY MEDICINE

## 2021-12-12 ASSESSMENT — ENCOUNTER SYMPTOMS
RESPIRATORY NEGATIVE: 1
GASTROINTESTINAL NEGATIVE: 1

## 2021-12-13 RX ORDER — OLMESARTAN MEDOXOMIL 40 MG/1
40 TABLET ORAL DAILY
Qty: 30 TABLET | Refills: 5 | Status: SHIPPED | OUTPATIENT
Start: 2021-12-13 | End: 2022-02-21 | Stop reason: SDUPTHER

## 2021-12-13 NOTE — PROGRESS NOTES
SUBJECTIVE:    Ron Terry is 80 y. o.female who comes in complaining of 6 Month Follow-Up   . HPI: She is brought in by her daughter-in-law. She says she is doing fairly well. She is still trying to be careful but does not fall as much because she is not out working in the yard. She is still dealing with her chronic dizziness. She is seeing multiple specialist but no one has been able to help. She denies any chest pain or shortness of breath. Allergies   Allergen Reactions    Gabapentin Swelling    Morphine     Stadol [Butorphanol Tartrate]        Social History     Socioeconomic History    Marital status:      Spouse name: Not on file    Number of children: 2    Years of education: Not on file    Highest education level: Not on file   Occupational History    Occupation: Homemaker   Tobacco Use    Smoking status: Never Smoker    Smokeless tobacco: Never Used   Vaping Use    Vaping Use: Never used   Substance and Sexual Activity    Alcohol use: No    Drug use: No    Sexual activity: Not Currently   Other Topics Concern    Not on file   Social History Narrative    Not on file     Social Determinants of Health     Financial Resource Strain:     Difficulty of Paying Living Expenses: Not on file   Food Insecurity:     Worried About [address] in the Last Year: Not on file    Ryan of Food in the Last Year: Not on file   Transportation Needs:     Lack of Transportation (Medical): Not on file    Lack of Transportation (Non-Medical):  Not on file   Physical Activity:     Days of Exercise per Week: Not on file    Minutes of Exercise per Session: Not on file   Stress:     Feeling of Stress : Not on file   Social Connections:     Frequency of Communication with Friends and Family: Not on file    Frequency of Social Gatherings with Friends and Family: Not on file    Attends Yarsani Services: Not on file    Active Member of Clubs or Organizations: Not on file  Attends Club or Organization Meetings: Not on file    Marital Status: Not on file   Intimate Partner Violence:     Fear of Current or Ex-Partner: Not on file    Emotionally Abused: Not on file    Physically Abused: Not on file    Sexually Abused: Not on file   Housing Stability:     Unable to Pay for Housing in the Last Year: Not on file    Number of [place] in the Last Year: Not on file    Unstable Housing in the Last Year: Not on file       Review of Systems   Constitutional: Negative. HENT: Negative. Respiratory: Negative. Cardiovascular: Negative. Gastrointestinal: Negative. Musculoskeletal: Positive for arthralgias. Neurological: Positive for dizziness. Hematological: Bruises/bleeds easily. Psychiatric/Behavioral: Negative. Current Outpatient Medications on File Prior to Visit   Medication Sig Dispense Refill    olmesartan-hydroCHLOROthiazide (BENICAR HCT) 20-12.5 MG per tablet TAKE 1 TABLET ONCE DAILY   FOR HIGH BLOOD PRESSURE    (REPLACES LOSARTAN         HYDROCHLOROTHIAZIDE) 90 tablet 3    metoprolol tartrate (LOPRESSOR) 50 MG tablet TAKE 1 TABLET TWICE A  tablet 3    amLODIPine (NORVASC) 5 MG tablet TAKE 1 TABLET DAILY 45 tablet 3    timolol (BETIMOL) 0.5 % ophthalmic solution Apply to eye      lidocaine (LIDODERM) 5 % Place 1 patch onto the skin daily 12 hours on, 12 hours off.  30 patch 0    ketorolac (TORADOL) 10 MG tablet Take 1 tablet by mouth every 6 hours as needed for Pain 10 tablet 0    atorvastatin (LIPITOR) 10 MG tablet Take one tablet daily for cholestrol 90 tablet 3    doxepin (SINEQUAN) 50 MG capsule TAKE 1 CAPSULE AT BEDTIME  FOR CHRONIC INSOMNIA 90 capsule 3    levothyroxine (SYNTHROID) 50 MCG tablet Take 1 tablet by mouth daily For low thyroid 90 tablet 3    aspirin 81 MG tablet aspirin   daily      Probiotic Product (PROBIOTIC-10) CAPS Take by mouth once      sodium chloride (AIDA 128) 5 % ophthalmic solution Place 1 drop into both eyes 2 times daily      timolol (TIMOPTIC) 0.5 % ophthalmic solution       ZIOPTAN 0.0015 % SOLN        No current facility-administered medications on file prior to visit. OBJECTIVE:    Wt Readings from Last 3 Encounters:   12/09/21 127 lb 6.4 oz (57.8 kg)   07/02/21 130 lb (59 kg)   06/20/21 125 lb (56.7 kg)       /78   Pulse 61   Temp 96.8 °F (36 °C)   Resp 18   Ht 5' 3\" (1.6 m)   Wt 127 lb 6.4 oz (57.8 kg)   SpO2 97%   BMI 22.57 kg/m²     Physical Exam  Vitals and nursing note reviewed. Constitutional:       General: She is not in acute distress. Appearance: Normal appearance. She is well-developed. HENT:      Head: Normocephalic. Right Ear: Tympanic membrane, ear canal and external ear normal.      Left Ear: Tympanic membrane, ear canal and external ear normal.      Mouth/Throat:      Mouth: Mucous membranes are moist.      Pharynx: No posterior oropharyngeal erythema. Eyes:      Extraocular Movements: Extraocular movements intact. Conjunctiva/sclera: Conjunctivae normal.      Pupils: Pupils are equal, round, and reactive to light. Neck:      Vascular: No carotid bruit. Cardiovascular:      Rate and Rhythm: Normal rate and regular rhythm. Heart sounds: Normal heart sounds. No murmur heard. Pulmonary:      Effort: Pulmonary effort is normal. No respiratory distress. Breath sounds: Normal breath sounds. Musculoskeletal:         General: No swelling. Normal range of motion. Cervical back: Normal range of motion and neck supple. Comments: Chronic changes of osteoarthritis at knees and hands but no acute inflammatory changes   Lymphadenopathy:      Cervical: No cervical adenopathy. Skin:     General: Skin is warm and dry. Neurological:      Mental Status: She is alert and oriented to person, place, and time.    Psychiatric:         Mood and Affect: Mood normal.         Speech: Speech normal.         Behavior: Behavior normal. Thought Content: Thought content normal.         Judgment: Judgment normal.         ASSESSMENT:    1. Essential hypertension    2. Leukocytosis, unspecified type    3. Decreased renal function          PLAN:  1. Essential hypertension  -     CBC  -     Comprehensive Metabolic Panel  2. Leukocytosis, unspecified type  -     CBC  3. Decreased renal function  -     Comprehensive Metabolic Panel     Essential hypertension is well controlled. We will check a CBC and CMP. Continue Benicar HCT 20/12.5 mg 1 tab daily along with Lopressor 50 mg 1 twice daily and amlodipine 5 mg 1/2 tablet at bedtime. We will check a CBC because of her leukocytosis. We need to make sure it is stable. We will recheck a CMP because her creatinine was 1 with a GFR 52 on 6/2/2020 and we need to make sure it is stable. Continue to drink plenty of water. If her symptoms worsen she is to let me know      Follow-up:  No follow-ups on file. She has a follow-up appointment with me on June 2, 2022. PATIENT INSTRUCTIONS:  There are no Patient Instructions on file for this visit. EMR Dragon/transcription disclaimer:  Much of this encounter note is electronic transcription/translation of spoken language to printed texts. The electronic translation of spoken language may be erroneous, or at times, nonsensical words or phrases may beinadvertently transcribed.   Although I have reviewed the note for such errors, some may still exist.

## 2022-01-24 ENCOUNTER — OFFICE VISIT (OUTPATIENT)
Dept: PRIMARY CARE CLINIC | Age: 87
End: 2022-01-24
Payer: MEDICARE

## 2022-01-24 VITALS
HEIGHT: 63 IN | BODY MASS INDEX: 23.28 KG/M2 | TEMPERATURE: 96.9 F | DIASTOLIC BLOOD PRESSURE: 74 MMHG | RESPIRATION RATE: 18 BRPM | SYSTOLIC BLOOD PRESSURE: 122 MMHG | WEIGHT: 131.4 LBS | OXYGEN SATURATION: 97 % | HEART RATE: 65 BPM

## 2022-01-24 DIAGNOSIS — R29.6 FALLS FREQUENTLY: ICD-10-CM

## 2022-01-24 DIAGNOSIS — I10 ESSENTIAL HYPERTENSION: Primary | ICD-10-CM

## 2022-01-24 DIAGNOSIS — I50.31 ACUTE DIASTOLIC CONGESTIVE HEART FAILURE (HCC): ICD-10-CM

## 2022-01-24 DIAGNOSIS — I48.0 PAF (PAROXYSMAL ATRIAL FIBRILLATION) (HCC): ICD-10-CM

## 2022-01-24 LAB
ANION GAP SERPL CALCULATED.3IONS-SCNC: 15 MMOL/L (ref 7–19)
BUN BLDV-MCNC: 17 MG/DL (ref 8–23)
CALCIUM SERPL-MCNC: 9.4 MG/DL (ref 8.2–9.6)
CHLORIDE BLD-SCNC: 104 MMOL/L (ref 98–111)
CO2: 25 MMOL/L (ref 22–29)
CREAT SERPL-MCNC: 0.9 MG/DL (ref 0.5–0.9)
GFR AFRICAN AMERICAN: >59
GFR NON-AFRICAN AMERICAN: 58
GLUCOSE BLD-MCNC: 95 MG/DL (ref 74–109)
POTASSIUM SERPL-SCNC: 4.3 MMOL/L (ref 3.5–5)
SODIUM BLD-SCNC: 144 MMOL/L (ref 136–145)

## 2022-01-24 PROCEDURE — 1123F ACP DISCUSS/DSCN MKR DOCD: CPT | Performed by: FAMILY MEDICINE

## 2022-01-24 PROCEDURE — 4040F PNEUMOC VAC/ADMIN/RCVD: CPT | Performed by: FAMILY MEDICINE

## 2022-01-24 PROCEDURE — G8420 CALC BMI NORM PARAMETERS: HCPCS | Performed by: FAMILY MEDICINE

## 2022-01-24 PROCEDURE — G8484 FLU IMMUNIZE NO ADMIN: HCPCS | Performed by: FAMILY MEDICINE

## 2022-01-24 PROCEDURE — 1036F TOBACCO NON-USER: CPT | Performed by: FAMILY MEDICINE

## 2022-01-24 PROCEDURE — 99214 OFFICE O/P EST MOD 30 MIN: CPT | Performed by: FAMILY MEDICINE

## 2022-01-24 PROCEDURE — G8427 DOCREV CUR MEDS BY ELIG CLIN: HCPCS | Performed by: FAMILY MEDICINE

## 2022-01-24 PROCEDURE — 1090F PRES/ABSN URINE INCON ASSESS: CPT | Performed by: FAMILY MEDICINE

## 2022-01-24 RX ORDER — CLONIDINE HYDROCHLORIDE 0.1 MG/1
TABLET ORAL
Qty: 30 TABLET | Refills: 3 | Status: SHIPPED | OUTPATIENT
Start: 2022-01-24 | End: 2022-05-06

## 2022-01-24 NOTE — PROGRESS NOTES
SUBJECTIVE:    Otis See is 80 y. o.female who comes in complaining of Follow-up   . HPI: Mrs. Valarie Orozco comes in today for follow-up with her daughter-in-law because of her very labile essential hypertension. She evidently has noticed that it runs high in the afternoon so she has been taking another half of amlodipine but then it will run low or normal in the morning. She is still dealing with her chronic dizziness. She is seen multiple specialists and none of them have been able to help. She denies chest pain or shortness of breath. Occasionally she will have a headache when her blood pressure is high. Because of her dizziness she continues to fall. She fell about a week ago and landed on her right back. It is quite sore and bruised. She did not hit her head. She said that she just was not able to reach her walker. She also has a history of acute diastolic congestive heart failure which actually has not been a problem lately. Allergies   Allergen Reactions    Gabapentin Swelling    Morphine     Stadol [Butorphanol Tartrate]        Social History     Socioeconomic History    Marital status:      Spouse name: None    Number of children: 2    Years of education: None    Highest education level: None   Occupational History    Occupation: Homemaker   Tobacco Use    Smoking status: Never Smoker    Smokeless tobacco: Never Used   Vaping Use    Vaping Use: Never used   Substance and Sexual Activity    Alcohol use: No    Drug use: No    Sexual activity: Not Currently   Other Topics Concern    None   Social History Narrative    None     Social Determinants of Health     Financial Resource Strain:     Difficulty of Paying Living Expenses: Not on file   Food Insecurity:     Worried About Running Out of Food in the Last Year: Not on file    Ryan of Food in the Last Year: Not on file   Transportation Needs:     Lack of Transportation (Medical):  Not on file    Lack of Transportation (Non-Medical): Not on file   Physical Activity:     Days of Exercise per Week: Not on file    Minutes of Exercise per Session: Not on file   Stress:     Feeling of Stress : Not on file   Social Connections:     Frequency of Communication with Friends and Family: Not on file    Frequency of Social Gatherings with Friends and Family: Not on file    Attends Baptism Services: Not on file    Active Member of [address] [place] [address] or Organizations: Not on file    Attends Club or Organization Meetings: Not on file    Marital Status: Not on file   Intimate Partner Violence:     Fear of Current or Ex-Partner: Not on file    Emotionally Abused: Not on file    Physically Abused: Not on file    Sexually Abused: Not on file   Housing Stability:     Unable to Pay for Housing in the Last Year: Not on file    Number of [place] in the Last Year: Not on file    Unstable Housing in the Last Year: Not on file       Review of Systems   Constitutional: Positive for activity change. HENT: Negative. Respiratory: Negative. Cardiovascular: Negative. Musculoskeletal: Positive for arthralgias. Neurological: Positive for dizziness and headaches. Hematological: Negative. Psychiatric/Behavioral: Negative. Current Outpatient Medications on File Prior to Visit   Medication Sig Dispense Refill    olmesartan (BENICAR) 40 MG tablet Take 1 tablet by mouth daily For high blood pressure 30 tablet 5    metoprolol tartrate (LOPRESSOR) 50 MG tablet TAKE 1 TABLET TWICE A DAY (Patient taking differently: 1 and half twice daily) 180 tablet 3    amLODIPine (NORVASC) 5 MG tablet TAKE 1 TABLET DAILY (Patient taking differently: 1/2 at noon and 1/2 at night) 45 tablet 3    timolol (BETIMOL) 0.5 % ophthalmic solution Apply to eye      lidocaine (LIDODERM) 5 % Place 1 patch onto the skin daily 12 hours on, 12 hours off.  30 patch 0    ketorolac (TORADOL) 10 MG tablet Take 1 tablet by mouth every 6 hours as needed for Pain 10 tablet 0    atorvastatin (LIPITOR) 10 MG tablet Take one tablet daily for cholestrol 90 tablet 3    doxepin (SINEQUAN) 50 MG capsule TAKE 1 CAPSULE AT BEDTIME  FOR CHRONIC INSOMNIA 90 capsule 3    levothyroxine (SYNTHROID) 50 MCG tablet Take 1 tablet by mouth daily For low thyroid 90 tablet 3    aspirin 81 MG tablet aspirin   daily      Probiotic Product (PROBIOTIC-10) CAPS Take by mouth once      sodium chloride (AIDA 128) 5 % ophthalmic solution Place 1 drop into both eyes 2 times daily      timolol (TIMOPTIC) 0.5 % ophthalmic solution       ZIOPTAN 0.0015 % SOLN        No current facility-administered medications on file prior to visit. OBJECTIVE:    Wt Readings from Last 3 Encounters:   01/24/22 131 lb 6.4 oz (59.6 kg)   12/09/21 127 lb 6.4 oz (57.8 kg)   07/02/21 130 lb (59 kg)       /74   Pulse 65   Temp 96.9 °F (36.1 °C)   Resp 18   Ht 5' 3\" (1.6 m)   Wt 131 lb 6.4 oz (59.6 kg)   SpO2 97%   BMI 23.28 kg/m²     Physical Exam  Vitals and nursing note reviewed. Constitutional:       General: She is not in acute distress. Appearance: Normal appearance. She is well-developed. She is not ill-appearing. HENT:      Head: Normocephalic. Right Ear: Tympanic membrane, ear canal and external ear normal.      Left Ear: Tympanic membrane, ear canal and external ear normal.   Eyes:      Extraocular Movements: Extraocular movements intact. Conjunctiva/sclera: Conjunctivae normal.      Pupils: Pupils are equal, round, and reactive to light. Neck:      Vascular: No carotid bruit. Cardiovascular:      Rate and Rhythm: Normal rate and regular rhythm. Pulses: Normal pulses. Heart sounds: Normal heart sounds. No murmur heard. Pulmonary:      Effort: Pulmonary effort is normal. No respiratory distress. Breath sounds: Normal breath sounds. Musculoskeletal:         General: Normal range of motion.       Cervical back: Normal range of motion and neck supple. Right lower leg: No edema. Left lower leg: No edema. Lymphadenopathy:      Cervical: No cervical adenopathy. Skin:     General: Skin is warm and dry. Neurological:      Mental Status: She is alert and oriented to person, place, and time. Psychiatric:         Mood and Affect: Mood normal.         Speech: Speech normal.         Behavior: Behavior normal.         Thought Content: Thought content normal.         Judgment: Judgment normal.         ASSESSMENT:    1. Essential hypertension    2. Acute diastolic congestive heart failure ([facility] [address]    3. Falls frequently    4. PAF (paroxysmal atrial fibrillation) ([facility] [address]          PLAN:  1. Essential hypertension  -     Basic Metabolic Panel  2. Acute diastolic congestive heart failure [facility])  Assessment & Plan: This problem is well-controlled -- continue current medications -- this is managed by [facility]  3. Falls frequently  4. PAF (paroxysmal atrial fibrillation) [facility])  Assessment & Plan:  She is followed by [facility]. Continue to see specialist.      Her blood pressure is still very labile. We are going to have her take her amlodipine at half tablet at noon and 1/2 tablet at night. She will continue her other blood pressure medications. I am going to give her clonidine 0.1 mg 1 tablet by mouth once a day if needed for blood pressure greater than 160/100. I do not think her dizziness is related to her labile hypertension. We will check a BMP to make sure her kidney function is good. She will continue to follow-up with cardiology. She will continue to use her walker every time she tries to walk. She did not want physical therapy to come out at this time. Follow-up:  Return in about 4 weeks (around 2/21/2022) for Recheck blood pressure with me. PATIENT INSTRUCTIONS:  Patient Instructions   Continue your regular medications but take the amlodipine 5 mg 1/2 tablet at noon and 1/2 tablet at bedtime.   Take this regularly with your other medicines      I am giving you a new medicine called clonidine 0.1 mg.  You were going to take 1 tablet of it if your blood pressure is greater then 160/100. When you take it, lie down and then recheck your blood pressure in 30 minutes. We are committed to providing you with the best care possible. In order to help us achieve these goals please remember to bring all medications, herbal products, and over the counter supplements with you to each visit. If your provider has ordered testing for you, please be sure to follow up with our office if you have not received results within 7 days after the testing took place. *If you receive a survey after visiting one of our offices, please take time to share your experience concerning your physician office visit. These surveys are confidential and no health information about you is shared. We are eager to improve for you and we are counting on your feedback to help make that happen. EMR Dragon/transcription disclaimer:  Much of this encounter note is electronic transcription/translation of spoken language to printed texts. The electronic translation of spoken language may be erroneous, or at times, nonsensical words or phrases may beinadvertently transcribed.   Although I have reviewed the note for such errors, some may still exist.

## 2022-01-25 ASSESSMENT — ENCOUNTER SYMPTOMS: RESPIRATORY NEGATIVE: 1

## 2022-01-26 NOTE — ASSESSMENT & PLAN NOTE
This problem is well-controlled -- continue current medications -- this is managed by [address] [place] [address]

## 2022-02-21 ENCOUNTER — OFFICE VISIT (OUTPATIENT)
Dept: PRIMARY CARE CLINIC | Age: 87
End: 2022-02-21
Payer: MEDICARE

## 2022-02-21 VITALS
WEIGHT: 127.4 LBS | DIASTOLIC BLOOD PRESSURE: 80 MMHG | SYSTOLIC BLOOD PRESSURE: 136 MMHG | TEMPERATURE: 96.7 F | HEART RATE: 70 BPM | BODY MASS INDEX: 22.57 KG/M2 | HEIGHT: 63 IN | OXYGEN SATURATION: 97 % | RESPIRATION RATE: 18 BRPM

## 2022-02-21 DIAGNOSIS — I10 ESSENTIAL HYPERTENSION: Primary | ICD-10-CM

## 2022-02-21 DIAGNOSIS — R42 DIZZINESS: ICD-10-CM

## 2022-02-21 DIAGNOSIS — M54.6 CHRONIC RIGHT-SIDED THORACIC BACK PAIN: ICD-10-CM

## 2022-02-21 DIAGNOSIS — R29.6 FALLS FREQUENTLY: ICD-10-CM

## 2022-02-21 DIAGNOSIS — G89.29 CHRONIC RIGHT-SIDED THORACIC BACK PAIN: ICD-10-CM

## 2022-02-21 PROCEDURE — G8427 DOCREV CUR MEDS BY ELIG CLIN: HCPCS | Performed by: FAMILY MEDICINE

## 2022-02-21 PROCEDURE — 1036F TOBACCO NON-USER: CPT | Performed by: FAMILY MEDICINE

## 2022-02-21 PROCEDURE — G8420 CALC BMI NORM PARAMETERS: HCPCS | Performed by: FAMILY MEDICINE

## 2022-02-21 PROCEDURE — 99213 OFFICE O/P EST LOW 20 MIN: CPT | Performed by: FAMILY MEDICINE

## 2022-02-21 PROCEDURE — 4040F PNEUMOC VAC/ADMIN/RCVD: CPT | Performed by: FAMILY MEDICINE

## 2022-02-21 PROCEDURE — G8484 FLU IMMUNIZE NO ADMIN: HCPCS | Performed by: FAMILY MEDICINE

## 2022-02-21 PROCEDURE — 1090F PRES/ABSN URINE INCON ASSESS: CPT | Performed by: FAMILY MEDICINE

## 2022-02-21 PROCEDURE — 1123F ACP DISCUSS/DSCN MKR DOCD: CPT | Performed by: FAMILY MEDICINE

## 2022-02-21 RX ORDER — LIDOCAINE 50 MG/G
1 PATCH TOPICAL DAILY
Qty: 10 PATCH | Refills: 1 | Status: SHIPPED | OUTPATIENT
Start: 2022-02-21 | End: 2022-03-03

## 2022-02-21 RX ORDER — OLMESARTAN MEDOXOMIL 40 MG/1
40 TABLET ORAL DAILY
Qty: 90 TABLET | Refills: 3 | Status: SHIPPED | OUTPATIENT
Start: 2022-02-21 | End: 2022-03-01 | Stop reason: SDUPTHER

## 2022-02-21 NOTE — PATIENT INSTRUCTIONS
Continue the olmesartan 40 mg 1 tablet once a day in the morning. Move the amlodipine 5 mg to 1 tablet in the morning. Continue the metoprolol tartrate 50 mg 1and 1/2 tablet in the morning and 1 and 1/2 tablet in the evening      Continue to use the clonidine 0.1 mg if blood pressures greater than 160/100. Call me in 2 weeks and let me know how that is working.

## 2022-02-22 ASSESSMENT — ENCOUNTER SYMPTOMS
BACK PAIN: 1
RESPIRATORY NEGATIVE: 1

## 2022-02-23 NOTE — PROGRESS NOTES
SUBJECTIVE:    Francisca Hawkins is 80 y. o.female who comes in complaining of 1 Month Follow-Up (B/P)   . HPI: Mrs. Janna Ducnan comes in today for follow-up of her blood pressure. She says that she has had to take the clonidine twice. It has been as high as 159/100. This morning it was 116/50. It seems to be getting higher as the day goes on. She did fall on her back on her sidewalk. She did not hit her head. She has [employer] but it did not fire. She is currently taking olmesartan 40 mg in the morning and metoprolol tartrate 50 mg 1-1/2 in the morning and 1-1/2 in the evening. She uses the clonidine 0.1 mg when blood pressure is greater than 150/95. She says if it gets high then she starts to feel dizzy again. She is only had the one fall. Her back is still hurting and she wants to know what to do with it. She denies cough or hemoptysis. No increased shortness of breath. Allergies   Allergen Reactions    Gabapentin Swelling    Morphine     Stadol [Butorphanol Tartrate]        Social History     Socioeconomic History    Marital status:      Spouse name: Not on file    Number of children: 2    Years of education: Not on file    Highest education level: Not on file   Occupational History    Occupation: Homemaker   Tobacco Use    Smoking status: Never Smoker    Smokeless tobacco: Never Used   Vaping Use    Vaping Use: Never used   Substance and Sexual Activity    Alcohol use: No    Drug use: No    Sexual activity: Not Currently   Other Topics Concern    Not on file   Social History Narrative    Not on file     Social Determinants of Health     Financial Resource Strain:     Difficulty of Paying Living Expenses: Not on file   Food Insecurity:     Worried About [address] in the Last Year: Not on file    Ryan of Food in the Last Year: Not on file   Transportation Needs:     Lack of Transportation (Medical): Not on file    Lack of Transportation (Non-Medical):  Not on file   Physical Activity:     Days of Exercise per Week: Not on file    Minutes of Exercise per Session: Not on file   Stress:     Feeling of Stress : Not on file   Social Connections:     Frequency of Communication with Friends and Family: Not on file    Frequency of Social Gatherings with Friends and Family: Not on file    Attends [employer] Services: Not on file    Active Member of [address] [place] [address] or Organizations: Not on file    Attends Club or Organization Meetings: Not on file    Marital Status: Not on file   Intimate Partner Violence:     Fear of Current or Ex-Partner: Not on file    Emotionally Abused: Not on file    Physically Abused: Not on file    Sexually Abused: Not on file   Housing Stability:     Unable to Pay for Housing in the Last Year: Not on file    Number of [place] in the Last Year: Not on file    Unstable Housing in the Last Year: Not on file       Review of Systems   Constitutional: Positive for activity change. Respiratory: Negative. Cardiovascular: Negative. Musculoskeletal: Positive for arthralgias and back pain. Neurological: Positive for dizziness. Hematological: Bruises/bleeds easily. Psychiatric/Behavioral: Negative.           Current Outpatient Medications on File Prior to Visit   Medication Sig Dispense Refill    cloNIDine (CATAPRES) 0.1 MG tablet 1 tablet once a day by mouth if needed for blood pressure greater than 160/100 30 tablet 3    metoprolol tartrate (LOPRESSOR) 50 MG tablet TAKE 1 TABLET TWICE A DAY (Patient taking differently: 1 and half twice daily) 180 tablet 3    amLODIPine (NORVASC) 5 MG tablet TAKE 1 TABLET DAILY (Patient taking differently: 1/2 at noon and 1/2 at night) 45 tablet 3    timolol (BETIMOL) 0.5 % ophthalmic solution Apply to eye      ketorolac (TORADOL) 10 MG tablet Take 1 tablet by mouth every 6 hours as needed for Pain 10 tablet 0    atorvastatin (LIPITOR) 10 MG tablet Take one tablet daily for cholestrol 90 tablet 3    doxepin (SINEQUAN) 50 MG capsule TAKE 1 CAPSULE AT BEDTIME  FOR CHRONIC INSOMNIA 90 capsule 3    levothyroxine (SYNTHROID) 50 MCG tablet Take 1 tablet by mouth daily For low thyroid 90 tablet 3    aspirin 81 MG tablet aspirin   daily      Probiotic Product (PROBIOTIC-10) CAPS Take by mouth once      sodium chloride (AIDA 128) 5 % ophthalmic solution Place 1 drop into both eyes 2 times daily      timolol (TIMOPTIC) 0.5 % ophthalmic solution       ZIOPTAN 0.0015 % SOLN        No current facility-administered medications on file prior to visit. OBJECTIVE:    Wt Readings from Last 3 Encounters:   02/21/22 127 lb 6.4 oz (57.8 kg)   01/24/22 131 lb 6.4 oz (59.6 kg)   12/09/21 127 lb 6.4 oz (57.8 kg)       /80   Pulse 70   Temp 96.7 °F (35.9 °C)   Resp 18   Ht 5' 3\" (1.6 m)   Wt 127 lb 6.4 oz (57.8 kg)   SpO2 97%   BMI 22.57 kg/m²     Physical Exam  Vitals and nursing note reviewed. Constitutional:       General: She is not in acute distress. Appearance: Normal appearance. She is well-developed. HENT:      Head: Normocephalic. Right Ear: Tympanic membrane, ear canal and external ear normal.      Left Ear: Tympanic membrane, ear canal and external ear normal.      Nose: Nose normal. No rhinorrhea. Mouth/Throat:      Mouth: Mucous membranes are moist.      Pharynx: No posterior oropharyngeal erythema. Eyes:      Extraocular Movements: Extraocular movements intact. Conjunctiva/sclera: Conjunctivae normal.      Pupils: Pupils are equal, round, and reactive to light. Neck:      Vascular: No carotid bruit. Cardiovascular:      Rate and Rhythm: Normal rate and regular rhythm. Heart sounds: Normal heart sounds. No murmur heard. Pulmonary:      Effort: Pulmonary effort is normal. No respiratory distress. Breath sounds: Normal breath sounds. Musculoskeletal:         General: Normal range of motion. Cervical back: Normal range of motion and neck supple. Comments: Chronic changes of osteoarthritis in hands. No bruising over back. Musculoskeletal area slightly tender on the right thoracic area but no bruising   Lymphadenopathy:      Cervical: No cervical adenopathy. Skin:     General: Skin is warm and dry. Neurological:      Mental Status: She is alert and oriented to person, place, and time. Psychiatric:         Mood and Affect: Mood normal.         Speech: Speech normal.         Behavior: Behavior normal.         Thought Content: Thought content normal.         Judgment: Judgment normal.         ASSESSMENT:    1. Essential hypertension    2. Falls frequently    3. Dizziness    4. Chronic right-sided thoracic back pain          PLAN:  1. Essential hypertension  2. Falls frequently  3. Dizziness  4. Chronic right-sided thoracic back pain     Since blood pressure seems to be worse in the evening we are going to move the amlodipine 5 mg to 1 tablet in the morning and she will continue her olmesartan 40 mg in the morning. She will continue to take the Toprol-XL 50 mg as she is doing now. Continue to check blood pressure once or twice a day. [facility]. She is going to try a Lidoderm patch on her back but if it is too expensive she can get over-the-counter Salonpas. She is to call if things worsen  Follow-up:  Return if symptoms worsen or fail to improve, for She has an appointment in June. PATIENT INSTRUCTIONS:  Patient Instructions   Continue the olmesartan 40 mg 1 tablet once a day in the morning. Move the amlodipine 5 mg to 1 tablet in the morning. Continue the metoprolol tartrate 50 mg 1and 1/2 tablet in the morning and 1 and 1/2 tablet in the evening      Continue to use the clonidine 0.1 mg if blood pressures greater than 160/100. Call me in 2 weeks and let me know how that is working.       EMR Dragon/transcription disclaimer:  Much of this encounter note is electronic transcription/translation of spoken language to printed texts. The electronic translation of spoken language may be erroneous, or at times, nonsensical words or phrases may beinadvertently transcribed.   Although I have reviewed the note for such errors, some may still exist.

## 2022-03-01 DIAGNOSIS — I10 ESSENTIAL HYPERTENSION: ICD-10-CM

## 2022-03-01 RX ORDER — OLMESARTAN MEDOXOMIL 40 MG/1
40 TABLET ORAL DAILY
Qty: 90 TABLET | Refills: 3 | Status: SHIPPED | OUTPATIENT
Start: 2022-03-01 | End: 2022-03-14 | Stop reason: ALTCHOICE

## 2022-03-01 RX ORDER — METOPROLOL TARTRATE 50 MG/1
TABLET, FILM COATED ORAL
Qty: 180 TABLET | Refills: 3 | Status: SHIPPED | OUTPATIENT
Start: 2022-03-01 | End: 2022-03-01 | Stop reason: DRUGHIGH

## 2022-03-01 RX ORDER — METOPROLOL TARTRATE 50 MG/1
TABLET, FILM COATED ORAL
Qty: 270 TABLET | Refills: 1 | Status: SHIPPED | OUTPATIENT
Start: 2022-03-01 | End: 2022-03-21 | Stop reason: SDUPTHER

## 2022-03-01 RX ORDER — AMLODIPINE BESYLATE 5 MG/1
TABLET ORAL
Qty: 45 TABLET | Refills: 3 | Status: SHIPPED | OUTPATIENT
Start: 2022-03-01 | End: 2022-04-11

## 2022-03-14 RX ORDER — VALSARTAN 80 MG/1
80 TABLET ORAL DAILY
Qty: 30 TABLET | Refills: 1 | Status: SHIPPED | OUTPATIENT
Start: 2022-03-14 | End: 2022-04-04 | Stop reason: SDUPTHER

## 2022-03-21 RX ORDER — METOPROLOL TARTRATE 50 MG/1
TABLET, FILM COATED ORAL
Qty: 270 TABLET | Refills: 1 | Status: SHIPPED | OUTPATIENT
Start: 2022-03-21 | End: 2022-10-06

## 2022-03-28 NOTE — TELEPHONE ENCOUNTER
Patient called back with blood pressure readings. She said from Thursday until it has been been running between 133/75 to 126/75. She said that one time her bottom number was 79. show

## 2022-04-04 RX ORDER — VALSARTAN 80 MG/1
80 TABLET ORAL DAILY
Qty: 90 TABLET | Refills: 3 | Status: SHIPPED | OUTPATIENT
Start: 2022-04-04 | End: 2022-05-02 | Stop reason: SDUPTHER

## 2022-04-09 DIAGNOSIS — I10 ESSENTIAL HYPERTENSION: ICD-10-CM

## 2022-04-11 RX ORDER — AMLODIPINE BESYLATE 5 MG/1
TABLET ORAL
Qty: 45 TABLET | Refills: 3 | Status: SHIPPED | OUTPATIENT
Start: 2022-04-11 | End: 2022-05-02 | Stop reason: SDUPTHER

## 2022-05-02 DIAGNOSIS — I10 ESSENTIAL HYPERTENSION: ICD-10-CM

## 2022-05-02 RX ORDER — VALSARTAN 80 MG/1
80 TABLET ORAL DAILY
Qty: 90 TABLET | Refills: 3 | Status: SHIPPED | OUTPATIENT
Start: 2022-05-02 | End: 2022-05-06

## 2022-05-02 RX ORDER — AMLODIPINE BESYLATE 5 MG/1
TABLET ORAL
Qty: 90 TABLET | Refills: 3 | Status: SHIPPED | OUTPATIENT
Start: 2022-05-02

## 2022-05-06 RX ORDER — VALSARTAN 80 MG/1
TABLET ORAL
Qty: 30 TABLET | Refills: 0 | Status: SHIPPED | OUTPATIENT
Start: 2022-05-06

## 2022-05-06 RX ORDER — CLONIDINE HYDROCHLORIDE 0.1 MG/1
TABLET ORAL
Qty: 30 TABLET | Refills: 0 | Status: SHIPPED | OUTPATIENT
Start: 2022-05-06

## 2022-05-27 ENCOUNTER — CARE COORDINATION (OUTPATIENT)
Dept: CARE COORDINATION | Age: 87
End: 2022-05-27

## 2022-05-27 NOTE — CARE COORDINATION
Ambulatory Care Coordination Note  5/27/2022  CM Risk Score: 1  Charlson 10 Year Mortality Risk Score: 98%     ACC: Shannan Morgan, RN    Summary Note: ACM spoke to patient's daughter-in-law, Lou Mason. ACM introduced my self and role in support of patients with chronic illness or other persistent health related concerns. Christelle Sanders agreed to speak with ACM today. Christelle Sanders stated that patient has recently lost vision of her rt eye. Patient's family thinks this is due to stroke 2-3 months ago. Patient saw Dr. Leobardo Huff and has had f/u with him due to her vision loss. She is using eye drops as prescribed but vision remains compromised. Pt lives alone but family visit her several times per week and talk to pt several times per day. Patient is very independent and reluctant to agree to help in her home beyond current family support. Family is concerned about her wellbeing and safety due to increased falls and witnessed neurological symptoms, possibly TIA episodes. 2 transient episodes that Christelle Sanders is aware of:  1. 30 min period one day when pt could not say the words she wanted. Christelle Sanders observed this event. Patient did not appear to have hemiparesis on assessment by Christelle Sanders. Event occurred either Mother's Day or patient's birthday in April. 2. 2nd episode - pt told Christelle Sanders she almost passed out. Event was not witnessed by anyone else. This was sometime during the first week of May. Luis Roman has increased balance problems with her vision deficit. She has fallen frequently but no serious injury. Pt uses a walker at home only. She also has a cane and sometimes will not use either device when out. Patient gets up at night for urination. She wears a liner/pad due to urge incontinence. Luis Roman has cut back on cooking to just a few times a week. She has occasionally had Boost or Ensure in the past.  She is taking her medicine well and does this on her own.   Pt has a fall alert system but is reluctant to use it.  Actions:  Discussed help in the home through local caregiver agencies. Anne Sarah stated pt might be open to that if able to accept this as an alternative to having to move out of her home. ACM did advise that paid caregiver or  assistance is not paid for by Medicare - patient's must have a supplemental skilled care policy that covers in home services. ACM will notify PCP of changes/concerns as above - patient may benefit from home health services due to falls and changes in her vision. Will offer caregiver agency information to family for reference if patient decides to consider it. Ambulatory Care Coordination Assessment    Care Coordination Protocol  Referral from Primary Care Provider: No  Week 1 - Initial Assessment     Do you have all of your prescriptions and are they filled?: Yes  Barriers to medication adherence: None  Are you able to afford your medications?: Yes  How often do you have trouble taking your medications the way you have been told to take them?: I always take them as prescribed. Do you have Home O2 Therapy?: No      Ability to seek help/take action for Emergent Urgent situations i.e. fire, crime, inclement weather or health crisis. : Independent  Ability to ambulate to restroom: Independent  Ability handle personal hygeine needs (bathing/dressing/grooming): Independent  Ability to manage Medications: Independent  Ability to prepare Food Preparation: Needs Assistance  Ability to maintain home (clean home, laundry): Independent  Ability to drive and/or has transportation: Dependent  Ability to do shopping: Needs Assistance  Ability to manage finances: Independent  Is patient able to live independently?: Yes     Current Housing: Private Residence        Per the Fall Risk Screening, did the patient have 2 or more falls or 1 fall with injury in the past year?: Yes  How often do you think you are about to fall and you do NOT fall?  For example, you grab something to stabilize yourself or hold onto a wall/furniture?: Occasionally  Use of a Mobility Aid: Yes  Difficulty walking/impaired gait: Yes  Issues with feet or shoes like numbness, edema, shoes not fitting: No  Changes in vision, poor vision or poor lighting in environment: Yes  Other Fall Risk: Yes  What other fall risks does the patient have?: resistance to using ambulatory aid devices     Frequent urination at night?: Yes     Are you experiencing loss of meaning?: No  Are you experiencing loss of hope and peace?: No     Thinking about your patient's physical health needs, are there any symptoms or problems (risk indicators) you are unsure about that require further investigation?: Moderate to severe symptoms or problems that impact on daily life   Are the patients physical health problems impacting on their mental well-being?: Mild impact on mental well-being e.g. \"\"feeling fed-up\"\", \"\"reduced enjoyment\"\"   Are there any problems with your patients lifestyle behaviors (alcohol, drugs, diet, exercise) that are impacting on physical or mental well-being?: Some mild concern of potential negative impact on well-being   Do you have any other concerns about your patients mental well-being? How would you rate their severity and impact on the patient?: Mild problems - don't interfere with function   How would you rate their home environment in terms of safety and stability (including domestic violence, insecure housing, neighbor harassment)?: Consistently safe, supportive, stable, no identified problems   How do daily activities impact on the patient's well-being? (include current or anticipated unemployment, work, caregiving, access to transportation or other):  Contributes to low mood or stress at times   How would you rate their social network (family, work, friends)?: Adequate participation with social networks   How would you rate their financial resources (including ability to afford all required medical care)?: [employer] secure, resources adequate, no identified problems   How wells does the patient now understand their health and well-being (symptoms, signs or risk factors) and what they need to do to manage their health?: Reasonable to good understanding but do not feel able to engage with advice at this time   How well do you think your patient can engage in healthcare discussions? (Barriers include language, deafness, aphasia, alcohol or drug problems, learning difficulties, concentration): Adequate communication, with or without minor barriers   Do other services need to be involved to help this patient?: Other care/services not in place and required   Are current services involved with this patient well-coordinated? (Include coordination with other services you are now recommendation): Required care/services in place and adequately coordinated   Suggested Interventions and Community Resources   Physical Therapy: Not Started. Will ask PCP to consider if indicated after evaluation at appt next week. Transportation Services: Completed. Family transports pt   Zone Management Tools: In Process. Support for improving home safety and offering resources due to concerns for pt living alone with some physical decline. Set up/Review Goals, Set up/Review an Education Plan, Schedule an appointment with the patient's PCP              Prior to Admission medications    Medication Sig Start Date End Date Taking?  Authorizing Provider   valsartan (DIOVAN) 80 MG tablet TAKE ONE TABLET BY MOUTH ONCE EVERY DAY FOR BLOOD PRESSURE**STOP OLMESARTAN** 5/6/22  Yes Alice Calixto MD   amLODIPine (NORVASC) 5 MG tablet TAKE 1 TABLET DAILY 5/2/22  Yes Alice Calixto MD   metoprolol tartrate (LOPRESSOR) 50 MG tablet One and 1/2 tab po BID for blood pressure 3/21/22  Yes Alice Calixto MD   timolol (BETIMOL) 0.5 % ophthalmic solution Apply to eye   Yes Historical Provider, MD   ketorolac (TORADOL) 10 MG tablet Take 1 tablet by mouth every 6 hours as needed for Pain 9/14/21 9/14/22 Yes May Wylie, APRN - CNP   atorvastatin (LIPITOR) 10 MG tablet Take one tablet daily for cholestrol 8/10/21  Yes Darrick Sandifer, MD   doxepin (SINEQUAN) 50 MG capsule TAKE 1 CAPSULE AT BEDTIME  FOR CHRONIC INSOMNIA 7/6/21  Yes Darrick Sandifer, MD   levothyroxine (SYNTHROID) 50 MCG tablet Take 1 tablet by mouth daily For low thyroid 6/13/21  Yes Darrick Sandifer, MD   aspirin 81 MG tablet aspirin   daily   Yes Historical Provider, MD   Probiotic Product (PROBIOTIC-10) CAPS Take by mouth once   Yes Historical Provider, MD   sodium chloride (AIDA 128) 5 % ophthalmic solution Place 1 drop into both eyes 2 times daily   Yes Historical Provider, MD   timolol (TIMOPTIC) 0.5 % ophthalmic solution  2/3/16  Yes Historical Provider, MD   ZIOPTAN 0.0015 % SOLN  8/17/14  Yes Historical Provider, MD   cloNIDine (CATAPRES) 0.1 MG tablet TAKE ONE TABLET BY MOUTH ONCE DAILY IF NEEDED FOR BLOOD PRESSURE GREATER THAN 160/100  Patient not taking: Reported on 5/27/2022 5/6/22   Darrick Sandifer, MD       Future Appointments   Date Time Provider Stephan Grace   6/2/2022 11:00 AM [facility]

## 2022-06-02 ENCOUNTER — OFFICE VISIT (OUTPATIENT)
Dept: PRIMARY CARE CLINIC | Age: 87
End: 2022-06-02
Payer: MEDICARE

## 2022-06-02 VITALS
DIASTOLIC BLOOD PRESSURE: 72 MMHG | OXYGEN SATURATION: 99 % | TEMPERATURE: 97 F | HEART RATE: 55 BPM | RESPIRATION RATE: 18 BRPM | SYSTOLIC BLOOD PRESSURE: 122 MMHG | HEIGHT: 63 IN | WEIGHT: 124.6 LBS | BODY MASS INDEX: 22.08 KG/M2

## 2022-06-02 DIAGNOSIS — N28.9 DECREASED RENAL FUNCTION: ICD-10-CM

## 2022-06-02 DIAGNOSIS — D72.829 LEUKOCYTOSIS, UNSPECIFIED TYPE: ICD-10-CM

## 2022-06-02 DIAGNOSIS — M17.0 PRIMARY OSTEOARTHRITIS OF BOTH KNEES: ICD-10-CM

## 2022-06-02 DIAGNOSIS — G45.9 TIA (TRANSIENT ISCHEMIC ATTACK): ICD-10-CM

## 2022-06-02 DIAGNOSIS — R29.6 FALLS FREQUENTLY: ICD-10-CM

## 2022-06-02 DIAGNOSIS — I10 ESSENTIAL HYPERTENSION: Primary | ICD-10-CM

## 2022-06-02 LAB
ALBUMIN SERPL-MCNC: 4.2 G/DL (ref 3.5–5.2)
ALP BLD-CCNC: 125 U/L (ref 35–104)
ALT SERPL-CCNC: 14 U/L (ref 5–33)
ANION GAP SERPL CALCULATED.3IONS-SCNC: 18 MMOL/L (ref 7–19)
AST SERPL-CCNC: 17 U/L (ref 5–32)
BILIRUB SERPL-MCNC: 0.6 MG/DL (ref 0.2–1.2)
BUN BLDV-MCNC: 24 MG/DL (ref 8–23)
CALCIUM SERPL-MCNC: 10.1 MG/DL (ref 8.2–9.6)
CHLORIDE BLD-SCNC: 106 MMOL/L (ref 98–111)
CO2: 23 MMOL/L (ref 22–29)
CREAT SERPL-MCNC: 1 MG/DL (ref 0.5–0.9)
GFR AFRICAN AMERICAN: >59
GFR NON-AFRICAN AMERICAN: 52
GLUCOSE BLD-MCNC: 106 MG/DL (ref 74–109)
HCT VFR BLD CALC: 46.2 % (ref 37–47)
HEMOGLOBIN: 14.4 G/DL (ref 12–16)
MCH RBC QN AUTO: 32.2 PG (ref 27–31)
MCHC RBC AUTO-ENTMCNC: 31.2 G/DL (ref 33–37)
MCV RBC AUTO: 103.4 FL (ref 81–99)
PDW BLD-RTO: 14 % (ref 11.5–14.5)
PLATELET # BLD: 284 K/UL (ref 130–400)
PMV BLD AUTO: 9.9 FL (ref 9.4–12.3)
POTASSIUM SERPL-SCNC: 4 MMOL/L (ref 3.5–5)
RBC # BLD: 4.47 M/UL (ref 4.2–5.4)
SODIUM BLD-SCNC: 147 MMOL/L (ref 136–145)
TOTAL PROTEIN: 7 G/DL (ref 6.6–8.7)
WBC # BLD: 10.6 K/UL (ref 4.8–10.8)

## 2022-06-02 PROCEDURE — 1123F ACP DISCUSS/DSCN MKR DOCD: CPT | Performed by: FAMILY MEDICINE

## 2022-06-02 PROCEDURE — G8420 CALC BMI NORM PARAMETERS: HCPCS | Performed by: FAMILY MEDICINE

## 2022-06-02 PROCEDURE — 99214 OFFICE O/P EST MOD 30 MIN: CPT | Performed by: FAMILY MEDICINE

## 2022-06-02 PROCEDURE — G8427 DOCREV CUR MEDS BY ELIG CLIN: HCPCS | Performed by: FAMILY MEDICINE

## 2022-06-02 PROCEDURE — 1036F TOBACCO NON-USER: CPT | Performed by: FAMILY MEDICINE

## 2022-06-02 PROCEDURE — 1090F PRES/ABSN URINE INCON ASSESS: CPT | Performed by: FAMILY MEDICINE

## 2022-06-02 ASSESSMENT — PATIENT HEALTH QUESTIONNAIRE - PHQ9
SUM OF ALL RESPONSES TO PHQ QUESTIONS 1-9: 0
2. FEELING DOWN, DEPRESSED OR HOPELESS: 0
SUM OF ALL RESPONSES TO PHQ9 QUESTIONS 1 & 2: 0
1. LITTLE INTEREST OR PLEASURE IN DOING THINGS: 0
SUM OF ALL RESPONSES TO PHQ QUESTIONS 1-9: 0

## 2022-06-02 NOTE — PROGRESS NOTES
SUBJECTIVE:    Briana Ashby is 80 y. o.female who comes in complaining of 6 Month Follow-Up   . HPI: Mrs. Jourdan Hernandez comes in today with her daughter-in-law for recheck of her blood pressure. It is finally better. We recently sent in her prescriptions to her mail-in pharmacy. She denies headache chest pain or shortness of breath. Unfortunately, she still struggles with her dizziness and lightheadedness. She is still falling. She is complaining that it is difficult to roll over in bed. She has a very nice large bed but there are no rails or anything to help her roll over. Her family is very concerned as is she because they think she may be having some TIAs. She falls and will be weak and sometimes she will be very tired and will even have some trouble getting words out. She is also having increasing difficulty getting up out of chairs. She does have arthritis of both knees, right greater than left. She is not interested in a knee replacement at age 80. Some of her falls are because she may lose her balance. She most recently fell off of a lawnmower when she was trying to pull weeds. She did scrape her leg. She also has decreased renal function and a slightly elevated white count and it is time for us to check these also. She denies any increased bruising. Past Medical History:   Diagnosis Date    Burning mouth syndrome     Chronic insomnia     Glaucoma     Hyperlipidemia     Hypertension     Hypothyroidism     Low back pain     Pure hypercholesterolemia 5/10/2016           Allergies   Allergen Reactions    Gabapentin Swelling    Morphine     Stadol [Butorphanol Tartrate]        Social History     Socioeconomic History    Marital status:       Spouse name: None    Number of children: 2    Years of education: None    Highest education level: None   Occupational History    Occupation: Homemaker   Tobacco Use    Smoking status: Never Smoker    Smokeless tobacco: Never Used   Vaping Use    Vaping Use: Never used   Substance and Sexual Activity    Alcohol use: No    Drug use: No    Sexual activity: Not Currently   Other Topics Concern    None   Social History Narrative    None     Social Determinants of Health     Financial Resource Strain:     Difficulty of Paying Living Expenses: Not on file   Food Insecurity:     Worried About Running Out of Food in the Last Year: Not on file    Ryan of Food in the Last Year: Not on file   Transportation Needs:     Lack of Transportation (Medical): Not on file    Lack of Transportation (Non-Medical): Not on file   Physical Activity:     Days of Exercise per Week: Not on file    Minutes of Exercise per Session: Not on file   Stress:     Feeling of Stress : Not on file   Social Connections:     Frequency of Communication with Friends and Family: Not on file    Frequency of Social Gatherings with Friends and Family: Not on file    Attends Yarsani Services: Not on file    Active Member of [address] [place] [address] or Organizations: Not on file    Attends Club or Organization Meetings: Not on file    Marital Status: Not on file   Intimate Partner Violence:     Fear of Current or Ex-Partner: Not on file    Emotionally Abused: Not on file    Physically Abused: Not on file    Sexually Abused: Not on file   Housing Stability:     Unable to Pay for Housing in the Last Year: Not on file    Number of [place] in the Last Year: Not on file    Unstable Housing in the Last Year: Not on file       Review of Systems   Constitutional: Positive for activity change and fatigue. HENT: Positive for hearing loss. Respiratory: Negative. Cardiovascular: Negative. Gastrointestinal: Negative. Musculoskeletal: Positive for arthralgias and gait problem. Neurological: Positive for dizziness, speech difficulty (occasionally) and light-headedness. Hematological: Bruises/bleeds easily. Psychiatric/Behavioral: Negative.           Current Outpatient Medications on File Prior to Visit   Medication Sig Dispense Refill    cloNIDine (CATAPRES) 0.1 MG tablet TAKE ONE TABLET BY MOUTH ONCE DAILY IF NEEDED FOR BLOOD PRESSURE GREATER THAN 160/100 30 tablet 0    valsartan (DIOVAN) 80 MG tablet TAKE ONE TABLET BY MOUTH ONCE EVERY DAY FOR BLOOD PRESSURE**STOP OLMESARTAN** 30 tablet 0    amLODIPine (NORVASC) 5 MG tablet TAKE 1 TABLET DAILY 90 tablet 3    metoprolol tartrate (LOPRESSOR) 50 MG tablet One and 1/2 tab po BID for blood pressure 270 tablet 1    timolol (BETIMOL) 0.5 % ophthalmic solution Apply to eye      ketorolac (TORADOL) 10 MG tablet Take 1 tablet by mouth every 6 hours as needed for Pain 10 tablet 0    atorvastatin (LIPITOR) 10 MG tablet Take one tablet daily for cholestrol 90 tablet 3    doxepin (SINEQUAN) 50 MG capsule TAKE 1 CAPSULE AT BEDTIME  FOR CHRONIC INSOMNIA 90 capsule 3    levothyroxine (SYNTHROID) 50 MCG tablet Take 1 tablet by mouth daily For low thyroid 90 tablet 3    aspirin 81 MG tablet aspirin   daily      Probiotic Product (PROBIOTIC-10) CAPS Take by mouth once      sodium chloride (AIDA 128) 5 % ophthalmic solution Place 1 drop into both eyes 2 times daily      timolol (TIMOPTIC) 0.5 % ophthalmic solution       ZIOPTAN 0.0015 % SOLN        No current facility-administered medications on file prior to visit. OBJECTIVE:    Wt Readings from Last 3 Encounters:   06/02/22 124 lb 9.6 oz (56.5 kg)   02/21/22 127 lb 6.4 oz (57.8 kg)   01/24/22 131 lb 6.4 oz (59.6 kg)       /72   Pulse 55   Temp 97 °F (36.1 °C)   Resp 18   Ht 5' 3\" (1.6 m)   Wt 124 lb 9.6 oz (56.5 kg)   SpO2 99%   BMI 22.07 kg/m²     Physical Exam  Vitals and nursing note reviewed. Constitutional:       General: She is not in acute distress. Appearance: Normal appearance. She is well-developed. Comments: Appears younger than stated age   HENT:      Head: Normocephalic.       Right Ear: Tympanic membrane, ear canal and external ear normal.      Left Ear: Tympanic membrane, ear canal and external ear normal.      Nose: Nose normal. No rhinorrhea. Mouth/Throat:      Mouth: Mucous membranes are moist.      Pharynx: No posterior oropharyngeal erythema. Eyes:      Extraocular Movements: Extraocular movements intact. Conjunctiva/sclera: Conjunctivae normal.      Pupils: Pupils are equal, round, and reactive to light. Neck:      Vascular: No carotid bruit. Cardiovascular:      Rate and Rhythm: Normal rate and regular rhythm. Heart sounds: Normal heart sounds. No murmur heard. Pulmonary:      Effort: Pulmonary effort is normal. No respiratory distress. Breath sounds: Normal breath sounds. Musculoskeletal:         General: Deformity present. Normal range of motion. Cervical back: Normal range of motion and neck supple. Comments: Chronic changes of osteoarthritis at hands and knees but no increased warmth or redness. Lymphadenopathy:      Cervical: No cervical adenopathy. Skin:     General: Skin is warm and dry. Comments: Sun damaged skin with seborrheic keratoses. There is a healing scab on her right lower leg. No signs of infection. Neurological:      Mental Status: She is alert and oriented to person, place, and time. Motor: Weakness present. Gait: Gait abnormal (because of knees). Comments: Unfortunately when she tried to get up out of the chair she was  unsteady and required assistance. Her daughter-in-law says that today is actually a good day. Psychiatric:         Mood and Affect: Mood normal.         Speech: Speech normal.         Behavior: Behavior normal.         Thought Content: Thought content normal.         Judgment: Judgment normal.         ASSESSMENT:    1. Essential hypertension    2. Falls frequently    3. Leukocytosis, unspecified type    4. Decreased renal function    5. TIA (transient ischemic attack)    6.  Primary osteoarthritis of both knees PLAN:  1. Essential hypertension  -     CBC  -     Comprehensive Metabolic Panel  2. Falls frequently  -     Neeraj López MD, Neurology, [place]  3. Leukocytosis, unspecified type  -     CBC  4. Decreased renal function  -     Comprehensive Metabolic Panel  5. TIA (transient ischemic attack)  -     Neeraj López MD, Neurology, [place]  6. Primary osteoarthritis of both knees     Essential hypertension is well controlled at 122/72. We refilled her clonidine and valsartan along with her amlodipine. These were sent to her mail order in April and then she was given a small amount to last her until they came in. She is continuing to fall. This is not improving. This may be related to TIAs but it is also very possibly related to her worsening osteoarthritis as well as her vertigo which has been worked up several times but has not improved. She also is experiencing some loss of vision and is being followed by Dr. Janna Hollins for this. Leukocytosis and decreased renal function are of uncertain status until we get the results. Both of these tests have been abnormal in the past.  In January 2022 her GFR was 58. I reviewed her CBC done in December 2021 and it was 12.2. We will contact her as soon as we get results. Her TIAs are worsening. She is on aspirin 81 mg.  I been hesitant to place her on stronger anticoagulants because of her falls but I am going to refer her to Dr. Josefa Klein to discuss this at least once. I would like his opinion regarding increasing her anticoagulation. Her primary osteoarthritis is worsening. She is having increasing difficulty getting up or moving around. I think a lift chair would be medically indicated since she had difficulty getting up out of a chair here in the office and it is a straight back relatively easy chair at the right height. Her arthritis is not going to improve and she is not interested in surgery at her age.   She was given a prescription for a lift chair. Follow-up:  Return in about 5 months (around 11/2/2022) for Medicare annual wellness and fasting blood work. PATIENT INSTRUCTIONS:  Patient Instructions   We are committed to providing you with the best care possible. In order to help us achieve these goals please remember to bring all medications, herbal products, and over the counter supplements with you to each visit. If your provider has ordered testing for you, please be sure to follow up with our office if you have not received results within 7 days after the testing took place. *If you receive a survey after visiting one of our offices, please take time to share your experience concerning your physician office visit. These surveys are confidential and no health information about you is shared. We are eager to improve for you and we are counting on your feedback to help make that happen. EMR Dragon/transcription disclaimer:  Much of this encounter note is electronic transcription/translation of spoken language to printed texts. The electronic translation of spoken language may be erroneous, or at times, nonsensical words or phrases may beinadvertently transcribed.   Although I have reviewed the note for such errors, some may still exist.

## 2022-06-03 ENCOUNTER — TELEPHONE (OUTPATIENT)
Dept: NEUROLOGY | Age: 87
End: 2022-06-03

## 2022-06-03 ASSESSMENT — ENCOUNTER SYMPTOMS
GASTROINTESTINAL NEGATIVE: 1
RESPIRATORY NEGATIVE: 1

## 2022-06-03 NOTE — TELEPHONE ENCOUNTER
Received a referral from Dr. Jayce Escoto office for this patient. Called and spoke with patient about getting her scheduled an appointment with LACI Koch. Patient asked for me to call her daughter in law since she is the one that takes her to her appointments. Called and spoke with Eunice Soto about getting patient scheduled an appointment. Eunice Soto is aware of when I have her scheduled an appointment with LACI Koch.

## 2022-06-21 ENCOUNTER — OFFICE VISIT (OUTPATIENT)
Dept: NEUROLOGY | Age: 87
End: 2022-06-21
Payer: MEDICARE

## 2022-06-21 VITALS
OXYGEN SATURATION: 98 % | HEART RATE: 57 BPM | DIASTOLIC BLOOD PRESSURE: 75 MMHG | HEIGHT: 63 IN | BODY MASS INDEX: 21.97 KG/M2 | SYSTOLIC BLOOD PRESSURE: 125 MMHG | WEIGHT: 124 LBS

## 2022-06-21 DIAGNOSIS — R47.89 EPISODE OF CHANGE IN SPEECH: Primary | ICD-10-CM

## 2022-06-21 DIAGNOSIS — H54.61 VISION LOSS OF RIGHT EYE: ICD-10-CM

## 2022-06-21 DIAGNOSIS — R26.89 IMBALANCE: ICD-10-CM
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
## 2022-06-21 PROCEDURE — 99214 OFFICE O/P EST MOD 30 MIN: CPT | Performed by: NURSE PRACTITIONER

## 2022-06-21 PROCEDURE — G8420 CALC BMI NORM PARAMETERS: HCPCS | Performed by: NURSE PRACTITIONER

## 2022-06-21 PROCEDURE — 1123F ACP DISCUSS/DSCN MKR DOCD: CPT | Performed by: NURSE PRACTITIONER

## 2022-06-21 PROCEDURE — G8427 DOCREV CUR MEDS BY ELIG CLIN: HCPCS | Performed by: NURSE PRACTITIONER

## 2022-06-21 PROCEDURE — 1090F PRES/ABSN URINE INCON ASSESS: CPT | Performed by: NURSE PRACTITIONER

## 2022-06-21 PROCEDURE — 1036F TOBACCO NON-USER: CPT | Performed by: NURSE PRACTITIONER

## 2022-06-21 NOTE — PROGRESS NOTES
[facility] Neurology Office Note      Patient:   Leonides Mcbride  MR#:    [id number]  Account Number:                         YOB: 1928  Date of Evaluation:  6/21/2022  Time of Note:                          11:28 AM  Primary/Referring Physician:  Rubens Castellon MD   Consulting Physician:  LACI Solano    NEW PATIENT CONSULTATION      Chief Complaint   Patient presents with    Fall     new patient        HISTORY OF PRESENT ILLNESS    Leonides Mcbride is a 80y.o. year old female here for evaluation of possible TIA's. She lives at home alone. She states episodes started around March and at that time she noted changes to vision in right eye. She relates she went to Dr. Vaibhav Crowder in [place] at [facility] and did have vision loss to right eye with some evidence of stroke. She states when she has these events something \"goes wrong\" in her head and she feels like passing out. Has never had syncope with these events. Family member relates she witnessed an episode that lasted about 10 minutes where the patient could not speak coherently. She was alert and could follow commands including standing. Face was symmetrical. Patient recalls all of this. Denies urinary incontinence or tongue biting. No dizziness, shaking, or post-ictal state. She also relates that she has trouble with balance that has been ongoing for about 7 years following left hip arthroplasty. She has had multiple rounds of physical therapy for this with minimal improvement. Uses a rollator at home. States she cannot walk without holding onto someone or using rollator. She does have history of reoccurring falls. Last fall was 3 days ago in garden related to loss of balance. Denies injury, loss of consciousness, head injury. She also states she has had trouble controlling hypertension and has had medicine changes recently. She is on Norvasc, Clonidine, Lopressor, Valsartan. In office today blood pressure is 125/75.  There is a family history of CVA. Denies history of anemia, thrombocytopenia, GI bleed or internal bleeding. Past Medical History:   Diagnosis Date    Burning mouth syndrome     Chronic insomnia     Glaucoma     Hyperlipidemia     Hypertension     Hypothyroidism     Low back pain     Pure hypercholesterolemia 5/10/2016       Past Surgical History:   Procedure Laterality Date    APPENDECTOMY      BREAST BIOPSY      CARPAL TUNNEL RELEASE      COLONOSCOPY  2005        ERCP      ? Dr.Whitney Alonzo Apigor HEEL SPUR SURGERY      HIP ARTHROPLASTY Left January 30, 2014    HYSTERECTOMY (CERVIX STATUS UNKNOWN)      REVISION TOTAL HIP ARTHROPLASTY Left February 4, 2014    UPPER GASTROINTESTINAL ENDOSCOPY      UPPER GASTROINTESTINAL ENDOSCOPY  12/2011           Family History   Problem Relation Age of Onset    Heart Disease Mother     Lung Cancer Father        Social History     Socioeconomic History    Marital status:      Spouse name: Not on file    Number of children: 2    Years of education: Not on file    Highest education level: Not on file   Occupational History    Occupation: Homemaker   Tobacco Use    Smoking status: Never Smoker    Smokeless tobacco: Never Used   Vaping Use    Vaping Use: Never used   Substance and Sexual Activity    Alcohol use: No    Drug use: No    Sexual activity: Not Currently   Other Topics Concern    Not on file   Social History Narrative    Not on file     Social Determinants of Health     Financial Resource Strain:     Difficulty of Paying Living Expenses: Not on file   Food Insecurity:     Worried About [address] in the Last Year: Not on file    Ryan of Food in the Last Year: Not on file   Transportation Needs:     Lack of Transportation (Medical): Not on file    Lack of Transportation (Non-Medical):  Not on file   Physical Activity:     Days of Exercise per Week: Not on file    Minutes of Exercise per Session: Not on file Stress:     Feeling of Stress : Not on file   Social Connections:     Frequency of Communication with Friends and Family: Not on file    Frequency of Social Gatherings with Friends and Family: Not on file    Attends Caodaism Services: Not on file    Active Member of Clubs or Organizations: Not on file    Attends Club or Organization Meetings: Not on file    Marital Status: Not on file   Intimate Partner Violence:     Fear of Current or Ex-Partner: Not on file    Emotionally Abused: Not on file    Physically Abused: Not on file    Sexually Abused: Not on file   Housing Stability:     Unable to Pay for Housing in the Last Year: Not on file    Number of Places Lived in the Last Year: Not on file    Unstable Housing in the Last Year: Not on file       Current Outpatient Medications   Medication Sig Dispense Refill    cloNIDine (CATAPRES) 0.1 MG tablet TAKE ONE TABLET BY MOUTH ONCE DAILY IF NEEDED FOR BLOOD PRESSURE GREATER THAN 160/100 30 tablet 0    valsartan (DIOVAN) 80 MG tablet TAKE ONE TABLET BY MOUTH ONCE EVERY DAY FOR BLOOD PRESSURE**STOP OLMESARTAN** 30 tablet 0    amLODIPine (NORVASC) 5 MG tablet TAKE 1 TABLET DAILY 90 tablet 3    metoprolol tartrate (LOPRESSOR) 50 MG tablet One and 1/2 tab po BID for blood pressure 270 tablet 1    timolol (BETIMOL) 0.5 % ophthalmic solution Apply to eye      ketorolac (TORADOL) 10 MG tablet Take 1 tablet by mouth every 6 hours as needed for Pain 10 tablet 0    atorvastatin (LIPITOR) 10 MG tablet Take one tablet daily for cholestrol 90 tablet 3    doxepin (SINEQUAN) 50 MG capsule TAKE 1 CAPSULE AT BEDTIME  FOR CHRONIC INSOMNIA 90 capsule 3    levothyroxine (SYNTHROID) 50 MCG tablet Take 1 tablet by mouth daily For low thyroid 90 tablet 3    aspirin 81 MG tablet aspirin   daily      Probiotic Product (PROBIOTIC-10) CAPS Take by mouth once      sodium chloride (AIDA 128) 5 % ophthalmic solution Place 1 drop into both eyes 2 times daily      timolol (TIMOPTIC) 0.5 % ophthalmic solution       ZIOPTAN 0.0015 % SOLN        No current facility-administered medications for this visit. Allergies   Allergen Reactions    Gabapentin Swelling    Morphine     Stadol [Butorphanol Tartrate]          REVIEW OF SYSTEMS  Constitutional: []? Fever []? Sweats []? Chills []? Recent Injury   [x]? Denies all unless marked  HENT:[]? Headache  []? Head Injury  []? Sore Throat  []? Ear Pain  []? Dizziness []? Hearing Loss   [x]? Denies all unless marked  Musculoskeletal: []? Arthralgia  []? Myalgias []? Muscle cramps  []? Muscle twitches   [x]? Denies all unless marked   Spine:  []? Neck pain  []? Back pain  []? Sciaticia  [x]? Denies all unless marked  Neurological:[]? Visual Disturbance []? Double Vision []? Slurred Speech []? Trouble swallowing  []? Vertigo []? Tingling []? Numbness []? Weakness [x]? Loss of Balance   []? Loss of Consciousness []? Memory Loss []? Seizures  []? Denies all unless marked  Psychiatric/Behavioral:[]? Depression []? Anxiety  [x]? Denies all unless marked  Sleep: []? Insomnia []? Sleep Disturbance []? Snoring []? Restless Legs []? Daytime Sleepiness []? Sleep Apnea  [x]? Denies all unless marked    The MA has completed the ROS with the patient. I have reviewed it in its' entirety with the patient and agree with the documentation. PHYSICAL EXAM  /75   Pulse 57   Ht 5' 3\" (1.6 m)   Wt 124 lb (56.2 kg)   SpO2 98%   BMI 21.97 kg/m²       Constitutional - No acute distress    HEENT- Conjunctiva normal.  No scars, masses, or lesions over external nose or ears, no neck masses noted, no jugular vein distension, no bruit  Cardiac- Regular rate and rhythm  Pulmonary- Good expansion, normal effort without use of accessory muscles  Musculoskeletal - No significant wasting of muscles noted, no bony deformities  Extremities - No clubbing, cyanosis or edema  Skin - Warm, dry, and intact.   No rash, erythema, or pallor  Psychiatric - Mood, affect, and behavior appear normal      NEUROLOGICAL EXAM     Mental status   [x] Awake, alert, oriented   [x]Affect attention and concentration appear appropriate  [x]Recent and remote memory appears unremarkable  [x]Speech normal without dysarthria or aphasia, comprehension and repetition intact. COMMENTS:    Cranial Nerves []No VF deficit to confrontation,  [x]PERRLA, EOMI, no nystagmus, conjugate eye movements, no ptosis  [x]Face symmetric  [x]Facial sensation intact  [x]Tongue midline no atrophy or fasciculations present  [x]Palate midline, hearing to finger rub normal bilaterally  [x]Shoulder shrug and SCM testing normal bilaterally  COMMENTS: impaired VF bilaterally   Motor   [x]5/5 strength x 4 extremities  [x]Normal bulk and tone  [x]No tremor present  [x]No rigidity or bradykinesia noted  COMMENTS:   Sensory  [x]Sensation intact to light touch, vibration, and proprioception BLE  [x]Sensation intact to light touch, vibration, and proprioception BUE  COMMENTS:   Coordination [x]FTN normal bilaterally   [x]HTS normal bilaterally  [x]TAVO normal bilaterally.    COMMENTS:   Reflexes  [x]Symmetric and non-pathological  [x]Toes down going bilaterally  [x]No clonus present  COMMENTS:   Gait                  []Normal steady gait    [x]Ataxic    []Spastic     []Magnetic     [x]Shuffling  COMMENTS: assist x1 with ambulating       LABS RECORD AND IMAGING REVIEW (As below and per HPI)    Lab Results   Component Value Date    KVLCTIDO44 1361 (H) 06/09/2021     Lab Results   Component Value Date    WBC 10.6 06/02/2022    HGB 14.4 06/02/2022    HCT 46.2 06/02/2022    .4 (H) 06/02/2022     06/02/2022     Lab Results   Component Value Date     (H) 06/02/2022    K 4.0 06/02/2022     06/02/2022    CO2 23 06/02/2022    BUN 24 (H) 06/02/2022    CREATININE 1.0 (H) 06/02/2022    GLUCOSE 106 06/02/2022    CALCIUM 10.1 (H) 06/02/2022    PROT 7.0 06/02/2022    LABALBU 4.2 06/02/2022    BILITOT 0.6 06/02/2022 ALKPHOS 125 (H) 06/02/2022    AST 17 06/02/2022    ALT 14 06/02/2022    LABGLOM 52 (A) 06/02/2022    GFRAA >59 06/02/2022    GLOB 2.4 05/12/2016     Lab Results   Component Value Date    CHOL 155 (L) 06/09/2021    TRIG 102 06/09/2021    HDL 70 06/09/2021    LDLCALC 65 06/09/2021     Lab Results   Component Value Date    TSH 0.637 06/09/2021    T4FREE 1.23 06/09/2021     Lab Results   Component Value Date    CRP 3.03 (H) 06/20/2021    SEDRATE 25 06/20/2021        MRI BRAIN WO CONTRAST 7/16/2021  EXAMINATION: MRI BRAIN WO CONTRAST 7/16/2021 2:03 PM COMPARISON: February 2019 HISTORY: 80years-old Female. Loss of balance TECHNIQUE: Routine pulse sequences of the brain were obtained without IV contrast. REPORT: The ventricles and cerebrospinal fluid spaces are normal in size and configuration for the patient's age. There is no evidence of mass-effect or midline shift. No reduced diffusivity is demonstrated. nonspecific bilateral white matter T2/FLAIR abnormal signal, with no diffusion restriction, but likely reflecting chronic microvascular changes. The brainstem, sella, pituitary, cerebellum are unremarkable. The basilar cisterns are preserved. The intraorbital structures are unremarkable. The flow voids are preserved. No acute osseous lesion. The visualized portions of the paranasal sinuses and mastoid air cells are unremarkable. 1. No acute intracranial abnormalities. 2. Mild chronic microvascular changes. Signed by Dr Justina Woodson 3/2022  1. Bilateral primary angle glaucoma severe stage  2. Cerebral infarction- noted extensive visual field loss changes in bilateral eyes, right worse than left. 3. Other optic atrophy, right greater than left. Concern for CVA. Zio Patch 5/12/21  Sinus rhythm with a first-degree AV block. A. fib burden 3%. The longest was 4 hours and 35 minutes. 106 SVT episodes. Patient is not on a blood thinner due to frequent falls.     Echo 2D 10/8/2020  Summary   Normal left ventricular size and systolic function   Mild concentric left ventricular hypertrophy with probable moderate [grade   2] diastolic dysfunction   Mild left atrial enlargement   Mild thickening of a tricuspid aortic valve with adequate cusp separation,   insignificant gradient, 9/6 mmHg], and mild insufficiency   Mild thickening of the mitral valve with minimally reduced mobility and   mild regurgitation   Mild to moderate tricuspid regurgitation with RVSP estimate of 35 mmHg   Normal IVC dimension and respiratory motion consistent with normal right   atrial filling pressures of 5-10 mmHg   No significant pericardial effusion and aortic dimensions are within   normal limits      Signature   Electronically signed by Aden Barriga MD(Interpreting physician)   on 10/08/2020 01:33 PM    Reviewed referral records       ASSESSMENT:    Will Mueller is a 80y.o. year old female here for evaluation of events that occur about every 3-4 weeks since March. She states she just feels funny in her head but cannot describe sensation, states feels like she may pass out or die when these occur. They last for 10-20 minutes at times and affect her speech. In March she had acute vision loss after event. [facility] records indicate that they are concerned patient has had CVA due to abrupt vision field loss and optic atrophy. She is very independent and lives alone. Closest family is 30 minutes away. She has history of frequent falls due to imbalance issues status post left hip arthroplasty in . She is not interested in additional physical therapy. Uses rollator when at home. Is taking baby aspirin daily. States she has had problems with hypertension but it is controlled today in office. Exam is non focal today. MRI in 2021 showed chronic . Will order another MRI brain due to acute vision field loss in March along with concern for TIA's. Patient is agreeable to this.  At this point there is not a need to start any anticoagulation medicines. She should continue with baby ASA daily. Did discuss with Dr. Sparkle Be as well and he is in agreement. Will re-evaluate need for dual therapy after MRI is resulted, in the meantime work on controlling stroke risk factors such as blood pressure, cholesterol, glucose, exc. ICD-10-CM    1. Episode of change in speech  R47.89 MRI BRAIN W WO CONTRAST   2. Fall, subsequent encounter  W19. XXXD MRI BRAIN W WO CONTRAST   3. Imbalance  R26.89 MRI BRAIN W WO CONTRAST   4. Vision loss of right eye  H54.61 MRI BRAIN W WO CONTRAST         PLAN:  1. MRI brain  2. Continue baby ASA daily, discussed risks of bleeding. 3. Stroke risk factor maximization- controlling BP, cholesterol, glucose, diet, and exercise. 4. Continue Norvasc, Clonidine, Lopressor, Valsartan, and Lipitor as prescribed. 5. ED for any stroke like symptoms. 6. Return in about 4 weeks (around 7/19/2022).     Mary Aguillon, APRN

## 2022-06-21 NOTE — PROGRESS NOTES
REVIEW OF SYSTEMS    Constitutional: []Fever []Sweats []Chills [] Recent Injury   [x] Denies all unless marked  HENT:[]Headache  [] Head Injury  [] Sore Throat  [] Ear Pain  [] Dizziness [] Hearing Loss   [x] Denies all unless marked  Musculoskeletal: [] Arthralgia  [] Myalgias [] Muscle cramps  [] Muscle twitches   [x] Denies all unless marked   Spine:  [] Neck pain  [] Back pain  [] Sciaticia  [x] Denies all unless marked  Neurological:[] Visual Disturbance [] Double Vision [] Slurred Speech [] Trouble swallowing  [] Vertigo [] Tingling [] Numbness [] Weakness [x] Loss of Balance   [] Loss of Consciousness [] Memory Loss [] Seizures  [] Denies all unless marked  Psychiatric/Behavioral:[] Depression [] Anxiety  [x] Denies all unless marked  Sleep: []  Insomnia [] Sleep Disturbance [] Snoring [] Restless Legs [] Daytime Sleepiness [] Sleep Apnea  [x] Denies all unless marked

## 2022-06-27 RX ORDER — DOXEPIN HYDROCHLORIDE 50 MG/1
CAPSULE ORAL
Qty: 90 CAPSULE | Refills: 3 | Status: SHIPPED | OUTPATIENT
Start: 2022-06-27 | End: 2022-09-13

## 2022-07-01 ENCOUNTER — CARE COORDINATION (OUTPATIENT)
Dept: CARE COORDINATION | Age: 87
End: 2022-07-01

## 2022-07-01 ENCOUNTER — HOSPITAL ENCOUNTER (OUTPATIENT)
Dept: MRI IMAGING | Age: 87
Discharge: HOME | End: 2022-07-01
Payer: MEDICARE

## 2022-07-01 DIAGNOSIS — H54.61 VISION LOSS OF RIGHT EYE: ICD-10-CM
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
## 2022-07-01 DIAGNOSIS — R47.89 EPISODE OF CHANGE IN SPEECH: ICD-10-CM

## 2022-07-01 DIAGNOSIS — R26.89 IMBALANCE: ICD-10-CM

## 2022-07-01 PROCEDURE — A9585 GADOBUTROL INJECTION: HCPCS | Performed by: NURSE PRACTITIONER

## 2022-07-01 PROCEDURE — 70553 MRI BRAIN STEM W/O & W/DYE: CPT | Performed by: RADIOLOGY

## 2022-07-01 PROCEDURE — 70553 MRI BRAIN STEM W/O & W/DYE: CPT

## 2022-07-01 PROCEDURE — 6360000004 HC RX CONTRAST MEDICATION: Performed by: NURSE PRACTITIONER

## 2022-07-01 PROCEDURE — G1010 CDSM STANSON: HCPCS | Performed by: RADIOLOGY

## 2022-07-01 RX ADMIN — GADOBUTROL 5.5 ML: 604.72 INJECTION INTRAVENOUS at 11:15

## 2022-07-01 NOTE — CARE COORDINATION
Ambulatory Care Coordination Note  7/1/2022  CM Risk Score: 1  Charlson 10 Year Mortality Risk Score: 98%     ACC: Dale Palomares, RN    Summary Note: AC spoke to patient's daughter-in-law, Yani Rios. Mrs. Bárbara Fraser has seen a neurologist for evaluation due to balance problems, possible TIA episodes. Patient had MRI today, ordered by neurology, awaiting results. Patient is stable overall. Family feels patient is not in need of caregiver support presently. Family is closely supportive of patient as well. Actions:  No further action or support is needed at this time. [facility] encouraged Yani Rios to call if any new support or concern arises for patient. She thanked [facility] and voiced understanding. Care Coordination Interventions    Referral from Primary Care Provider: No  Suggested Interventions and Community Resources  Transportation Support: Completed  Zone Management Tools: Completed (Comment: 7/1/22: Maintain safe independence at home. Stable)         Goals Addressed                 This Visit's Progress     COMPLETED: Reduce Falls    On track     I will reduce my risk of falls by the following: Remove rugs or use non slip rugs  Install grab bars in bathroom  Use walking aids like cane or walker  Follow through on orders for PT    Barriers: Reluctance toward assistance  Plan for overcoming my barriers:   Increase knowledge of adjustments that can support safe independence at home  Increase knowledge of community resources available to support safe living at home  Confidence: 6/10  Anticipated Goal Completion Date: 8/27/22            Prior to Admission medications    Medication Sig Start Date End Date Taking?  Authorizing Provider   doxepin (SINEQUAN) 50 MG capsule TAKE 1 CAPSULE AT BEDTIME  FOR CHRONIC INSOMNIA 6/27/22   Chavez Garcia MD   cloNIDine (CATAPRES) 0.1 MG tablet TAKE ONE TABLET BY MOUTH ONCE DAILY IF NEEDED FOR BLOOD PRESSURE GREATER THAN 160/100 5/6/22   Chavez Garcia MD   valsartan (DIOVAN) 80 MG tablet TAKE ONE TABLET BY MOUTH ONCE EVERY DAY FOR BLOOD PRESSURE**STOP OLMESARTAN** 5/6/22   Bettie Coronado MD   amLODIPine (NORVASC) 5 MG tablet TAKE 1 TABLET DAILY 5/2/22   Bettie Coronado MD   metoprolol tartrate (LOPRESSOR) 50 MG tablet One and 1/2 tab po BID for blood pressure 3/21/22   Bettie Coronado MD   timolol (BETIMOL) 0.5 % ophthalmic solution Apply to eye    Historical Provider, MD   ketorolac (TORADOL) 10 MG tablet Take 1 tablet by mouth every 6 hours as needed for Pain 9/14/21 9/14/22  [facility], APRN - CNP   atorvastatin (LIPITOR) 10 MG tablet Take one tablet daily for cholestrol 8/10/21   Bettie Coronado MD   levothyroxine (SYNTHROID) 50 MCG tablet Take 1 tablet by mouth daily For low thyroid 6/13/21   Bettie Coronado MD   aspirin 81 MG tablet aspirin   daily    Historical Provider, MD   Probiotic Product (PROBIOTIC-10) CAPS Take by mouth once    Historical Provider, MD   sodium chloride (AIDA 128) 5 % ophthalmic solution Place 1 drop into both eyes 2 times daily    Historical Provider, MD   timolol (TIMOPTIC) 0.5 % ophthalmic solution  2/3/16   Historical Provider, MD PUENTESOPTAN 0.0015 % SOLN  8/17/14   Historical Provider, MD       Future Appointments   Date Time Provider Stephan Grace   7/19/2022 11:00 AM LACI Baig - CNP [facility]   11/7/2022 10:30 AM Bettie Coronado MD [facility]

## 2022-07-19 ENCOUNTER — OFFICE VISIT (OUTPATIENT)
Dept: NEUROLOGY | Age: 87
End: 2022-07-19
Payer: MEDICARE

## 2022-07-19 VITALS
HEIGHT: 63 IN | DIASTOLIC BLOOD PRESSURE: 82 MMHG | WEIGHT: 125 LBS | HEART RATE: 73 BPM | BODY MASS INDEX: 22.15 KG/M2 | OXYGEN SATURATION: 98 % | SYSTOLIC BLOOD PRESSURE: 133 MMHG

## 2022-07-19 DIAGNOSIS — R29.6 FALLS FREQUENTLY: ICD-10-CM

## 2022-07-19 DIAGNOSIS — R47.89 EPISODE OF CHANGE IN SPEECH: ICD-10-CM

## 2022-07-19 DIAGNOSIS — I67.9 CEREBROVASCULAR SMALL VESSEL DISEASE: Primary | ICD-10-CM

## 2022-07-19 PROCEDURE — G8427 DOCREV CUR MEDS BY ELIG CLIN: HCPCS | Performed by: NURSE PRACTITIONER

## 2022-07-19 PROCEDURE — 99213 OFFICE O/P EST LOW 20 MIN: CPT | Performed by: NURSE PRACTITIONER

## 2022-07-19 PROCEDURE — 1036F TOBACCO NON-USER: CPT | Performed by: NURSE PRACTITIONER

## 2022-07-19 PROCEDURE — 1123F ACP DISCUSS/DSCN MKR DOCD: CPT | Performed by: NURSE PRACTITIONER

## 2022-07-19 PROCEDURE — 1090F PRES/ABSN URINE INCON ASSESS: CPT | Performed by: NURSE PRACTITIONER

## 2022-07-19 PROCEDURE — G8420 CALC BMI NORM PARAMETERS: HCPCS | Performed by: NURSE PRACTITIONER

## 2022-07-19 RX ORDER — LATANOPROSTENE BUNOD 0.24 MG/ML
SOLUTION/ DROPS OPHTHALMIC
COMMUNITY
Start: 2022-06-26

## 2022-07-19 NOTE — PROGRESS NOTES
[facility] Neurology Office Note      Patient:   Luis Dunlap  MR#:    [id number]  Account Number:                         YOB: 1928  Date of Evaluation:  7/21/2022  Time of Note:                          8:38 AM  Primary/Referring Physician:  Melanie Moody MD   Consulting Physician:  LACI Boyer    NEW PATIENT CONSULTATION      Chief Complaint   Patient presents with    Follow-up     MRI results         HISTORY OF PRESENT ILLNESS    Luis Dunlap is a 80y.o. year old female here for follow up of events that she was concerned are possible TIA's. She is still living at home independently. She denies any new vision problems. Was seen at eye doctor yesterday and will go back in a month for management of severe bilateral primary angle glaucoma. She denies any further events. Previous events described as something \"goes wrong\" in her head and she feels like passing out. Has never had syncope with these events. Family member relates she witnessed an episode that lasted about 10 minutes where the patient could not speak coherently. She was alert and could follow commands including standing. Face was symmetrical. Patient recalls all of this. Denies urinary incontinence or tongue biting. No dizziness, shaking, or post-ictal state. She is also still having trouble with balance that has been ongoing for about 7 years following left hip arthroplasty. This is not worsening. She has had multiple rounds of physical therapy for this with minimal improvement. Uses a rollator at home. States she still cannot walk without holding onto someone or using rollator. Falls frequently. No new injuries from falling or LOC. She is still having some trouble controlling hypertension. Monitors it at home. She is on Norvasc, Clonidine, Lopressor, Valsartan. In office today blood pressure is 133/82 There is a family history of CVA. Denies history of anemia, thrombocytopenia, GI bleed or internal bleeding.  There is a history of atrial fibrillation. She does not see a cardiologist.     Past Medical History:   Diagnosis Date    Burning mouth syndrome     Chronic insomnia     Glaucoma     Hyperlipidemia     Hypertension     Hypothyroidism     Low back pain     Pure hypercholesterolemia 5/10/2016       Past Surgical History:   Procedure Laterality Date    APPENDECTOMY      BREAST BIOPSY      CARPAL TUNNEL RELEASE      COLONOSCOPY  2005        ERCP      ? Dr.Whitney Martinez [facility] SPUR SURGERY      HIP ARTHROPLASTY Left January 30, 2014    HYSTERECTOMY (CERVIX STATUS UNKNOWN)      REVISION TOTAL HIP ARTHROPLASTY Left February 4, 2014    UPPER GASTROINTESTINAL ENDOSCOPY      UPPER GASTROINTESTINAL ENDOSCOPY  12/2011           Family History   Problem Relation Age of Onset    Heart Disease Mother     Lung Cancer Father        Social History     Socioeconomic History    Marital status:       Spouse name: Not on file    Number of children: 2    Years of education: Not on file    Highest education level: Not on file   Occupational History    Occupation: Homemaker   Tobacco Use    Smoking status: Never    Smokeless tobacco: Never   Vaping Use    Vaping Use: Never used   Substance and Sexual Activity    Alcohol use: No    Drug use: No    Sexual activity: Not Currently   Other Topics Concern    Not on file   Social History Narrative    Not on file     Social Determinants of Health     Financial Resource Strain: Not on file   Food Insecurity: Not on file   Transportation Needs: Not on file   Physical Activity: Not on file   Stress: Not on file   Social Connections: Not on file   Intimate Partner Violence: Not on file   Housing Stability: Not on file       Current Outpatient Medications   Medication Sig Dispense Refill    VYZULTA 0.024 % SOLN       doxepin (SINEQUAN) 50 MG capsule TAKE 1 CAPSULE AT BEDTIME  FOR CHRONIC INSOMNIA 90 capsule 3    cloNIDine (CATAPRES) 0.1 MG tablet TAKE ONE TABLET BY MOUTH ONCE DAILY IF NEEDED FOR BLOOD PRESSURE GREATER THAN 160/100 30 tablet 0    valsartan (DIOVAN) 80 MG tablet TAKE ONE TABLET BY MOUTH ONCE EVERY DAY FOR BLOOD PRESSURE**STOP OLMESARTAN** 30 tablet 0    amLODIPine (NORVASC) 5 MG tablet TAKE 1 TABLET DAILY 90 tablet 3    metoprolol tartrate (LOPRESSOR) 50 MG tablet One and 1/2 tab po BID for blood pressure 270 tablet 1    timolol (BETIMOL) 0.5 % ophthalmic solution Apply to eye      ketorolac (TORADOL) 10 MG tablet Take 1 tablet by mouth every 6 hours as needed for Pain 10 tablet 0    atorvastatin (LIPITOR) 10 MG tablet Take one tablet daily for cholestrol 90 tablet 3    levothyroxine (SYNTHROID) 50 MCG tablet Take 1 tablet by mouth daily For low thyroid 90 tablet 3    aspirin 81 MG tablet aspirin   daily      Probiotic Product (PROBIOTIC-10) CAPS Take by mouth once      sodium chloride (AIDA 128) 5 % ophthalmic solution Place 1 drop into both eyes 2 times daily      timolol (TIMOPTIC) 0.5 % ophthalmic solution       ZIOPTAN 0.0015 % SOLN        No current facility-administered medications for this visit.        Allergies   Allergen Reactions    Gabapentin Swelling    Morphine     Stadol [Butorphanol Tartrate]          REVIEW OF SYSTEMS  Constitutional: []Fever []Sweats []Chills [] Recent Injury   [x] Denies all unless marked  HENT:[]Headache  [] Head Injury  [] Sore Throat  [] Ear Pain  [] Dizziness [] Hearing Loss   [x] Denies all unless marked  Musculoskeletal: [] Arthralgia  [] Myalgias [] Muscle cramps  [] Muscle twitches   [x] Denies all unless marked   Spine:  [] Neck pain  [] Back pain  [] Sciaticia  [x] Denies all unless marked  Neurological:[] Visual Disturbance [] Double Vision [] Slurred Speech [] Trouble swallowing  [] Vertigo [] Tingling [] Numbness [] Weakness [x] Loss of Balance   [] Loss of Consciousness [] Memory Loss [] Seizures  [] Denies all unless marked  Psychiatric/Behavioral:[] Depression [] Anxiety  [x] Denies all unless marked  Sleep: []  Insomnia [] Sleep Disturbance [] Snoring [] Restless Legs [] Daytime Sleepiness [] Sleep Apnea  [x] Denies all unless marked    The MA has completed the ROS with the patient. I have reviewed it in its' entirety with the patient and agree with the documentation. PHYSICAL EXAM  /82   Pulse 73   Ht 5' 3\" (1.6 m)   Wt 125 lb (56.7 kg)   SpO2 98%   BMI 22.14 kg/m²       Constitutional - No acute distress    HEENT- Conjunctiva normal.  No scars, masses, or lesions over external nose or ears, no neck masses noted, no jugular vein distension, no bruit  Cardiac- Regular rate and rhythm  Pulmonary- Good expansion, normal effort without use of accessory muscles  Musculoskeletal - No significant wasting of muscles noted, no bony deformities  Extremities - No clubbing, cyanosis or edema  Skin - Warm, dry, and intact. No rash, erythema, or pallor  Psychiatric - Mood, affect, and behavior appear normal      NEUROLOGICAL EXAM     Mental status   [x] Awake, alert, oriented   [x]Affect attention and concentration appear appropriate  [x]Recent and remote memory appears unremarkable  [x]Speech normal without dysarthria or aphasia, comprehension and repetition intact.    COMMENTS:    Cranial Nerves []No VF deficit to confrontation,  [x]PERRLA, EOMI, no nystagmus, conjugate eye movements, no ptosis  [x]Face symmetric  [x]Facial sensation intact  [x]Tongue midline no atrophy or fasciculations present  [x]Palate midline, hearing to finger rub normal bilaterally  [x]Shoulder shrug and SCM testing normal bilaterally  COMMENTS: impaired VF bilaterally   Motor   [x]5/5 strength x 4 extremities  [x]Normal bulk and tone  [x]No tremor present  [x]No rigidity or bradykinesia noted  COMMENTS:   Sensory  [x]Sensation intact to light touch, vibration, and proprioception BLE  [x]Sensation intact to light touch, vibration, and proprioception BUE  COMMENTS:   Coordination [x]FTN normal bilaterally   [x]HTS normal bilaterally  [x]TAVO normal bilaterally. COMMENTS:   Reflexes  [x]Symmetric and non-pathological  [x]Toes down going bilaterally  [x]No clonus present  COMMENTS:   Gait                  []Normal steady gait    [x]Ataxic    []Spastic     []Magnetic     [x]Shuffling  COMMENTS: assist x1 with ambulating       LABS RECORD AND IMAGING REVIEW (As below and per HPI)    Lab Results   Component Value Date    DKYIWINY67 1361 (H) 06/09/2021     Lab Results   Component Value Date    WBC 10.6 06/02/2022    HGB 14.4 06/02/2022    HCT 46.2 06/02/2022    .4 (H) 06/02/2022     06/02/2022     Lab Results   Component Value Date     (H) 06/02/2022    K 4.0 06/02/2022     06/02/2022    CO2 23 06/02/2022    BUN 24 (H) 06/02/2022    CREATININE 1.0 (H) 06/02/2022    GLUCOSE 106 06/02/2022    CALCIUM 10.1 (H) 06/02/2022    PROT 7.0 06/02/2022    LABALBU 4.2 06/02/2022    BILITOT 0.6 06/02/2022    ALKPHOS 125 (H) 06/02/2022    AST 17 06/02/2022    ALT 14 06/02/2022    LABGLOM 52 (A) 06/02/2022    GFRAA >59 06/02/2022    GLOB 2.4 05/12/2016     Lab Results   Component Value Date    CHOL 155 (L) 06/09/2021    TRIG 102 06/09/2021    HDL 70 06/09/2021    LDLCALC 65 06/09/2021     Lab Results   Component Value Date    TSH 0.637 06/09/2021    T4FREE 1.23 06/09/2021     Lab Results   Component Value Date    CRP 3.03 (H) 06/20/2021    SEDRATE 25 06/20/2021        MRI BRAIN WO CONTRAST 7/16/2021  EXAMINATION: MRI BRAIN WO CONTRAST 7/16/2021 2:03 PM COMPARISON: February 2019 HISTORY: 80years-old Female. Loss of balance TECHNIQUE: Routine pulse sequences of the brain were obtained without IV contrast. REPORT: The ventricles and cerebrospinal fluid spaces are normal in size and configuration for the patient's age. There is no evidence of mass-effect or midline shift. No reduced diffusivity is demonstrated. nonspecific bilateral white matter T2/FLAIR abnormal signal, with no diffusion restriction, but likely reflecting chronic microvascular changes.  The brainstem, sella, pituitary, cerebellum are unremarkable. The basilar cisterns are preserved. The intraorbital structures are unremarkable. The flow voids are preserved. No acute osseous lesion. The visualized portions of the paranasal sinuses and mastoid air cells are unremarkable. 1. No acute intracranial abnormalities. 2. Mild chronic microvascular changes. Signed by Dr Bogdan De 3/2022  1. Bilateral primary angle glaucoma severe stage  2. Cerebral infarction- noted extensive visual field loss changes in bilateral eyes, right worse than left. 3. Other optic atrophy, right greater than left. Concern for CVA. Zio Patch 5/12/21  Sinus rhythm with a first-degree AV block. A. fib burden 3%. The longest was 4 hours and 35 minutes. 106 SVT episodes. Patient is not on a blood thinner due to frequent falls. Echo 2D 10/8/2020  Summary   Normal left ventricular size and systolic function   Mild concentric left ventricular hypertrophy with probable moderate [grade   2] diastolic dysfunction   Mild left atrial enlargement   Mild thickening of a tricuspid aortic valve with adequate cusp separation,   insignificant gradient, 9/6 mmHg], and mild insufficiency   Mild thickening of the mitral valve with minimally reduced mobility and   mild regurgitation   Mild to moderate tricuspid regurgitation with RVSP estimate of 35 mmHg   Normal IVC dimension and respiratory motion consistent with normal right   atrial filling pressures of 5-10 mmHg   No significant pericardial effusion and aortic dimensions are within   normal limits      Signature   Electronically signed by Beverley Geller MD(Interpreting physician)   on 10/08/2020 01:33 PM    MRI Brain W WO 7/1/2022    Narrative   NO PRIOR REPORT AVAILABLE   Exam: MRI OF THE BRAIN WITHOUT AND WITHINTRAVENOUS CONTRAST   Clinical data:Fall, subsequent encounter. Falls, imbalance, vision loss of right eye, episode of difficulty in word finding. History of TIA. Technique: Multiplanar multi-sequence MRI of the brain without and with intravenous gadolinium. Diffusion-weighted imaging is submitted. Contrast used:Gadavist.Amount: 5.5 cc. Prior studies: MRI of brain dated 2021, without prior report available at this time    Findings: Atrophy, scattered mild microangiopathic change trauma grossly as before. No acute intracranial abnormality, specifically, cortical infarction, hemorrhage, mass or mass effect is seen. The ventricular system is normal in size, shape, and    contour. No abnormal extra-axial fluid collections are present. The marrow signal within the skull base and calvarium is intact. The paranasal sinuses and mastoid air cells are clear. Larger intracranial flow voids are intact. No abnormal leptomeningeal,    parenchymal or ependymal enhancement. DWI/ADC: No restricted diffusion. Impression   1. No acute intracranial process appreciated, with findings as above. Recommendation:    Follow up as clinically indicated. Electronically Signed by Ray Townsend MD at 2022 03:11:46 AM          Reviewed records       ASSESSMENT:    Luis Maher is a 80y.o. year old female here for evaluation of possible TIA's. She also sees [facility] regularly as she has severe glaucoma and bilateral vision deficit from probable cerebral infarction. She relates she has odd events where she feels like she may pass out, there is some confusion associated with these events although she is still alert and able to follow commands. No one-sided weakness, facial droop, amrik syncope, GTC activity, incontinence. MRI in 2021 showed chronic . New MRI does not show any evidence of acute or subacute CVA and is comparable to previous. She takes baby aspirin daily. Is on multiple anti-hypertensive agents but does still have some uncontrolled hypertension at times. She has a history of atrial fibrillation.  She does not follow with cardiology. She does not want to see a new cardiologist or follow up with old one concerning atrial fibrillation. Heart rhythm is regular on ausculation today in office. ZIO patch last year showed Afib burden of 3%. She is still very independent and lives alone. Closest family is 30 minutes away. She still has frequent falls due to imbalance issues status post left hip arthroplasty in 2014. Has had PT in the past, does not want to do anymore. Uses rollator when at home. Exam is non focal today. Discussed differential of atypical seizure events although this is less likely. Discussed that we could do EEG monitoring. Patient does not wish to have that done and I do feel it is low yield. Discussed work on controlling stroke risk factors such as blood pressure, cholesterol, glucose, exc. Offered cardiology referral today to re-evaluated atrial fibrillation. She declined. Offered to order ZIO patch to re-assess for atrial fibrillation burden. She declined. Considering previous afib burden of 3%; no subacute or acute CVA on recent MRI, her age, and frequent falling, I do not believe that she should be started on anticoagulation therapy. Discussed risks and benefits of this with patient. She is in agreement. ICD-10-CM    1. Cerebrovascular small vessel disease  I67.9       2. Falls frequently  R29.6               PLAN:  1. I recommend being established with cardiology due to history of atrial fibrillation or wearing ZIO patch to re-assess afib burden. 2. Continue baby ASA daily, discussed risks of bleeding. 3. Stroke risk factor maximization- controlling BP, cholesterol, glucose, diet, and exercise. 4. Continue Norvasc, Clonidine, Lopressor, Valsartan, Metoprolol, and Lipitor as prescribed. Monitor blood pressure. 5. ED for any stroke like symptoms. 6. Return in about 1 year (around 7/19/2023) for Follow up, sooner with worsening symptoms.     LACI Tijerina

## 2022-07-21 ENCOUNTER — TELEPHONE (OUTPATIENT)
Dept: NEUROLOGY | Age: 87
End: 2022-07-21

## 2022-07-21 NOTE — TELEPHONE ENCOUNTER
Spoke with pt and asked if she would be interested in a Zio patch to assess her heart. Pt declined at this time.

## 2022-07-25 RX ORDER — LEVOTHYROXINE SODIUM 50 MCG
TABLET ORAL
Qty: 90 TABLET | Refills: 3 | Status: SHIPPED | OUTPATIENT
Start: 2022-07-25

## 2022-08-02 RX ORDER — ATORVASTATIN CALCIUM 10 MG/1
TABLET, FILM COATED ORAL
Qty: 90 TABLET | Refills: 3 | Status: SHIPPED | OUTPATIENT
Start: 2022-08-02

## 2022-08-04 ENCOUNTER — APPOINTMENT (OUTPATIENT)
Dept: CT IMAGING | Age: 87
End: 2022-08-04
Payer: MEDICARE

## 2022-08-04 ENCOUNTER — HOSPITAL ENCOUNTER (EMERGENCY)
Age: 87
Discharge: HOME | End: 2022-08-04
Attending: EMERGENCY MEDICINE
Payer: MEDICARE

## 2022-08-04 ENCOUNTER — APPOINTMENT (OUTPATIENT)
Dept: GENERAL RADIOLOGY | Age: 87
End: 2022-08-04
Payer: MEDICARE

## 2022-08-04 VITALS
TEMPERATURE: 98 F | HEART RATE: 90 BPM | BODY MASS INDEX: 22.14 KG/M2 | WEIGHT: 125 LBS | DIASTOLIC BLOOD PRESSURE: 74 MMHG | RESPIRATION RATE: 16 BRPM | OXYGEN SATURATION: 96 % | SYSTOLIC BLOOD PRESSURE: 127 MMHG

## 2022-08-04 DIAGNOSIS — M25.552 LEFT HIP PAIN: Primary | ICD-10-CM

## 2022-08-04 PROCEDURE — 6370000000 HC RX 637 (ALT 250 FOR IP): Performed by: EMERGENCY MEDICINE

## 2022-08-04 PROCEDURE — 72170 X-RAY EXAM OF PELVIS: CPT

## 2022-08-04 PROCEDURE — 72192 CT PELVIS W/O DYE: CPT | Performed by: RADIOLOGY

## 2022-08-04 PROCEDURE — 99284 EMERGENCY DEPT VISIT MOD MDM: CPT

## 2022-08-04 PROCEDURE — 72192 CT PELVIS W/O DYE: CPT

## 2022-08-04 PROCEDURE — 6360000002 HC RX W HCPCS: Performed by: EMERGENCY MEDICINE

## 2022-08-04 PROCEDURE — 73552 X-RAY EXAM OF FEMUR 2/>: CPT | Performed by: RADIOLOGY

## 2022-08-04 PROCEDURE — 73552 X-RAY EXAM OF FEMUR 2/>: CPT

## 2022-08-04 PROCEDURE — 72170 X-RAY EXAM OF PELVIS: CPT | Performed by: RADIOLOGY

## 2022-08-04 PROCEDURE — 96372 THER/PROPH/DIAG INJ SC/IM: CPT

## 2022-08-04 RX ORDER — HYDROCODONE BITARTRATE AND ACETAMINOPHEN 5; 325 MG/1; MG/1
1 TABLET ORAL EVERY 6 HOURS PRN
Qty: 12 TABLET | Refills: 0 | Status: SHIPPED | OUTPATIENT
Start: 2022-08-04 | End: 2022-08-07

## 2022-08-04 RX ORDER — HYDROMORPHONE HYDROCHLORIDE 1 MG/ML
1 INJECTION, SOLUTION INTRAMUSCULAR; INTRAVENOUS; SUBCUTANEOUS ONCE
Status: COMPLETED | OUTPATIENT
Start: 2022-08-04 | End: 2022-08-04

## 2022-08-04 RX ORDER — HYDROCODONE BITARTRATE AND ACETAMINOPHEN 5; 325 MG/1; MG/1
1 TABLET ORAL ONCE
Status: COMPLETED | OUTPATIENT
Start: 2022-08-04 | End: 2022-08-04

## 2022-08-04 RX ADMIN — HYDROMORPHONE HYDROCHLORIDE 1 MG: 1 INJECTION, SOLUTION INTRAMUSCULAR; INTRAVENOUS; SUBCUTANEOUS at 18:03

## 2022-08-04 RX ADMIN — HYDROCODONE BITARTRATE AND ACETAMINOPHEN 1 TABLET: 5; 325 TABLET ORAL at 16:44

## 2022-08-04 ASSESSMENT — ENCOUNTER SYMPTOMS
COUGH: 0
RHINORRHEA: 0
NAUSEA: 0
SORE THROAT: 0
ABDOMINAL PAIN: 0
BACK PAIN: 0
DIARRHEA: 0
SHORTNESS OF BREATH: 0
VOMITING: 0

## 2022-08-04 ASSESSMENT — PAIN SCALES - GENERAL
PAINLEVEL_OUTOF10: 10

## 2022-08-04 ASSESSMENT — PAIN DESCRIPTION - LOCATION: LOCATION: HIP

## 2022-08-04 ASSESSMENT — PAIN - FUNCTIONAL ASSESSMENT: PAIN_FUNCTIONAL_ASSESSMENT: 0-10

## 2022-08-04 ASSESSMENT — PAIN DESCRIPTION - ORIENTATION: ORIENTATION: LEFT

## 2022-08-04 NOTE — ED PROVIDER NOTES
[facility] EMERGENCY DEPT  eMERGENCY dEPARTMENT eNCOUnter      Pt Name: Magaly Botello  MRN: [id number]  [place] 4/21/1928  Date of evaluation: 8/4/2022  Provider: Miguel Fraser MD    [address]       Chief Complaint   Patient presents with    Fall     Left hip pain after falling in the yard and again in the shower. HISTORY OF PRESENT ILLNESS   (Location/Symptom, Timing/Onset,Context/Setting, Quality, Duration, Modifying Factors, Severity)  Note limiting factors. Magaly Botello is a 80 y.o. female who presents to the emergency department for left hip region pain. Patient states that she mowed on her riding lawn more yesterday afternoon for approximately 2 hours and whenever she stood up off the lawnmower her left leg gave out on her and she fell. Since then she has had some ongoing left hip and buttock region discomfort. She states she is still able to ambulate however it is painful if she tries to bear full weight on the leg. She was still able ambulate today with her walker and help from her family prior to arrival.  Does states she has a artificial left hip that was placed approximately 10 years ago. She denies any neck or back pain. Admits that she has fallen several more times since her initial fall yesterday due to the pain when trying to ambulate. No head trauma. HPI    NursingNotes were reviewed. REVIEW OF SYSTEMS    (2-9 systems for level 4, 10 or more for level 5)     Review of Systems   Constitutional:  Negative for chills and fever. HENT:  Negative for rhinorrhea and sore throat. Respiratory:  Negative for cough and shortness of breath. Cardiovascular:  Negative for chest pain and leg swelling. Gastrointestinal:  Negative for abdominal pain, diarrhea, nausea and vomiting. Genitourinary:  Negative for dysuria, frequency and urgency. Musculoskeletal:  Positive for gait problem. Negative for back pain and neck pain. Neurological:  Negative for dizziness and headaches.    All other systems reviewed and are negative. PAST MEDICALHISTORY     Past Medical History:   Diagnosis Date    Burning mouth syndrome     Chronic insomnia     Glaucoma     Hyperlipidemia     Hypertension     Hypothyroidism     Low back pain     Pure hypercholesterolemia 5/10/2016         SURGICAL HISTORY       Past Surgical History:   Procedure Laterality Date    APPENDECTOMY      BREAST BIOPSY      CARPAL TUNNEL RELEASE      COLONOSCOPY  2005        ERCP      ? Dr.Whitney Ngo Sly SPUR SURGERY      HIP ARTHROPLASTY Left January 30, 2014    HYSTERECTOMY (CERVIX STATUS UNKNOWN)      REVISION TOTAL HIP ARTHROPLASTY Left February 4, 2014    UPPER GASTROINTESTINAL ENDOSCOPY      UPPER GASTROINTESTINAL ENDOSCOPY  12/2011             CURRENT MEDICATIONS     Discharge Medication List as of 8/4/2022  7:29 PM        CONTINUE these medications which have NOT CHANGED    Details   atorvastatin (LIPITOR) 10 MG tablet TAKE 1 TABLET DAILY FOR    CHOLESTEROL, Disp-90 tablet, R-3Normal      SYNTHROID 50 MCG tablet TAKE 1 TABLET DAILY FOR LOWTHYROID.  CHANGE IN DOSE, Disp-90 tablet, R-3Normal      VYZULTA 0.024 % SOLN DAWHistorical Med      doxepin (SINEQUAN) 50 MG capsule TAKE 1 CAPSULE AT BEDTIME  FOR CHRONIC INSOMNIA, Disp-90 capsule, R-3Normal      cloNIDine (CATAPRES) 0.1 MG tablet TAKE ONE TABLET BY MOUTH ONCE DAILY IF NEEDED FOR BLOOD PRESSURE GREATER THAN 160/100, Disp-30 tablet, R-0Normal      valsartan (DIOVAN) 80 MG tablet TAKE ONE TABLET BY MOUTH ONCE EVERY DAY FOR BLOOD PRESSURE**STOP OLMESARTAN**, Disp-30 tablet, R-0Normal      amLODIPine (NORVASC) 5 MG tablet TAKE 1 TABLET DAILY, Disp-90 tablet, R-3Normal      metoprolol tartrate (LOPRESSOR) 50 MG tablet One and 1/2 tab po BID for blood pressure, Disp-270 tablet, R-1Cancel previous prescription errorNormal      timolol (BETIMOL) 0.5 % ophthalmic solution Apply to eyeHistorical Med      ketorolac (TORADOL) 10 MG tablet Take 1 tablet by mouth every 6 hours as needed for Pain, Disp-10 tablet, R-0Normal      aspirin 81 MG tablet aspirin   dailyHistorical Med      Probiotic Product (PROBIOTIC-10) CAPS Take by mouth onceHistorical Med      sodium chloride (AIDA 128) 5 % ophthalmic solution Place 1 drop into both eyes 2 times dailyHistorical Med      timolol (TIMOPTIC) 0.5 % ophthalmic solution Historical Med      ZIOPTAN 0.0015 % SOLN Historical Med             ALLERGIES     Gabapentin, Morphine, and Stadol [butorphanol tartrate]    FAMILY HISTORY       Family History   Problem Relation Age of Onset    Heart Disease Mother     Lung Cancer Father           SOCIAL HISTORY       Social History     Socioeconomic History    Marital status:      Spouse name: None    Number of children: 2    Years of education: None    Highest education level: None   Occupational History    Occupation: Homemaker   Tobacco Use    Smoking status: Never    Smokeless tobacco: Never   Vaping Use    Vaping Use: Never used   Substance and Sexual Activity    Alcohol use: No    Drug use: No    Sexual activity: Not Currently       SCREENINGS    Shorty Coma Scale  Eye Opening: Spontaneous  Best Verbal Response: Oriented  Best Motor Response: Obeys commands  Shorty Coma Scale Score: 15        PHYSICAL EXAM    (up to 7 for level 4, 8 or more for level 5)     ED Triage Vitals [08/04/22 1611]   BP Temp Temp src Heart Rate Resp SpO2 Height Weight   (!) 112/96 98 °F (36.7 °C) -- 98 18 94 % -- 125 lb (56.7 kg)       Physical Exam  Vitals and nursing note reviewed. Constitutional:       General: She is not in acute distress. Appearance: Normal appearance. She is well-developed. She is not ill-appearing or diaphoretic. HENT:      Head: Normocephalic and atraumatic.       Right Ear: External ear normal.      Left Ear: External ear normal.      Nose: Nose normal.      Mouth/Throat:      Mouth: Mucous membranes are moist.   Eyes:      Conjunctiva/sclera: Conjunctivae normal.   Neck: Trachea: No tracheal deviation. Cardiovascular:      Rate and Rhythm: Normal rate and regular rhythm. Pulses: Normal pulses. Heart sounds: Normal heart sounds. No murmur heard. Pulmonary:      Effort: No respiratory distress. Breath sounds: Normal breath sounds. No wheezing or rales. Abdominal:      Palpations: Abdomen is soft. Tenderness: There is no abdominal tenderness. Musculoskeletal:         General: Normal range of motion. Cervical back: Normal range of motion. Comments: No obvious deformities, tenderness over the left posterior hip buttock region she is able lift her leg up off the bed, remainder of leg is nontender    No midline neck or back pain. Skin:     General: Skin is warm and dry. Neurological:      Mental Status: She is alert and oriented to person, place, and time. GCS: GCS eye subscore is 4. GCS verbal subscore is 5. GCS motor subscore is 6. DIAGNOSTIC RESULTS           RADIOLOGY:  Non-plain film images such as CT, Ultrasound and MRI are read by the radiologist. Plain radiographic images are visualized and preliminarily interpreted bythe emergency physician with the below findings:      CT PELVIS WO CONTRAST Additional Contrast? None   Final Result       1. No evidence of right hip or pelvic fracture/dislocation   2. Postop changes status post left hip arthroplasty. Normal alignment maintained   3. 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 All CT scans at this facility utilize dose modulation, iterative reconstruction, and/or weight based dosing when appropriate to reduce radiation dose to as low as reasonably achievable. Electronically Signed by Andrew Parmar at 04-Aug-2022 08:04:32 PM               XR PELVIS (1-2 VIEWS)   Final Result   No gross fracture. Severe OA right hip OA       Recommendation:    Follow up as clinically indicated.    Electronically Signed by Adan Holden MD at 04-Aug-2022 06:35:02 PM               XR FEMUR LEFT (MIN 2 VIEWS)   Final Result   Left hip prosthesis. Mild degenerative changes at the knee. No acute osseous injury noted. Recommendation:    Follow up as clinically indicated. Electronically Signed by Adan Holden MD at 04-Aug-2022 06:37:24 PM                       LABS:  Labs Reviewed - No data to display    All other labs were within normal range or not returned as of this dictation. EMERGENCY DEPARTMENT COURSE and DIFFERENTIAL DIAGNOSIS/MDM:   Vitals:    Vitals:    08/04/22 1611 08/04/22 1927   BP: (!) 112/96 127/74   Pulse: 98 90   Resp: 18 16   Temp: 98 °F (36.7 °C)    SpO2: 94% 96%   Weight: 125 lb (56.7 kg)        MDM     Amount and/or Complexity of Data Reviewed  Tests in the radiology section of CPT®: ordered and reviewed  Independent visualization of images, tracings, or specimens: yes      Neg xray and CT pelvis. Pt ambulatory with walker here requiring no assistance now after pain control. Stable for DC and outpt follow up. Understands return precautions. CONSULTS:  None    PROCEDURES:  Unless otherwise noted below, none     Procedures    FINAL IMPRESSION      1. Left hip pain          DISPOSITION/PLAN   DISPOSITION Decision To Discharge 08/04/2022 07:29:00 PM      PATIENT REFERRED TO:  Ayanna Holden MD  [address] [place] [address]  [phone]    Schedule an appointment as soon as possible for a visit in 1 week  As needed, If symptoms worsen    [facility] EMERGENCY DEPT  [facility]  [phone]        DISCHARGE MEDICATIONS:  Discharge Medication List as of 8/4/2022  7:29 PM        START taking these medications    Details   HYDROcodone-acetaminophen (NORCO) 5-325 MG per tablet Take 1 tablet by mouth every 6 hours as needed for Pain for up to 3 days. Intended supply: 3 days.  Take lowest dose possible to manage pain, Disp-12 tablet, R-0Normal                (Please note that portions of this note were completed with a voice recognition program.  Efforts were made to edit thedictations but occasionally words are mis-transcribed.)    Rosemary Ventura MD (electronically signed)  Attending Emergency Physician        George Ceron MD  08/04/22 3255

## 2022-08-05 ENCOUNTER — CARE COORDINATION (OUTPATIENT)
Dept: CARE COORDINATION | Age: 87
End: 2022-08-05

## 2022-08-05 NOTE — ED NOTES
Pt was informed by this RN that her scans were clear and she was up for discharge. Pt and daughter insisted that something must be wrong and won't leave until they talk to the doctor.  Dr. Shin Morning aware      Thang Kim RN  08/04/22 1950

## 2022-08-05 NOTE — CARE COORDINATION
ER follow up note:  ACM spoke to patient's daughter-in-law, Gerhardt Eva. Patient is still having pain today post fall on Wednesday, visit to ER on Thursday with Gerhardt Eva. ER imaging showed no fractures. Patient was prescribed Hydrocodone for pain mgmt but it has not helped much. She is very sore. Her son, Yusef Santoyo, is with her today. Patient's sons are concerned about pt, have talked to her several times about slowing down her physical activity due to injury risk, yet patient is not ready to curtail her routine at this time. Her family has been very attentive and want to support her wellbeing and safe independence. Actions:  Discussed Lidocaine patches as pain mgmt option but pt has had limited success using them in the past. Despite no fracture on imaging, patient may be very sore for then next 5-7 days before pain begins to subside/resolve. ACM offered to set up ortho appointment per ER note - Gerhardt Eva will talk to Yusef Santoyo and let me or office know if they want to proceed. Discussed option for Physical Therapy and also Palliative Care service. PT would provide some additional support to patient for a limited time. Palliative Care referral would place a clinician on patient's Care Team that makes periodic home visits to patient for support/observation/evaluation of environment and needs. Both services come to the patient, might offer an acceptable level of presence for her. Palliative Care does not replace Dr. Olivier [facility] or any other services she might need or want. Gerhardt Eva voiced understanding, stated she will discuss with rest of family/patient. ACM will notify PCP to request approval should family wish to proceed.   Electronically signed by Dayanara Jalloh RN on 8/5/2022 at 10:02 AM

## 2022-08-08 ENCOUNTER — TELEPHONE (OUTPATIENT)
Dept: PRIMARY CARE CLINIC | Age: 87
End: 2022-08-08

## 2022-08-08 ENCOUNTER — PATIENT MESSAGE (OUTPATIENT)
Dept: NEUROLOGY | Age: 87
End: 2022-08-08

## 2022-08-08 RX ORDER — PREDNISONE 10 MG/1
10 TABLET ORAL DAILY
Qty: 10 TABLET | Refills: 0 | Status: SHIPPED | OUTPATIENT
Start: 2022-08-08 | End: 2022-08-18

## 2022-08-24 ENCOUNTER — TELEPHONE (OUTPATIENT)
Dept: PRIMARY CARE CLINIC | Age: 87
End: 2022-08-24

## 2022-08-24 RX ORDER — PREDNISONE 1 MG/1
5 TABLET ORAL DAILY
Qty: 10 TABLET | Refills: 0 | Status: SHIPPED | OUTPATIENT
Start: 2022-08-24 | End: 2022-09-03

## 2022-08-24 NOTE — TELEPHONE ENCOUNTER
Pt Son states Pt hip and leg still hurting. Having trouble getting out of bed. Asking for another round of Steroids.  Elias Voss

## 2022-08-25 ENCOUNTER — APPOINTMENT (OUTPATIENT)
Dept: ULTRASOUND IMAGING | Facility: HOSPITAL | Age: 87
End: 2022-08-25

## 2022-08-25 ENCOUNTER — HOSPITAL ENCOUNTER (EMERGENCY)
Facility: HOSPITAL | Age: 87
Discharge: HOME | End: 2022-08-25
Admitting: EMERGENCY MEDICINE

## 2022-08-25 ENCOUNTER — APPOINTMENT (OUTPATIENT)
Dept: GENERAL RADIOLOGY | Facility: HOSPITAL | Age: 87
End: 2022-08-25

## 2022-08-25 VITALS
HEART RATE: 100 BPM | HEIGHT: 63 IN | DIASTOLIC BLOOD PRESSURE: 84 MMHG | OXYGEN SATURATION: 100 % | BODY MASS INDEX: 22.15 KG/M2 | WEIGHT: 125 LBS | SYSTOLIC BLOOD PRESSURE: 138 MMHG | RESPIRATION RATE: 16 BRPM | TEMPERATURE: 98.9 F

## 2022-08-25 DIAGNOSIS — M71.22 BAKER'S CYST OF KNEE, LEFT: Primary | ICD-10-CM

## 2022-08-25 PROCEDURE — 93970 EXTREMITY STUDY: CPT

## 2022-08-25 PROCEDURE — 99283 EMERGENCY DEPT VISIT LOW MDM: CPT

## 2022-08-25 PROCEDURE — 93970 EXTREMITY STUDY: CPT | Performed by: SURGERY

## 2022-08-25 PROCEDURE — 73564 X-RAY EXAM KNEE 4 OR MORE: CPT

## 2022-08-25 RX ORDER — METOPROLOL TARTRATE 50 MG/1
TABLET, FILM COATED ORAL
Status: ON HOLD | COMMUNITY
Start: 2022-03-21 | End: 2023-01-02

## 2022-08-25 RX ORDER — ATORVASTATIN CALCIUM 10 MG/1
1 TABLET, FILM COATED ORAL DAILY
COMMUNITY
Start: 2022-08-02 | End: 2023-01-05 | Stop reason: HOSPADM

## 2022-08-25 RX ORDER — ONDANSETRON 4 MG/1
4 TABLET, ORALLY DISINTEGRATING ORAL ONCE
Status: DISCONTINUED | OUTPATIENT
Start: 2022-08-25 | End: 2022-08-25

## 2022-08-25 RX ORDER — ACETAMINOPHEN 500 MG
1000 TABLET ORAL ONCE
Status: COMPLETED | OUTPATIENT
Start: 2022-08-25 | End: 2022-08-25

## 2022-08-25 RX ORDER — AMLODIPINE BESYLATE 5 MG/1
1 TABLET ORAL DAILY
COMMUNITY
Start: 2022-05-02 | End: 2023-01-05 | Stop reason: HOSPADM

## 2022-08-25 RX ORDER — VALSARTAN 80 MG/1
TABLET ORAL
Status: ON HOLD | COMMUNITY
Start: 2022-05-06 | End: 2023-01-02

## 2022-08-25 RX ORDER — LEVOTHYROXINE SODIUM 0.05 MG/1
TABLET ORAL
Status: ON HOLD | COMMUNITY
Start: 2022-07-25 | End: 2023-01-02

## 2022-08-25 RX ADMIN — ACETAMINOPHEN 1000 MG: 500 TABLET ORAL at 16:41

## 2022-08-30 ENCOUNTER — OFFICE VISIT (OUTPATIENT)
Dept: PRIMARY CARE CLINIC | Age: 87
End: 2022-08-30
Payer: MEDICARE

## 2022-08-30 VITALS
DIASTOLIC BLOOD PRESSURE: 74 MMHG | TEMPERATURE: 97.1 F | HEART RATE: 75 BPM | SYSTOLIC BLOOD PRESSURE: 130 MMHG | OXYGEN SATURATION: 98 % | HEIGHT: 63 IN | BODY MASS INDEX: 22.14 KG/M2

## 2022-08-30 DIAGNOSIS — M54.16 RIGHT LUMBAR RADICULOPATHY: ICD-10-CM

## 2022-08-30 DIAGNOSIS — M53.3 SI (SACROILIAC) JOINT DYSFUNCTION: Primary | ICD-10-CM

## 2022-08-30 DIAGNOSIS — I10 ESSENTIAL HYPERTENSION: ICD-10-CM

## 2022-08-30 LAB
ALBUMIN SERPL-MCNC: 3.9 G/DL (ref 3.5–5.2)
ALP BLD-CCNC: 130 U/L (ref 35–104)
ALT SERPL-CCNC: 18 U/L (ref 5–33)
ANION GAP SERPL CALCULATED.3IONS-SCNC: 13 MMOL/L (ref 7–19)
AST SERPL-CCNC: 19 U/L (ref 5–32)
BILIRUB SERPL-MCNC: 0.7 MG/DL (ref 0.2–1.2)
BUN BLDV-MCNC: 15 MG/DL (ref 8–23)
CALCIUM SERPL-MCNC: 9.4 MG/DL (ref 8.2–9.6)
CHLORIDE BLD-SCNC: 106 MMOL/L (ref 98–111)
CO2: 25 MMOL/L (ref 22–29)
CREAT SERPL-MCNC: 0.9 MG/DL (ref 0.5–0.9)
GFR AFRICAN AMERICAN: >59
GFR NON-AFRICAN AMERICAN: 58
GLUCOSE BLD-MCNC: 107 MG/DL (ref 74–109)
HCT VFR BLD CALC: 44.4 % (ref 37–47)
HEMOGLOBIN: 13.9 G/DL (ref 12–16)
MCH RBC QN AUTO: 31.7 PG (ref 27–31)
MCHC RBC AUTO-ENTMCNC: 31.3 G/DL (ref 33–37)
MCV RBC AUTO: 101.1 FL (ref 81–99)
PDW BLD-RTO: 13.3 % (ref 11.5–14.5)
PLATELET # BLD: 289 K/UL (ref 130–400)
PMV BLD AUTO: 10.4 FL (ref 9.4–12.3)
POTASSIUM SERPL-SCNC: 3.8 MMOL/L (ref 3.5–5)
RBC # BLD: 4.39 M/UL (ref 4.2–5.4)
SODIUM BLD-SCNC: 144 MMOL/L (ref 136–145)
TOTAL PROTEIN: 6.7 G/DL (ref 6.6–8.7)
WBC # BLD: 9.6 K/UL (ref 4.8–10.8)

## 2022-08-30 PROCEDURE — G8420 CALC BMI NORM PARAMETERS: HCPCS | Performed by: FAMILY MEDICINE

## 2022-08-30 PROCEDURE — 99213 OFFICE O/P EST LOW 20 MIN: CPT | Performed by: FAMILY MEDICINE

## 2022-08-30 PROCEDURE — 1036F TOBACCO NON-USER: CPT | Performed by: FAMILY MEDICINE

## 2022-08-30 PROCEDURE — 1123F ACP DISCUSS/DSCN MKR DOCD: CPT | Performed by: FAMILY MEDICINE

## 2022-08-30 PROCEDURE — G8427 DOCREV CUR MEDS BY ELIG CLIN: HCPCS | Performed by: FAMILY MEDICINE

## 2022-08-30 PROCEDURE — 1090F PRES/ABSN URINE INCON ASSESS: CPT | Performed by: FAMILY MEDICINE

## 2022-08-30 SDOH — ECONOMIC STABILITY: FOOD INSECURITY: WITHIN THE PAST 12 MONTHS, THE FOOD YOU BOUGHT JUST DIDN'T LAST AND YOU DIDN'T HAVE MONEY TO GET MORE.: NEVER TRUE

## 2022-08-30 SDOH — ECONOMIC STABILITY: FOOD INSECURITY: WITHIN THE PAST 12 MONTHS, YOU WORRIED THAT YOUR FOOD WOULD RUN OUT BEFORE YOU GOT MONEY TO BUY MORE.: NEVER TRUE

## 2022-08-30 ASSESSMENT — PATIENT HEALTH QUESTIONNAIRE - PHQ9
2. FEELING DOWN, DEPRESSED OR HOPELESS: 0
SUM OF ALL RESPONSES TO PHQ QUESTIONS 1-9: 0
SUM OF ALL RESPONSES TO PHQ QUESTIONS 1-9: 0
1. LITTLE INTEREST OR PLEASURE IN DOING THINGS: 0
SUM OF ALL RESPONSES TO PHQ QUESTIONS 1-9: 0
SUM OF ALL RESPONSES TO PHQ9 QUESTIONS 1 & 2: 0
SUM OF ALL RESPONSES TO PHQ QUESTIONS 1-9: 0

## 2022-08-30 ASSESSMENT — ENCOUNTER SYMPTOMS: RESPIRATORY NEGATIVE: 1

## 2022-08-30 ASSESSMENT — SOCIAL DETERMINANTS OF HEALTH (SDOH): HOW HARD IS IT FOR YOU TO PAY FOR THE VERY BASICS LIKE FOOD, HOUSING, MEDICAL CARE, AND HEATING?: NOT HARD AT ALL

## 2022-08-30 NOTE — PATIENT INSTRUCTIONS
We are committed to providing you with the best care possible. In order to help us achieve these goals please remember to bring all medications, herbal products, and over the counter supplements with you to each visit. If your provider has ordered testing for you, please be sure to follow up with our office if you have not received results within 7 days after the testing took place. *If you receive a survey after visiting one of our offices, please take time to share your experience concerning your physician office visit. These surveys are confidential and no health information about you is shared. We are eager to improve for you and we are counting on your feedback to help make that happen.      Wt Readings from Last 3 Encounters:   08/04/22 125 lb (56.7 kg)   07/19/22 125 lb (56.7 kg)   06/21/22 124 lb (56.2 kg)

## 2022-08-31 NOTE — PROGRESS NOTES
SUBJECTIVE:    Jonel Ramirez is 80 y. o.female who comes in complaining of Follow-Up from Hospital (Due to leg pain - left > right @ [facility].  )   . HPI: Mrs. Van Bennett comes in today for follow-up from her recent ER visit due to leg and lower back pain. She says that she hurt her back when she was in her 35s and she would occasionally have a flareup. She fell last month after getting off the mower because her left hip gave way. She crawled to the back door and into her bedroom and then pulled herself up. The next month she noticed that her lower back was hurting and it was radiating down her right leg. The pain was severe in spite of low-dose prednisone so she went to the emergency room at [facility] on 8/25/2022. They told her that there was no fracture but that she had a Baker's cyst behind her left knee. She says she is a little better now. The left knee revealed osteoarthritic changes with no acute abnormality. Allergies   Allergen Reactions    Gabapentin Swelling    Morphine     Stadol [Butorphanol Tartrate]        Social History     Socioeconomic History    Marital status:      Spouse name: None    Number of children: 2    Years of education: None    Highest education level: None   Occupational History    Occupation: Homemaker   Tobacco Use    Smoking status: Never    Smokeless tobacco: Never   Vaping Use    Vaping Use: Never used   Substance and Sexual Activity    Alcohol use: No    Drug use: No    Sexual activity: Not Currently     Social Determinants of Health     Financial Resource Strain: Low Risk     Difficulty of Paying Living Expenses: Not hard at all   Food Insecurity: No Food Insecurity    Worried About Running Out of Food in the Last Year: Never true    Ran Out of Food in the Last Year: Never true       Review of Systems   Constitutional:  Positive for activity change. Respiratory: Negative. Cardiovascular: Negative.     Musculoskeletal:  Positive for arthralgias and gait problem. Neurological:  Positive for weakness and numbness. Current Outpatient Medications on File Prior to Visit   Medication Sig Dispense Refill    predniSONE (DELTASONE) 5 MG tablet Take 1 tablet by mouth daily for 10 days 10 tablet 0    atorvastatin (LIPITOR) 10 MG tablet TAKE 1 TABLET DAILY FOR    CHOLESTEROL 90 tablet 3    SYNTHROID 50 MCG tablet TAKE 1 TABLET DAILY FOR LOWTHYROID. CHANGE IN DOSE 90 tablet 3    VYZULTA 0.024 % SOLN       doxepin (SINEQUAN) 50 MG capsule TAKE 1 CAPSULE AT BEDTIME  FOR CHRONIC INSOMNIA 90 capsule 3    cloNIDine (CATAPRES) 0.1 MG tablet TAKE ONE TABLET BY MOUTH ONCE DAILY IF NEEDED FOR BLOOD PRESSURE GREATER THAN 160/100 30 tablet 0    valsartan (DIOVAN) 80 MG tablet TAKE ONE TABLET BY MOUTH ONCE EVERY DAY FOR BLOOD PRESSURE**STOP OLMESARTAN** 30 tablet 0    amLODIPine (NORVASC) 5 MG tablet TAKE 1 TABLET DAILY 90 tablet 3    metoprolol tartrate (LOPRESSOR) 50 MG tablet One and 1/2 tab po BID for blood pressure 270 tablet 1    timolol (BETIMOL) 0.5 % ophthalmic solution Apply to eye      ketorolac (TORADOL) 10 MG tablet Take 1 tablet by mouth every 6 hours as needed for Pain 10 tablet 0    aspirin 81 MG tablet aspirin   daily      Probiotic Product (PROBIOTIC-10) CAPS Take by mouth once      sodium chloride (AIDA 128) 5 % ophthalmic solution Place 1 drop into both eyes 2 times daily      timolol (TIMOPTIC) 0.5 % ophthalmic solution       ZIOPTAN 0.0015 % SOLN        No current facility-administered medications on file prior to visit. OBJECTIVE:    Wt Readings from Last 3 Encounters:   08/04/22 125 lb (56.7 kg)   07/19/22 125 lb (56.7 kg)   06/21/22 124 lb (56.2 kg)       /74   Pulse 75   Temp 97.1 °F (36.2 °C)   Ht 5' 3\" (1.6 m)   SpO2 98%   BMI 22.14 kg/m²     Physical Exam  Vitals reviewed. Constitutional:       General: She is not in acute distress. Appearance: Normal appearance. She is well-developed. HENT:      Head: Normocephalic. Cardiovascular:      Rate and Rhythm: Normal rate and regular rhythm. Heart sounds: Normal heart sounds. Pulmonary:      Effort: Pulmonary effort is normal.      Breath sounds: Normal breath sounds. Musculoskeletal:         General: No swelling, tenderness, deformity or signs of injury. Cervical back: Normal range of motion and neck supple. Right lower leg: No edema. Left lower leg: No edema. Comments: Chronic changes of osteoarthritis at hands and knees but no increased warmth or redness. Skin:     General: Skin is warm and dry. Neurological:      Mental Status: She is alert and oriented to person, place, and time. Motor: Weakness present. Comments: Straight leg raising test negative bilaterally   Psychiatric:         Mood and Affect: Mood normal.         Behavior: Behavior normal.         Thought Content: Thought content normal.         Judgment: Judgment normal.       ASSESSMENT:    1. SI (sacroiliac) joint dysfunction    2. Essential hypertension    3. Right lumbar radiculopathy          PLAN:  1. SI (sacroiliac) joint dysfunction  -     [facility] LACI [place], Pain Medicine, [place]  2. Essential hypertension  -     CBC  -     Comprehensive Metabolic Panel  3. Right lumbar radiculopathy  -     [address], [facility] Miriam Fletcher APRN, Pain Medicine, [place]     I reviewed her records from [facility] on 8/25/2022. Venous Doppler of the lower legs revealed no blood clot. Impression: There is no evidence of deep venous thrombosis or   superficial thrombophlebitis of right or left lower extremities. There   is a Baker's cyst in the left popliteal space measuring 5.25 x 1.1 x   1.03 cm. X-ray of her left knee revealed:Impression: There is no evidence of deep venous thrombosis or   superficial thrombophlebitis of right or left lower extremities. There   is a Baker's cyst in the left popliteal space measuring 5.25 x 1.1 x   1.03 cm.     She really feels like the injection they gave her for her back and right sciatica at [facility] pain management helped. She would like to go back and see them. Blood pressure is well controlled. We will check a CBC and CMP. I feel that her sacroiliac joint dysfunction is related to her lower back pain which is also causing the sciatica. I explained that I cannot give her steroids long-term even though they do help the pain. Follow-up:  Return if symptoms worsen or fail to improve. PATIENT INSTRUCTIONS:  Patient Instructions   We are committed to providing you with the best care possible. In order to help us achieve these goals please remember to bring all medications, herbal products, and over the counter supplements with you to each visit. If your provider has ordered testing for you, please be sure to follow up with our office if you have not received results within 7 days after the testing took place. *If you receive a survey after visiting one of our offices, please take time to share your experience concerning your physician office visit. These surveys are confidential and no health information about you is shared. We are eager to improve for you and we are counting on your feedback to help make that happen. Wt Readings from Last 3 Encounters:   08/04/22 125 lb (56.7 kg)   07/19/22 125 lb (56.7 kg)   06/21/22 124 lb (56.2 kg)        EMR Dragon/transcription disclaimer:  Much of this encounter note is electronic transcription/translation of spoken language to printed texts. The electronic translation of spoken language may be erroneous, or at times, nonsensical words or phrases may beinadvertently transcribed.   Although I have reviewed the note for such errors, some may still exist.

## 2022-09-13 ENCOUNTER — HOSPITAL ENCOUNTER (OUTPATIENT)
Dept: PAIN MANAGEMENT | Age: 87
Discharge: HOME | End: 2022-09-13
Payer: MEDICARE

## 2022-09-13 VITALS
TEMPERATURE: 97.2 F | OXYGEN SATURATION: 93 % | RESPIRATION RATE: 16 BRPM | DIASTOLIC BLOOD PRESSURE: 82 MMHG | BODY MASS INDEX: 22.15 KG/M2 | SYSTOLIC BLOOD PRESSURE: 151 MMHG | WEIGHT: 125 LBS | HEART RATE: 72 BPM | HEIGHT: 63 IN

## 2022-09-13 DIAGNOSIS — M54.16 LUMBAR RADICULOPATHY: ICD-10-CM

## 2022-09-13 PROCEDURE — 99213 OFFICE O/P EST LOW 20 MIN: CPT | Performed by: NURSE PRACTITIONER

## 2022-09-13 PROCEDURE — 99215 OFFICE O/P EST HI 40 MIN: CPT

## 2022-09-13 ASSESSMENT — PAIN SCALES - GENERAL: PAINLEVEL_OUTOF10: 8

## 2022-09-13 ASSESSMENT — PAIN DESCRIPTION - PAIN TYPE: TYPE: CHRONIC PAIN

## 2022-09-13 ASSESSMENT — PAIN DESCRIPTION - ORIENTATION: ORIENTATION: LOWER

## 2022-09-13 ASSESSMENT — PAIN DESCRIPTION - LOCATION: LOCATION: BACK

## 2022-09-13 NOTE — PROGRESS NOTES
Pain agreement contract reviewed with patient and signed per patient. No questions voiced at the present and patient states understanding of contract. Copy given to patient. List of office phone numbers also given to patient.

## 2022-09-13 NOTE — H&P
[facility] Physical and Pain Medicine    History and Physical    Patient Name: Carlton Taveras    MR #: [id number]    Account #: [de-identified]    : 1928    Age: 80 y.o. Sex: female    Date: 2022    PCP: Alcides Powell MD         Referring Provider:    Chief Complaint:   Chief Complaint   Patient presents with    Back Pain       History of Present Illness:    Carlton Taveras is a 80 y.o. female who presents to the office with primary complaints of low back pain that radiates down her right leg to her foot. Says that she was getting off of the riding mower 6 weeks ago and fell on her back. Has had pain since that radiates down her right leg. She did finally go to ER where a CT scan of her pelvis was completed to make sure she had not damaged her left hip replacement. Says that the pain did improvement while she was on a low dose steroid. Explains that sitting, standing or walking makes the pain worse, while lying down seems to help. She has not been to any PT nor had recent MRI. Employment: Retired []   Disabled  []   Works []     Does Not Work [x]     Previous Injury:  Yes  [x]   No [] Fall 6 weeks ago    Previous Surgery: Yes []   No [x]    Previous Physical Therapy In the last 6 months? Yes  []    No [x]   Did Physical Therapy make thepain better or worse? Better []   Worse []  Unchanged []     MRI in the last two years? Yes []  No [x]  Results reviewed with patient? Yes []   No []    CT Scan in the last two years? Yes  [x]   No [] Of pelvis  Results reviewed with patient? Yes [x]   No []     X-ray in the last two years? Yes []   No  [x]  Results reviewed with patient? Yes  []  No [x]     Injections in the past?  Yes []   No [x]   Did the injections help relieve the pain? Yes []   No []     Do you have Depression? Yes  []    No [x]  Thinking of harming yourself or others?   Yes  []   No [x]    Past Medical Histoy  Past Medical History:   Diagnosis Date Arthritis     Atrial fibrillation, chronic (HCC)     Burning mouth syndrome     Cerebral artery occlusion with cerebral infarction [facility])     Chronic insomnia     Glaucoma     Hx of blood clots     Hyperlipidemia     Hypertension     Hypothyroidism     Low back pain     Pure hypercholesterolemia 05/10/2016       Surgery History  Past Surgical History:   Procedure Laterality Date    APPENDECTOMY      BREAST BIOPSY      CARPAL TUNNEL RELEASE      COLONOSCOPY  2005        ERCP      ? Dr.Whitney Love Prost SPUR SURGERY      HIP ARTHROPLASTY Left January 30, 2014    HYSTERECTOMY (CERVIX STATUS UNKNOWN)      REVISION TOTAL HIP ARTHROPLASTY Left February 4, 2014    UPPER GASTROINTESTINAL ENDOSCOPY      UPPER GASTROINTESTINAL ENDOSCOPY  12/2011            Allergies  Gabapentin, Morphine, and Stadol [butorphanol tartrate]     Current Medications  Current Outpatient Medications   Medication Sig Dispense Refill    diphenhydrAMINE-APAP, sleep, (TYLENOL PM EXTRA STRENGTH PO) Take 2 tablets by mouth nightly      atorvastatin (LIPITOR) 10 MG tablet TAKE 1 TABLET DAILY FOR    CHOLESTEROL 90 tablet 3    SYNTHROID 50 MCG tablet TAKE 1 TABLET DAILY FOR LOWTHYROID.  CHANGE IN DOSE 90 tablet 3    VYZULTA 0.024 % SOLN       cloNIDine (CATAPRES) 0.1 MG tablet TAKE ONE TABLET BY MOUTH ONCE DAILY IF NEEDED FOR BLOOD PRESSURE GREATER THAN 160/100 30 tablet 0    valsartan (DIOVAN) 80 MG tablet TAKE ONE TABLET BY MOUTH ONCE EVERY DAY FOR BLOOD PRESSURE**STOP OLMESARTAN** 30 tablet 0    amLODIPine (NORVASC) 5 MG tablet TAKE 1 TABLET DAILY 90 tablet 3    metoprolol tartrate (LOPRESSOR) 50 MG tablet One and 1/2 tab po BID for blood pressure 270 tablet 1    timolol (BETIMOL) 0.5 % ophthalmic solution Apply to eye      ketorolac (TORADOL) 10 MG tablet Take 1 tablet by mouth every 6 hours as needed for Pain 10 tablet 0    aspirin 81 MG tablet aspirin   daily      Probiotic Product (PROBIOTIC-10) CAPS Take by mouth once sodium chloride (AIDA 128) 5 % ophthalmic solution Place 1 drop into both eyes 2 times daily      timolol (TIMOPTIC) 0.5 % ophthalmic solution       ZIOPTAN 0.0015 % SOLN        No current facility-administered medications for this encounter. Social History    Social History     Tobacco Use    Smoking status: Never    Smokeless tobacco: Never   Substance Use Topics    Alcohol use: No         Family History  family history includes Heart Disease in her mother; Fam Marquising in her father. Review of Systems:  Constitutional: denies fever, chills, fatigue, change in appetite, weight gain or weight loss  Head: Normocephalic  Skin: denies easy bruising, skin redness, skin rash, hives, sensitivity to sun exposure, tightness, nodules or bumps, hair loss, color changes in the hands or feet, or feeling of temperature change such as coldness  Eyes: denies pain, redness, loss of vision, double or blurred vision, eye drainage, or dryness. ENT and Mouth: denies ringing in the ears, loss of hearing, nasal congestion, nasal discharge, no hoarseness, sore throat, or difficulty swallowing   Respiratory: denies chronic dry cough, coughing up blood, coughing up mucus, waking at night coughing or choking, or wheezing  Cardiovascular: denies chest pain, irregular heartbeats, palpitations, shortness of breath, or edema in legs  Gastrointestinal: denies, nausea, vomiting, heartburn, diarrhea, constipation  Genitourinary: denies difficult urination, pain or burning with urination, blood in the urine, or cloudy urine  Musculoskeletal: denies arm, buttock, thigh or calf cramps. Has pain in low back that radiates down right leg, and tenderness in low back. No muscle weakness. No joint swelling. Patient in wheelchair. Neurologic: headache, dizziness, fainting, loss of consciousness, no sensitivity, no memory loss. .    Endocrine: denies intolerance to hot or cold temperature, night sweats, flushing, fingernail changes, increased thirst, or hairloss   Hematologic/ Lymphatic: denies anemia, bleeding tendency or clotting tendency, bruising easily. Allergic/ Immunologic: denies rhinitis, asthma, skin sensitivity, or Latex allergy  Psychiatric: denies depression or thoughts of suicide, or voices in head. Current Pain Assessment:   Pain Assessment  Pain Assessment: 0-10  Pain Level: 8  Pain Location: Back  Pain Orientation: Lower  Pain Type: Chronic pain    Clinical Progression: gradually improving  Effect of Pain on Daily Activities: limits activity  Patient's Stated Pain Goal: No pain  Pain Intervention(s): Medication (see eMar), Repositioning, Rest, Ice    Current PE.7    ORT Score: 0    PHQ-9 Score: 14    Physical Exam:    Vitals:    22 1143   BP: (!) 151/82   Pulse: 72   Resp: 16   Temp: 97.2 °F (36.2 °C)   TempSrc: Temporal   SpO2: 93%   Weight: 125 lb (56.7 kg)   Height: 5' 3\" (1.6 m)       Body mass index is 22.14 kg/m². General Appearance: No acute distress. Appears to be well dressed  Skin Exam: Warm and dry, no jaundice, rashes or leasions  Head Exam: NCAT, PERRLA, EOMI, scalp normal  Eye Exam: PERRLA, EOMI, conjunctivae clear  Ear Exam: Normal external auricles. No drainage from ear canals  Nose Exam: Normal alignment. Turbinates clear. No drainage  Mouth Exam: Oral mucosa pink and moist. Gums pink. Throat Exam: Posterior pharynx pink in color with no edema  Neck Exam: Supple, trachea midline. No masses palpated. Respiratory Exam: Clear to ausculation in all lobes anterior and posterior. Cardiovascular Exam: Regular rate and rhythm, no gallops, no rubs or murmurs. No edema in extremities  Gastrointestinal Exam: Bowel sounds in all quadrants, soft, non-distended, non-tender with palpation, no guarding   Musculoskeletal Exam: No joint swelling or deformity. Back Exam: Pain in low mid back with palpation.  Patient in wheelchair  Hip Exam: Full rotation bilateral  Shoulder Exam: Full rotation bilateral  Knee combining Benzodiazapine's and Opioids. Explained \"Black Box Warnings\" such as; possible suppressed breathing, hypoxia, anoxia, depressed cognition, heart arrhythmia, coma and possible death. Patient verbalized understanding concerning possible effects. Controlled Substance Monitoring:   Attestation: The RASHEED report for this patient was reviewed today. Discussed with patient possible medication side effects, risk of tolerance, dependence and alternative treatments. Discussed thegrowing epidemic in the [place] with the overprescribing and at times the abuse of narcotics. Discussed the detrimental effects of long term narcotic use. Patient encouraged to set daily goals of exercising and decreasing dailynarcotic intake. Discussed with the patient about the development of hyperalgesia with long term narcotic intake. EMR dragon/transcription disclaimer: Much of this encounter note is electronic transcription/translation of spoken language to printed tach. Electronic translation of spoken language may be erroneous, or at times, nonsensical words or phrases may be inadvertently transcribed. Although, I have reviewed the note for such errors, some may still exist.    CC:  Gwen Abdullahi MD    Thank you for this kind referral and allowing me to participate    in your patients care.     Tyroneantonieta Gomez, APRN, 9/13/2022 at 12:44 PM

## 2022-09-20 ENCOUNTER — HOSPITAL ENCOUNTER (OUTPATIENT)
Dept: MRI IMAGING | Age: 87
Discharge: HOME | End: 2022-09-20
Payer: MEDICARE

## 2022-09-20 DIAGNOSIS — M54.16 LUMBAR RADICULOPATHY: ICD-10-CM

## 2022-09-20 PROCEDURE — 72148 MRI LUMBAR SPINE W/O DYE: CPT

## 2022-10-06 RX ORDER — METOPROLOL TARTRATE 50 MG/1
TABLET, FILM COATED ORAL
Qty: 270 TABLET | Refills: 1 | Status: SHIPPED | OUTPATIENT
Start: 2022-10-06

## 2022-10-12 ENCOUNTER — HOSPITAL ENCOUNTER (OUTPATIENT)
Dept: PAIN MANAGEMENT | Age: 87
Discharge: HOME | End: 2022-10-12
Payer: MEDICARE

## 2022-10-12 VITALS
SYSTOLIC BLOOD PRESSURE: 160 MMHG | HEIGHT: 63 IN | DIASTOLIC BLOOD PRESSURE: 88 MMHG | TEMPERATURE: 96.8 F | OXYGEN SATURATION: 95 % | RESPIRATION RATE: 18 BRPM | BODY MASS INDEX: 22.14 KG/M2 | HEART RATE: 66 BPM

## 2022-10-12 PROCEDURE — 99213 OFFICE O/P EST LOW 20 MIN: CPT | Performed by: NURSE PRACTITIONER

## 2022-10-12 PROCEDURE — 99214 OFFICE O/P EST MOD 30 MIN: CPT

## 2022-10-12 ASSESSMENT — PAIN DESCRIPTION - FREQUENCY: FREQUENCY: CONTINUOUS

## 2022-10-12 ASSESSMENT — PAIN DESCRIPTION - DIRECTION: RADIATING_TOWARDS: RADIATES RIGHT LEG

## 2022-10-12 ASSESSMENT — PAIN SCALES - GENERAL: PAINLEVEL_OUTOF10: 8

## 2022-10-12 ASSESSMENT — PAIN DESCRIPTION - ORIENTATION: ORIENTATION: LOWER

## 2022-10-12 ASSESSMENT — PAIN DESCRIPTION - LOCATION: LOCATION: BACK

## 2022-10-12 ASSESSMENT — PAIN DESCRIPTION - PAIN TYPE: TYPE: CHRONIC PAIN

## 2022-10-12 ASSESSMENT — PAIN - FUNCTIONAL ASSESSMENT: PAIN_FUNCTIONAL_ASSESSMENT: PREVENTS OR INTERFERES SOME ACTIVE ACTIVITIES AND ADLS

## 2022-10-12 ASSESSMENT — PAIN DESCRIPTION - ONSET: ONSET: ON-GOING

## 2022-10-12 NOTE — PROGRESS NOTES
Clinic Documentation      Education Provided:  [x] Review of Sam Olivia  [] Agreement Review  [x] PEG Score Calculated [] PHQ Score Calculated [] ORT Score Calculated    [] Compliance Issues Discussed [] Cognitive Behavior Needs [x] Exercise [] Review of Test [] Financial Issues  [x] Tobacco/Alcohol Use Reviewed [x] Teaching [] New Patient [] Picture Obtained    Physician Plan:  [x] Outgoing Referral  [] Pharmacy Consult  [] Test Ordered [] Prescription Ordered/Changed   [x] Obtained Test Results / Consult Notes        Complete if patient is withholding blood thinner for procedure     Blood Thinner Patient is currently taking:      [] Plavix (Hold for 7 days)  [x] Aspirin (Hold for 5 days)     [] Pletal (Hold for 2 days)  [] Pradaxa (Hold for 3 days)    [] Effient (Hold for 7 days)  [] Xarelto (Hold for 2 days)    [] Eliquis (Hold for 2 days)  [] Brilinta (Hold for 7 days)    [] Coumadin (Hold for 5 days) - (INR needs to be drawn the day prior to procedure- INR < 2.0)    [] Aggrenox (Hold for 7 days)        [] Patient will stop medication on their own.    [] Blood Thinner Form Faxed for approval to hold.    Provider form faxed to:     Assessment Completed by:  Electronically signed by Helen Hussein RN on 10/12/2022 at 11:10 AM

## 2022-10-12 NOTE — PROGRESS NOTES
[address] [place] [address] Physical and Pain Medicine    Office Progress Note    Patient Name: Esequiel Jaimes    MR #: [id number]    Account #: [de-identified]    : 1928    Age: 80 y.o. Sex: female    Date: 2022    PCP: Elda Garza MD         Referring Provider:    Chief Complaint:   Chief Complaint   Patient presents with    Back Pain       History of Present Illness:    Esequiel Jaimes is a 80 y.o. female who presents to the office with primary complaints of low back pain that radiates down her right leg to her foot. Had fallen when getting off of . Says that her pain is unchanged. Had MRI of lumbar spine that showed pathology at L4-L5. Patient is interested in getting injection. Has tried HEP with no improvement in her pain. Explains that her pain is interfering with her quality of life. Is unable to sleep due to the pain. Does take a ASA 81 mg daily. Daughter says that she goes in and out of [employer]. Did see Dr. Dyllan Matthews, but fired him the same day. Employment: Retired []   Disabled  []   Works []     Does Not Work [x]     Previous Injury:  Yes  [x]   No [] Fall 6 weeks ago    Previous Surgery: Yes []   No [x]    Previous Physical Therapy In the last 6 months? Yes  []    No [x]   Did Physical Therapy make thepain better or worse? Better []   Worse []  Unchanged []     MRI in the last two years? Yes [x]  No []  Results reviewed with patient? Yes [x]   No []    CT Scan in the last two years? Yes  [x]   No [] Of pelvis  Results reviewed with patient? Yes [x]   No []     X-ray in the last two years? Yes []   No  [x]  Results reviewed with patient? Yes  []  No [x]     Injections in the past?  Yes []   No [x]   Did the injections help relieve the pain? Yes []   No [x]     Do you have Depression? Yes  []    No [x]  Thinking of harming yourself or others?   Yes  []   No [x]    Past Medical Histoy  Past Medical History:   Diagnosis Date    Arthritis     Atrial fibrillation, chronic (HCC)     Burning mouth syndrome     Cerebral artery occlusion with cerebral infarction [facility])     Chronic insomnia     Glaucoma     Hx of blood clots     Hyperlipidemia     Hypertension     Hypothyroidism     Low back pain     Pure hypercholesterolemia 05/10/2016       Surgery History  Past Surgical History:   Procedure Laterality Date    APPENDECTOMY      BREAST BIOPSY      CARPAL TUNNEL RELEASE      COLONOSCOPY  2005        ERCP      ? Dr.Whitney Jimenez Danielle SPUR SURGERY      HIP ARTHROPLASTY Left January 30, 2014    HYSTERECTOMY (CERVIX STATUS UNKNOWN)      REVISION TOTAL HIP ARTHROPLASTY Left February 4, 2014    UPPER GASTROINTESTINAL ENDOSCOPY      UPPER GASTROINTESTINAL ENDOSCOPY  12/2011            Allergies  Gabapentin, Morphine, and Stadol [butorphanol tartrate]     Current Medications  Current Outpatient Medications   Medication Sig Dispense Refill    metoprolol tartrate (LOPRESSOR) 50 MG tablet TAKE 1 AND 1/2 TABLETS     TWICE DAILY FOR BLOOD      PRESSURE (CANCEL PREVIOUS  PRESCRIPTION ERROR) 270 tablet 1    diphenhydrAMINE-APAP, sleep, (TYLENOL PM EXTRA STRENGTH PO) Take 2 tablets by mouth nightly      atorvastatin (LIPITOR) 10 MG tablet TAKE 1 TABLET DAILY FOR    CHOLESTEROL 90 tablet 3    SYNTHROID 50 MCG tablet TAKE 1 TABLET DAILY FOR LOWTHYROID.  CHANGE IN DOSE 90 tablet 3    VYZULTA 0.024 % SOLN       cloNIDine (CATAPRES) 0.1 MG tablet TAKE ONE TABLET BY MOUTH ONCE DAILY IF NEEDED FOR BLOOD PRESSURE GREATER THAN 160/100 30 tablet 0    valsartan (DIOVAN) 80 MG tablet TAKE ONE TABLET BY MOUTH ONCE EVERY DAY FOR BLOOD PRESSURE**STOP OLMESARTAN** 30 tablet 0    amLODIPine (NORVASC) 5 MG tablet TAKE 1 TABLET DAILY 90 tablet 3    timolol (BETIMOL) 0.5 % ophthalmic solution Apply to eye      ketorolac (TORADOL) 10 MG tablet Take 1 tablet by mouth every 6 hours as needed for Pain 10 tablet 0    aspirin 81 MG tablet aspirin   daily      Probiotic Product (PROBIOTIC-10) CAPS Take by mouth once      sodium chloride (AIDA 128) 5 % ophthalmic solution Place 1 drop into both eyes 2 times daily      timolol (TIMOPTIC) 0.5 % ophthalmic solution       ZIOPTAN 0.0015 % SOLN        No current facility-administered medications for this encounter. Social History    Social History     Tobacco Use    Smoking status: Never    Smokeless tobacco: Never   Substance Use Topics    Alcohol use: No         Family History  family history includes Heart Disease in her mother; Gavi Coleman in her father. Review of Systems:  Constitutional: denies fever, chills, fatigue, change in appetite, weight gain or weight loss  Head: Normocephalic  Skin: denies easy bruising, skin redness, skin rash, hives, sensitivity to sun exposure, tightness, nodules or bumps, hair loss, color changes in the hands or feet, or feeling of temperature change such as coldness  Eyes: denies pain, redness, loss of vision, double or blurred vision, eye drainage, or dryness. ENT and Mouth: denies ringing in the ears, loss of hearing, nasal congestion, nasal discharge, no hoarseness, sore throat, or difficulty swallowing   Respiratory: denies chronic dry cough, coughing up blood, coughing up mucus, waking at night coughing or choking, or wheezing  Cardiovascular: denies chest pain, irregular heartbeats, palpitations, shortness of breath, or edema in legs  Gastrointestinal: denies, nausea, vomiting, heartburn, diarrhea, constipation  Genitourinary: denies difficult urination, pain or burning with urination, blood in the urine, or cloudy urine  Musculoskeletal: denies arm, buttock, thigh or calf cramps. Has pain in low back that radiates down right leg, and tenderness in low back. No muscle weakness. No joint swelling. Patient not in wheelchair. Neurologic: headache, dizziness, fainting, loss of consciousness, no sensitivity, no memory loss. .    Endocrine: denies intolerance to hot or cold temperature, night sweats, flushing, fingernail changes, increased thirst, or hairloss   Hematologic/ Lymphatic: denies anemia, bleeding tendency or clotting tendency, bruising easily. Allergic/ Immunologic: denies rhinitis, asthma, skin sensitivity, or Latex allergy  Psychiatric: denies depression or thoughts of suicide, or voices in head. Current Pain Assessment:   Pain Assessment  Pain Assessment: 0-10  Pain Level: 8  Pain Location: Back  Pain Orientation: Lower  Pain Descriptors: Sharp, Aching, Nagging, Burning  Functional Pain Assessment: Prevents or interferes some active activities and ADLs  Pain Type: Chronic pain  Pain Radiating Towards: Radiates right leg  Pain Frequency: Continuous  Pain Onset: On-going  Non-Pharmaceutical Pain Intervention(s): Heat applied  Response to Pain Intervention: None (pain unchanged or no improvement)    Clinical Progression: gradually improving  Effect of Pain on Daily Activities: limits activity  Patient's Stated Pain Goal: No pain  Pain Intervention(s): Medication (see eMar), Repositioning, Rest, Ice    Current PE.3    ORT Score: 0    PHQ-9 Score: 14    Physical Exam:    Vitals:    10/12/22 1106   BP: (!) 160/88   Pulse: 66   Resp: 18   Temp: 96.8 °F (36 °C)   SpO2: 95%   Height: 5' 3\" (1.6 m)       Body mass index is 22.14 kg/m². General Appearance: No acute distress. Appears to be well dressed  Skin Exam: Warm and dry, no jaundice, rashes or leasions  Head Exam: NCAT, PERRLA, EOMI, scalp normal  Eye Exam: PERRLA, EOMI, conjunctivae clear  Ear Exam: Normal external auricles. No drainage from ear canals  Nose Exam: Normal alignment. Turbinates clear. No drainage  Mouth Exam: Oral mucosa pink and moist. Gums pink. Throat Exam: Posterior pharynx pink in color with no edema  Neck Exam: Supple, trachea midline. No masses palpated. Respiratory Exam: Clear to ausculation in all lobes anterior and posterior. Cardiovascular Exam: Regular rate and rhythm, no gallops, no rubs or murmurs.  No edema in extremities  Gastrointestinal Exam: Bowel sounds in all quadrants, soft, non-distended, non-tender with palpation, no guarding   Musculoskeletal Exam: No joint swelling or deformity. Back Exam: Pain in low mid back with palpation. Positive straight leg raise right. Patient is not in a wheelchair today. Hip Exam: Full rotation bilateral  Shoulder Exam: Full rotation bilateral  Knee Exam: Full flexion and extension bilateral  Extremities: No rash, cyanosis or bruising  Neurologic Exam: Gait and coordination normal, speech normal and clear  Reflexes: Normal brachialis, Negative Robbins's bilateral. Normal Patellar bilateral,   CN EXAM: II-XII intact, face symmetrical, tongue symmetrical, the trapezius and sternocleidomastoid muscle appearance and strength symmetrical, normal achilles bilateral, ankle clonus negative bilateral  Strength: 5/5 RUE Bi's/Tri's, 5/5 LUE Bi's/Tri's, 5/5 RLE knee flex/ext, 5/5 RLE DF/PF, 5/5 LLE knee flex/ext, 5/5 LLE DF/PF  Sensation: Equal and intact to fine touch in all extremities  Mood and affect: Normal limits  Nurses note reviewed along with current vital signs    Active Problem(s)  Active Problems:    Lumbar radiculopathy  Resolved Problems:    * No resolved hospital problems. *                                                                                                                                 PLAN:  1. Patient is to call the office with any questions or concerns that may arise prior to next appointment. 2. Schedule patient for LESI level L4-L5    Patient takes 81 mg ASA daily. Faxed blood thinner hold sheet to Dr. Aquilla Osgood. Patient does not see a cardiologist anymore (fired Dr. Angelo Head). Patient goes in and out of Atrial F[facility]. Urine Drug Screen Today:   Yes  [x]    No  []     Discussion:  Discussed exam findings and plan of care with patient. Patient agreed with the current plan of care at this time.  All questions from the patient were answered by the provider. Activity:   Discussed exercise as beneficial to pain reduction, encouraged stretching exercise with a focus on torso strengthening. Education Provided:  Review of Pk Risk [x]  Agreement Review  [x]  Reviewed PHQ-9  [x]      Reviewed ORT [x]  Review of Test  [x]  Compliance Issues Discussed [x]   Cognitive Behavior Needs  []  Exercise  [x]  Financial Issues  []   Tobacco/Alcohol Use  []  Teaching  [x]   New Patient Picture Obtained  [x]      [] Benzodiazapine's and Narcotics:  Patient educated on the possible effects of combining Benzodiazapine's and Opioids. Explained \"Black Box Warnings\" such as; possible suppressed breathing, hypoxia, anoxia, depressed cognition, heart arrhythmia, coma and possible death. Patient verbalized understanding concerning possible effects. Controlled Substance Monitoring:   Attestation: The RASHEED report for this patient was reviewed today. Discussed with patient possible medication side effects, risk of tolerance, dependence and alternative treatments. Discussed thegrowing epidemic in the [place] with the overprescribing and at times the abuse of narcotics. Discussed the detrimental effects of long term narcotic use. Patient encouraged to set daily goals of exercising and decreasing dailynarcotic intake. Discussed with the patient about the development of hyperalgesia with long term narcotic intake. EMR dragon/transcription disclaimer: Much of this encounter note is electronic transcription/translation of spoken language to printed tach. Electronic translation of spoken language may be erroneous, or at times, nonsensical words or phrases may be inadvertently transcribed. Although, I have reviewed the note for such errors, some may still exist.    CC:  Janeth Garcia MD    Thank you for this kind referral and allowing me to participate    in your patients care.     LACI Sam, 10/12/2022 at 11:19 AM

## 2022-10-24 ENCOUNTER — CARE COORDINATION (OUTPATIENT)
Dept: CARE COORDINATION | Age: 87
End: 2022-10-24

## 2022-10-26 ENCOUNTER — TELEPHONE (OUTPATIENT)
Dept: PAIN MANAGEMENT | Age: 87
End: 2022-10-26

## 2022-10-26 NOTE — TELEPHONE ENCOUNTER
Call placed to patient to remind her to hold asa x5 days starting tomorrow for LESI next Tuesday. Voice mail left for patient.

## 2022-11-01 ENCOUNTER — HOSPITAL ENCOUNTER (OUTPATIENT)
Dept: PAIN MANAGEMENT | Age: 87
Discharge: HOME | End: 2022-11-01
Payer: MEDICARE

## 2022-11-01 VITALS
HEART RATE: 91 BPM | SYSTOLIC BLOOD PRESSURE: 145 MMHG | TEMPERATURE: 96.8 F | DIASTOLIC BLOOD PRESSURE: 83 MMHG | OXYGEN SATURATION: 96 % | RESPIRATION RATE: 18 BRPM

## 2022-11-01 DIAGNOSIS — R52 PAIN MANAGEMENT: ICD-10-CM

## 2022-11-01 PROCEDURE — 6360000002 HC RX W HCPCS

## 2022-11-01 PROCEDURE — A4216 STERILE WATER/SALINE, 10 ML: HCPCS

## 2022-11-01 PROCEDURE — 2500000003 HC RX 250 WO HCPCS

## 2022-11-01 PROCEDURE — 2580000003 HC RX 258

## 2022-11-01 PROCEDURE — 62323 NJX INTERLAMINAR LMBR/SAC: CPT

## 2022-11-01 RX ORDER — SODIUM CHLORIDE 9 MG/ML
5 INJECTION INTRAVENOUS ONCE
Status: DISCONTINUED | OUTPATIENT
Start: 2022-11-01 | End: 2022-11-03 | Stop reason: HOSPADM

## 2022-11-01 RX ORDER — LIDOCAINE HYDROCHLORIDE 10 MG/ML
5 INJECTION, SOLUTION EPIDURAL; INFILTRATION; INTRACAUDAL; PERINEURAL ONCE
Status: DISCONTINUED | OUTPATIENT
Start: 2022-11-01 | End: 2022-11-03 | Stop reason: HOSPADM

## 2022-11-01 RX ORDER — METHYLPREDNISOLONE ACETATE 80 MG/ML
80 INJECTION, SUSPENSION INTRA-ARTICULAR; INTRALESIONAL; INTRAMUSCULAR; SOFT TISSUE ONCE
Status: DISCONTINUED | OUTPATIENT
Start: 2022-11-01 | End: 2022-11-03 | Stop reason: HOSPADM

## 2022-11-01 ASSESSMENT — PAIN DESCRIPTION - LOCATION: LOCATION: BACK

## 2022-11-01 ASSESSMENT — PAIN SCALES - GENERAL: PAINLEVEL_OUTOF10: 8

## 2022-11-01 ASSESSMENT — PAIN DESCRIPTION - ORIENTATION: ORIENTATION: LOWER

## 2022-11-01 ASSESSMENT — PAIN - FUNCTIONAL ASSESSMENT: PAIN_FUNCTIONAL_ASSESSMENT: NONE - DENIES PAIN

## 2022-11-01 NOTE — INTERVAL H&P NOTE
Update History & Physical    The patient's History and Physical  was reviewed with the patient and I examined the patient. There was no change. The surgical site was confirmed by the patient and me. Plan: The risks, benefits, expected outcome, and alternative to the recommended procedure have been discussed with the patient. Patient understands and wants to proceed with the procedure.      Electronically signed by Shante Antonio MD on 11/1/2022 at 10:13 AM

## 2022-11-07 ENCOUNTER — OFFICE VISIT (OUTPATIENT)
Dept: PRIMARY CARE CLINIC | Age: 87
End: 2022-11-07
Payer: MEDICARE

## 2022-11-07 VITALS
DIASTOLIC BLOOD PRESSURE: 70 MMHG | HEART RATE: 70 BPM | TEMPERATURE: 97 F | OXYGEN SATURATION: 98 % | HEIGHT: 63 IN | WEIGHT: 120.4 LBS | RESPIRATION RATE: 18 BRPM | SYSTOLIC BLOOD PRESSURE: 120 MMHG | BODY MASS INDEX: 21.33 KG/M2

## 2022-11-07 DIAGNOSIS — Z23 NEED FOR INFLUENZA VACCINATION: ICD-10-CM

## 2022-11-07 DIAGNOSIS — E78.00 PURE HYPERCHOLESTEROLEMIA: ICD-10-CM

## 2022-11-07 DIAGNOSIS — E03.9 ACQUIRED HYPOTHYROIDISM: ICD-10-CM

## 2022-11-07 DIAGNOSIS — N28.9 DECREASED RENAL FUNCTION: ICD-10-CM

## 2022-11-07 DIAGNOSIS — Z00.00 MEDICARE ANNUAL WELLNESS VISIT, SUBSEQUENT: Primary | ICD-10-CM

## 2022-11-07 DIAGNOSIS — I10 ESSENTIAL HYPERTENSION: ICD-10-CM

## 2022-11-07 LAB
ALBUMIN SERPL-MCNC: 4.5 G/DL (ref 3.5–5.2)
ALP BLD-CCNC: 122 U/L (ref 35–104)
ALT SERPL-CCNC: 26 U/L (ref 5–33)
ANION GAP SERPL CALCULATED.3IONS-SCNC: 15 MMOL/L (ref 7–19)
AST SERPL-CCNC: 22 U/L (ref 5–32)
BILIRUB SERPL-MCNC: 0.6 MG/DL (ref 0.2–1.2)
BUN BLDV-MCNC: 31 MG/DL (ref 8–23)
CALCIUM SERPL-MCNC: 10.2 MG/DL (ref 8.2–9.6)
CHLORIDE BLD-SCNC: 102 MMOL/L (ref 98–111)
CHOLESTEROL, TOTAL: 177 MG/DL (ref 160–199)
CO2: 26 MMOL/L (ref 22–29)
CREAT SERPL-MCNC: 1.2 MG/DL (ref 0.5–0.9)
GFR SERPL CREATININE-BSD FRML MDRD: 42 ML/MIN/{1.73_M2}
GLUCOSE BLD-MCNC: 99 MG/DL (ref 74–109)
HCT VFR BLD CALC: 50.1 % (ref 37–47)
HDLC SERPL-MCNC: 88 MG/DL (ref 65–121)
HEMOGLOBIN: 15.8 G/DL (ref 12–16)
LDL CHOLESTEROL CALCULATED: 72 MG/DL
MCH RBC QN AUTO: 31 PG (ref 27–31)
MCHC RBC AUTO-ENTMCNC: 31.5 G/DL (ref 33–37)
MCV RBC AUTO: 98.4 FL (ref 81–99)
PDW BLD-RTO: 14.2 % (ref 11.5–14.5)
PLATELET # BLD: 306 K/UL (ref 130–400)
PMV BLD AUTO: 10 FL (ref 9.4–12.3)
POTASSIUM SERPL-SCNC: 4.1 MMOL/L (ref 3.5–5)
RBC # BLD: 5.09 M/UL (ref 4.2–5.4)
SODIUM BLD-SCNC: 143 MMOL/L (ref 136–145)
TOTAL PROTEIN: 7 G/DL (ref 6.6–8.7)
TRIGL SERPL-MCNC: 85 MG/DL (ref 0–149)
TSH REFLEX FT4: 3.77 UIU/ML (ref 0.35–5.5)
WBC # BLD: 14.9 K/UL (ref 4.8–10.8)

## 2022-11-07 PROCEDURE — G8484 FLU IMMUNIZE NO ADMIN: HCPCS | Performed by: FAMILY MEDICINE

## 2022-11-07 PROCEDURE — G0439 PPPS, SUBSEQ VISIT: HCPCS | Performed by: FAMILY MEDICINE

## 2022-11-07 PROCEDURE — 1123F ACP DISCUSS/DSCN MKR DOCD: CPT | Performed by: FAMILY MEDICINE

## 2022-11-07 PROCEDURE — G0008 ADMIN INFLUENZA VIRUS VAC: HCPCS | Performed by: FAMILY MEDICINE

## 2022-11-07 PROCEDURE — 90694 VACC AIIV4 NO PRSRV 0.5ML IM: CPT | Performed by: FAMILY MEDICINE

## 2022-11-07 ASSESSMENT — PATIENT HEALTH QUESTIONNAIRE - PHQ9
SUM OF ALL RESPONSES TO PHQ QUESTIONS 1-9: 0
SUM OF ALL RESPONSES TO PHQ QUESTIONS 1-9: 0
1. LITTLE INTEREST OR PLEASURE IN DOING THINGS: 0
SUM OF ALL RESPONSES TO PHQ QUESTIONS 1-9: 0
2. FEELING DOWN, DEPRESSED OR HOPELESS: 0
SUM OF ALL RESPONSES TO PHQ QUESTIONS 1-9: 0
SUM OF ALL RESPONSES TO PHQ9 QUESTIONS 1 & 2: 0

## 2022-11-07 ASSESSMENT — LIFESTYLE VARIABLES
HOW OFTEN DO YOU HAVE A DRINK CONTAINING ALCOHOL: NEVER
HOW MANY STANDARD DRINKS CONTAINING ALCOHOL DO YOU HAVE ON A TYPICAL DAY: PATIENT DOES NOT DRINK

## 2022-11-07 NOTE — PATIENT INSTRUCTIONS
We are committed to providing you with the best care possible. In order to help us achieve these goals please remember to bring all medications, herbal products, and over the counter supplements with you to each visit. If your provider has ordered testing for you, please be sure to follow up with our office if you have not received results within 7 days after the testing took place. *If you receive a survey after visiting one of our offices, please take time to share your experience concerning your physician office visit. These surveys are confidential and no health information about you is shared. We are eager to improve for you and we are counting on your feedback to help make that happen. Wt Readings from Last 3 Encounters:   11/07/22 120 lb 6.4 oz (54.6 kg)   09/13/22 125 lb (56.7 kg)   08/04/22 125 lb (56.7 kg)          Personalized Preventive Plan for Nancy Mercado - 11/7/2022  Medicare offers a range of preventive health benefits. Some of the tests and screenings are paid in full while other may be subject to a deductible, co-insurance, and/or copay. Some of these benefits include a comprehensive review of your medical history including lifestyle, illnesses that may run in your family, and various assessments and screenings as appropriate. After reviewing your medical record and screening and assessments performed today your provider may have ordered immunizations, labs, imaging, and/or referrals for you. A list of these orders (if applicable) as well as your Preventive Care list are included within your After Visit Summary for your review. Other Preventive Recommendations:    A preventive eye exam performed by an eye specialist is recommended every 1-2 years to screen for glaucoma; cataracts, macular degeneration, and other eye disorders. A preventive dental visit is recommended every 6 months.   Try to get at least 150 minutes of exercise per week or 10,000 steps per day on a pedometer . Order or download the FREE \"Exercise & Physical Activity: Your Everyday Guide\" from The [employer]. Call 3-772.375.7595 or search [employer] online. You need 2825-0706 mg of calcium and 6890-0691 IU of vitamin D per day. It is possible to meet your calcium requirement with diet alone, but a vitamin D supplement is usually necessary to meet this goal.  When exposed to the sun, use a sunscreen that protects against both UVA and UVB radiation with an SPF of 30 or greater. Reapply every 2 to 3 hours or after sweating, drying off with a towel, or swimming. Always wear a seat belt when traveling in a car. Always wear a helmet when riding a bicycle or motorcycle. Keeping Home a [facility]       As we get older, changes in balance, gait, strength, vision, hearing, and cognition make even the most youthful senior more prone to accidents. Falls are one of the leading health risks for older people. This increased risk of falling is related to:   Aging process (eg, decreased muscle strength, slowed reflexes)   Higher incidence of chronic health problems (eg, arthritis, diabetes) that may limit mobility, agility or sensory awareness   Side effects of medicine (eg, dizziness, blurred vision)especially medicines like prescription pain medicines and drugs used to treat mental health conditions   Depending on the brittleness of your bones, the consequences of a fall can be serious and long lasting. Home Life   Research by the Association of [employer]) shows that some home accidents among older adults can be prevented by making simple lifestyle changes and basic modifications and repairs to the home environment. Here are some lifestyle changes that experts recommend:   Have your hearing and vision checked regularly. Be sure to wear prescription glasses that are right for you. Speak to your doctor or pharmacist about the possible side effects of your medicines.  A number of medicines can cause dizziness. If you have problems with sleep, talk to your doctor. Limit your intake of alcohol. If necessary, use a cane or walker to help maintain your balance. Wear supportive, rubber-soled shoes, even at home. If you live in a region that gets wintry weather, you may want to put special cleats on your shoes to prevent you from slipping on the snow and ice. Exercise regularly to help maintain muscle tone, agility, and balance. Always hold the banister when going up or down stairs. Also, use  bars when getting in or out of the bath or shower, or using the toilet. To avoid dizziness, get up slowly from a lying down position. Sit up first, dangling your legs for a minute or two before rising to a standing position. Overall Home Safety Check   According to the Consumer [employer] Safety Comm[employer]'s \"Older Consumer Home Safety Checklist,\" it is important to check for potential hazards in each room. And remember, proper lighting is an essential factor in home safety. If you cannot see clearly, you are more likely to fall. Important questions to ask yourself include:   Are lamp, electric, extension, and telephone cords placed out of the flow of traffic and maintained in good condition? Have frayed cords been replaced? Are all small rugs and runners slip resistant? If not, you can secure them to the floor with a special double-sided carpet tape. Are smoke detectors properly locatedone on every floor of your home and one outside of every sleeping area? Are they in good working order? Are batteries replaced at least once a year? Do you have a well-maintained carbon monoxide detector outside every sleeping are in your home? Does your furniture layout leave plenty of space to maneuver between and around chairs, tables, beds, and sofas? Are hallways, stairs and passages between rooms well lit? Can you reach a lamp without getting out of bed? Are floor surfaces well maintained? Shag rugs, high-pile carpeting, tile floors, and polished wood floors can be particularly slippery. Stairs should always have handrails and be carpeted or fitted with a non-skid tread. Is your telephone easily reachable. Is the cord safely tucked away? Room by Room   According to the Association of Aging, bathrooms and huyen are the two most potentially hazardous rooms in your home. In the Kitchen    Be sure your stove is in proper working order and always make sure burners and the oven are off before you go out or go to sleep. Keep pots on the back burners, turn handles away from the front of the stove, and keep stove clean and free of grease build-up. Kitchen ventilation systems and range exhausts should be working properly. Keep flammable objects such as towels and pot holders away from the cooking area except when in use. Make sure kitchen curtains are tied back. Move cords and appliances away from the sink and hot surfaces. If extension cords are needed, install wiring guides so they do not hang over the sink, range, or working areas. Look for coffee pots, kettles and toaster ovens with automatic shut-offs. Keep a mop handy in the kitchen so you can wipe up spills instantly. You should also have a small fire extinguisher. Arrange your kitchen with frequently used items on lower shelves to avoid the need to stand on a stepstool to reach them. Make sure countertops are well-lit to avoid injuries while cutting and preparing food. In the Bathroom    Use a non-slip mat or decals in the tub and shower, since wet, soapy tile or porcelain surfaces are extremely slippery. Make sure bathroom rugs are non-skid or tape them firmly to the floor. Bathtubs should have at least one, preferably two, grab bars, firmly attached to structural supports in the wall.  (Do not use built-in soap holders or glass shower doors as grab bars.)    Tub seats fitted with non-slip material on the legs allow you to wash sitting down. For people with limited mobility, bathtub transfer benches allow you to slide safely into the tub. Raised toilet seats and toilet safety rails are helpful for those with knee or hip problems. In the [facility]    Make sure you use a nightlight and that the area around your bed is clear of potential obstacles. Be careful with electric blankets and never go to sleep with a heating pad, which can cause serious burns even if on a low setting. Use fire-resistant mattress covers and pillows, and NEVER smoke in bed. Keep a phone next to the bed that is programmed to dial 911 at the push of a button. If you have a chronic condition, you may want to sign on with an automatic call-in service. Typically the system includes a small pendant that connects directly to an emergency medical voice-response system. You should also make arrangements to stay in contact with someonefriend, neighbor, family memberon a regular schedule. Fire Prevention   According to the [employer]. (Smoke Alarms for Every) [address] [place] [address], senior citizens are one of the two highest risk groups for death and serious injuries due to residential fires. When cooking, wear short-sleeved items, never a bulky long-sleeved robe. The [facility]'s Safety Checklist for Older Consumers emphasizes the importance of checking basements, garages, workshops and storage areas for fire hazards, such as volatile liquids, piles of old rags or clothing and overloaded circuits. Never smoke in bed or when lying down on a couch or recliner chair. Small portable electric or kerosene heaters are responsible for many home fires and should be used cautiously if at all. If you do use one, be sure to keep them away from flammable materials. In case of fire, make sure you have a pre-established emergency exit plan. Have a professional check your fireplace and other fuel-burning appliances yearly.     Helping Hands   Baby boomers entering the barron years will continue to see the development of new products to help older adults live safely and independently in spite of age-related changes. Making Life More Livable  , by Quentin [employer], lists over 1,000 products for \"living well in the mature years,\" such as bathing and mobility aids, household security devices, ergonomically designed knives and peelers, and faucet valves and knobs for temperature control. Medical supply stores and organizations are good sources of information about products that improve your quality of life and insure your safety.      Last Reviewed: November 2009 Kenia Mcgee MD   Updated: 3/7/2011

## 2022-11-07 NOTE — PROGRESS NOTES
Medicare Annual Wellness Visit    Karen Hood is here for Medicare AWV (Pt is here for annual wellness visit. Pt states she has a pain under her right shoulder blade. She states it has been bothering her for a while but it is getting worse. She states it doesn't let her sleep. Pt is not fasting. No other concerns. )    Assessment & Plan   Medicare annual wellness visit, subsequent  Need for influenza vaccination  -     Influenza, FLUAD, (age 72 y+), IM, Preservative Free, 0.5 mL  Essential hypertension  -     Comprehensive Metabolic Panel; Future  -     CBC; Future  Decreased renal function  -     Comprehensive Metabolic Panel; Future  -     CBC; Future  Acquired hypothyroidism  -     TSH with Reflex to FT4; Future  Pure hypercholesterolemia  -     Lipid Panel; Future    Recommendations for Preventive Services Due: see orders and patient instructions/AVS.  Recommended screening schedule for the next 5-10 years is provided to the patient in written form: see Patient Instructions/AVS.     Return in 6 months (on 5/7/2023) for recheck blood pressure. Subjective   Complaining of a pain under her right shoulder. It has been bothering her for a while and it interferes with her sleep. She feels like it is getting worse. She did see Dr. Tia Crabtree and he gave her an injection but it did not help. In the morning if she gets up and works around for a while she will have so much pain she has to stop. Patient's complete Health Risk Assessment and screening values have been reviewed and are found in Flowsheets. The following problems were reviewed today and where indicated follow up appointments were made and/or referrals ordered.     Positive Risk Factor Screenings with Interventions:    Fall Risk:  Do you feel unsteady or are you worried about falling? : (!) yes  2 or more falls in past year?: (!) yes  Fall with injury in past year?: no   Fall Risk Interventions:    Home safety tips provided            General Health and ACP:  General  In general, how would you say your health is?: Very Good  In the past 7 days, have you experienced any of the following: New or Increased Pain, New or Increased Fatigue, Loneliness, Social Isolation, Stress or Anger?: (!) Yes  Select all that apply: (!) New or Increased Pain  Do you get the social and emotional support that you need?: Yes  Do you have a Living Will?: Yes    Advance Directives       Power of  Living Will ACP-Advance Directive ACP-Power of     Not on File Filed on 08/18/18 Filed Not on File        General Health Risk Interventions:  Pain issues: She will follow-up with Dr. Karen Kidd. This is her right shoulder pain. Health Habits/Nutrition:  Physical Activity: Inactive    Days of Exercise per Week: 0 days    Minutes of Exercise per Session: 0 min     Have you lost any weight without trying in the past 3 months?: No  Body mass index: 21.33  Have you seen the dentist within the past year?: Yes  Health Habits/Nutrition Interventions:  Inadequate physical activity:  patient is not ready to increase his/her physical activity level at this time    Hearing/Vision:  Do you or your family notice any trouble with your hearing that hasn't been managed with hearing aids?: No  Do you have difficulty driving, watching TV, or doing any of your daily activities because of your eyesight?: (!) Yes  Have you had an eye exam within the past year?: Yes  No results found.   Hearing/Vision Interventions:  Vision concerns:  patient encouraged to make appointment with his/her eye specialist    Safety:  Do you have working smoke detectors?: Yes  Do you have any tripping hazards - loose or unsecured carpets or rugs?: (!) Yes  Do you have any tripping hazards - clutter in doorways, halls, or stairs?: No  Do you have either shower bars, grab bars, non-slip mats or non-slip surfaces in your shower or bathtub?: Yes  Do all of your stairways have a railing or banister?: Yes  Do you always fasten your seatbelt when you are in a car?: Yes  Safety Interventions:  Home safety tips provided    ADLs:  In the past 7 days, did you need help from others to perform any of the following everyday activities: Eating, dressing, grooming, bathing, toileting, or walking/balance?: (!) Yes  Select all that apply: (!) Walking/Balance  In the past 7 days, did you need help from others to take care of any of the following: Laundry, housekeeping, banking/finances, shopping, telephone use, food preparation, transportation, or taking medications?: (!) Yes  Select all that apply: (!) Transportation  ADL Interventions:  Patient declines any further evaluation/treatment for this issue  She has had physical therapy for her balance and walking. Her family takes care of her. Objective   Vitals:    11/07/22 1034   BP: 120/70   Site: Left Upper Arm   Position: Sitting   Cuff Size: Medium Adult   Pulse: 70   Resp: 18   Temp: 97 °F (36.1 °C)   TempSrc: Temporal   SpO2: 98%   Weight: 120 lb 6.4 oz (54.6 kg)   Height: 5' 3\" (1.6 m)      Body mass index is 21.33 kg/m².       General Appearance: alert and oriented to person, place and time, well developed and well- nourished, in no acute distress  Skin: warm and dry, no rash or erythema  Head: normocephalic and atraumatic  Eyes: pupils equal, round, and reactive to light, extraocular eye movements intact, conjunctivae normal  ENT: tympanic membrane, external ear and ear canal normal bilaterally, nose without deformity, nasal mucosa and turbinates normal without polyps  Neck: supple and non-tender without mass, no thyromegaly or thyroid nodules, no cervical lymphadenopathy  Pulmonary/Chest: clear to auscultation bilaterally- no wheezes, rales or rhonchi, normal air movement, no respiratory distress  Cardiovascular: normal rate, regular rhythm, normal S1 and S2, no murmurs, rubs, clicks, or gallops, distal pulses intact, no carotid bruits  Abdomen: soft, non-tender, non-distended, normal bowel sounds, no masses or organomegaly  Extremities: no cyanosis, clubbing or edema  Musculoskeletal: normal range of motion, no joint swelling, deformity or tenderness  Neurologic: reflexes normal and symmetric, no cranial nerve deficit, gait, coordination and speech normal       Allergies   Allergen Reactions    Gabapentin Swelling    Morphine     Stadol [Butorphanol Tartrate]      Prior to Visit Medications    Medication Sig Taking? Authorizing Provider   metoprolol tartrate (LOPRESSOR) 50 MG tablet TAKE 1 AND 1/2 TABLETS     TWICE DAILY FOR BLOOD      PRESSURE (CANCEL PREVIOUS  PRESCRIPTION ERROR) Yes Chiqui Bains MD   diphenhydrAMINE-APAP, sleep, (TYLENOL PM EXTRA STRENGTH PO) Take 2 tablets by mouth nightly Yes Historical Provider, MD   atorvastatin (LIPITOR) 10 MG tablet TAKE 1 TABLET DAILY FOR    CHOLESTEROL Yes Chiqui Bains MD   SYNTHROID 50 MCG tablet TAKE 1 TABLET DAILY FOR LOWTHYROID. CHANGE IN DOSE Yes Chiqui Bains MD   VYZULTA 0.024 % SOLN  Yes Historical Provider, MD   cloNIDine (CATAPRES) 0.1 MG tablet TAKE ONE TABLET BY MOUTH ONCE DAILY IF NEEDED FOR BLOOD PRESSURE GREATER THAN 160/100 Yes Chiqui Bains MD   valsartan (DIOVAN) 80 MG tablet TAKE ONE TABLET BY MOUTH ONCE EVERY DAY FOR BLOOD PRESSURE**STOP OLMESARTAN** Yes Chiqui Bains MD   amLODIPine (NORVASC) 5 MG tablet TAKE 1 TABLET DAILY Yes Chiqui Bains MD   timolol (BETIMOL) 0.5 % ophthalmic solution Apply to eye Yes Historical Provider, MD   aspirin 81 MG tablet aspirin   daily Yes Historical Provider, MD   Probiotic Product (PROBIOTIC-10) CAPS Take by mouth once Yes Historical Provider, MD   sodium chloride (AIDA 128) 5 % ophthalmic solution Place 1 drop into both eyes 2 times daily Yes Historical Provider, MD   ZIOPTAN 0.0015 % SOLN  Yes Historical Provider, MD   methylPREDNISolone (MEDROL DOSEPACK) 4 MG tablet Take by mouth.   LACI Paulson   ketorolac (TORADOL) 10 MG tablet Take 1 tablet by mouth every 6 hours as needed for Pain  LACI Burr CNP       CareTeam (Including outside providers/suppliers regularly involved in providing care):   Patient Care Team:  Curly Hayes MD as PCP - Dorina Diaz MD as PCP - [facility] Empaneled Provider  LACI Qureshi as Advanced Practice Nurse (Neurology)  Lance Amin MD as Consulting Physician (Cardiology)  Cooper Godoy MD as Consulting Physician (Neurosurgery)  LACI Olivier as Nurse Practitioner (Certified Nurse Specialist)  LACI Dale CNP as Nurse Practitioner (Neurology)  LACI Dale CNP as Nurse Practitioner (Family Medicine)  Ashish Davenport RN as Ambulatory Care Manager     Reviewed and updated this visit:  Tobacco  Allergies  Meds  Problems  Med Hx  Surg Hx  Soc Hx  Fam Hx

## 2022-11-07 NOTE — PROGRESS NOTES
After obtaining consent, and per orders of Dr. Suzanne Marinelli, injection of Fluad Quadrivalent given in Left deltoid by Froylan Diamond. Patient tolerated well.

## 2022-11-08 ENCOUNTER — TELEPHONE (OUTPATIENT)
Dept: PAIN MANAGEMENT | Age: 87
End: 2022-11-08

## 2022-11-08 DIAGNOSIS — N28.9 DECREASED RENAL FUNCTION: ICD-10-CM

## 2022-11-08 DIAGNOSIS — D72.829 LEUKOCYTOSIS, UNSPECIFIED TYPE: Primary | ICD-10-CM

## 2022-11-08 NOTE — TELEPHONE ENCOUNTER
Medrol dosepack phoned in to [employer] [facility]. Notified patient's daughter in law, she verbalized understanding.

## 2022-11-08 NOTE — TELEPHONE ENCOUNTER
Call received from patient's daughter in law regarding her injection on 11/1. She stated that the patient has not gained any relief from the injection at this time. She said that it is affecting her quality of life, and is wanting to know what the next step is. Patient is scheduled to follow up in the office in January but she feels that she needs something else before then as the pain is severe. I have messaged Belen at this time.

## 2022-11-09 RX ORDER — METHYLPREDNISOLONE 4 MG/1
TABLET ORAL
Qty: 1 KIT | Refills: 0 | OUTPATIENT
Start: 2022-11-09 | End: 2022-11-14

## 2022-12-02 ENCOUNTER — HOSPITAL ENCOUNTER (EMERGENCY)
Facility: HOSPITAL | Age: 87
Discharge: HOME | End: 2022-12-02
Attending: FAMILY MEDICINE | Admitting: FAMILY MEDICINE

## 2022-12-02 ENCOUNTER — APPOINTMENT (OUTPATIENT)
Dept: GENERAL RADIOLOGY | Facility: HOSPITAL | Age: 87
End: 2022-12-02

## 2022-12-02 VITALS
BODY MASS INDEX: 22.15 KG/M2 | RESPIRATION RATE: 18 BRPM | OXYGEN SATURATION: 100 % | HEIGHT: 63 IN | HEART RATE: 90 BPM | SYSTOLIC BLOOD PRESSURE: 145 MMHG | WEIGHT: 125 LBS | DIASTOLIC BLOOD PRESSURE: 77 MMHG | TEMPERATURE: 98.2 F

## 2022-12-02 DIAGNOSIS — R51.9 GENERALIZED HEADACHE: ICD-10-CM

## 2022-12-02 DIAGNOSIS — R11.0 NAUSEA: ICD-10-CM

## 2022-12-02 DIAGNOSIS — R07.9 CHEST PAIN, UNSPECIFIED TYPE: Primary | ICD-10-CM

## 2022-12-02 LAB
ALBUMIN SERPL-MCNC: 4 G/DL (ref 3.5–5.2)
ALBUMIN/GLOB SERPL: 1.5 G/DL
ALP SERPL-CCNC: 132 U/L (ref 39–117)
ALT SERPL W P-5'-P-CCNC: 17 U/L (ref 1–33)
ANION GAP SERPL CALCULATED.3IONS-SCNC: 9 MMOL/L (ref 5–15)
AST SERPL-CCNC: 19 U/L (ref 1–32)
BASOPHILS # BLD AUTO: 0.05 10*3/MM3 (ref 0–0.2)
BASOPHILS NFR BLD AUTO: 0.6 % (ref 0–1.5)
BILIRUB SERPL-MCNC: 0.7 MG/DL (ref 0–1.2)
BUN SERPL-MCNC: 15 MG/DL (ref 8–23)
BUN/CREAT SERPL: 16.7 (ref 7–25)
CALCIUM SPEC-SCNC: 9.2 MG/DL (ref 8.2–9.6)
CHLORIDE SERPL-SCNC: 103 MMOL/L (ref 98–107)
CO2 SERPL-SCNC: 28 MMOL/L (ref 22–29)
CREAT SERPL-MCNC: 0.9 MG/DL (ref 0.57–1)
D DIMER PPP FEU-MCNC: 0.84 MCGFEU/ML (ref 0–0.94)
DEPRECATED RDW RBC AUTO: 51.2 FL (ref 37–54)
EGFRCR SERPLBLD CKD-EPI 2021: 59.4 ML/MIN/1.73
EOSINOPHIL # BLD AUTO: 0.13 10*3/MM3 (ref 0–0.4)
EOSINOPHIL NFR BLD AUTO: 1.5 % (ref 0.3–6.2)
ERYTHROCYTE [DISTWIDTH] IN BLOOD BY AUTOMATED COUNT: 14.2 % (ref 12.3–15.4)
FLUAV RNA RESP QL NAA+PROBE: NOT DETECTED
FLUBV RNA RESP QL NAA+PROBE: NOT DETECTED
GLOBULIN UR ELPH-MCNC: 2.6 GM/DL
GLUCOSE SERPL-MCNC: 117 MG/DL (ref 65–99)
HCT VFR BLD AUTO: 42.9 % (ref 34–46.6)
HGB BLD-MCNC: 13.8 G/DL (ref 12–15.9)
IMM GRANULOCYTES # BLD AUTO: 0.03 10*3/MM3 (ref 0–0.05)
IMM GRANULOCYTES NFR BLD AUTO: 0.3 % (ref 0–0.5)
LYMPHOCYTES # BLD AUTO: 2.87 10*3/MM3 (ref 0.7–3.1)
LYMPHOCYTES NFR BLD AUTO: 32.1 % (ref 19.6–45.3)
MCH RBC QN AUTO: 31.7 PG (ref 26.6–33)
MCHC RBC AUTO-ENTMCNC: 32.2 G/DL (ref 31.5–35.7)
MCV RBC AUTO: 98.4 FL (ref 79–97)
MONOCYTES # BLD AUTO: 0.69 10*3/MM3 (ref 0.1–0.9)
MONOCYTES NFR BLD AUTO: 7.7 % (ref 5–12)
NEUTROPHILS NFR BLD AUTO: 5.18 10*3/MM3 (ref 1.7–7)
NEUTROPHILS NFR BLD AUTO: 57.8 % (ref 42.7–76)
NRBC BLD AUTO-RTO: 0 /100 WBC (ref 0–0.2)
NT-PROBNP SERPL-MCNC: 4208 PG/ML (ref 0–1800)
PLATELET # BLD AUTO: 248 10*3/MM3 (ref 140–450)
PMV BLD AUTO: 9.3 FL (ref 6–12)
POTASSIUM SERPL-SCNC: 3.7 MMOL/L (ref 3.5–5.2)
PROT SERPL-MCNC: 6.6 G/DL (ref 6–8.5)
RBC # BLD AUTO: 4.36 10*6/MM3 (ref 3.77–5.28)
RSV RNA NPH QL NAA+NON-PROBE: NOT DETECTED
SARS-COV-2 RNA RESP QL NAA+PROBE: NOT DETECTED
SODIUM SERPL-SCNC: 140 MMOL/L (ref 136–145)
TROPONIN T SERPL-MCNC: <0.01 NG/ML (ref 0–0.03)
WBC NRBC COR # BLD: 8.95 10*3/MM3 (ref 3.4–10.8)

## 2022-12-02 PROCEDURE — 71045 X-RAY EXAM CHEST 1 VIEW: CPT

## 2022-12-02 PROCEDURE — 99283 EMERGENCY DEPT VISIT LOW MDM: CPT

## 2022-12-02 PROCEDURE — 93010 ELECTROCARDIOGRAM REPORT: CPT | Performed by: INTERNAL MEDICINE

## 2022-12-02 PROCEDURE — 85025 COMPLETE CBC W/AUTO DIFF WBC: CPT | Performed by: FAMILY MEDICINE

## 2022-12-02 PROCEDURE — 80053 COMPREHEN METABOLIC PANEL: CPT | Performed by: FAMILY MEDICINE

## 2022-12-02 PROCEDURE — 87637 SARSCOV2&INF A&B&RSV AMP PRB: CPT | Performed by: FAMILY MEDICINE

## 2022-12-02 PROCEDURE — 93005 ELECTROCARDIOGRAM TRACING: CPT | Performed by: FAMILY MEDICINE

## 2022-12-02 PROCEDURE — 85379 FIBRIN DEGRADATION QUANT: CPT | Performed by: FAMILY MEDICINE

## 2022-12-02 PROCEDURE — 83880 ASSAY OF NATRIURETIC PEPTIDE: CPT | Performed by: FAMILY MEDICINE

## 2022-12-02 PROCEDURE — 84484 ASSAY OF TROPONIN QUANT: CPT | Performed by: FAMILY MEDICINE

## 2022-12-02 PROCEDURE — 36415 COLL VENOUS BLD VENIPUNCTURE: CPT

## 2022-12-02 RX ORDER — ONDANSETRON 4 MG/1
4 TABLET, ORALLY DISINTEGRATING ORAL EVERY 8 HOURS PRN
Qty: 12 TABLET | Refills: 0 | Status: SHIPPED | OUTPATIENT
Start: 2022-12-02 | End: 2022-12-06

## 2022-12-03 NOTE — ED PROVIDER NOTES
Subjective   History of Present Illness  Patient reports having the worst headache of her life that began yesterday.  Patient states that she went to bed and she woke up and the headache has eased and she no longer has a headache today.  Patient states that this morning she did wake up nauseated.  Patient states that she is also had 3-4 episodes of left-sided chest pain.  Patient states that the chest pain lasted for few seconds.  Patient states that it was underneath her left breast.  Patient reports no shortness of breath.  Patient reports no cough.  Patient has no other symptoms at this time.  Patient states that she was given Zofran in the ambulance and she states that currently she is not nauseous.        Review of Systems   Cardiovascular: Positive for chest pain.   Gastrointestinal: Positive for nausea.   Neurological: Positive for headaches.   All other systems reviewed and are negative.      Past Medical History:   Diagnosis Date   • Atrial fibrillation (HCC)    • Deep vein thrombosis (DVT) (HCC)    • Glaucoma    • Hyperlipidemia    • Hypertension    • Hypothyroidism    • Macular degeneration    • Pulmonary embolism (HCC)    • TIA (transient ischemic attack)        Allergies   Allergen Reactions   • Morphine Shortness Of Breath   • Stadol Ns [Butorphanol] Anaphylaxis   • Gabapentin Swelling       Past Surgical History:   Procedure Laterality Date   • APPENDECTOMY     • BREAST SURGERY     • CARPAL TUNNEL RELEASE     • CHOLECYSTECTOMY     • FOOT SURGERY Bilateral     Bilateral heel spurs   • HYSTERECTOMY     • TOTAL HIP ARTHROPLASTY Left        History reviewed. No pertinent family history.    Social History     Socioeconomic History   • Marital status:    Tobacco Use   • Smoking status: Never   • Smokeless tobacco: Never   Vaping Use   • Vaping Use: Never used   Substance and Sexual Activity   • Alcohol use: Never   • Drug use: Never   • Sexual activity: Defer           Objective   Physical  Exam  Vitals and nursing note reviewed.   Constitutional:       General: She is not in acute distress.     Appearance: Normal appearance. She is not toxic-appearing or diaphoretic.   HENT:      Head: Normocephalic and atraumatic.      Mouth/Throat:      Mouth: Mucous membranes are moist.   Eyes:      Extraocular Movements: Extraocular movements intact.      Pupils: Pupils are equal, round, and reactive to light.   Cardiovascular:      Rate and Rhythm: Normal rate and regular rhythm.      Heart sounds: Normal heart sounds.   Pulmonary:      Effort: Pulmonary effort is normal. No respiratory distress.      Breath sounds: Normal breath sounds.   Abdominal:      General: Bowel sounds are normal.      Palpations: Abdomen is soft.      Tenderness: There is no abdominal tenderness.   Musculoskeletal:      Cervical back: Normal range of motion and neck supple.   Skin:     General: Skin is warm and dry.   Neurological:      General: No focal deficit present.      Mental Status: She is alert and oriented to person, place, and time.      Cranial Nerves: No cranial nerve deficit.      Sensory: No sensory deficit.      Motor: No weakness.   Psychiatric:         Mood and Affect: Mood normal.         Behavior: Behavior normal.         Procedures           ED Course                                         Lab Results (last 24 hours)     Procedure Component Value Units Date/Time    COVID-19, FLU A/B, RSV PCR - Swab, Nasopharynx [[id number]]  (Normal) Collected: 12/02/22 1839    Specimen: Swab from Nasopharynx Updated: 12/02/22 1947     COVID19 Not Detected     Influenza A PCR Not Detected     Influenza B PCR Not Detected     RSV, PCR Not Detected    Narrative:      Fact sheet for providers: https://www.fda.gov/media/761287/download    Fact sheet for patients: https://www.fda.gov/media/428994/download    Test performed by PCR.    CBC & Differential [[id number]]  (Abnormal) Collected: 12/02/22 1859    Specimen: Blood Updated: 12/02/22  1908    Narrative:      The following orders were created for panel order CBC & Differential.  Procedure                               Abnormality         Status                     ---------                               -----------         ------                     CBC Auto Differential[[id number]]        Abnormal            Final result                 Please view results for these tests on the individual orders.    Comprehensive Metabolic Panel [[id number]]  (Abnormal) Collected: 12/02/22 1859    Specimen: Blood Updated: 12/02/22 1932     Glucose 117 mg/dL      BUN 15 mg/dL      Creatinine 0.90 mg/dL      Sodium 140 mmol/L      Potassium 3.7 mmol/L      Comment: Slight hemolysis detected by analyzer. Results may be affected.        Chloride 103 mmol/L      CO2 28.0 mmol/L      Calcium 9.2 mg/dL      Total Protein 6.6 g/dL      Albumin 4.00 g/dL      ALT (SGPT) 17 U/L      AST (SGOT) 19 U/L      Comment: Slight hemolysis detected by analyzer. Results may be affected.        Alkaline Phosphatase 132 U/L      Total Bilirubin 0.7 mg/dL      Globulin 2.6 gm/dL      A/G Ratio 1.5 g/dL      BUN/Creatinine Ratio 16.7     Anion Gap 9.0 mmol/L      eGFR 59.4 mL/min/1.73      Comment: National Kidney Foundation and American Society of Nephrology (ASN) Task Force recommended calculation based on the Chronic Kidney Disease Epidemiology Collaboration (CKD-EPI) equation refit without adjustment for race.       Narrative:      GFR Normal >60  Chronic Kidney Disease <60  Kidney Failure <15    The GFR formula is only valid for adults with stable renal function between ages 18 and 70.    Troponin [[id number]]  (Normal) Collected: 12/02/22 1859    Specimen: Blood Updated: 12/02/22 1927     Troponin T <0.010 ng/mL     Narrative:      Troponin T Reference Range:  <= 0.03 ng/mL-   Negative for AMI  >0.03 ng/mL-     Abnormal for myocardial necrosis.  Clinicians would have to utilize clinical acumen, EKG, Troponin and serial changes to  "determine if it is an Acute Myocardial Infarction or myocardial injury due to an underlying chronic condition.       Results may be falsely decreased if patient taking Biotin.      D-dimer, Quantitative [[id number]]  (Normal) Collected: 12/02/22 1859    Specimen: Blood Updated: 12/02/22 1918     D-Dimer, Quantitative 0.84 MCGFEU/mL     Narrative:      According to the assay 's published package insert, a normal (<0.50 MCGFEU/mL) D-dimer result in conjunction with a non-high clinical probability assessment, excludes deep vein thrombosis (DVT) and pulmonary embolism (PE) with high sensitivity.    D-dimer values increase with age and this can make VTE exclusion of an older population difficult. To address this, the American College of Physicians, based on best available evidence and recent guidelines, recommends that clinicians use age-adjusted D-dimer thresholds in patients greater than 50 years of age with: a) a low probability of PE who do not meet all Pulmonary Embolism Rule Out Criteria, or b) in those with intermediate probability of PE.   The formula for an age-adjusted D-dimer cut-off is \"age/100\".  For example, a 60 year old patient would have an age-adjusted cut-off of 0.60 MCGFEU/mL and an 80 year old 0.80 MCGFEU/mL.    BNP [[id number]]  (Abnormal) Collected: 12/02/22 1859    Specimen: Blood Updated: 12/02/22 1927     proBNP 4,208.0 pg/mL     Narrative:      Among patients with dyspnea, NT-proBNP is highly sensitive for the detection of acute congestive heart failure. In addition NT-proBNP of <300 pg/ml effectively rules out acute congestive heart failure with 99% negative predictive value.      CBC Auto Differential [[id number]]  (Abnormal) Collected: 12/02/22 1859    Specimen: Blood Updated: 12/02/22 1908     WBC 8.95 10*3/mm3      RBC 4.36 10*6/mm3      Hemoglobin 13.8 g/dL      Hematocrit 42.9 %      MCV 98.4 fL      MCH 31.7 pg      MCHC 32.2 g/dL      RDW 14.2 %      RDW-SD 51.2 fl      MPV " 9.3 fL      Platelets 248 10*3/mm3      Neutrophil % 57.8 %      Lymphocyte % 32.1 %      Monocyte % 7.7 %      Eosinophil % 1.5 %      Basophil % 0.6 %      Immature Grans % 0.3 %      Neutrophils, Absolute 5.18 10*3/mm3      Lymphocytes, Absolute 2.87 10*3/mm3      Monocytes, Absolute 0.69 10*3/mm3      Eosinophils, Absolute 0.13 10*3/mm3      Basophils, Absolute 0.05 10*3/mm3      Immature Grans, Absolute 0.03 10*3/mm3      nRBC 0.0 /100 WBC           XR Chest 1 View   Final Result   1.. Emphysematous changes of the lungs with hyperinflation of lung   parenchyma. No evidence of lobar pneumonia or effusion.   This report was finalized on 12/02/2022 19:32 by Dr. Gilbert Arora MD.            MDM    Patient presents emergency room today with headache, chest pain and nausea.  Patient's EKG was unremarkable.  Patient's troponin was negative.  Patient currently does not have any headache.  Patient does have some nausea.  Patient does have a heart score of 4.  Patient's scores related to her age and her risk factors.  Patient will be discharged home in a stable condition at this time.  Patient was advised to follow-up with her cardiologist on an outpatient basis.  Patient was advised to return to the emergency room with new or worsening symptoms.  Patient verbalized understanding and was agreeable to plan as discussed.      Final diagnoses:   Chest pain, unspecified type   Generalized headache   Nausea       ED Disposition  ED Disposition     ED Disposition   Discharge    Condition   Stable    Comment   --             Елена Cedeno MD  [address] [id number] [phone]          [facility] Emergency Department  [address] [place] [id number] [phone]    As needed, If symptoms worsen         Medication List      Changed    ondansetron ODT 4 MG disintegrating tablet  Commonly known as: ZOFRAN-ODT  Place 1 tablet on the tongue Every 8 (Eight) Hours As Needed for  Nausea or Vomiting for up to 4 days.  What changed:   · how to take this  · reasons to take this           Where to Get Your Medications      These medications were sent to [facility] - [facility] - [facility] AT Portal to [facility] Sites - [phone] [facility] [phone] , [place] [address]    Phone: [phone]   · ondansetron ODT 4 MG disintegrating tablet          Rafita Carter MD  12/02/22 2008

## 2022-12-07 ENCOUNTER — OFFICE VISIT (OUTPATIENT)
Dept: PRIMARY CARE CLINIC | Age: 87
End: 2022-12-07

## 2022-12-07 VITALS
HEIGHT: 63 IN | OXYGEN SATURATION: 98 % | HEART RATE: 65 BPM | TEMPERATURE: 97 F | DIASTOLIC BLOOD PRESSURE: 98 MMHG | RESPIRATION RATE: 18 BRPM | SYSTOLIC BLOOD PRESSURE: 156 MMHG | WEIGHT: 123 LBS | BODY MASS INDEX: 21.79 KG/M2

## 2022-12-07 DIAGNOSIS — R26.89 LOSS OF BALANCE: ICD-10-CM

## 2022-12-07 DIAGNOSIS — R79.89 ELEVATED BRAIN NATRIURETIC PEPTIDE (BNP) LEVEL: Primary | ICD-10-CM

## 2022-12-07 DIAGNOSIS — I48.0 PAROXYSMAL ATRIAL FIBRILLATION (HCC): ICD-10-CM

## 2022-12-07 DIAGNOSIS — R29.898 WEAKNESS OF BOTH LEGS: ICD-10-CM

## 2022-12-07 DIAGNOSIS — R06.09 DYSPNEA ON EXERTION: ICD-10-CM

## 2022-12-07 DIAGNOSIS — R29.6 FALLS FREQUENTLY: ICD-10-CM

## 2022-12-07 NOTE — PATIENT INSTRUCTIONS
Wt Readings from Last 3 Encounters:   12/07/22 123 lb (55.8 kg)   11/07/22 120 lb 6.4 oz (54.6 kg)   09/13/22 125 lb (56.7 kg)        We are committed to providing you with the best care possible. In order to help us achieve these goals please remember to bring all medications, herbal products, and over the counter supplements with you to each visit. If your provider has ordered testing for you, please be sure to follow up with our office if you have not received results within 7 days after the testing took place. *If you receive a survey after visiting one of our offices, please take time to share your experience concerning your physician office visit. These surveys are confidential and no health information about you is shared. We are eager to improve for you and we are counting on your feedback to help make that happen.

## 2022-12-08 LAB
QT INTERVAL: 438 MS
QTC INTERVAL: 495 MS

## 2022-12-11 ASSESSMENT — ENCOUNTER SYMPTOMS
BACK PAIN: 1
SHORTNESS OF BREATH: 1

## 2022-12-12 ENCOUNTER — TELEPHONE (OUTPATIENT)
Dept: PAIN MANAGEMENT | Age: 87
End: 2022-12-12

## 2022-12-12 ENCOUNTER — HOSPITAL ENCOUNTER (EMERGENCY)
Age: 87
Discharge: HOME | End: 2022-12-12
Payer: MEDICARE

## 2022-12-12 ENCOUNTER — APPOINTMENT (OUTPATIENT)
Dept: GENERAL RADIOLOGY | Age: 87
End: 2022-12-12
Payer: MEDICARE

## 2022-12-12 VITALS
HEART RATE: 72 BPM | BODY MASS INDEX: 21.79 KG/M2 | OXYGEN SATURATION: 93 % | WEIGHT: 123 LBS | DIASTOLIC BLOOD PRESSURE: 88 MMHG | SYSTOLIC BLOOD PRESSURE: 138 MMHG | TEMPERATURE: 98 F | HEIGHT: 63 IN | RESPIRATION RATE: 18 BRPM

## 2022-12-12 DIAGNOSIS — M54.50 ACUTE EXACERBATION OF CHRONIC LOW BACK PAIN: Primary | ICD-10-CM

## 2022-12-12 DIAGNOSIS — M54.32 SCIATICA OF LEFT SIDE: ICD-10-CM

## 2022-12-12 DIAGNOSIS — G89.29 ACUTE EXACERBATION OF CHRONIC LOW BACK PAIN: Primary | ICD-10-CM

## 2022-12-12 LAB
ALBUMIN SERPL-MCNC: 3.9 G/DL (ref 3.5–5.2)
ALP BLD-CCNC: 146 U/L (ref 35–104)
ALT SERPL-CCNC: 7 U/L (ref 5–33)
ANION GAP SERPL CALCULATED.3IONS-SCNC: 11 MMOL/L (ref 7–19)
AST SERPL-CCNC: 21 U/L (ref 5–32)
BACTERIA: NEGATIVE /HPF
BASOPHILS ABSOLUTE: 0.1 K/UL (ref 0–0.2)
BASOPHILS RELATIVE PERCENT: 0.5 % (ref 0–1)
BILIRUB SERPL-MCNC: 1 MG/DL (ref 0.2–1.2)
BILIRUBIN URINE: NEGATIVE
BLOOD, URINE: ABNORMAL
BUN BLDV-MCNC: 12 MG/DL (ref 8–23)
CALCIUM SERPL-MCNC: 9.3 MG/DL (ref 8.2–9.6)
CHLORIDE BLD-SCNC: 103 MMOL/L (ref 98–111)
CLARITY: CLEAR
CO2: 27 MMOL/L (ref 22–29)
COLOR: YELLOW
CREAT SERPL-MCNC: 0.9 MG/DL (ref 0.5–0.9)
CRYSTALS, UA: ABNORMAL /HPF
EOSINOPHILS ABSOLUTE: 0.1 K/UL (ref 0–0.6)
EOSINOPHILS RELATIVE PERCENT: 0.5 % (ref 0–5)
EPITHELIAL CELLS, UA: 4 /HPF (ref 0–5)
GFR SERPL CREATININE-BSD FRML MDRD: 59 ML/MIN/{1.73_M2}
GLUCOSE BLD-MCNC: 108 MG/DL (ref 74–109)
GLUCOSE URINE: NEGATIVE MG/DL
HCT VFR BLD CALC: 44.9 % (ref 37–47)
HEMOGLOBIN: 14.7 G/DL (ref 12–16)
HYALINE CASTS: 3 /HPF (ref 0–8)
IMMATURE GRANULOCYTES #: 0.1 K/UL
KETONES, URINE: 40 MG/DL
LEUKOCYTE ESTERASE, URINE: ABNORMAL
LIPASE: 11 U/L (ref 13–60)
LYMPHOCYTES ABSOLUTE: 2.3 K/UL (ref 1.1–4.5)
LYMPHOCYTES RELATIVE PERCENT: 19.5 % (ref 20–40)
MCH RBC QN AUTO: 32.8 PG (ref 27–31)
MCHC RBC AUTO-ENTMCNC: 32.7 G/DL (ref 33–37)
MCV RBC AUTO: 100.2 FL (ref 81–99)
MONOCYTES ABSOLUTE: 1.2 K/UL (ref 0–0.9)
MONOCYTES RELATIVE PERCENT: 10.5 % (ref 0–10)
NEUTROPHILS ABSOLUTE: 8.1 K/UL (ref 1.5–7.5)
NEUTROPHILS RELATIVE PERCENT: 68.6 % (ref 50–65)
NITRITE, URINE: NEGATIVE
PDW BLD-RTO: 13.8 % (ref 11.5–14.5)
PH UA: 5.5 (ref 5–8)
PLATELET # BLD: 295 K/UL (ref 130–400)
PMV BLD AUTO: 9.6 FL (ref 9.4–12.3)
POTASSIUM SERPL-SCNC: 3.6 MMOL/L (ref 3.5–5)
PROTEIN UA: ABNORMAL MG/DL
RBC # BLD: 4.48 M/UL (ref 4.2–5.4)
RBC UA: 7 /HPF (ref 0–4)
SODIUM BLD-SCNC: 141 MMOL/L (ref 136–145)
SPECIFIC GRAVITY UA: 1.02 (ref 1–1.03)
TOTAL PROTEIN: 6.8 G/DL (ref 6.6–8.7)
UROBILINOGEN, URINE: 1 E.U./DL
WBC # BLD: 11.8 K/UL (ref 4.8–10.8)
WBC UA: 25 /HPF (ref 0–5)

## 2022-12-12 PROCEDURE — 73502 X-RAY EXAM HIP UNI 2-3 VIEWS: CPT

## 2022-12-12 PROCEDURE — 87086 URINE CULTURE/COLONY COUNT: CPT

## 2022-12-12 PROCEDURE — 36415 COLL VENOUS BLD VENIPUNCTURE: CPT

## 2022-12-12 PROCEDURE — 99284 EMERGENCY DEPT VISIT MOD MDM: CPT

## 2022-12-12 PROCEDURE — 73502 X-RAY EXAM HIP UNI 2-3 VIEWS: CPT | Performed by: RADIOLOGY

## 2022-12-12 PROCEDURE — 83690 ASSAY OF LIPASE: CPT

## 2022-12-12 PROCEDURE — 85025 COMPLETE CBC W/AUTO DIFF WBC: CPT

## 2022-12-12 PROCEDURE — 80053 COMPREHEN METABOLIC PANEL: CPT

## 2022-12-12 PROCEDURE — 6370000000 HC RX 637 (ALT 250 FOR IP): Performed by: NURSE PRACTITIONER

## 2022-12-12 PROCEDURE — 81001 URINALYSIS AUTO W/SCOPE: CPT

## 2022-12-12 RX ORDER — HYDROCODONE BITARTRATE AND ACETAMINOPHEN 5; 325 MG/1; MG/1
1 TABLET ORAL ONCE
Status: COMPLETED | OUTPATIENT
Start: 2022-12-12 | End: 2022-12-12

## 2022-12-12 RX ORDER — HYDROCODONE BITARTRATE AND ACETAMINOPHEN 5; 325 MG/1; MG/1
1 TABLET ORAL EVERY 6 HOURS PRN
Qty: 12 TABLET | Refills: 0 | Status: SHIPPED | OUTPATIENT
Start: 2022-12-12 | End: 2022-12-15

## 2022-12-12 RX ADMIN — HYDROCODONE BITARTRATE AND ACETAMINOPHEN 1 TABLET: 5; 325 TABLET ORAL at 16:13

## 2022-12-12 ASSESSMENT — ENCOUNTER SYMPTOMS
BACK PAIN: 1
ABDOMINAL PAIN: 0

## 2022-12-12 ASSESSMENT — PAIN SCALES - GENERAL
PAINLEVEL_OUTOF10: 9
PAINLEVEL_OUTOF10: 10

## 2022-12-12 ASSESSMENT — PAIN - FUNCTIONAL ASSESSMENT: PAIN_FUNCTIONAL_ASSESSMENT: 0-10

## 2022-12-12 ASSESSMENT — PAIN DESCRIPTION - LOCATION: LOCATION: HIP

## 2022-12-12 ASSESSMENT — PAIN DESCRIPTION - ORIENTATION: ORIENTATION: LEFT

## 2022-12-12 NOTE — TELEPHONE ENCOUNTER
Call left on third floor scheduling line, forwarded to nurse line. I have spoken with patient's daughter in law. She is having severe pain this am and can't get out of bed. She has not fallen today that they are aware of, but states does fall frequently. Pain is centered over \"dimple\" above hip. She did get some relief from Tylenol, however is awake and in pain again. I have messaged Belen for instructions, and will call daughter back.

## 2022-12-12 NOTE — PROGRESS NOTES
SUBJECTIVE:    Leonel Lara is 80 y. o.female who comes in complaining of Hypertension (Went to the ER)   . HPI: Mrs. Conner Bee comes in today accompanied by her daughter-in-law for follow-up after her recent ER visit. She went to the emergency room because her blood pressure was greg high and she had a headache. They really did not do anything. This was on 12/2/2022 at [address] and influenza and RSV were negative. D-dimer and CBC were basically normal.  BNP was around 4000 which is unusual for her. CMP revealed a glucose of 117 but she was not fasting. Kidney and liver function normal.    She continues to feel off balance. It is very difficult for her to walk. She is at great risk of falling. Blood pressure is still erratic. She denies headache or chest pain. Her legs feel very weak. She does have a history of paroxysmal atrial fibrillation and has some dyspnea on exertion. Because of her advanced age and the increased risk of falling she is not on anticoagulation. Allergies   Allergen Reactions    Gabapentin Swelling    Morphine     Stadol [Butorphanol Tartrate]        Social History     Socioeconomic History    Marital status:       Spouse name: None    Number of children: 2    Years of education: None    Highest education level: None   Occupational History    Occupation: Homemaker   Tobacco Use    Smoking status: Never    Smokeless tobacco: Never   Vaping Use    Vaping Use: Never used   Substance and Sexual Activity    Alcohol use: No    Drug use: No    Sexual activity: Not Currently     Social Determinants of Health     Financial Resource Strain: Low Risk     Difficulty of Paying Living Expenses: Not hard at all   Food Insecurity: No Food Insecurity    Worried About [employer] in the Last Year: Never true    Ran Out of Food in the Last Year: Never true   Physical Activity: Inactive    Days of Exercise per Week: 0 days    Minutes of Exercise per Session: 0 min Review of Systems   Constitutional:  Positive for activity change. HENT: Negative. Respiratory:  Positive for shortness of breath. Cardiovascular: Negative. Musculoskeletal:  Positive for arthralgias and back pain. Neurological:  Positive for weakness. Hematological:  Bruises/bleeds easily. Psychiatric/Behavioral: Negative. Current Outpatient Medications on File Prior to Visit   Medication Sig Dispense Refill    metoprolol tartrate (LOPRESSOR) 50 MG tablet TAKE 1 AND 1/2 TABLETS     TWICE DAILY FOR BLOOD      PRESSURE (CANCEL PREVIOUS  PRESCRIPTION ERROR) 270 tablet 1    diphenhydrAMINE-APAP, sleep, (TYLENOL PM EXTRA STRENGTH PO) Take 2 tablets by mouth nightly      atorvastatin (LIPITOR) 10 MG tablet TAKE 1 TABLET DAILY FOR    CHOLESTEROL 90 tablet 3    SYNTHROID 50 MCG tablet TAKE 1 TABLET DAILY FOR LOWTHYROID. CHANGE IN DOSE 90 tablet 3    VYZULTA 0.024 % SOLN       cloNIDine (CATAPRES) 0.1 MG tablet TAKE ONE TABLET BY MOUTH ONCE DAILY IF NEEDED FOR BLOOD PRESSURE GREATER THAN 160/100 30 tablet 0    valsartan (DIOVAN) 80 MG tablet TAKE ONE TABLET BY MOUTH ONCE EVERY DAY FOR BLOOD PRESSURE**STOP OLMESARTAN** 30 tablet 0    amLODIPine (NORVASC) 5 MG tablet TAKE 1 TABLET DAILY 90 tablet 3    timolol (BETIMOL) 0.5 % ophthalmic solution Apply to eye      aspirin 81 MG tablet aspirin   daily      Probiotic Product (PROBIOTIC-10) CAPS Take by mouth once      sodium chloride (AIDA 128) 5 % ophthalmic solution Place 1 drop into both eyes 2 times daily      ZIOPTAN 0.0015 % SOLN       ketorolac (TORADOL) 10 MG tablet Take 1 tablet by mouth every 6 hours as needed for Pain 10 tablet 0     No current facility-administered medications on file prior to visit.           OBJECTIVE:    Wt Readings from Last 3 Encounters:   12/07/22 123 lb (55.8 kg)   11/07/22 120 lb 6.4 oz (54.6 kg)   09/13/22 125 lb (56.7 kg)       BP (!) 156/98   Pulse 65   Temp 97 °F (36.1 °C)   Resp 18   Ht 5' 3\" (1.6 m) Wt 123 lb (55.8 kg)   SpO2 98%   BMI 21.79 kg/m²     Physical Exam  Vitals and nursing note reviewed. Constitutional:       General: She is not in acute distress. Appearance: Normal appearance. She is well-developed. HENT:      Head: Normocephalic. Right Ear: Tympanic membrane, ear canal and external ear normal.      Left Ear: Tympanic membrane, ear canal and external ear normal.      Nose: Nose normal. No rhinorrhea. Mouth/Throat:      Mouth: Mucous membranes are moist.      Pharynx: No posterior oropharyngeal erythema. Eyes:      Extraocular Movements: Extraocular movements intact. Conjunctiva/sclera: Conjunctivae normal.      Pupils: Pupils are equal, round, and reactive to light. Neck:      Vascular: No carotid bruit. Cardiovascular:      Rate and Rhythm: Normal rate and regular rhythm. Heart sounds: Normal heart sounds. No murmur heard. Pulmonary:      Effort: Pulmonary effort is normal. No respiratory distress. Breath sounds: Normal breath sounds. Musculoskeletal:      Cervical back: Normal range of motion and neck supple. Lymphadenopathy:      Cervical: No cervical adenopathy. Skin:     General: Skin is warm and dry. Neurological:      Mental Status: She is alert and oriented to person, place, and time. Comments: She is weak and requires assistance getting up out of a chair. When she walks her gait is very cautious and a little uneven. Psychiatric:         Mood and Affect: Mood normal.         Speech: Speech normal.         Behavior: Behavior normal.         Thought Content: Thought content normal.         Judgment: Judgment normal.       ASSESSMENT:    1. Elevated brain natriuretic peptide (BNP) level    2. Paroxysmal atrial fibrillation (HCC)    3. Dyspnea on exertion    4. Loss of balance    5. Falls frequently    6. Weakness of both legs          PLAN:  1.  Elevated brain natriuretic peptide (BNP) level  -     Echo 2d w doppler w contrast limited; Future  2. Paroxysmal atrial fibrillation (HCC)  -     Echo 2d w doppler w contrast limited; Future  3. Dyspnea on exertion  -     Echo 2d w doppler w contrast limited; Future  4. Loss of balance  -     EMG; Future  -     Nerve conduction test; Future  5. Falls frequently  -     EMG; Future  -     Nerve conduction test; Future  6. Weakness of both legs  -     EMG; Future  -     Nerve conduction test; Future     I am not sure what is going on. Because of her elevated BNP which is new I am going to get a 2D echo with Doppler. We will also look at her paroxysmal atrial fibrillation. I do not know if she could have worsening heart function that could be causing her dyspnea on exertion. I am also going to set up nerve conduction studies and EMGs. Her gait is definitely getting worse. It is not a festinating gait. It does not seem to be because of pain but she just is weak and very unsteady. All of these problems are very serious because if she falls and hits her head it could be quite dangerous. She has not had an echo in several years. If she has any problems she is to let me know. She is to be very careful while we are getting these tests and try to avoid falling. Follow-up:  Return in about 6 weeks (around 1/18/2023) for recheck. PATIENT INSTRUCTIONS:  Patient Instructions     Wt Readings from Last 3 Encounters:   12/07/22 123 lb (55.8 kg)   11/07/22 120 lb 6.4 oz (54.6 kg)   09/13/22 125 lb (56.7 kg)        We are committed to providing you with the best care possible. In order to help us achieve these goals please remember to bring all medications, herbal products, and over the counter supplements with you to each visit. If your provider has ordered testing for you, please be sure to follow up with our office if you have not received results within 7 days after the testing took place.      *If you receive a survey after visiting one of our offices, please take time to share your experience concerning your physician office visit. These surveys are confidential and no health information about you is shared. We are eager to improve for you and we are counting on your feedback to help make that happen. EMR Dragon/transcription disclaimer:  Much of this encounter note is electronic transcription/translation of spoken language to printed texts. The electronic translation of spoken language may be erroneous, or at times, nonsensical words or phrases may beinadvertently transcribed.   Although I have reviewed the note for such errors, some may still exist.

## 2022-12-12 NOTE — TELEPHONE ENCOUNTER
Call returned to patient's daughter in law. Have instructed her per [facility], that she needs to be seen and evaluated in the ER with focus on her hip area. She stated that patient has not seen neurosurgery in awhile and may need to be seen by them.

## 2022-12-13 ENCOUNTER — CARE COORDINATION (OUTPATIENT)
Dept: CARE COORDINATION | Age: 87
End: 2022-12-13

## 2022-12-13 NOTE — ED PROVIDER NOTES
[facility] EMERGENCY DEPT  eMERGENCY dEPARTMENT eNCOUnter      Pt Name: Josefina Whelan  MRN: [id number]  [place] 4/21/1928  Date of evaluation: 12/12/2022  Provider: LACI Tam    CHIEF COMPLAINT       Chief Complaint   Patient presents with    Back Pain     Reports left sided lower back pain that she describes as \"really really bad, its excruciating\"         HISTORY OF PRESENT ILLNESS   (Location/Symptom, Timing/Onset,Context/Setting, Quality, Duration, Modifying Factors, Severity)  Note limiting factors. Josefina Whelan is a 80 y.o. female who presents to the emergency department with left hip pain x 2 weeks. Has been on and off and just getting worse. Hurt to roll in bed last night. NO injury. No change in bowel or bladder habits. No fever. pain is to her lumbar spine and her left hip. Pt lives alone and still does her own houswork. Is here with her daughter in law. Does not drive due to poor eyesight     The history is provided by the patient. Back Pain  Location:  Lumbar spine  Radiates to:  L thigh (left hip)  Pain severity:  Severe  Onset quality:  Sudden  Associated symptoms: no abdominal pain and no fever      NursingNotes were reviewed. REVIEW OF SYSTEMS    (2-9 systems for level 4, 10 or more for level 5)     Review of Systems   Constitutional:  Negative for fever. Gastrointestinal:  Negative for abdominal pain. Genitourinary:  Negative for difficulty urinating. Musculoskeletal:  Positive for arthralgias, back pain and gait problem. Except as noted above the remainder of the review of systems was reviewed and negative.        PAST MEDICAL HISTORY     Past Medical History:   Diagnosis Date    Arthritis     Atrial fibrillation, chronic (HCC)     Burning mouth syndrome     Cerebral artery occlusion with cerebral infarction (HCC)     Chronic insomnia     Glaucoma     Hx of blood clots     Hyperlipidemia     Hypertension     Hypothyroidism     Low back pain     Pure hypercholesterolemia 05/10/2016         SURGICALHISTORY       Past Surgical History:   Procedure Laterality Date    APPENDECTOMY      BREAST BIOPSY      CARPAL TUNNEL RELEASE      COLONOSCOPY  2005        ERCP      ? Dr.Whitney Means Handing SPUR SURGERY      HIP ARTHROPLASTY Left January 30, 2014    HYSTERECTOMY (CERVIX STATUS UNKNOWN)      REVISION TOTAL HIP ARTHROPLASTY Left February 4, 2014    UPPER GASTROINTESTINAL ENDOSCOPY      UPPER GASTROINTESTINAL ENDOSCOPY  12/2011             CURRENT MEDICATIONS       Discharge Medication List as of 12/12/2022  7:21 PM        CONTINUE these medications which have NOT CHANGED    Details   metoprolol tartrate (LOPRESSOR) 50 MG tablet TAKE 1 AND 1/2 TABLETS     TWICE DAILY FOR BLOOD      PRESSURE (CANCEL PREVIOUS  PRESCRIPTION ERROR), Disp-270 tablet, R-1Normal      diphenhydrAMINE-APAP, sleep, (TYLENOL PM EXTRA STRENGTH PO) Take 2 tablets by mouth nightlyHistorical Med      atorvastatin (LIPITOR) 10 MG tablet TAKE 1 TABLET DAILY FOR    CHOLESTEROL, Disp-90 tablet, R-3Normal      SYNTHROID 50 MCG tablet TAKE 1 TABLET DAILY FOR LOWTHYROID.  CHANGE IN DOSE, Disp-90 tablet, R-3Normal      VYZULTA 0.024 % SOLN DAWHistorical Med      cloNIDine (CATAPRES) 0.1 MG tablet TAKE ONE TABLET BY MOUTH ONCE DAILY IF NEEDED FOR BLOOD PRESSURE GREATER THAN 160/100, Disp-30 tablet, R-0Normal      valsartan (DIOVAN) 80 MG tablet TAKE ONE TABLET BY MOUTH ONCE EVERY DAY FOR BLOOD PRESSURE**STOP OLMESARTAN**, Disp-30 tablet, R-0Normal      amLODIPine (NORVASC) 5 MG tablet TAKE 1 TABLET DAILY, Disp-90 tablet, R-3Normal      timolol (BETIMOL) 0.5 % ophthalmic solution Apply to eyeHistorical Med      ketorolac (TORADOL) 10 MG tablet Take 1 tablet by mouth every 6 hours as needed for Pain, Disp-10 tablet, R-0Normal      aspirin 81 MG tablet aspirin   dailyHistorical Med      Probiotic Product (PROBIOTIC-10) CAPS Take by mouth onceHistorical Med      sodium chloride (AIDA 128) 5 % ophthalmic solution Place 1 drop into both eyes 2 times dailyHistorical Med      ZIOPTAN 0.0015 % SOLN Historical Med             ALLERGIES     Gabapentin, Morphine, and Stadol [butorphanol tartrate]    FAMILY HISTORY       Family History   Problem Relation Age of Onset    Heart Disease Mother     Lung Cancer Father           SOCIAL HISTORY       Social History     Socioeconomic History    Marital status:      Spouse name: None    Number of children: 2    Years of education: None    Highest education level: None   Occupational History    Occupation: Homemaker   Tobacco Use    Smoking status: Never    Smokeless tobacco: Never   Vaping Use    Vaping Use: Never used   Substance and Sexual Activity    Alcohol use: No    Drug use: No    Sexual activity: Not Currently     Social Determinants of Health     Financial Resource Strain: Low Risk     Difficulty of Paying Living Expenses: Not hard at all   Food Insecurity: No Food Insecurity    Worried About [address] [place] [address] [employer] in the Last Year: Never true    Ran Out of Food in the Last Year: Never true   Physical Activity: Inactive    Days of Exercise per Week: 0 days    Minutes of Exercise per Session: 0 min       SCREENINGS    Shorty Coma Scale  Eye Opening: Spontaneous  Best Verbal Response: Oriented  Best Motor Response: Obeys commands  Shorty Coma Scale Score: 15 @FLOW([id number])@      PHYSICAL EXAM    (up to 7 for level 4, 8 or more for level 5)     ED Triage Vitals [12/12/22 1229]   BP Temp Temp src Heart Rate Resp SpO2 Height Weight   (!) 159/89 98.2 °F (36.8 °C) -- 60 18 94 % 5' 3\" (1.6 m) 123 lb (55.8 kg)       Physical Exam  Vitals and nursing note reviewed. Constitutional:       Appearance: Normal appearance. She is well-developed. HENT:      Head: Normocephalic and atraumatic. Eyes:      General: No scleral icterus. Right eye: No discharge. Left eye: No discharge.    Cardiovascular:      Rate and Rhythm: Normal rate and regular rhythm. Pulmonary:      Effort: Pulmonary effort is normal. No respiratory distress. Breath sounds: Normal breath sounds. Abdominal:      Palpations: Abdomen is soft. Tenderness: There is no abdominal tenderness. Musculoskeletal:      Cervical back: Normal range of motion and neck supple. Neurological:      Mental Status: She is alert and oriented to person, place, and time. Psychiatric:         Behavior: Behavior normal.       DIAGNOSTIC RESULTS     EKG: All EKG's are interpreted by the Emergency Department Physician who either signs or Co-signsthis chart in the absence of a cardiologist.        RADIOLOGY:   Azucena Friendly such as CT, Ultrasound and MRI are read by the radiologist. Plain radiographic images are visualized and preliminarily interpreted by the emergency physician with the below findings:      Interpretation per the Radiologist below, if available at the time of this note:    XR HIP 2-3 VW W PELVIS LEFT   Final Result   Left hip prosthesis appears in position. Moderate to marked degenerative changes of the right hip. Intact pelvic ring. No significant interval change. Recommendation: Followup as clinically indicated.     Dictated and Electronically Signed by Abdon Jameson MD, [facility] CERTIFIED at [id number] 05:45:10 PM                     ED BEDSIDEULTRASOUND:   Performed by ED Physician -none    LABS:  Labs Reviewed   CBC WITH AUTO DIFFERENTIAL - Abnormal; Notable for the following components:       Result Value    WBC 11.8 (*)     .2 (*)     MCH 32.8 (*)     MCHC 32.7 (*)     Neutrophils % 68.6 (*)     Lymphocytes % 19.5 (*)     Monocytes % 10.5 (*)     Neutrophils Absolute 8.1 (*)     Monocytes Absolute 1.20 (*)     All other components within normal limits   COMPREHENSIVE METABOLIC PANEL - Abnormal; Notable for the following components:    Est, Glom Filt Rate 59 (*)     Alkaline Phosphatase 146 (*)     All other components within normal limits   LIPASE - Abnormal; Notable for the following components:    Lipase 11 (*)     All other components within normal limits   URINALYSIS WITH REFLEX TO CULTURE - Abnormal; Notable for the following components:    Ketones, Urine 40 (*)     Blood, Urine SMALL (*)     Protein, UA TRACE (*)     Leukocyte Esterase, Urine SMALL (*)     All other components within normal limits   MICROSCOPIC URINALYSIS - Abnormal; Notable for the following components:    Bacteria, UA NEGATIVE (*)     Crystals, UA NEG (*)     WBC, UA 25 (*)     RBC, UA 7 (*)     All other components within normal limits   CULTURE, URINE       All other labs were within normal range or not returned as of this dictation. EMERGENCY DEPARTMENT COURSE and DIFFERENTIALDIAGNOSIS/MDM:   Vitals:    Vitals:    12/12/22 1229 12/12/22 1930   BP: (!) 159/89 138/88   Pulse: 60 72   Resp: 18 18   Temp: 98.2 °F (36.8 °C) 98 °F (36.7 °C)   SpO2: 94% 93%   Weight: 123 lb (55.8 kg)    Height: 5' 3\" (1.6 m)            MDM  Number of Diagnoses or Management Options  Acute exacerbation of chronic low back pain: established and worsening  Sciatica of left side: established and worsening     Amount and/or Complexity of Data Reviewed  Clinical lab tests: ordered and reviewed  Tests in the radiology section of CPT®: ordered  Tests in the medicine section of CPT®: ordered and reviewed    Risk of Complications, Morbidity, and/or Mortality  Presenting problems: moderate  Diagnostic procedures: moderate  Management options: low  General comments: Pt up and ambulated without difficulty. HAs a walker at home and family to stay with her. Would like a few lortabs           CONSULTS:  None    PROCEDURES:  Unless otherwise noted below, none     Procedures    FINAL IMPRESSION      1. Acute exacerbation of chronic low back pain    2.  Sciatica of left side        DISPOSITION/PLAN   DISPOSITION Decision To Discharge 12/12/2022 07:09:04 PM      PATIENT REFERRED TO:  No follow-up provider specified. DISCHARGE MEDICATIONS:  Discharge Medication List as of 12/12/2022  7:21 PM        START taking these medications    Details   HYDROcodone-acetaminophen (NORCO) 5-325 MG per tablet Take 1 tablet by mouth every 6 hours as needed for Pain for up to 3 days. , Disp-12 tablet, R-0Normal                (Please note that portions of this note were completed with a voice recognitionprogram.  Efforts were made to edit the dictations but occasionally words are mis-transcribed.)    LACI Tam (electronically signed)          LACI Tam  12/13/22 0170

## 2022-12-13 NOTE — ED NOTES
Pt ambulated with one assist with minimal difficulty. Pt stated she was in pain while doing so.      Theresa Desai  12/12/22 7455

## 2022-12-13 NOTE — CARE COORDINATION
[facility] ED Follow Up Call    2022    Patient: Edie Vizcaino Patient : 1928   MRN: [id number]  Reason for Admission: back pain  Discharge Date: 22    LPN CC spoke with patient's daughter-in-law, Ricky Tse, HIPAA verified. States they had a terrible experience in ED. Felt their needs or concerns weren't listened to very well. Agreeable to patient advocate number. Back pain is somewhat better. Meds help some. Going to get scans done tomorrow for ECHO & EMG. LPN CC encouraged f/u with pain management and/or PCP for ED f/u. Geoff Carrasquillo states she will do so.    [place]  [phone]

## 2022-12-14 ENCOUNTER — HOSPITAL ENCOUNTER (OUTPATIENT)
Dept: NON INVASIVE DIAGNOSTICS | Age: 87
Discharge: HOME | End: 2022-12-14
Payer: MEDICARE

## 2022-12-14 ENCOUNTER — HOSPITAL ENCOUNTER (OUTPATIENT)
Dept: NEUROLOGY | Age: 87
Discharge: HOME | End: 2022-12-14
Payer: MEDICARE

## 2022-12-14 DIAGNOSIS — R26.89 LOSS OF BALANCE: ICD-10-CM

## 2022-12-14 DIAGNOSIS — R29.6 FALLS FREQUENTLY: ICD-10-CM

## 2022-12-14 DIAGNOSIS — R29.898 WEAKNESS OF BOTH LEGS: ICD-10-CM

## 2022-12-14 LAB
ORGANISM: ABNORMAL
ORGANISM: ABNORMAL
URINE CULTURE, ROUTINE: ABNORMAL

## 2022-12-14 PROCEDURE — 95909 NRV CNDJ TST 5-6 STUDIES: CPT

## 2022-12-14 PROCEDURE — 95886 MUSC TEST DONE W/N TEST COMP: CPT | Performed by: PSYCHIATRY & NEUROLOGY

## 2022-12-14 PROCEDURE — 95909 NRV CNDJ TST 5-6 STUDIES: CPT | Performed by: PSYCHIATRY & NEUROLOGY

## 2022-12-14 PROCEDURE — 95886 MUSC TEST DONE W/N TEST COMP: CPT

## 2022-12-14 PROCEDURE — 93306 TTE W/DOPPLER COMPLETE: CPT

## 2022-12-15 NOTE — PROCEDURES
[facility]  Neurophysiology Department  [address] [place] [address]  Phone [phone]  Fax [phone] REPORT  Patient Data  Patient Name Cooper Isbell Referring Provider Alina Chester MD   Account Number [de-identified] Interpreting physician JASS Cook 4/21/1928 Technologist Karina Eli   Age 80 Test Nerve conduction studies/electromyogram   Indications for the test weakness Date of test 12/14/2022       HISTORY:     Cooper Isbell is a 80year old woman who complains of weakness in the bilateral lower extremities. SUMMARY:     Nerve conduction studies of the bilateral lower extremities showed low amplitude peroneal motor responses bilaterally, a slow left peroneal conduction velocity, and absent sensory responses bilaterally. Electromyogram of the bilateral lower extremities was normal.      INTERPRETATION:     The findings are considered normal at her age. Raiza Chavez M.D. Sensory NCS      Nerve / Sites Rec. Site Onset Lat Peak Lat NP Amp PP Amp Segments Distance Velocity     ms ms µV µV  cm m/s   L Sural - Ankle (Calf)      Calf Ankle NR NR NR NR Calf - Ankle 14 NR      Ref. ?4.5 ? 6.0  Ref. R Sural - Ankle (Calf)      Calf Ankle NR NR NR NR Calf - Ankle 14 NR      Ref. ?4.5 ? 6.0  Ref. Motor NCS      Nerve / Sites Muscle Latency Ref. Amplitude Ref. Amp % Duration Segments Distance Lat Diff Velocity Ref.      ms ms mV mV % ms  cm ms m/s m/s   L Peroneal - EDB      Ankle EDB 5.3 ?6.5 0.3 ?2.0 100 13.3 Ankle - EDB 8.5         Pop fossa EDB 14.1  0.7  211 7.3 Pop fossa - Ankle 34 8.8 38 ?41   R Peroneal - EDB      Ankle EDB 6.3 ?6.5 0.5 ?2.0 100 13.1 Ankle - EDB 8.5         Pop fossa EDB 12.7  0.4  90.1 10.9 Pop fossa - Ankle 35 6.4 55 ?41   L Tibial - AH      Ankle AH 4.4 ?6.0 13.2 ?4.0 100 7.5 Ankle - AH 8         Pop fossa AH 13.2  10.7  80.9 8.8 Pop fossa - Ankle 40 8.8 46 ?42   R Tibial - AH      Ankle AH 4.5 ?6.0 12.9 ?4.0 100 7.1 Ankle - AH 8         Pop fossa AH 13.3  12.0  93.1 8.1 Pop fossa - Ankle 37 8.8 42 ?42                     EMG Summary Table     Spontaneous MUAP Recruitment   Muscle Nerve Roots IA Fib PSW Fasc H.F. Amp Dur. PPP Pattern   R. Tibialis anterior Deep peroneal (Fibular) L4-L5 N None None None None N N N N   R. Gastrocnemius (Medial head) Tibial S1-S2 N None None None None N N N N   R. Abductor hallucis Tibial S1-S2 N None None None None N N N N   R. Vastus lateralis Femoral L2-L4 N None None None None N N N N   R. Iliopsoas Femoral L2-L3 N None None None None N N N N   L. Tibialis anterior Deep peroneal (Fibular) L4-L5 N None None None None N N N N   L. Gastrocnemius (Medial head) Tibial S1-S2 N None None None None N N N N   L. Abductor hallucis Tibial S1-S2 N None None None None N N N N   L. Vastus lateralis Femoral L2-L4 N None None None None N N N N   L.  Iliopsoas Femoral L2-L3 N None None None None N N N N

## 2022-12-18 DIAGNOSIS — G89.29 CHRONIC HIP PAIN, UNSPECIFIED LATERALITY: Primary | ICD-10-CM

## 2022-12-18 DIAGNOSIS — M25.559 CHRONIC HIP PAIN, UNSPECIFIED LATERALITY: Primary | ICD-10-CM

## 2022-12-18 RX ORDER — TRAMADOL HYDROCHLORIDE 50 MG/1
50 TABLET ORAL 2 TIMES DAILY PRN
Qty: 30 TABLET | Refills: 0 | Status: SHIPPED | OUTPATIENT
Start: 2022-12-18 | End: 2023-01-02

## 2023-01-02 ENCOUNTER — APPOINTMENT (OUTPATIENT)
Dept: GENERAL RADIOLOGY | Facility: HOSPITAL | Age: 88
DRG: 65 | End: 2023-01-02
Payer: MEDICARE

## 2023-01-02 ENCOUNTER — APPOINTMENT (OUTPATIENT)
Dept: CT IMAGING | Facility: HOSPITAL | Age: 88
DRG: 65 | End: 2023-01-02
Payer: MEDICARE

## 2023-01-02 ENCOUNTER — HOSPITAL ENCOUNTER (INPATIENT)
Facility: HOSPITAL | Age: 88
LOS: 3 days | Discharge: NURSING, REHAB OR LONG-TERM CARE | DRG: 65 | End: 2023-01-05
Attending: EMERGENCY MEDICINE | Admitting: FAMILY MEDICINE
Payer: MEDICARE

## 2023-01-02 ENCOUNTER — APPOINTMENT (OUTPATIENT)
Dept: MRI IMAGING | Facility: HOSPITAL | Age: 88
DRG: 65 | End: 2023-01-02
Payer: MEDICARE

## 2023-01-02 DIAGNOSIS — I63.9 CEREBROVASCULAR ACCIDENT (CVA), UNSPECIFIED MECHANISM: Primary | ICD-10-CM

## 2023-01-02 DIAGNOSIS — Z74.09 IMPAIRED MOBILITY AND ADLS: ICD-10-CM

## 2023-01-02 DIAGNOSIS — D72.829 LEUKOCYTOSIS, UNSPECIFIED TYPE: ICD-10-CM

## 2023-01-02 DIAGNOSIS — Z78.9 IMPAIRED MOBILITY AND ADLS: ICD-10-CM

## 2023-01-02 DIAGNOSIS — R13.10 DYSPHAGIA, UNSPECIFIED TYPE: ICD-10-CM

## 2023-01-02 DIAGNOSIS — I48.20 CHRONIC ATRIAL FIBRILLATION: ICD-10-CM

## 2023-01-02 DIAGNOSIS — M62.81 MUSCLE WEAKNESS: ICD-10-CM

## 2023-01-02 PROBLEM — E87.6 HYPOKALEMIA: Status: ACTIVE | Noted: 2023-01-02

## 2023-01-02 PROBLEM — E83.42 HYPOMAGNESEMIA: Status: ACTIVE | Noted: 2023-01-02

## 2023-01-02 PROBLEM — I48.0 PAF (PAROXYSMAL ATRIAL FIBRILLATION): Status: ACTIVE | Noted: 2023-01-02

## 2023-01-02 PROBLEM — I63.511 ACUTE ISCHEMIC RIGHT MCA STROKE: Status: ACTIVE | Noted: 2023-01-02

## 2023-01-02 PROBLEM — R74.8 ELEVATED CK: Status: ACTIVE | Noted: 2023-01-02

## 2023-01-02 LAB
ALBUMIN SERPL-MCNC: 3.7 G/DL (ref 3.5–5.2)
ALBUMIN/GLOB SERPL: 1.2 G/DL
ALP SERPL-CCNC: 134 U/L (ref 39–117)
ALT SERPL W P-5'-P-CCNC: 13 U/L (ref 1–33)
ANION GAP SERPL CALCULATED.3IONS-SCNC: 15 MMOL/L (ref 5–15)
AST SERPL-CCNC: 26 U/L (ref 1–32)
BACTERIA UR QL AUTO: ABNORMAL /HPF
BASOPHILS # BLD AUTO: 0.03 10*3/MM3 (ref 0–0.2)
BASOPHILS NFR BLD AUTO: 0.2 % (ref 0–1.5)
BILIRUB SERPL-MCNC: 1 MG/DL (ref 0–1.2)
BILIRUB UR QL STRIP: NEGATIVE
BUN SERPL-MCNC: 16 MG/DL (ref 8–23)
BUN/CREAT SERPL: 20.8 (ref 7–25)
CALCIUM SPEC-SCNC: 9.2 MG/DL (ref 8.2–9.6)
CHLORIDE SERPL-SCNC: 102 MMOL/L (ref 98–107)
CK SERPL-CCNC: 520 U/L (ref 20–180)
CLARITY UR: CLEAR
CO2 SERPL-SCNC: 25 MMOL/L (ref 22–29)
COLOR UR: YELLOW
CREAT SERPL-MCNC: 0.77 MG/DL (ref 0.57–1)
D-LACTATE SERPL-SCNC: 1.7 MMOL/L (ref 0.5–2)
D-LACTATE SERPL-SCNC: 3.8 MMOL/L (ref 0.5–2)
DEPRECATED RDW RBC AUTO: 49.4 FL (ref 37–54)
EGFRCR SERPLBLD CKD-EPI 2021: 71.6 ML/MIN/1.73
EOSINOPHIL # BLD AUTO: 0 10*3/MM3 (ref 0–0.4)
EOSINOPHIL NFR BLD AUTO: 0 % (ref 0.3–6.2)
ERYTHROCYTE [DISTWIDTH] IN BLOOD BY AUTOMATED COUNT: 13.6 % (ref 12.3–15.4)
GLOBULIN UR ELPH-MCNC: 3 GM/DL
GLUCOSE BLDC GLUCOMTR-MCNC: 130 MG/DL (ref 70–130)
GLUCOSE BLDC GLUCOMTR-MCNC: 86 MG/DL (ref 70–130)
GLUCOSE BLDC GLUCOMTR-MCNC: 96 MG/DL (ref 70–130)
GLUCOSE SERPL-MCNC: 130 MG/DL (ref 65–99)
GLUCOSE UR STRIP-MCNC: NEGATIVE MG/DL
HCT VFR BLD AUTO: 43.1 % (ref 34–46.6)
HGB BLD-MCNC: 13.8 G/DL (ref 12–15.9)
HGB UR QL STRIP.AUTO: ABNORMAL
HYALINE CASTS UR QL AUTO: ABNORMAL /LPF
IMM GRANULOCYTES # BLD AUTO: 0.09 10*3/MM3 (ref 0–0.05)
IMM GRANULOCYTES NFR BLD AUTO: 0.5 % (ref 0–0.5)
KETONES UR QL STRIP: ABNORMAL
LEUKOCYTE ESTERASE UR QL STRIP.AUTO: NEGATIVE
LYMPHOCYTES # BLD AUTO: 1.18 10*3/MM3 (ref 0.7–3.1)
LYMPHOCYTES NFR BLD AUTO: 6.9 % (ref 19.6–45.3)
MAGNESIUM SERPL-MCNC: 1.1 MG/DL (ref 1.7–2.3)
MCH RBC QN AUTO: 31.4 PG (ref 26.6–33)
MCHC RBC AUTO-ENTMCNC: 32 G/DL (ref 31.5–35.7)
MCV RBC AUTO: 98.2 FL (ref 79–97)
MONOCYTES # BLD AUTO: 1.42 10*3/MM3 (ref 0.1–0.9)
MONOCYTES NFR BLD AUTO: 8.3 % (ref 5–12)
NEUTROPHILS NFR BLD AUTO: 14.48 10*3/MM3 (ref 1.7–7)
NEUTROPHILS NFR BLD AUTO: 84.1 % (ref 42.7–76)
NITRITE UR QL STRIP: NEGATIVE
NRBC BLD AUTO-RTO: 0 /100 WBC (ref 0–0.2)
PH UR STRIP.AUTO: 5.5 [PH] (ref 5–8)
PLATELET # BLD AUTO: 325 10*3/MM3 (ref 140–450)
PMV BLD AUTO: 10 FL (ref 6–12)
POTASSIUM SERPL-SCNC: 3.2 MMOL/L (ref 3.5–5.2)
PROCALCITONIN SERPL-MCNC: 0.1 NG/ML (ref 0–0.25)
PROT SERPL-MCNC: 6.7 G/DL (ref 6–8.5)
PROT UR QL STRIP: ABNORMAL
RBC # BLD AUTO: 4.39 10*6/MM3 (ref 3.77–5.28)
RBC # UR STRIP: ABNORMAL /HPF
REF LAB TEST METHOD: ABNORMAL
SODIUM SERPL-SCNC: 142 MMOL/L (ref 136–145)
SP GR UR STRIP: >1.03 (ref 1–1.03)
SQUAMOUS #/AREA URNS HPF: ABNORMAL /HPF
TSH SERPL DL<=0.05 MIU/L-ACNC: 1.82 UIU/ML (ref 0.27–4.2)
UROBILINOGEN UR QL STRIP: ABNORMAL
WBC # UR STRIP: ABNORMAL /HPF
WBC NRBC COR # BLD: 17.2 10*3/MM3 (ref 3.4–10.8)

## 2023-01-02 PROCEDURE — 93005 ELECTROCARDIOGRAM TRACING: CPT | Performed by: EMERGENCY MEDICINE

## 2023-01-02 PROCEDURE — 72125 CT NECK SPINE W/O DYE: CPT

## 2023-01-02 PROCEDURE — 99285 EMERGENCY DEPT VISIT HI MDM: CPT

## 2023-01-02 PROCEDURE — 82962 GLUCOSE BLOOD TEST: CPT

## 2023-01-02 PROCEDURE — 36415 COLL VENOUS BLD VENIPUNCTURE: CPT

## 2023-01-02 PROCEDURE — 81001 URINALYSIS AUTO W/SCOPE: CPT | Performed by: EMERGENCY MEDICINE

## 2023-01-02 PROCEDURE — 80053 COMPREHEN METABOLIC PANEL: CPT | Performed by: EMERGENCY MEDICINE

## 2023-01-02 PROCEDURE — 0 POTASSIUM CHLORIDE 10 MEQ/100ML SOLUTION: Performed by: FAMILY MEDICINE

## 2023-01-02 PROCEDURE — 82550 ASSAY OF CK (CPK): CPT | Performed by: EMERGENCY MEDICINE

## 2023-01-02 PROCEDURE — 0 IOPAMIDOL PER 1 ML: Performed by: EMERGENCY MEDICINE

## 2023-01-02 PROCEDURE — 85025 COMPLETE CBC W/AUTO DIFF WBC: CPT | Performed by: EMERGENCY MEDICINE

## 2023-01-02 PROCEDURE — 71045 X-RAY EXAM CHEST 1 VIEW: CPT

## 2023-01-02 PROCEDURE — 99223 1ST HOSP IP/OBS HIGH 75: CPT | Performed by: PSYCHIATRY & NEUROLOGY

## 2023-01-02 PROCEDURE — 70551 MRI BRAIN STEM W/O DYE: CPT

## 2023-01-02 PROCEDURE — 87040 BLOOD CULTURE FOR BACTERIA: CPT | Performed by: EMERGENCY MEDICINE

## 2023-01-02 PROCEDURE — 83605 ASSAY OF LACTIC ACID: CPT | Performed by: EMERGENCY MEDICINE

## 2023-01-02 PROCEDURE — 83735 ASSAY OF MAGNESIUM: CPT | Performed by: PSYCHIATRY & NEUROLOGY

## 2023-01-02 PROCEDURE — 93010 ELECTROCARDIOGRAM REPORT: CPT | Performed by: EMERGENCY MEDICINE

## 2023-01-02 PROCEDURE — 70498 CT ANGIOGRAPHY NECK: CPT

## 2023-01-02 PROCEDURE — 70450 CT HEAD/BRAIN W/O DYE: CPT

## 2023-01-02 PROCEDURE — 82607 VITAMIN B-12: CPT | Performed by: PSYCHIATRY & NEUROLOGY

## 2023-01-02 PROCEDURE — 25010000002 KETOROLAC TROMETHAMINE PER 15 MG: Performed by: FAMILY MEDICINE

## 2023-01-02 PROCEDURE — 72170 X-RAY EXAM OF PELVIS: CPT

## 2023-01-02 PROCEDURE — 83605 ASSAY OF LACTIC ACID: CPT | Performed by: FAMILY MEDICINE

## 2023-01-02 PROCEDURE — 84145 PROCALCITONIN (PCT): CPT | Performed by: EMERGENCY MEDICINE

## 2023-01-02 PROCEDURE — 92610 EVALUATE SWALLOWING FUNCTION: CPT

## 2023-01-02 PROCEDURE — 84443 ASSAY THYROID STIM HORMONE: CPT | Performed by: PSYCHIATRY & NEUROLOGY

## 2023-01-02 PROCEDURE — 70496 CT ANGIOGRAPHY HEAD: CPT

## 2023-01-02 PROCEDURE — 25010000002 MAGNESIUM SULFATE 2 GM/50ML SOLUTION: Performed by: FAMILY MEDICINE

## 2023-01-02 RX ORDER — LEVOTHYROXINE SODIUM 0.05 MG/1
50 TABLET ORAL
Status: DISCONTINUED | OUTPATIENT
Start: 2023-01-03 | End: 2023-01-04

## 2023-01-02 RX ORDER — ASPIRIN 300 MG/1
300 SUPPOSITORY RECTAL DAILY
Status: DISCONTINUED | OUTPATIENT
Start: 2023-01-03 | End: 2023-01-02

## 2023-01-02 RX ORDER — VALSARTAN 80 MG/1
80 TABLET ORAL DAILY
COMMUNITY
End: 2023-01-05 | Stop reason: HOSPADM

## 2023-01-02 RX ORDER — MAGNESIUM SULFATE HEPTAHYDRATE 40 MG/ML
2 INJECTION, SOLUTION INTRAVENOUS AS NEEDED
Status: DISCONTINUED | OUTPATIENT
Start: 2023-01-02 | End: 2023-01-04

## 2023-01-02 RX ORDER — SODIUM CHLORIDE 0.9 % (FLUSH) 0.9 %
10 SYRINGE (ML) INJECTION AS NEEDED
Status: DISCONTINUED | OUTPATIENT
Start: 2023-01-02 | End: 2023-01-06 | Stop reason: HOSPADM

## 2023-01-02 RX ORDER — SODIUM CHLORIDE 0.9 % (FLUSH) 0.9 %
10 SYRINGE (ML) INJECTION EVERY 12 HOURS SCHEDULED
Status: DISCONTINUED | OUTPATIENT
Start: 2023-01-02 | End: 2023-01-06 | Stop reason: HOSPADM

## 2023-01-02 RX ORDER — TAFLUPROST OPTHALMIC 0 MG/.3ML
1 SOLUTION/ DROPS OPHTHALMIC NIGHTLY
Status: ON HOLD | COMMUNITY
End: 2023-01-03

## 2023-01-02 RX ORDER — LATANOPROST 50 UG/ML
1 SOLUTION/ DROPS OPHTHALMIC NIGHTLY
Status: DISCONTINUED | OUTPATIENT
Start: 2023-01-02 | End: 2023-01-03

## 2023-01-02 RX ORDER — METOPROLOL TARTRATE 5 MG/5ML
5 INJECTION INTRAVENOUS ONCE
Status: COMPLETED | OUTPATIENT
Start: 2023-01-02 | End: 2023-01-02

## 2023-01-02 RX ORDER — SODIUM CHLORIDE 9 MG/ML
75 INJECTION, SOLUTION INTRAVENOUS CONTINUOUS
Status: DISCONTINUED | OUTPATIENT
Start: 2023-01-02 | End: 2023-01-04

## 2023-01-02 RX ORDER — ASPIRIN 300 MG/1
300 SUPPOSITORY RECTAL DAILY
Status: DISCONTINUED | OUTPATIENT
Start: 2023-01-02 | End: 2023-01-06 | Stop reason: HOSPADM

## 2023-01-02 RX ORDER — POTASSIUM CHLORIDE 750 MG/1
40 CAPSULE, EXTENDED RELEASE ORAL AS NEEDED
Status: DISCONTINUED | OUTPATIENT
Start: 2023-01-02 | End: 2023-01-04

## 2023-01-02 RX ORDER — BISACODYL 10 MG
10 SUPPOSITORY, RECTAL RECTAL DAILY PRN
Status: DISCONTINUED | OUTPATIENT
Start: 2023-01-02 | End: 2023-01-06 | Stop reason: HOSPADM

## 2023-01-02 RX ORDER — AMLODIPINE BESYLATE 5 MG/1
5 TABLET ORAL DAILY
Status: DISCONTINUED | OUTPATIENT
Start: 2023-01-02 | End: 2023-01-04

## 2023-01-02 RX ORDER — VALSARTAN 80 MG/1
80 TABLET ORAL DAILY
Status: DISCONTINUED | OUTPATIENT
Start: 2023-01-03 | End: 2023-01-04

## 2023-01-02 RX ORDER — MAGNESIUM SULFATE HEPTAHYDRATE 40 MG/ML
4 INJECTION, SOLUTION INTRAVENOUS AS NEEDED
Status: DISCONTINUED | OUTPATIENT
Start: 2023-01-02 | End: 2023-01-04

## 2023-01-02 RX ORDER — ALUMINA, MAGNESIA, AND SIMETHICONE 2400; 2400; 240 MG/30ML; MG/30ML; MG/30ML
7.5 SUSPENSION ORAL EVERY 4 HOURS PRN
Status: DISCONTINUED | OUTPATIENT
Start: 2023-01-02 | End: 2023-01-06 | Stop reason: HOSPADM

## 2023-01-02 RX ORDER — NETARSUDIL AND LATANOPROST OPHTHALMIC SOLUTION, 0.02%/0.005% .2; .05 MG/ML; MG/ML
1 SOLUTION/ DROPS OPHTHALMIC; TOPICAL EVERY MORNING
Status: ON HOLD | COMMUNITY
End: 2023-01-03

## 2023-01-02 RX ORDER — ONDANSETRON 2 MG/ML
4 INJECTION INTRAMUSCULAR; INTRAVENOUS EVERY 6 HOURS PRN
Status: DISCONTINUED | OUTPATIENT
Start: 2023-01-02 | End: 2023-01-06 | Stop reason: HOSPADM

## 2023-01-02 RX ORDER — LEVOTHYROXINE SODIUM 0.05 MG/1
50 TABLET ORAL
Status: DISCONTINUED | OUTPATIENT
Start: 2023-01-03 | End: 2023-01-02

## 2023-01-02 RX ORDER — KETOROLAC TROMETHAMINE 30 MG/ML
15 INJECTION, SOLUTION INTRAMUSCULAR; INTRAVENOUS EVERY 6 HOURS PRN
Status: DISCONTINUED | OUTPATIENT
Start: 2023-01-02 | End: 2023-01-05

## 2023-01-02 RX ORDER — METOPROLOL TARTRATE 50 MG/1
75 TABLET, FILM COATED ORAL 2 TIMES DAILY
COMMUNITY
End: 2023-01-05 | Stop reason: HOSPADM

## 2023-01-02 RX ORDER — DOXEPIN HYDROCHLORIDE 25 MG/1
50 CAPSULE ORAL NIGHTLY
Status: DISCONTINUED | OUTPATIENT
Start: 2023-01-02 | End: 2023-01-04

## 2023-01-02 RX ORDER — POTASSIUM CHLORIDE 7.45 MG/ML
10 INJECTION INTRAVENOUS
Status: DISCONTINUED | OUTPATIENT
Start: 2023-01-02 | End: 2023-01-04

## 2023-01-02 RX ORDER — ASPIRIN 81 MG/1
81 TABLET, CHEWABLE ORAL DAILY
Status: DISCONTINUED | OUTPATIENT
Start: 2023-01-02 | End: 2023-01-02

## 2023-01-02 RX ORDER — ASPIRIN 81 MG/1
81 TABLET, CHEWABLE ORAL DAILY
Status: DISCONTINUED | OUTPATIENT
Start: 2023-01-02 | End: 2023-01-06 | Stop reason: HOSPADM

## 2023-01-02 RX ORDER — ACETAMINOPHEN 325 MG/1
650 TABLET ORAL EVERY 4 HOURS PRN
Status: DISCONTINUED | OUTPATIENT
Start: 2023-01-02 | End: 2023-01-06 | Stop reason: HOSPADM

## 2023-01-02 RX ORDER — VALSARTAN 80 MG/1
80 TABLET ORAL
Status: DISCONTINUED | OUTPATIENT
Start: 2023-01-02 | End: 2023-01-02

## 2023-01-02 RX ORDER — ASPIRIN 81 MG/1
81 TABLET, CHEWABLE ORAL DAILY
Status: DISCONTINUED | OUTPATIENT
Start: 2023-01-03 | End: 2023-01-02

## 2023-01-02 RX ORDER — LEVOTHYROXINE SODIUM 0.05 MG/1
50 TABLET ORAL DAILY
COMMUNITY
End: 2023-01-05 | Stop reason: HOSPADM

## 2023-01-02 RX ORDER — SODIUM CHLORIDE 9 MG/ML
40 INJECTION, SOLUTION INTRAVENOUS AS NEEDED
Status: DISCONTINUED | OUTPATIENT
Start: 2023-01-02 | End: 2023-01-06 | Stop reason: HOSPADM

## 2023-01-02 RX ORDER — ATORVASTATIN CALCIUM 40 MG/1
80 TABLET, FILM COATED ORAL NIGHTLY
Status: DISCONTINUED | OUTPATIENT
Start: 2023-01-02 | End: 2023-01-03

## 2023-01-02 RX ORDER — POTASSIUM CHLORIDE 1.5 G/1.77G
40 POWDER, FOR SOLUTION ORAL AS NEEDED
Status: DISCONTINUED | OUTPATIENT
Start: 2023-01-02 | End: 2023-01-04

## 2023-01-02 RX ORDER — ASPIRIN 81 MG/1
81 TABLET, CHEWABLE ORAL DAILY
COMMUNITY
End: 2023-01-05 | Stop reason: HOSPADM

## 2023-01-02 RX ORDER — DOXEPIN HYDROCHLORIDE 50 MG/1
50 CAPSULE ORAL NIGHTLY
COMMUNITY
End: 2023-01-05 | Stop reason: HOSPADM

## 2023-01-02 RX ADMIN — Medication 10 ML: at 14:49

## 2023-01-02 RX ADMIN — MAGNESIUM SULFATE HEPTAHYDRATE 2 G: 2 INJECTION, SOLUTION INTRAVENOUS at 23:50

## 2023-01-02 RX ADMIN — POTASSIUM CHLORIDE 10 MEQ: 7.46 INJECTION, SOLUTION INTRAVENOUS at 23:24

## 2023-01-02 RX ADMIN — POTASSIUM CHLORIDE 10 MEQ: 7.46 INJECTION, SOLUTION INTRAVENOUS at 22:16

## 2023-01-02 RX ADMIN — POTASSIUM CHLORIDE 10 MEQ: 7.46 INJECTION, SOLUTION INTRAVENOUS at 21:28

## 2023-01-02 RX ADMIN — MAGNESIUM SULFATE HEPTAHYDRATE 2 G: 2 INJECTION, SOLUTION INTRAVENOUS at 22:15

## 2023-01-02 RX ADMIN — KETOROLAC TROMETHAMINE 15 MG: 30 INJECTION, SOLUTION INTRAMUSCULAR; INTRAVENOUS at 16:11

## 2023-01-02 RX ADMIN — SODIUM CHLORIDE 75 ML/HR: 9 INJECTION, SOLUTION INTRAVENOUS at 17:49

## 2023-01-02 RX ADMIN — IOPAMIDOL 90 ML: 755 INJECTION, SOLUTION INTRAVENOUS at 09:40

## 2023-01-02 RX ADMIN — METOPROLOL TARTRATE 5 MG: 5 INJECTION INTRAVENOUS at 10:10

## 2023-01-02 RX ADMIN — ASPIRIN 300 MG: 300 SUPPOSITORY RECTAL at 18:09

## 2023-01-02 RX ADMIN — LATANOPROST 1 DROP: 50 SOLUTION OPHTHALMIC at 21:28

## 2023-01-02 NOTE — ED PROVIDER NOTES
Subjective   History of Present Illness  89-year-old lady history of A. fib not on any anticoagulation last known well was 6:30 PM yesterday since then she went to sleep did fall out of her bed may have laid on the floor on the left side when in the morning was found back in the bed by the family members not able to move her left arm and left leg.  And confused subsequently per the ED.  On arrival to the ER the code stroke was not called since the patient has got a history of trauma and we do not know exactly when the symptom onset's were.  CT angios were obtained which are negative for any large vessel occlusion the patient is not a tPA candidate I have discussed this case with the patient's family are guarded prognosis have been discussed neurological consultation has been obtained MRI is pending the patient will be admitted to the hospital service.    Cerebrovascular Accident  The primary symptoms include altered mental status, focal weakness and speech change. The symptoms began 12 to 24 hours ago. The last time the patient was known to be well was 1/1/2023 6:30 PM.    The symptoms are unchanged. The neurological symptoms are multifocal.   Additional symptoms include weakness and loss of balance. Additional symptoms do not include neck stiffness, pain, lower back pain, leg pain, hallucinations, nystagmus, taste disturbance, hearing loss, tinnitus, vertigo, anxiety or irritability. Medical issues also include cerebral vascular accident and hypertension. Medical issues do not include seizures, drug use, diabetes or recent surgery. Workup history includes CT scan and cerebral angiography.       Review of Systems   Unable to perform ROS: Mental status change   Constitutional: Negative for irritability.   HENT: Negative for hearing loss and tinnitus.    Musculoskeletal: Negative for neck stiffness.   Neurological: Positive for speech change, focal weakness, weakness and loss of balance. Negative for vertigo.    Psychiatric/Behavioral: Negative for hallucinations.       Past Medical History:   Diagnosis Date   • Atrial fibrillation (HCC)    • Deep vein thrombosis (DVT) (HCC)    • Glaucoma    • Hyperlipidemia    • Hypertension    • Hypothyroidism    • Macular degeneration    • Pulmonary embolism (HCC)    • TIA (transient ischemic attack)        Allergies   Allergen Reactions   • Morphine Shortness Of Breath   • Stadol Ns [Butorphanol] Anaphylaxis   • Gabapentin Swelling       Past Surgical History:   Procedure Laterality Date   • APPENDECTOMY     • BREAST SURGERY     • CARPAL TUNNEL RELEASE     • CHOLECYSTECTOMY     • FOOT SURGERY Bilateral     Bilateral heel spurs   • HYSTERECTOMY     • TOTAL HIP ARTHROPLASTY Left        History reviewed. No pertinent family history.    Social History     Socioeconomic History   • Marital status:    Tobacco Use   • Smoking status: Never   • Smokeless tobacco: Never   Vaping Use   • Vaping Use: Never used   Substance and Sexual Activity   • Alcohol use: Never   • Drug use: Never   • Sexual activity: Defer           Objective   Physical Exam  Vitals and nursing note reviewed. Exam conducted with a chaperone present.   Constitutional:       General: She is awake. She is not in acute distress.     Appearance: She is well-developed and underweight. She is not toxic-appearing or diaphoretic.      Comments: Confused   HENT:      Head: Normocephalic and atraumatic.      Comments: Left preorbital swelling no hyphema extraocular movements are intact no entrapment no tenderness to palpation of the orbit itself no nasal tenderness no maxillofacial tenderness no trismus.  No hemotympanum.     Mouth/Throat:      Mouth: Mucous membranes are moist.      Pharynx: Oropharynx is clear.   Eyes:      General: Lids are normal. Lids are everted, no foreign bodies appreciated.      Pupils: Pupils are equal, round, and reactive to light.   Neck:      Thyroid: No thyromegaly.      Vascular: Normal carotid  pulses. No carotid bruit or JVD.      Trachea: Trachea and phonation normal. No tracheal tenderness or tracheal deviation.      Meningeal: Brudzinski's sign and Kernig's sign absent.      Comments: Lateral neck on the left side is tender there is no step-offs or laxity midline trachea  Cardiovascular:      Rate and Rhythm: Tachycardia present. Rhythm irregular.      Pulses: Normal pulses.      Heart sounds: Normal heart sounds.    No diastolic murmur is present.  Pulmonary:      Effort: Pulmonary effort is normal. No tachypnea or respiratory distress.      Breath sounds: Normal breath sounds. No stridor.   Chest:      Comments: No trauma  Abdominal:      General: Abdomen is flat. Bowel sounds are normal. There is no distension.      Palpations: Abdomen is soft. There is no mass.      Tenderness: There is no abdominal tenderness. There is no guarding.      Comments: Patient has got a left groin bruise no hematomas range of motion of the hip is intact   Musculoskeletal:         General: Normal range of motion.      Cervical back: Full passive range of motion without pain, normal range of motion and neck supple. No rigidity.      Right lower le+ Edema present.      Left lower le+ Edema present.   Skin:     General: Skin is warm and dry.      Capillary Refill: Capillary refill takes less than 2 seconds.      Coloration: Skin is not pale.      Nails: There is no clubbing.   Neurological:      General: No focal deficit present.      Mental Status: She is easily aroused. Mental status is at baseline. She is disoriented and confused.      GCS: GCS eye subscore is 4. GCS verbal subscore is 4. GCS motor subscore is 5.      Cranial Nerves: Cranial nerve deficit and facial asymmetry present.      Sensory: No sensory deficit.      Motor: Weakness and pronator drift present. No abnormal muscle tone.      Deep Tendon Reflexes: Reflexes normal. Babinski sign absent on the right side.      Reflex Scores:       Bicep reflexes  are 2+ on the right side and 0 on the left side.       Patellar reflexes are 2+ on the right side and 0 on the left side.  Psychiatric:         Behavior: Behavior is cooperative.         Procedures           ED Course  ED Course as of 01/02/23 1241   Mon Jan 02, 2023   0959 A. fib with rate 107 [TS]   1028 NIH stroke scale 16 [TS]   1236  Patient has one or more of the following conditions that are excluded from the measure Head CT ordered for reasons other than trauma.        [TS]      ED Course User Index  [TS] Les Mccartney MD                                           Medical Decision Making  Differential Diagnosis:  I considered toxic-metabolic etiology, hypoglycemia, hyperglycemia, diabetic ketoacidosis, drug overdose, ethanol intoxication, thiamine deficiency, hypothermia, hyponatremia, hypernatremia, organ failure, liver failure, kidney failure, thyroid failure, adrenal failure, hypoxia, hypercarbia, ischemic stroke, intracranial bleed, subarachnoid hemorrhage, closed head injury, subdural hematoma, seizure activity, syncopal episode, infectious etiology, hypertensive encephalopathy, vasculitis, thrombotic thrombocytopenic purpura and disseminated intravascular coagulation as a possible cause of altered mental status in this patient. This is a partial list of diagnoses considered.           Amount and/or Complexity of Data Reviewed  Independent Historian: EMS     Details: History was obtained from EMS as well as the patient's family members.  Labs: ordered. Decision-making details documented in ED Course.     Details: Patient has got leukocytosis there is no obvious evidence of any infection procalcitonin level is negative.  Her lactic is slightly elevated also this could be related to laying on the floor versus seizure activity versus hypoperfusion.  CK is minimally elevated  Radiology: ordered and independent interpretation performed. Decision-making details documented in ED Course.     Details: CT scan of  the cervical spine is negative CT of the head is negative for any acute stroke or hemorrhage.  CT angio does not show a LVO  ECG/medicine tests: ordered and independent interpretation performed.     Details: Chronic A. fib with slightly increased rate  Discussion of management or test interpretation with external provider(s): Patient's case were discussed with neurology    Risk  Prescription drug management.  Decision regarding hospitalization.    Risk Details: Patient obviously has had a stroke with left-sided weakness and confusion.  MRI is pending.  This case was discussed with neurology the patient will be admitted to the medicine service        Final diagnoses:   Cerebrovascular accident (CVA), unspecified mechanism (HCC)   Chronic atrial fibrillation (HCC)   Leukocytosis, unspecified type       ED Disposition  ED Disposition     ED Disposition   Decision to Admit    Condition   --    Comment   Level of Care: Telemetry [5]   Diagnosis: Cerebrovascular accident (CVA), unspecified mechanism (HCC) [[id number]]   Admitting Physician: JAMES HERNANDEZ [[id number]]   Attending Physician: JAMES HERNANDEZ [[id number]]   Certification: I Certify That Inpatient Hospital Services Are Medically Necessary For Greater Than 2 Midnights               No follow-up provider specified.       Medication List      No changes were made to your prescriptions during this visit.          Les Mccartney MD  01/02/23 1106       Les Mccartney MD  01/02/23 1241

## 2023-01-02 NOTE — PLAN OF CARE
Problem: Adult Inpatient Plan of Care  Goal: Plan of Care Review  Outcome: Ongoing, Progressing  Flowsheets (Taken 1/2/2023 1425)  Progress: (Eval) --  Plan of Care Reviewed With: (Brandy LOZADA)   patient   son   family   grandchild(christianne)   other (see comments)  Outcome Evaluation:  Clinical bedside swallowing evaluation completed. Two sons, granddaughter, and other family members were present, though sons stepped out of room during evaluation. Pt was sleeping upon SLP arrival. Required mod verbal cues and mild tactile cues on R, provided per female family member, to alert. Pt was noted to have contusion to L face, unable to open eye on L, with weak opening at time of SLP cueing with R eye with difficulty with visual attention and tracking on R. She was noted to have intact language fx, consistent with areas of infarct, followed all oral motor commands. Able to protrude tongue to midline, yet L labial and mandibular weakness is severe at this time, resulting in significantly reduced ROM. She also had difficulty completing lingual lateralization to the L side, though was able to complete with delayed response time and more than one attempt. She was presented pureed trials only. She exhibited weak labial seal and resistance on spoon, appeared to have weak oral manipulation, as well as delayed initiation of bolus transit at times, warranting intermittent cues. She appeared to have weak laryngeal elevation with the initial trial, as well as multiple swallows and coughing. No overt s/s of aspiration with further trials, yet she was noted to have mild to moderate oral residue post swallow on occasion and required cues to complete multiple swallows to clear. Family and Brandy LOZADA, were educated on observed concerns, risks for aspiration given L weakness and reduced L oral sensation which is also likely impacting pharyngeal swallow function, and plan. They verbalized understanding of SLP recommendations at this time.   RECS:     Continue NPO status, including medication   Oral care to be provided per nursing staff at least 4x/day via oral care kits, following aspiration precautions   Ok for family to swab mouth with water dampened toothette and/or mouthwash dampened toothette, yet were educated to ensure that liquid does not drip from swab when squeezed   SLP to follow up at bedside in AM on 01/03/23 and determine if pt is appropriate for VFSS in Radiology at that time. Thanks!

## 2023-01-02 NOTE — THERAPY EVALUATION
Acute Care - Speech Language Pathology   Swallow Initial Evaluation [facility]     Patient Name: Erinn Najera  : 1928  MRN: [id number]  Today's Date: 2023               Admit Date: 2023     Clinical bedside swallowing evaluation completed. Two sons, granddaughter, and other family members were present, though sons stepped out of room during evaluation. Pt was sleeping upon SLP arrival. Required mod verbal cues and mild tactile cues on R, provided per female family member, to alert. Pt was noted to have contusion to L face, unable to open eye on L, with weak opening at time of SLP cueing with R eye with difficulty with visual attention and tracking on R. She was noted to have intact language fx, consistent with areas of infarct, followed all oral motor commands. Able to protrude tongue to midline, yet L labial and mandibular weakness is severe at this time, resulting in significantly reduced ROM. She also had difficulty completing lingual lateralization to the L side, though was able to complete with delayed response time and more than one attempt. She was presented pureed trials only. She exhibited weak labial seal and resistance on spoon, appeared to have weak oral manipulation, as well as delayed initiation of bolus transit at times, warranting intermittent cues. She appeared to have weak laryngeal elevation with the initial trial, as well as multiple swallows and coughing. No overt s/s of aspiration with further trials, yet she was noted to have mild to moderate oral residue post swallow on occasion and required cues to complete multiple swallows to clear. Family and RNBrandy, were educated on observed concerns, risks for aspiration given L weakness and reduced L oral sensation which is also likely impacting pharyngeal swallow function, and plan. They verbalized understanding of SLP recommendations at this time.   RECS:   • Continue NPO status, including medication  • Oral care to be provided  per nursing staff at least 4x/day via oral care kits, following aspiration precautions  • Ok for family to swab mouth with water dampened toothette and/or mouthwash dampened toothette, yet were educated to ensure that liquid does not drip from swab when squeezed  • SLP to follow up at bedside in AM on 01/03/23 and determine if pt is appropriate for VFSS in Radiology at that time. Thanks!  Luisa Pion, CCC-SLP 1/2/2023 15:49 CST    Visit Dx:     ICD-10-CM ICD-9-CM   1. Cerebrovascular accident (CVA), unspecified mechanism (HCC)  I63.9 434.91   2. Chronic atrial fibrillation (HCC)  I48.20 427.31   3. Leukocytosis, unspecified type  D72.829 288.60   4. Dysphagia, unspecified type  R13.10 787.20     Patient Active Problem List   Diagnosis   • Acute ischemic right MCA stroke (HCC)   • PAF (paroxysmal atrial fibrillation) (HCC)   • Hypertension   • Hyperlipidemia     Past Medical History:   Diagnosis Date   • Arthritis    • Atrial fibrillation (HCC)    • Cerebral artery occlusion    • Chronic low back pain    • Deep vein thrombosis (DVT) (HCC)    • Glaucoma    • Hyperlipidemia    • Hypertension    • Hypothyroidism    • Insomnia    • Macular degeneration    • Pulmonary embolism (HCC)    • TIA (transient ischemic attack)      Past Surgical History:   Procedure Laterality Date   • APPENDECTOMY     • BREAST SURGERY     • CARPAL TUNNEL RELEASE     • CHOLECYSTECTOMY     • ERCP AND ENDOSCOPY     • FOOT SURGERY Bilateral     Bilateral heel spurs   • HYSTERECTOMY     • TOTAL HIP ARTHROPLASTY Left        SLP Recommendation and Plan  SLP Swallowing Diagnosis: severe, oral dysphagia, suspected pharyngeal dysphagia (01/02/23 1425)  SLP Diet Recommendation: NPO (01/02/23 1425)     SLP Rec. for Method of Medication Administration: meds via alternate route (01/02/23 1425)     Monitor for Signs of Aspiration: yes, notify SLP if any concerns, cough, gurgly voice, throat clearing, pneumonia, right lower lobe infiltrates (01/02/23  1425)  Recommended Diagnostics: reassess via clinical swallow evaluation, VFSS ([facility] [place]) (01/02/23 1425)  Swallow Criteria for Skilled Therapeutic Interventions Met: demonstrates skilled criteria (01/02/23 1425)  Anticipated Discharge Disposition (SLP): skilled nursing facility (01/02/23 1425)  Rehab Potential/Prognosis, Swallowing: adequate, monitor progress closely (01/02/23 1425)  Therapy Frequency (Swallow): daily (01/02/23 1425)  Predicted Duration Therapy Intervention (Days): until discharge (01/02/23 1425)                                        Plan of Care Reviewed With: patient, son, family, grandchild(christianne), other (see comments) (RN, Brandy)  Progress:  (Eval)  Outcome Evaluation: Clinical bedside swallowing evaluation completed. Two sons, granddaughter, and other family members were present, though sons stepped out of room during evaluation. Pt was sleeping upon SLP arrival. Required mod verbal cues and mild tactile cues on R, provided per female family member, to alert. Pt was noted to have contusion to L face, unable to open eye on L, with weak opening at time of SLP cueing with R eye with difficulty with visual attention and tracking on R. She was noted to have intact language fx, consistent with areas of infarct, followed all oral motor commands. Able to protrude tongue to midline, yet L labial and mandibular weakness is severe at this time, resulting in significantly reduced ROM. She also had difficulty completing lingual lateralization to the L side, though was able to complete with delayed response time and more than one attempt. She was presented pureed trials only. She exhibited weak labial seal and resistance on spoon, appeared to have weak oral manipulation, as well as delayed initiation of bolus transit at times, warranting intermittent cues. She appeared to have weak laryngeal elevation with the initial trial, as well as multiple swallows and coughing. No overt s/s of aspiration with further trials,  yet she was noted to have mild to moderate oral residue post swallow on occasion and required cues to complete multiple swallows to clear. Family and RN, Brandy, were educated on observed concerns, risks for aspiration given L weakness and reduced L oral sensation which is also likely impacting pharyngeal swallow function, and plan. They verbalized understanding of SLP recommendations at this time. RECS: Continue NPO status, including medication; Oral care to be provided per nursing staff at least 4x/day via oral care kits, following aspiration precautions; Ok for family to swab mouth with water dampened toothette and/or mouthwash dampened toothette, yet were educated to ensure that liquid does not drip from swab when squeezed; SLP to follow up at bedside in AM on 01/03/23 and determine if pt is appropriate for VFSS in Radiology at that time. Thanks!      SWALLOW EVALUATION (last 72 hours)     SLP Adult Swallow Evaluation     Row Name 01/02/23 1425                   Rehab Evaluation    Document Type evaluation  -TM        Subjective Information fatigue  -TM        Patient Observations cooperative  -TM        Patient/Family/Caregiver Comments/Observations Multiple family members present.  -TM        Patient Effort adequate  -TM           General Information    Patient Profile Reviewed yes  -TM        Pertinent History Of Current Problem Acute L arm and leg weakness, confusion. MRI brain 01/02/23 showed acute vs subacute non-hemorrhagic CVA in the R temporal lobe and basal ganglia. Hx of a-fib, DVT, glaucoma, HTN, HLD, hypothyroidism, PE, TIA, macular degeneration. CXR 01/02/23 showed no acute process.  -TM        Current Method of Nutrition NPO  -TM        Precautions/Limitations, Vision vision impairment, bilaterally;other (see comments)  Unable to open L eye at this time, weak opening of R on most attempts despite cues per SLP.  -TM        Precautions/Limitations, Hearing WFL  -TM        Prior Level of  Function-Communication WFL  -TM        Prior Level of Function-Swallowing no diet consistency restrictions  -TM        Plans/Goals Discussed with patient;family;other (see comments)  RNBrandy  -TM        Barriers to Rehab medically complex  -TM        Patient's Goals for Discharge patient did not state  -TM        Family Goals for Discharge patient able to return to PO diet  -TM           Pain    Additional Documentation Pain Scale: FACES Pre/Post-Treatment (Group)  -TM           Pain Scale: FACES Pre/Post-Treatment    Pain: FACES Scale, Pretreatment 2-->hurts little bit  -TM        Posttreatment Pain Rating 2-->hurts little bit  -TM        Pain Location - Side/Orientation Right  -TM        Pain Location - hip  -TM        Pre/Posttreatment Pain Comment Radiates to shin per pt.  -TM           Oral Motor Structure and Function    Dentition Assessment natural, present and adequate  -TM        Secretion Management WNL/WFL  -TM        Mucosal Quality moist, healthy  -TM        Volitional Swallow delayed  -TM        Volitional Cough reduced respiratory support  -TM           Oral Musculature and Cranial Nerve Assessment    Oral Motor General Assessment oral labial or buccal impairment;lingual impairment;mandibular impairment  -TM        Mandibular Impairment Detail, Cranial Nerve V (Trigeminal) reduced strength on left  -TM        Oral Labial or Buccal Impairment, Detail, Cranial Nerve VII (Facial): left labial droop  -TM        Lingual Impairment, Detail. Cranial Nerves IX, XII (Glossopharyngeal and Hypoglossal) reduced strength left  -TM           General Eating/Swallowing Observations    Respiratory Support Currently in Use room air  -TM        Eating/Swallowing Skills fed by SLP  -TM        Positioning During Eating upright in bed;needs frequent re-positioning  -TM        Utensils Used spoon  -TM        Consistencies Trialed pudding thick  -TM           Respiratory    Respiratory Status WFL  -TM           Clinical  Swallow Eval    Oral Prep Phase impaired  -TM        Oral Transit impaired  -TM        Oral Residue impaired  -TM        Pharyngeal Phase suspected pharyngeal impairment  -TM        Esophageal Phase unremarkable  -TM        Clinical Swallow Evaluation Summary See note.  -TM           Oral Prep Concerns    Oral Prep Concerns incomplete or weak lip closure around spoon;incomplete bolus preparation  -TM        Incomplete or Weak Lip Closure Around Spoon pudding  -TM        Incomplete Bolus Preparation pudding  -TM           Oral Transit Concerns    Oral Transit Concerns delayed initiation of bolus transit  -TM        Delayed Intiation of Bolus Transit pudding  -TM           Oral Residue Concerns    Oral Residue Concerns diffuse residue throughout oral cavity  -TM        Diffuse Residue Throughout Oral Cavity pudding  -TM           Pharyngeal Phase Concerns    Pharyngeal Phase Concerns cough;multiple swallows  -TM        Multiple Swallows pudding  -TM        Cough pudding  -TM           SLP Evaluation Clinical Impression    SLP Swallowing Diagnosis severe;oral dysphagia;suspected pharyngeal dysphagia  -TM        Functional Impact risk of aspiration/pneumonia;risk of malnutrition;risk of dehydration  -TM        Rehab Potential/Prognosis, Swallowing adequate, monitor progress closely  -TM        Swallow Criteria for Skilled Therapeutic Interventions Met demonstrates skilled criteria  -TM           Recommendations    Therapy Frequency (Swallow) daily  -TM        Predicted Duration Therapy Intervention (Days) until discharge  -        SLP Diet Recommendation NPO  -TM        Recommended Diagnostics reassess via clinical swallow evaluation;VFSS ([facility])  -        Oral Care Recommendations Oral Care BID/PRN;Swab  -TM        SLP Rec. for Method of Medication Administration meds via alternate route  -TM        Monitor for Signs of Aspiration yes;notify SLP if any concerns;cough;gurgly voice;throat clearing;pneumonia;right lower  lobe infiltrates  -TM        Anticipated Discharge Disposition (SLP) skilled nursing facility  -TM           Swallow Goals (SLP)    Swallow LTGs Swallow Long Term Goal (free text)  -TM        Swallow STGs labial strengthening goal selection (SLP);lingual strengthening goal selection (SLP);pharyngeal strengthening exercise goal selection (SLP)  -TM        Labial Strengthening Goal Selection (SLP) labial strengthening, SLP goal 1  -TM        Lingual Strengthening Goal Selection (SLP) lingual strengthening, SLP goal 1  -TM        Pharyngeal Strengthening Exercise Goal Selection (SLP) pharyngeal strengthening exercise, SLP goal 1  -TM           (LTG) Swallow    (LTG) Swallow Pt will tolerate LRD without overt s/s of aspiration.  -TM        [place] (Swallow Long Term Goal) with minimal cues (75-90% accuracy)  -TM        Time Frame (Swallow Long Term Goal) by discharge  -TM        Barriers (Swallow Long Term Goal) Reduced alertness  -TM        Progress/Outcomes (Swallow Long Term Goal) new goal;goal ongoing  -TM           (STG) Labial Strengthening Goal 1 (SLP)    Activity (Labial Strengthening Goal 1, SLP) increase labial tone  -TM        Increase Labial Tone labial resistance exercises  -TM        [place]/Accuracy (Labial Strengthening Goal 1, SLP) with moderate cues (50-74% accuracy)  -TM        Time Frame (Labial Strengthening Goal 1, SLP) by discharge  -TM        Barriers (Labial Strengthening Goal 1, SLP) L weakness  -TM        Progress/Outcomes (Labial Strengthening Goal 1, SLP) new goal;goal ongoing  -TM           (STG) Lingual Strengthening Goal 1 (SLP)    Activity (Lingual Strengthening Goal 1, SLP) increase lingual tone/sensation/control/coordination/movement  -TM        Increase Lingual Tone/Sensation/Control/Coordination/Movement lingual movement exercises;lingual resistance exercises  -TM        [place]/Accuracy (Lingual Strengthening Goal 1, SLP) with moderate cues (50-74% accuracy)  -TM         Time Frame (Lingual Strengthening Goal 1, SLP) by discharge  -TM        Barriers (Lingual Strengthening Goal 1, SLP) L weakness  -TM        Progress/Outcomes (Lingual Strengthening Goal 1, SLP) new goal;goal ongoing  -TM           (STG) Pharyngeal Strengthening Exercise Goal 1 (SLP)    Activity (Pharyngeal Strengthening Goal 1, SLP) increase squeeze/positive pressure generation;increase tongue base retraction  -TM        Increase Squeeze/Positive Pressure Generation hard effortful swallow  -TM        Increase Tongue Base Retraction jose  -TM        [place]/Accuracy (Pharyngeal Strengthening Goal 1, SLP) with moderate cues (50-74% accuracy)  -TM        Time Frame (Pharyngeal Strengthening Goal 1, SLP) by discharge  -TM        Barriers (Pharyngeal Strengthening Goal 1, SLP) L weakness  -TM        Progress/Outcomes (Pharyngeal Strengthening Goal 1, SLP) new goal;goal ongoing  -TM              User Key  (r) = Recorded By, (t) = Taken By, (c) = Cosigned By    Initials Name Effective Dates    TM Luisa Pino CCC-SLP 06/16/21 -                 EDUCATION  The patient has been educated in the following areas:   Dysphagia (Swallowing Impairment).        SLP GOALS     Row Name 01/02/23 1425             (LTG) Swallow    (LTG) Swallow Pt will tolerate LRD without overt s/s of aspiration.  -TM      [place] (Swallow Long Term Goal) with minimal cues (75-90% accuracy)  -TM      Time Frame (Swallow Long Term Goal) by discharge  -TM      Barriers (Swallow Long Term Goal) Reduced alertness  -TM      Progress/Outcomes (Swallow Long Term Goal) new goal;goal ongoing  -TM         (STG) Labial Strengthening Goal 1 (SLP)    Activity (Labial Strengthening Goal 1, SLP) increase labial tone  -TM      Increase Labial Tone labial resistance exercises  -TM      [place]/Accuracy (Labial Strengthening Goal 1, SLP) with moderate cues (50-74% accuracy)  -TM      Time Frame (Labial Strengthening Goal 1, SLP) by discharge  -TM       Barriers (Labial Strengthening Goal 1, SLP) L weakness  -TM      Progress/Outcomes (Labial Strengthening Goal 1, SLP) new goal;goal ongoing  -TM         (STG) Lingual Strengthening Goal 1 (SLP)    Activity (Lingual Strengthening Goal 1, SLP) increase lingual tone/sensation/control/coordination/movement  -TM      Increase Lingual Tone/Sensation/Control/Coordination/Movement lingual movement exercises;lingual resistance exercises  -TM      [place]/Accuracy (Lingual Strengthening Goal 1, SLP) with moderate cues (50-74% accuracy)  -TM      Time Frame (Lingual Strengthening Goal 1, SLP) by discharge  -TM      Barriers (Lingual Strengthening Goal 1, SLP) L weakness  -TM      Progress/Outcomes (Lingual Strengthening Goal 1, SLP) new goal;goal ongoing  -TM         (STG) Pharyngeal Strengthening Exercise Goal 1 (SLP)    Activity (Pharyngeal Strengthening Goal 1, SLP) increase squeeze/positive pressure generation;increase tongue base retraction  -TM      Increase Squeeze/Positive Pressure Generation hard effortful swallow  -TM      Increase Tongue Base Retraction jose  -TM      [place]/Accuracy (Pharyngeal Strengthening Goal 1, SLP) with moderate cues (50-74% accuracy)  -TM      Time Frame (Pharyngeal Strengthening Goal 1, SLP) by discharge  -TM      Barriers (Pharyngeal Strengthening Goal 1, SLP) L weakness  -TM      Progress/Outcomes (Pharyngeal Strengthening Goal 1, SLP) new goal;goal ongoing  -TM            User Key  (r) = Recorded By, (t) = Taken By, (c) = Cosigned By    Initials Name Provider Type    TM Luisa Pino CCC-SLP Speech and Language Pathologist                   Time Calculation:    Time Calculation- SLP     Row Name 01/02/23 1548             Time Calculation- SLP    SLP Start Time 1425  -TM      SLP Stop Time 1520  -      SLP Time Calculation (min) 55 min  -TM      SLP Received On 01/02/23  -      SLP Goal Re-Cert Due Date 01/12/23  -         Untimed Charges    SLP Eval/Re-eval  ST  Eval Oral Pharyng Swallow - [id number]  -TM      [id number] Eval Oral Pharyng Swallow Minutes 55  -TM         Total Minutes    Untimed Charges Total Minutes 55  -TM       Total Minutes 55  -TM            User Key  (r) = Recorded By, (t) = Taken By, (c) = Cosigned By    Initials Name Provider Type     Luisa Pino CCC-SLP Speech and Language Pathologist                Therapy Charges for Today     Code Description Service Date Service Provider Modifiers Qty    [id number] [facility] EVAL ORAL PHARYNG SWALLOW 4 1/2/2023 Luisa Pino CCC-SLP GN 1               GERARD Brown  1/2/2023

## 2023-01-02 NOTE — CONSULTS
Neurology Consult Note    Patient:  Erinn Najera   YOB: 1928  MRN:  [id number]  Date of Admission:  2023  9:16 AM    Date: 2023    Referring Provider: Karlo Pereira DO  Reason for Consultation: Stroke      History of present illness:     History is from family --2 sons and granddaughter were in the room Another woman came into the room later but did not identify herself    This is a 89 y.o. right handed female.with comorbid condition of atrial fibrillation,  DVT and PE,  Hyperlipidemia, hypothyroid, macular degeneration, hypertension, cerebral microvascular disease and previous TIA evaluated for stroke  In her past history is cerebral artery occlusion but no further details are made.   Patient presented to the ED with mental status/speech changes and  Focal left sided  weakness  Last known well was 6:30 PM ;last night but that was via talking to her on the phone  She lives by herself and son found her this morning at 8:30 AM in the bathroom on the floor on her left side with bruising over left shoulder, left hip and bruising and swelling of left periorbital area from a fall. She had gotten out of bed around 4:30 AM and fell and crawled to the bathroom   She had wet herself .   Per family she will mow the yard, weed and will chop wood, cooks, cleans ,d dresses herself. She stopped driving after her drivers license .     However she falls daily and for this reason they did not give her any anticoagulation. She has been on and off of it throughout the years.     Head CT in the Ed showed no acute changes but did show a right BG stroke that was not present in previous Head CT of 2020  CTA of head and neck showed no LVO or hemodynamically significant stenosis    Patient's balance difficulties began in  or -after her left hip replacement.   She was seen by Stephanie canas in [facility] neurology clinic in 2019 for her frequent falls.   In 2019 she  was seen in Neurology Clinic at [place]  by Nahun Tate MD and diagnosed with multifactorial gait disorder  And has 1/4 inch leg length discrepancy per Ortho   As well as periventricular leukomalacia and peripheral neuropathy (although latest EMG did not show neuropathy)   Was seen in Cardiology clinic at [facility] and noted that she had PAF but that there was no documented reoccurrence.  No mention on anticoagulation   TTE without contrast  was performed at [facility] in 10/2020:  Normal left ventricular size and systolic function  Mild concentric left ventricular hypertrophy with probable moderate [grade  2] diastolic dysfunction  Mild left atrial enlargement  Mild thickening of a tricuspid aortic valve with adequate cusp separation,  insignificant gradient, 9/6 mmHg], and mild insufficiency  Mild thickening of the mitral valve with minimally reduced mobility and  mild regurgitation  Mild to moderate tricuspid regurgitation with RVSP estimate of 35 mmHg  Normal IVC dimension and respiratory motion consistent with normal right  atrial filling pressures of 5-10 mmHg  No significant pericardial effusion and aortic dimensions are within  normal limits  Past Medical History:   Diagnosis Date   • Arthritis    • Atrial fibrillation (HCC)    • Cerebral artery occlusion    • Chronic low back pain    • Deep vein thrombosis (DVT) (HCC)    • Glaucoma    • Hyperlipidemia    • Hypertension    • Hypothyroidism    • Insomnia    • Macular degeneration    • Pulmonary embolism (HCC)    • TIA (transient ischemic attack)        Past Surgical History:   Procedure Laterality Date   • APPENDECTOMY     • BREAST SURGERY     • CARPAL TUNNEL RELEASE     • CHOLECYSTECTOMY     • ERCP AND ENDOSCOPY     • FOOT SURGERY Bilateral     Bilateral heel spurs   • HYSTERECTOMY     • TOTAL HIP ARTHROPLASTY Left    Revision of total hip arthroplasty 2014    Prior to Admission medications    Medication Sig Start Date End Date Taking? Authorizing Provider    amLODIPine (NORVASC) 5 MG tablet Take 1 tablet by mouth Daily. 5/2/22   Alvin Leon MD   aspirin 81 MG chewable tablet Chew 81 mg Daily.    Alvin Leon MD   atorvastatin (LIPITOR) 10 MG tablet Take 1 tablet by mouth Daily. 8/2/22   Alvin Leon MD   doxepin (SINEquan) 50 MG capsule Take 50 mg by mouth Every Night.    Alvin Leon MD   levothyroxine (SYNTHROID, LEVOTHROID) 50 MCG tablet Take 50 mcg by mouth Daily. Pt takes name brand from pharmacy    Alvin Leon MD   metoprolol tartrate (LOPRESSOR) 50 MG tablet Take 75 mg by mouth 2 (Two) Times a Day.    Alvin Leon MD   Netarsudil-Latanoprost (Rocklatan) 0.02-0.005 % solution Administer 1 drop to both eyes Every Morning.    Alvin Leon MD   Tafluprost, PF, (ZIOPTAN) 0.0015 % solution ophthalmic solution Administer 1 drop to both eyes Every Night.    Alvin Leon MD   valsartan (DIOVAN) 80 MG tablet Take 80 mg by mouth Daily.    Alvin Leon MD   acetaminophen-codeine (TYLENOL #3) 300-30 MG per tablet Take 1 tablet by mouth Every 6 (Six) Hours As Needed for Moderate Pain .  Patient not taking: Reported on 1/2/2023 2/9/20 1/2/23  Moreno Magdaleno DO   ASPIRIN 81 PO aspirin   daily  1/2/23  Alvin Leon MD   diclofenac (VOLTAREN) 50 MG EC tablet Take 1 tablet by mouth 2 (Two) Times a Day As Needed (Headache or pain).  Patient not taking: Reported on 1/2/2023 2/9/20 1/2/23  Moreno Magdaleno DO   levothyroxine (Synthroid) 50 MCG tablet TAKE 1 TABLET DAILY FOR LOWTHYROID. CHANGE IN DOSE 7/25/22 1/2/23  Alvin Leon MD   metoprolol tartrate (LOPRESSOR) 50 MG tablet One and 1/2 tab po BID for blood pressure 3/21/22 1/2/23  Alvin Leon MD   valsartan (DIOVAN) 80 MG tablet TAKE ONE TABLET BY MOUTH ONCE EVERY DAY FOR BLOOD PRESSURE**STOP OLMESARTAN** 5/6/22 1/2/23  Alvin Leon MD       Hospital scheduled medications:   amLODIPine, 5 mg, Oral,  Daily  aspirin, 81 mg, Oral, Daily  atorvastatin, 80 mg, Oral, Nightly  [START ON 1/3/2023] levothyroxine, 50 mcg, Oral, Q AM  metoprolol tartrate, 25 mg, Oral, Q12H  sodium chloride, 10 mL, Intravenous, Q12H  valsartan, 80 mg, Oral, Q24H      Hospital PRN medications:  •  acetaminophen  •  aluminum-magnesium hydroxide-simethicone  •  bisacodyl  •  ondansetron  •  sodium chloride  •  sodium chloride    Allergies   Allergen Reactions   • Morphine Shortness Of Breath   • Stadol Ns [Butorphanol] Anaphylaxis   • Gabapentin Swelling       Social History     Socioeconomic History   • Marital status:    Tobacco Use   • Smoking status: Never   • Smokeless tobacco: Never   Vaping Use   • Vaping Use: Never used   Substance and Sexual Activity   • Alcohol use: Never   • Drug use: Never   • Sexual activity: Defer     Family History   Problem Relation Age of Onset   • Heart disease Mother    • Cancer Father        Review of Systems  A 14-point review of systems was reviewed and was negative except for left sided weakness and fatigue  Did have a headache    Vital Signs   Temp:  [98.5 °F (36.9 °C)-98.8 °F (37.1 °C)] 98.5 °F (36.9 °C)  Heart Rate:  [] 87  Resp:  [20] 20  BP: (141-175)/(74-99) 150/98    General Exam:  Head:  Normocephalic, atraumatic  HEENT:  Neck supple  Fundoscopic Exam:  No signs of disc edema  CVS:  Regular rate and rhythm.  No murmurs  Carotid Examination:  No bruits  Lungs:  Clear to auscultation  Abdomen:  Nontender, nondistended  Extremities:  No signs of peripheral edema  Skin:  No rashes    Neurologic Exam:    Mental Status:    Somnolent but awakens  Talks but is dysarthric  Left sided neglect  Follows some commands    CN II:  Visual fields -difficult to assess as she did not respond to threat but has macular degeneration and eyes were deviated to right.and she kept closing her eyes  Pupils equally reactive to light  CN III, IV, VI:  Extraocular Muscles --she'd look over to the right and  "midline but could not get her to cross midline  CN V:  Facial sensory is symmetric per her report  CN VII:  Facial motor asymmetric with left lower facial droop  CN VIII:  Gross hearing intact bilaterally  CN IX/X:  Palate elevates symmetrically  CN XI:  Shoulder shrug asymmetric-decreased on left  CN XII:  Tongue is midline on protrusion    Motor: (strength out of 5:  1= minimal movement, 2 = movement in plane of gravity, 3 = movement against gravity, 4 = movement against some resistance, 5 = full strength)    -Right Upper Ext: Proximal: 5 Distal: 5  -Left Upper Ext: Proximal:3 Distal:0    -Right Lower Ext: Proximal: 5 Distal: 5  -Left Lower Ext: Proximal: 3 Distal: 1    DTR:  -Right   Biceps: 2+ Triceps: 2+ Brachioradialis: 2+   Patella: 2+ Ankle: 2+ Neg Babinski  -Left   Biceps: 2+ Triceps: 2+ Brachioradialis: 2+   Patella: 2+ Ankle: 2+ Has a Babinski    Sensory:  -Intact to light touch, pinprick, temperature, pain, and proprioception    Coordination:  -Finger to nose normal on the right and too weak to do on the left      Gait  -Not tested for safety reasons      Results Review:  Lab Results (last 7 days)     Procedure Component Value Units Date/Time    Procalcitonin [[id number]]  (Normal) Collected: 01/02/23 1108    Specimen: Blood Updated: 01/02/23 1136     Procalcitonin 0.10 ng/mL     Narrative:      As a Marker for Sepsis (Non-Neonates):    1. <0.5 ng/mL represents a low risk of severe sepsis and/or septic shock.  2. >2 ng/mL represents a high risk of severe sepsis and/or septic shock.    As a Marker for Lower Respiratory Tract Infections that require antibiotic therapy:    PCT on Admission    Antibiotic Therapy       6-12 Hrs later    >0.5                Strongly Recommended  >0.25 - <0.5        Recommended   0.1 - 0.25          Discouraged              Remeasure/reassess PCT  <0.1                Strongly Discouraged     Remeasure/reassess PCT    As 28 day mortality risk marker: \"Change in Procalcitonin " "Result\" (>80% or <=80%) if Day 0 (or Day 1) and Day 4 values are available. Refer to [email or web address]    Change in PCT <=80%  A decrease of PCT levels below or equal to 80% defines a positive change in PCT test result representing a higher risk for 28-day all-cause mortality of patients diagnosed with severe sepsis for septic shock.    Change in PCT >80%  A decrease of PCT levels of more than 80% defines a negative change in PCT result representing a lower risk for 28-day all-cause mortality of patients diagnosed with severe sepsis or septic shock.       Comprehensive Metabolic Panel [[id number]]  (Abnormal) Collected: 01/02/23 1108    Specimen: Blood Updated: 01/02/23 1130     Glucose 130 mg/dL      BUN 16 mg/dL      Creatinine 0.77 mg/dL      Sodium 142 mmol/L      Potassium 3.2 mmol/L      Chloride 102 mmol/L      CO2 25.0 mmol/L      Calcium 9.2 mg/dL      Total Protein 6.7 g/dL      Albumin 3.7 g/dL      ALT (SGPT) 13 U/L      AST (SGOT) 26 U/L      Alkaline Phosphatase 134 U/L      Total Bilirubin 1.0 mg/dL      Globulin 3.0 gm/dL      A/G Ratio 1.2 g/dL      BUN/Creatinine Ratio 20.8     Anion Gap 15.0 mmol/L      eGFR 71.6 mL/min/1.73      Comment: National Kidney Foundation and American Society of Nephrology (ASN) Task Force recommended calculation based on the Chronic Kidney Disease Epidemiology Collaboration (CKD-EPI) equation refit without adjustment for race.       Narrative:      GFR Normal >60  Chronic Kidney Disease <60  Kidney Failure <15    The GFR formula is only valid for adults with stable renal function between ages 18 and 70.    CK [[id number]]  (Abnormal) Collected: 01/02/23 1108    Specimen: Blood Updated: 01/02/23 1130     Creatine Kinase 520 U/L     Lactic Acid, Plasma [[id number]]  (Abnormal) Collected: 01/02/23 1026    Specimen: Blood Updated: 01/02/23 1051     Lactate 3.8 mmol/L     Urinalysis, Microscopic Only - Urine, Catheter [[id number]]  (Abnormal) Collected: " 01/02/23 1029    Specimen: Urine, Catheter Updated: 01/02/23 1045     RBC, UA 6-12 /HPF      WBC, UA 3-5 /HPF      Comment: Urine culture not indicated.        Bacteria, UA None Seen /HPF      Squamous Epithelial Cells, UA 0-2 /HPF      Hyaline Casts, UA None Seen /LPF      Methodology Automated Microscopy    Urinalysis With Culture If Indicated - Urine, Catheter [[id number]]  (Abnormal) Collected: 01/02/23 1029    Specimen: Urine, Catheter Updated: 01/02/23 1045     Color, UA Yellow     Appearance, UA Clear     pH, UA 5.5     Specific Gravity, UA >1.030     Glucose, UA Negative     Ketones, UA Trace     Bilirubin, UA Negative     Blood, UA Moderate (2+)     Protein, UA Trace     Leuk Esterase, UA Negative     Nitrite, UA Negative     Urobilinogen, UA 0.2 E.U./dL    Narrative:      In absence of clinical symptoms, the presence of pyuria, bacteria, and/or nitrites on the urinalysis result does not correlate with infection.    Blood Culture - Blood, Arm, Right [[id number]] Collected: 01/02/23 1027    Specimen: Blood from Arm, Right Updated: 01/02/23 1031    Blood Culture - Blood, Arm, Right [[id number]] Collected: 01/02/23 1015    Specimen: Blood from Arm, Right Updated: 01/02/23 1031    CBC & Differential [[id number]]  (Abnormal) Collected: 01/02/23 0934    Specimen: Blood Updated: 01/02/23 0941    Narrative:      The following orders were created for panel order CBC & Differential.  Procedure                               Abnormality         Status                     ---------                               -----------         ------                     CBC Auto Differential[[id number]]        Abnormal            Final result                 Please view results for these tests on the individual orders.    CBC Auto Differential [[id number]]  (Abnormal) Collected: 01/02/23 0934    Specimen: Blood Updated: 01/02/23 0941     WBC 17.20 10*3/mm3      RBC 4.39 10*6/mm3      Hemoglobin 13.8 g/dL      Hematocrit 43.1 %      MCV  98.2 fL      MCH 31.4 pg      MCHC 32.0 g/dL      RDW 13.6 %      RDW-SD 49.4 fl      MPV 10.0 fL      Platelets 325 10*3/mm3      Neutrophil % 84.1 %      Lymphocyte % 6.9 %      Monocyte % 8.3 %      Eosinophil % 0.0 %      Basophil % 0.2 %      Immature Grans % 0.5 %      Neutrophils, Absolute 14.48 10*3/mm3      Lymphocytes, Absolute 1.18 10*3/mm3      Monocytes, Absolute 1.42 10*3/mm3      Eosinophils, Absolute 0.00 10*3/mm3      Basophils, Absolute 0.03 10*3/mm3      Immature Grans, Absolute 0.09 10*3/mm3      nRBC 0.0 /100 WBC     POC Glucose Once [[id number]]  (Normal) Collected: 01/02/23 0917    Specimen: Blood Updated: 01/02/23 0938     Glucose 130 mg/dL      Comment: : [id number] Tanya AmbrizMeter ID: [id number]             .  Imaging Results (Last 24 Hours)     Procedure Component Value Units Date/Time    MRI Brain Without Contrast [[id number]] Collected: 01/02/23 1232     Updated: 01/02/23 1241    Narrative:      EXAMINATION: MRI BRAIN WO CONTRAST- 1/2/2023 12:32 PM CST     HISTORY: Mental status change, unknown cause, stroke symptoms.     REPORT: Multiplanar multisequence MR imaging of the brain was performed  without contrast.     COMPARISON: Noncontrast CT of the head 01/02/2023.     There is diffusion restriction in the distribution of the right middle  cerebral artery, primarily involving the insular cortex of the temporal  lobe and right basal ganglia. This is compatible with acute infarction.  Gradient echo images show no associated hemorrhage. There is no midline  shift. Diffuse atrophy is present. FLAIR images demonstrate increased  confluent signal in the periventricular and subcortical white matter  tracts compatible with mild to moderate chronic small vessel white  matter ischemic disease. T2-weighted images demonstrate the major  intracranial vascular flow-voids to be present. There slight flattening  of the pituitary gland within the pituitary fossa, often seen as a  normal  variant.       Impression:      1. Acute versus early subacute nonhemorrhagic MCA territory infarction  primarily involving the insular cortex of the right temporal lobe and  the right basal ganglia. This includes an area of edema measuring up to  4.7 x 2.9 cm. Diffuse cerebral atrophy and chronic small vessel white  matter ischemic changes.  This report was finalized on 01/02/2023 12:37 by Dr. Bobby Juarez MD.    XR Pelvis 1 or 2 View [[id number]] Collected: 01/02/23 1034     Updated: 01/02/23 1039    Narrative:      EXAMINATION: XR PELVIS 1 OR 2 VW- 1/2/2023 10:34 AM CST     HISTORY: injury, pain.     REPORT: An AP view the pelvis was obtained.     COMPARISON: There are no correlative imaging studies for comparison.     The bladder is opacified with excreted contrast. There is previous left  hip arthroplasty with normal alignment and positioning of the  prosthesis, without loosening. No acute fracture is identified. There is  moderate narrowing of the right hip joint space with associated spurring  of the femoral head. Degenerative changes are noted at the lumbosacral  junction.       Impression:      No acute osseous abnormality. Moderate arthritic changes of  the right hip. Evidence previous left hip arthroplasty.  This report was finalized on 01/02/2023 10:36 by Dr. Bobby Juarez MD.    XR Chest 1 View [[id number]] Collected: 01/02/23 0957     Updated: 01/02/23 1001    Narrative:      EXAMINATION: XR CHEST 1 VW- 1/2/2023 9:57 AM CST     HISTORY: Chest pain.     REPORT: A frontal view of the chest was obtained.     COMPARISON: Chest x-ray 12/02/2022.     There is mild diffuse interstitial prominence as before. No lung  consolidation is identified. Linear opacity over the right base is  favored represent skin fold artifact. No pneumothorax or pleural  effusion is identified. Heart size is normal. The osseous structures are  acutely unremarkable. The upper abdomen appears normal. Cholecystectomy  clips are  present.       Impression:      No acute cardiopulmonary abnormality.  This report was finalized on 01/02/2023 09:58 by Dr. Bobby Juarez MD.    CT Angiogram Head [[id number]] Collected: 01/02/23 0955     Updated: 01/02/23 1000    Narrative:      EXAMINATION: CT ANGIOGRAM HEAD- 1/2/2023 9:55 AM CST     HISTORY: Neuro deficit, acute, stroke suspected     DOSE: 288 mGycm (Automatic exposure control technique was implemented in  an effort to keep the radiation dose as low as possible without  compromising image quality)     REPORT: Spiral CT of the head was performed after administration of  intravenous contrast using CTA protocol, which includes reconstructed  coronal, sagittal and 3-D images.     COMPARISON: Noncontrast CT of the head on the same day.     The visualized distal internal carotid arteries are patent and appear  normal caliber. There is moderate right and small volume of calcified  left plaque at the cavernous portion of both distal ICAs. The middle and  anterior cerebral arteries are patent and no LVO is identified. No  intracranial aneurysm is identified. Bilateral vertebral arteries are  patent, the right femoral artery is dominant compared to the left.  Bilateral PICA are patent. The basilar artery and distal branch vessels  of the basilar artery appear patent.       Impression:      No intracranial arterial LVO or significant intracranial  arterial steno-occlusive disease. Small volume of calcified plaque  within the cavernous portion of both distal ICAs.        This report was finalized on 01/02/2023 09:57 by Dr. Bobby Juarez MD.    CT Angiogram Neck [[id number]] Collected: 01/02/23 0950     Updated: 01/02/23 0958    Narrative:      EXAMINATION: CT ANGIOGRAM NECK- 1/2/2023 9:50 AM CST     HISTORY: Neuro deficit, acute, stroke suspected     DOSE: 288 mGycm (Automatic exposure control technique was implemented in  an effort to keep the radiation dose as low as possible without  compromising image  quality)     REPORT: Spiral CT of the neck was performed after administration of  intravenous contrast using CTA protocol, which includes reconstructed  coronal, sagittal and 3-D images.     COMPARISON: There are no correlative imaging studies for comparison.     Grading of carotid stenosis performed with NASCET criteria.        The visualized lungs are clear, there is mild dependent atelectasis.  There is normal caliber of the visualized aortic arch with patency of  the origins of the great vessels demonstrate. Small volume of calcified  plaque is noted at the origin of the left subclavian artery, not  flow-limiting. There is moderate calcified plaque at the origin of the  right subclavian artery with less than 50% stenosis. The right common  carotid artery is patent, normal caliber, without evidence of  dissection. There is heavy calcified plaque the right carotid bulb and  origin of the right ICA, with less than 50% stenosis. The right internal  and external carotid arteries are patent. Moderate calcified plaque is  noted within the cavernous portion of the distal right ICA. The left  common carotid artery is patent, normal caliber, without evidence of  dissection. There is a small volume of calcified eccentric plaque within  the origin of the left ICA, with less than 50% stenosis. The left  external and internal carotid arteries are patent. There is a small  volume of calcified plaque within the cavernous portion of the distal  left ICA. Bilateral vertebral arteries are patent, the right vertebral  artery is dominant compared to the left. No evidence of vertebral  dissection is identified. Bilateral PICA are patent. The intracranial  arterial vasculature is described in detail in a separate CTA head  report today.       Impression:      1. No evidence of significant arterial steno-occlusive disease or  dissection involving the cervical portion of the carotid and vertebral  arteries, there is calcified plaque at  both carotid bifurcations with  less than 50% stenosis. There is moderate calcified plaque within the  right subclavian artery origin, not flow-limiting. A small volume of  calcified plaque is also noted at the origin of the left subclavian  artery.        This report was finalized on 01/02/2023 09:54 by Dr. Bobby Juarez MD.    CT Cervical Spine Without Contrast [[id number]] Collected: 01/02/23 0946     Updated: 01/02/23 0953    Narrative:      EXAMINATION: CT CERVICAL SPINE WO CONTRAST- 1/2/2023 9:46 AM CST     HISTORY: Neck trauma (Age >= 65y)     DOSE: 245 mGycm (Automatic exposure control technique was implemented in  an effort to keep the radiation dose as low as possible without  compromising image quality)     REPORT: Spiral CT of the cervical spine was performed without contrast,  reconstructed coronal and sagittal images are also reviewed.     COMPARISON: There are no correlative imaging studies for comparison.     Sagittal images demonstrate moderate motion artifact, there is  levoscoliosis which may be exaggerated by positioning, no evidence of  spondylolisthesis. There is also rotation of C1 on C2 which is probably  positional. No acute fracture is identified. Degenerative disc space  narrowing is present at C5-6, C6-7, with small endplate spurs. No  osseous spinal canal stenosis is identified. Review of soft tissue  windows demonstrate a normal appearance of the prevertebral soft  tissues. There is no obvious disc herniation. The visualized upper lungs  are clear.       Impression:      No fracture, no acute osseous cervical spine abnormality.  Moderate levoscoliosis and rotation of C1 on C2 may be exaggerated by  positioning. Degenerative changes are present, greatest at C5-6 and  C6-7.              This report was finalized on 01/02/2023 09:50 by Dr. Bobby Juarez MD.    CT Head Without Contrast [[id number]] Collected: 01/02/23 0941     Updated: 01/02/23 0947    Narrative:      EXAMINATION: CT HEAD  WO CONTRAST- 1/2/2023 9:41 AM CST     HISTORY: Mental status change, unknown cause.     DOSE: 563 mGycm (Automatic exposure control technique was implemented in  an effort to keep the radiation dose as low as possible without  compromising image quality)     REPORT: Axial CT of the head was performed without contrast,  reconstructed coronal and sagittal images are also reviewed.     Comparison: CT head without contrast 02/09/2020.     No intracranial hemorrhage, mass or mass effect is identified. The  ventricles and basal cisterns are within normal limits. There is mild  diffuse atrophy. There is a chronic appearing lacunar infarct in the  right basal ganglia which was not present in 2020. There is decreased  attenuation in the periventricular white matter tracts compatible with  chronic small vessel white matter ischemic disease. The extracranial  structures appear within normal limits.       Impression:      No acute intracranial abnormality. There is a chronic  appearing right basal ganglia lacunar infarct which was not present in  2020. MRI would be more sensitive for acute ischemia. Diffuse atrophy is  noted and there are moderate chronic small vessel white matter ischemic  changes.  This report was finalized on 01/02/2023 09:44 by Dr. Bobby Juarez MD.        Personal review of MRI of brain shows acute/subacute right MCA stroke  DWI      Telemetry A flutter 80 to 83 with PVCs and couplets  Impression    1. Acute right MCA stroke  2. Chronic atrial fib/a flutter  3. Hyperlipidemia  4 Hypertension  5. Hypokalemia  6. Hypothyroid  7. Dysphagia secondary to stroke   8. Leukocytosis    Plan  · Agree with current work up as shown in orders to include telemetry, lipid panel hemoglobin A1C  Will add TSH and B12 and magnesium  · Pt/OT/Speech therapy  · No TPA as she was outside TPA window  · Lipitor high dose statin  Given her age would use 40 mg  · Unclear why she is not on anticoagulation  · NPO   · Start IVF as  she had a dye load and is NPO  · Will change orders to rectal aspirin  · No anticoagulation for now due to size of stroke  Family is very hesitant about this But we will broach this topic later    I discussed the patient's findings and my recommendations with patient, family and nursing staff    Kassi Barrera MD  01/02/23  15:31 CST

## 2023-01-03 ENCOUNTER — APPOINTMENT (OUTPATIENT)
Dept: GENERAL RADIOLOGY | Facility: HOSPITAL | Age: 88
DRG: 65 | End: 2023-01-03
Payer: MEDICARE

## 2023-01-03 LAB
ALBUMIN SERPL-MCNC: 3.1 G/DL (ref 3.5–5.2)
ALBUMIN/GLOB SERPL: 1.2 G/DL
ALP SERPL-CCNC: 118 U/L (ref 39–117)
ALT SERPL W P-5'-P-CCNC: 12 U/L (ref 1–33)
ANION GAP SERPL CALCULATED.3IONS-SCNC: 11 MMOL/L (ref 5–15)
AST SERPL-CCNC: 40 U/L (ref 1–32)
BASOPHILS # BLD AUTO: 0.04 10*3/MM3 (ref 0–0.2)
BASOPHILS NFR BLD AUTO: 0.3 % (ref 0–1.5)
BILIRUB SERPL-MCNC: 0.9 MG/DL (ref 0–1.2)
BUN SERPL-MCNC: 17 MG/DL (ref 8–23)
BUN/CREAT SERPL: 21 (ref 7–25)
CALCIUM SPEC-SCNC: 8.6 MG/DL (ref 8.2–9.6)
CHLORIDE SERPL-SCNC: 104 MMOL/L (ref 98–107)
CHOLEST SERPL-MCNC: 130 MG/DL (ref 0–200)
CK SERPL-CCNC: 701 U/L (ref 20–180)
CO2 SERPL-SCNC: 26 MMOL/L (ref 22–29)
CREAT SERPL-MCNC: 0.81 MG/DL (ref 0.57–1)
DEPRECATED RDW RBC AUTO: 50.9 FL (ref 37–54)
EGFRCR SERPLBLD CKD-EPI 2021: 67.4 ML/MIN/1.73
EOSINOPHIL # BLD AUTO: 0.03 10*3/MM3 (ref 0–0.4)
EOSINOPHIL NFR BLD AUTO: 0.2 % (ref 0.3–6.2)
ERYTHROCYTE [DISTWIDTH] IN BLOOD BY AUTOMATED COUNT: 14 % (ref 12.3–15.4)
GLOBULIN UR ELPH-MCNC: 2.5 GM/DL
GLUCOSE SERPL-MCNC: 94 MG/DL (ref 65–99)
HBA1C MFR BLD: 5.4 % (ref 4.8–5.6)
HCT VFR BLD AUTO: 41.1 % (ref 34–46.6)
HDLC SERPL-MCNC: 63 MG/DL (ref 40–60)
HGB BLD-MCNC: 13.4 G/DL (ref 12–15.9)
IMM GRANULOCYTES # BLD AUTO: 0.09 10*3/MM3 (ref 0–0.05)
IMM GRANULOCYTES NFR BLD AUTO: 0.6 % (ref 0–0.5)
LDLC SERPL CALC-MCNC: 54 MG/DL (ref 0–100)
LDLC/HDLC SERPL: 0.86 {RATIO}
LYMPHOCYTES # BLD AUTO: 2.05 10*3/MM3 (ref 0.7–3.1)
LYMPHOCYTES NFR BLD AUTO: 14 % (ref 19.6–45.3)
MAGNESIUM SERPL-MCNC: 3.3 MG/DL (ref 1.7–2.3)
MCH RBC QN AUTO: 31.9 PG (ref 26.6–33)
MCHC RBC AUTO-ENTMCNC: 32.6 G/DL (ref 31.5–35.7)
MCV RBC AUTO: 97.9 FL (ref 79–97)
MONOCYTES # BLD AUTO: 1.25 10*3/MM3 (ref 0.1–0.9)
MONOCYTES NFR BLD AUTO: 8.5 % (ref 5–12)
NEUTROPHILS NFR BLD AUTO: 11.19 10*3/MM3 (ref 1.7–7)
NEUTROPHILS NFR BLD AUTO: 76.4 % (ref 42.7–76)
NRBC BLD AUTO-RTO: 0 /100 WBC (ref 0–0.2)
PLATELET # BLD AUTO: 261 10*3/MM3 (ref 140–450)
PMV BLD AUTO: 10 FL (ref 6–12)
POTASSIUM SERPL-SCNC: 3.9 MMOL/L (ref 3.5–5.2)
PROT SERPL-MCNC: 5.6 G/DL (ref 6–8.5)
QT INTERVAL: 360 MS
QTC INTERVAL: 480 MS
RBC # BLD AUTO: 4.2 10*6/MM3 (ref 3.77–5.28)
SODIUM SERPL-SCNC: 141 MMOL/L (ref 136–145)
TRIGL SERPL-MCNC: 63 MG/DL (ref 0–150)
VIT B12 BLD-MCNC: 459 PG/ML (ref 211–946)
VLDLC SERPL-MCNC: 13 MG/DL (ref 5–40)
WBC NRBC COR # BLD: 14.65 10*3/MM3 (ref 3.4–10.8)

## 2023-01-03 PROCEDURE — 83735 ASSAY OF MAGNESIUM: CPT | Performed by: FAMILY MEDICINE

## 2023-01-03 PROCEDURE — 80053 COMPREHEN METABOLIC PANEL: CPT | Performed by: PSYCHIATRY & NEUROLOGY

## 2023-01-03 PROCEDURE — 25010000002 MAGNESIUM SULFATE 2 GM/50ML SOLUTION: Performed by: FAMILY MEDICINE

## 2023-01-03 PROCEDURE — 92611 MOTION FLUOROSCOPY/SWALLOW: CPT

## 2023-01-03 PROCEDURE — 85025 COMPLETE CBC W/AUTO DIFF WBC: CPT | Performed by: PSYCHIATRY & NEUROLOGY

## 2023-01-03 PROCEDURE — 36415 COLL VENOUS BLD VENIPUNCTURE: CPT | Performed by: PSYCHIATRY & NEUROLOGY

## 2023-01-03 PROCEDURE — 97166 OT EVAL MOD COMPLEX 45 MIN: CPT | Performed by: OCCUPATIONAL THERAPIST

## 2023-01-03 PROCEDURE — 82550 ASSAY OF CK (CPK): CPT | Performed by: FAMILY MEDICINE

## 2023-01-03 PROCEDURE — 97530 THERAPEUTIC ACTIVITIES: CPT

## 2023-01-03 PROCEDURE — 74230 X-RAY XM SWLNG FUNCJ C+: CPT

## 2023-01-03 PROCEDURE — 83036 HEMOGLOBIN GLYCOSYLATED A1C: CPT | Performed by: FAMILY MEDICINE

## 2023-01-03 PROCEDURE — 0 POTASSIUM CHLORIDE 10 MEQ/100ML SOLUTION: Performed by: FAMILY MEDICINE

## 2023-01-03 PROCEDURE — 80061 LIPID PANEL: CPT | Performed by: FAMILY MEDICINE

## 2023-01-03 PROCEDURE — 97162 PT EVAL MOD COMPLEX 30 MIN: CPT

## 2023-01-03 PROCEDURE — 99233 SBSQ HOSP IP/OBS HIGH 50: CPT | Performed by: PSYCHIATRY & NEUROLOGY

## 2023-01-03 PROCEDURE — 25010000002 KETOROLAC TROMETHAMINE PER 15 MG: Performed by: FAMILY MEDICINE

## 2023-01-03 PROCEDURE — 92526 ORAL FUNCTION THERAPY: CPT | Performed by: SPEECH-LANGUAGE PATHOLOGIST

## 2023-01-03 RX ORDER — LATANOPROSTENE BUNOD 0.24 MG/ML
1 SOLUTION/ DROPS OPHTHALMIC EVERY MORNING
COMMUNITY
End: 2023-01-05 | Stop reason: HOSPADM

## 2023-01-03 RX ORDER — ATORVASTATIN CALCIUM 40 MG/1
40 TABLET, FILM COATED ORAL NIGHTLY
Status: DISCONTINUED | OUTPATIENT
Start: 2023-01-03 | End: 2023-01-04

## 2023-01-03 RX ADMIN — KETOROLAC TROMETHAMINE 15 MG: 30 INJECTION, SOLUTION INTRAMUSCULAR; INTRAVENOUS at 21:18

## 2023-01-03 RX ADMIN — ASPIRIN 300 MG: 300 SUPPOSITORY RECTAL at 08:09

## 2023-01-03 RX ADMIN — MAGNESIUM SULFATE HEPTAHYDRATE 2 G: 2 INJECTION, SOLUTION INTRAVENOUS at 02:01

## 2023-01-03 RX ADMIN — SODIUM CHLORIDE 75 ML/HR: 9 INJECTION, SOLUTION INTRAVENOUS at 08:07

## 2023-01-03 RX ADMIN — Medication 10 ML: at 08:19

## 2023-01-03 RX ADMIN — KETOROLAC TROMETHAMINE 15 MG: 30 INJECTION, SOLUTION INTRAMUSCULAR; INTRAVENOUS at 09:42

## 2023-01-03 RX ADMIN — SODIUM CHLORIDE 75 ML/HR: 9 INJECTION, SOLUTION INTRAVENOUS at 20:56

## 2023-01-03 RX ADMIN — BARIUM SULFATE 20 ML: 400 SUSPENSION ORAL at 15:07

## 2023-01-03 RX ADMIN — KETOROLAC TROMETHAMINE 15 MG: 30 INJECTION, SOLUTION INTRAMUSCULAR; INTRAVENOUS at 15:31

## 2023-01-03 RX ADMIN — POTASSIUM CHLORIDE 10 MEQ: 7.46 INJECTION, SOLUTION INTRAVENOUS at 00:20

## 2023-01-03 RX ADMIN — Medication 10 ML: at 20:56

## 2023-01-03 RX ADMIN — BARIUM SULFATE 20 ML: 400 PASTE ORAL at 15:07

## 2023-01-03 NOTE — PLAN OF CARE
Goal Outcome Evaluation:  Plan of Care Reviewed With: patient, caregiver, family        Progress: no change  Outcome Evaluation: PT eval completed.  Pt resting in bed w/ head of bed elevated and slight R sidelying.  Pt oriented X 4 and follows commands, however keeps eyes closed throughout visit unless directly asked to open them, then they close again very quickly.  Pt w/ L sided facial droop, flaccid L U/LEs, intact to light touch, however decreased on L compared to R, withdrawals from pain in L LE, pt able to perform finger opposition on R and heel to shin w/ R LE, however unable to actively move L side to attempt.  FTN decreased on R, unable on L due to weakness.  Pt w/ macular degeneration, was able to see this therapist's red jacket, but feel her prior visual deficits were effecting the FTN test.  Pt w/ noted L shld subluxation.  Education re: positioning of this arm at rest.  Pt performed rolling L and R w/ max assist.  Upon rolling attempts pt reported R posterior neck pain and rated this a 10/10.  Notified RN who provided pain meds.  Pt was dependent to scoot up in bed.  Pt motivated and did anything asked of her by this therapist.  Pt will benefit from continued PT services for ther ex, neuromuscular re-education, to improve bed mobility, and progress functional mobility, to assist w/ skin protection/pressure relief and to increase safety awareness/ reduce fall risk.  Recommend continued skilled care at discharge.  At this time is felt pt will most benefit from a stay in SNF for her rehab.  Will follow for progress and needs.

## 2023-01-03 NOTE — PLAN OF CARE
Problem: Adult Inpatient Plan of Care  Goal: Plan of Care Review  Outcome: Ongoing, Progressing  Flowsheets (Taken 1/3/2023 0440)  Progress: no change  Plan of Care Reviewed With: patient  Outcome Evaluation: Patient has no c/o pain. Patient received x4 runs of IV potassium and x3 runs of IV mag per protocol. Purewick in place. Afl 80-93 on tele. Remains NPO. VSS. Safety maintained. Will notify MD of any changes.

## 2023-01-03 NOTE — PROGRESS NOTES
[facility] Medicine Services  INPATIENT PROGRESS NOTE    Patient Name: Erinn Najera  Date of Admission: 1/2/2023  Today's Date: 01/03/23  Length of Stay: 1  Primary Care Physician: Елена Cedeno MD    Subjective   Chief Complaint: Fall from bed, inability to use left side  HPI     Patient continues to demonstrate flaccidity of the left side.  She has been seen and evaluated by neurology.  Speech therapy has seen and evaluated the patient.  The patient has significant risk for aspiration and speech therapy recommends alternate method of nutrition if aggressive treatment is desired.  Alternative would be to place the patient on puréed diet with thickened liquids which would be unlikely to provide for her nutritional needs leading to gradual decline in her condition.  I had a long discussion with 3 of 4 of the patient's children.  They would like to see their mother agreed to PEG tube placement however the patient appears to be opposed to tube feeding.  The patient is alert, oriented x3 and is quite decisional and capable of making decisions for herself.  Family members were highly encouraged to meet together as a family with their mother and to discuss these options with her.  Ultimately the patient's personal wishes will be honored.  After supplementation, magnesium is slightly elevated at 3.3.  Potassium is within normal limits.  Lipid panel is unremarkable.  Hemoglobin A1c 5.4%.  White blood cell count improved to 14,700.  The patient suffers from PAF and chronic anticoagulation was discussed with the family.  Previously, they have refused anticoagulation because of high fall risk.  If the patient is no longer a significant fall risk, anticoagulation could be considered.    Review of Systems   All pertinent negatives and positives are as above. All other systems have been reviewed and are negative unless otherwise stated.     Objective    Temp:  [97.2 °F (36.2 °C)-98.4 °F  (36.9 °C)] 97.2 °F (36.2 °C)  Heart Rate:  [79-91] 88  Resp:  [18-20] 18  BP: (119-127)/(47-71) 127/71  Physical Exam  Constitutional:       Appearance: Normal appearance. She is normal weight.   HENT:      Head: Normocephalic and atraumatic.      Right Ear: External ear normal.      Left Ear: External ear normal.      Nose: Nose normal.      Mouth: Mucous membranes are dry.      Pharynx: Oropharynx is clear.   Eyes:      General: No scleral icterus.     Conjunctiva/sclera: Conjunctivae normal.    Cardiovascular:      Rate and Rhythm: Normal rate. Rhythm irregularly irregular.      Pulses: Normal pulses.      Heart sounds: No murmur heard.  Pulmonary:      Effort: Pulmonary effort is normal. No respiratory distress.      Breath sounds: Normal breath sounds.   Abdominal:      General: Abdomen is flat. Bowel sounds are normal.      Palpations: Abdomen is soft. There is no mass.   Musculoskeletal:         General: Normal range of motion.      Right lower leg: No edema.      Left lower leg: No edema.   Skin:     General: Skin is warm and dry.      Findings: Bruising (Left periorbital area, left shoulder, left lower abdomen) present.   Neurological:      Mental Status: She is alert and oriented to person, place, and time.      Sensory: No sensory deficit.      Motor: Weakness ( flaccid L side, L facial droop) present.   Psychiatric:         Mood and Affect: Mood normal.         Judgment: Judgment normal.     Results Review:  I have reviewed the labs, radiology results, and diagnostic studies.    Laboratory Data:   Results from last 7 days   Lab Units 01/03/23  0338 01/02/23  0934   WBC 10*3/mm3 14.65* 17.20*   HEMOGLOBIN g/dL 13.4 13.8   HEMATOCRIT % 41.1 43.1   PLATELETS 10*3/mm3 261 325        Results from last 7 days   Lab Units 01/03/23  0338 01/02/23  1108   SODIUM mmol/L 141 142   POTASSIUM mmol/L 3.9 3.2*   CHLORIDE mmol/L 104 102   CO2 mmol/L 26.0 25.0   BUN mg/dL 17 16   CREATININE mg/dL 0.81 0.77   CALCIUM  mg/dL 8.6 9.2   BILIRUBIN mg/dL 0.9 1.0   ALK PHOS U/L 118* 134*   ALT (SGPT) U/L 12 13   AST (SGOT) U/L 40* 26   GLUCOSE mg/dL 94 130*       Culture Data:   Blood Culture   Date Value Ref Range Status   01/02/2023 No growth at 24 hours  Preliminary   01/02/2023 No growth at 24 hours  Preliminary       Radiology Data:   Imaging Results (Last 24 Hours)     Procedure Component Value Units Date/Time    FL Video Swallow With Speech Single Contrast [[id number]] Collected: 01/03/23 1520     Updated: 01/03/23 1526    Narrative:      EXAMINATION: FL VIDEO SWALLOW W SPEECH SINGLE-CONTRAST-     1/3/2023 2:55 PM CST     HISTORY: dysphagia post stroke; I63.9-Cerebral infarction, unspecified;  I48.20-Chronic atrial fibrillation, unspecified; D72.829-Elevated white  blood cell count, unspecified; R13.10-Dysphagia, unspecified;  M62.81-Muscle weakness (generalized)     The fluoroscopy is performed during ingestion of honey consistency and  pudding consistency contrast boluses.     There is no previous study for comparison.     The study was performed under supervision of speech therapist.     There is significant difficulty in initiating the swallowing with slow  and delayed passage through the oropharynx. There is moderate residue in  the vallecula which clears on subsequent attempts. There is deep  laryngeal penetration and mild aspiration with honey consistency  contrast bolus. There was immediate cough reflex and expectoration.       Impression:      1. Abnormal swallowing function study.  2. Fluoroscopy time: 3 minutes 1 second.  3. The dose: 13.9mGy.  4. Number of images: 1        This report was finalized on 01/03/2023 15:23 by Dr. Noemi Jones MD.          I have reviewed the patient's current medications.     Assessment/Plan   Assessment  Active Hospital Problems    Diagnosis    • **Acute ischemic right MCA stroke (HCC)    • PAF (paroxysmal atrial fibrillation) (HCC)    • Elevated CK    • Hypomagnesemia    • Hypokalemia     • Hypertension    • Hyperlipidemia        Treatment Plan  Continue PT/OT  Family to discuss PEG tube feeding versus modified oral intake    Medical Decision Making  Number and Complexity of problems:   1) acute right ischemic MCA CVA: Acute problem with high complexity  2) PAF: Chronic problem  3) hypertension: Chronic problem in exacerbation  4) hyperlipidemia: Chronic problem  5) elevated CK: Acute problem with moderate complexity  6) hypomagnesia and hypokalemia: Acute problem with low complexity     Differential Diagnosis:   Hemorrhagic CVA  Seizure disorder     Conditions and Status:        Condition is stable     MDM Data  External documents reviewed: Care everywhere documents including ambulatory summary from [facility], [facility], [facility]  My EKG interpretation: Atrial fibrillation with RVR, rate of 107.  No change from previous EKGs  My interpretation of radiological data is deferred to staff board-certified radiologist  Tests considered but not ordered: EEG     Decision rules/scores evaluated (example [id number], Wells, etc): [id number] score of 6 with 9.7% annual stroke risk     Discussed with: The patient, her nurse and her son     Care Planning  Shared decision making: The patient and her 4 children will be sharing decision-making  Code status and discussions: Full code     Disposition  Social Determinants of Health that impact treatment or disposition: Unknown as yet  Estimated length of stay is unknown.      I confirmed that the patient's advanced care plan is present, code status is documented, and a surrogate decision maker is listed in the patient's medical record.     Electronically signed by Karlo Pereira DO, 01/03/23, 17:54 CST.

## 2023-01-03 NOTE — CASE MANAGEMENT/SOCIAL WORK
Discharge Planning Assessment  [facility]     Patient Name: Erinn Najera  MRN: [id number]  Today's Date: 1/3/2023    Admit Date: 1/2/2023        Discharge Needs Assessment     Row Name 01/03/23 0944       Living Environment    People in Home alone;other (see comments)    Current Living Arrangements home    Primary Care Provided by self;child(christianne)    Provides Primary Care For no one    Family Caregiver if Needed child(christianne), adult    Quality of Family Relationships helpful;involved;supportive       Transition Planning    Patient/Family Anticipated Services at Transition skilled nursing       Discharge Needs Assessment    Readmission Within the Last 30 Days no previous admission in last 30 days    Current Outpatient/Agency/Support Group skilled nursing facility    Equipment Currently Used at Home rollator    Discharge Facility/Level of Care Needs nursing facility, skilled    Provided Post Acute Provider List? Yes    Post Acute Provider List Nursing Home    Discharge Coordination/Progress Spoke to patient's family in room re discharge planning. Patient lives alone but her children are all very involved in her care and provide assistance as needed. Patient has a rollator, Rx coverage, and PCP. Patient's family agrees that patient will need SNF but ask for some time to discuss facility options. SW will check back this afternoon. Contact information provided to family as well.                COLT Simpson

## 2023-01-03 NOTE — H&P
[facility] Medicine Services  HISTORY AND PHYSICAL    Date of Admission: 1/2/2023  Primary Care Physician: Елена Cedeno MD    Subjective   Primary Historian: The patient's son    Chief Complaint:   Fall from bed, inability to use left side    History of Present Illness    This 89-year-old female presents to the emergency department today with a chief complaint of inability to use her left side.  The patient was last known well at about 6:30 PM yesterday when she went to bed.  She apparently fell out of bed landing on her left side.  The following morning she was found by her family members back in bed but with a bruised face, left shoulder and left lower abdomen and with inability to use her left side.  Code stroke was not called as the patient had a history of trauma related to falling from her bed.  She was not a tPA candidate as a result.  CT angiograms of the head and neck were obtained and are negative for any large vessel occlusion.  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  There is an area of edema measuring 4.7 x 2.9 cm.  Diffuse cerebral atrophy and chronic small vessel ischemic changes were noted as well.  X-rays of the pelvis showed no acute bony abnormality.  Chest x-ray with unremarkable.  CT of the cervical spine showed degenerative changes at C5-7 with no fracture or acute bony change.  Laboratory evaluation reveals a normal lactate, procalcitonin, TSH.  CBC shows an elevated white blood cell count 17,200.  Lactate 3.8.  Unremarkable urinalysis.  Magnesium low at 1.1 and replacement has been ordered.  CK elevated at 520.  CMP is unremarkable except potassium low at 3.2.    Recent echocardiogram performed 12/14/2022 at [facility]:   Summary    Abnormal rhythm consistent with atrial fibrillation    Normal left ventricular size and  systolic function estimated ejection    fraction 60 to 65%    Normal left ventricular wall thickness with indeterminate diastolic    function because of abnormal rhythm [tissue Doppler suggests significant    diastolic dysfunction]    Mild right atrial enlargement with borderline RV size but with preserved    systolic function    Mild left atrial enlargement    IVC dimension and inspiratory motion are normal consistent with normal    right atrial filling pressures    Mild pulmonic valvular insufficiency    Moderate tricuspid regurgitation with moderate pulmonary hypertension RVSP    estimate 50 mmHg    Mild thickening of a tricuspid aortic valve with adequate cusp separation    neither stenosis or insufficiency    Minimal mitral annular calcification with normally mobile mitral leaflets    and mild regurgitation    Aortic root and ascending segment measure within normal limits    Poor visualization of the aortic valve without demonstrable abnormality    No significant pericardial effusion      Signature      ----------------------------------------------------------------    Electronically signed by King Coulter MD      Review of Systems   Constitutional: Positive for activity change.   HENT: Negative.    Eyes: Negative.    Respiratory: Negative.    Cardiovascular: Negative.    Gastrointestinal: Negative.    Endocrine: Negative.    Genitourinary: Negative.    Musculoskeletal: Positive for gait problem.   Neurological: Positive for speech difficulty and weakness (L sided).   Hematological: Negative.    Psychiatric/Behavioral: Negative.         Otherwise complete ROS reviewed and negative except as mentioned in the HPI.    Past Medical History:   Past Medical History:   Diagnosis Date   • Arthritis    • Atrial fibrillation (HCC)    • Cerebral artery occlusion    • Chronic low back pain    • Deep vein thrombosis (DVT) (HCC)    • Glaucoma    • Hyperlipidemia    • Hypertension    • Hypothyroidism    • Insomnia    • Macular  degeneration    • Pulmonary embolism (HCC)    • TIA (transient ischemic attack)      Past Surgical History:  Past Surgical History:   Procedure Laterality Date   • APPENDECTOMY     • BREAST SURGERY     • CARPAL TUNNEL RELEASE     • CHOLECYSTECTOMY     • ERCP AND ENDOSCOPY     • FOOT SURGERY Bilateral     Bilateral heel spurs   • HYSTERECTOMY     • TOTAL HIP ARTHROPLASTY Left      Social History:  reports that she has never smoked. She has never used smokeless tobacco. She reports that she does not drink alcohol and does not use drugs.    Family History: family history includes Cancer in her father; Heart disease in her mother.       Allergies:  Allergies   Allergen Reactions   • Morphine Shortness Of Breath   • Stadol Ns [Butorphanol] Anaphylaxis   • Gabapentin Swelling       Medications:  Prior to Admission medications    Medication Sig Start Date End Date Taking? Authorizing Provider   amLODIPine (NORVASC) 5 MG tablet Take 1 tablet by mouth Daily. 5/2/22   Alvin Leon MD   aspirin 81 MG chewable tablet Chew 81 mg Daily.    Alvin Leon MD   atorvastatin (LIPITOR) 10 MG tablet Take 1 tablet by mouth Daily. 8/2/22   Alvin Leon MD   doxepin (SINEquan) 50 MG capsule Take 50 mg by mouth Every Night.    Alvin Leon MD   levothyroxine (SYNTHROID, LEVOTHROID) 50 MCG tablet Take 50 mcg by mouth Daily. Pt takes name brand from pharmacy    Alvin Leon MD   metoprolol tartrate (LOPRESSOR) 50 MG tablet Take 75 mg by mouth 2 (Two) Times a Day.    Alvin Leon MD   Netarsudil-Latanoprost (Rocklatan) 0.02-0.005 % solution Administer 1 drop to both eyes Every Morning.    Alvin eLon MD   Tafluprost, PF, (ZIOPTAN) 0.0015 % solution ophthalmic solution Administer 1 drop to both eyes Every Night.    Alvin Leon MD   valsartan (DIOVAN) 80 MG tablet Take 80 mg by mouth Daily.    Alvin Leon MD   acetaminophen-codeine (TYLENOL #3) 300-30 MG per  "tablet Take 1 tablet by mouth Every 6 (Six) Hours As Needed for Moderate Pain .  Patient not taking: Reported on 1/2/2023 2/9/20 1/2/23  Moreno Magdaleno DO   ASPIRIN 81 PO aspirin   daily  1/2/23  Alvin Leon MD   diclofenac (VOLTAREN) 50 MG EC tablet Take 1 tablet by mouth 2 (Two) Times a Day As Needed (Headache or pain).  Patient not taking: Reported on 1/2/2023 2/9/20 1/2/23  Moreno Magdaleno DO   levothyroxine (Synthroid) 50 MCG tablet TAKE 1 TABLET DAILY FOR LOWTHYROID. CHANGE IN DOSE 7/25/22 1/2/23  Alvin Leon MD   metoprolol tartrate (LOPRESSOR) 50 MG tablet One and 1/2 tab po BID for blood pressure 3/21/22 1/2/23  Alvin Leon MD   valsartan (DIOVAN) 80 MG tablet TAKE ONE TABLET BY MOUTH ONCE EVERY DAY FOR BLOOD PRESSURE**STOP OLMESARTAN** 5/6/22 1/2/23  ProviderAlvin MD     I have utilized all available immediate resources to obtain, update, or review the patient's current medications (including all prescriptions, over-the-counter products, herbals, cannabis/cannabidiol products, and vitamin/mineral/dietary (nutritional) supplements).    Objective     Vital Signs: /98 (BP Location: Right arm, Patient Position: Lying)   Pulse 87   Temp 98.5 °F (36.9 °C) (Oral)   Resp 20   Ht 152.4 cm (60\")   Wt 56.2 kg (123 lb 14.4 oz)   SpO2 95%   BMI 24.20 kg/m²   Physical Exam  Constitutional:       Appearance: Normal appearance. She is normal weight.   HENT:      Head: Normocephalic and atraumatic.      Right Ear: External ear normal.      Left Ear: External ear normal.      Nose: Nose normal.      Mouth/Throat:      Mouth: Mucous membranes are dry.      Pharynx: Oropharynx is clear.   Eyes:      General: No scleral icterus.     Extraocular Movements: Extraocular movements intact.      Conjunctiva/sclera: Conjunctivae normal.      Pupils: Pupils are equal, round, and reactive to light.   Cardiovascular:      Rate and Rhythm: Normal rate. Rhythm irregularly irregular.    "   Pulses: Normal pulses.      Heart sounds: No murmur heard.  Pulmonary:      Effort: Pulmonary effort is normal. No respiratory distress.      Breath sounds: Normal breath sounds.   Abdominal:      General: Abdomen is flat. Bowel sounds are normal.      Palpations: Abdomen is soft. There is no mass.   Musculoskeletal:         General: Normal range of motion.      Right lower leg: No edema.      Left lower leg: No edema.   Skin:     General: Skin is warm and dry.      Findings: Bruising (Left periorbital area, left shoulder, left lower abdomen) present.   Neurological:      Mental Status: She is alert and oriented to person, place, and time.      Sensory: No sensory deficit.      Motor: Weakness ( flaccid L side, L facial droop) present.   Psychiatric:         Mood and Affect: Mood normal.         Judgment: Judgment normal.          Results Reviewed:  Lab Results (last 24 hours)     Procedure Component Value Units Date/Time    STAT Lactic Acid, Reflex [[id number]]  (Normal) Collected: 01/02/23 1711    Specimen: Blood Updated: 01/02/23 1802     Lactate 1.7 mmol/L     Vitamin B12 [[id number]] Collected: 01/02/23 1711    Specimen: Blood Updated: 01/02/23 1742    POC Glucose Once [[id number]]  (Normal) Collected: 01/02/23 1658    Specimen: Blood Updated: 01/02/23 1714     Glucose 96 mg/dL      Comment: : [id number] [place] Heather[place] ID: [id number]       Magnesium [[id number]]  (Abnormal) Collected: 01/02/23 1108    Specimen: Blood Updated: 01/02/23 1627     Magnesium 1.1 mg/dL     TSH [[id number]]  (Normal) Collected: 01/02/23 1108    Specimen: Blood Updated: 01/02/23 1621     TSH 1.820 uIU/mL     Procalcitonin [[id number]]  (Normal) Collected: 01/02/23 1108    Specimen: Blood Updated: 01/02/23 1136     Procalcitonin 0.10 ng/mL     Narrative:      As a Marker for Sepsis (Non-Neonates):    1. <0.5 ng/mL represents a low risk of severe sepsis and/or septic shock.  2. >2 ng/mL represents a high risk of severe sepsis and/or  "septic shock.    As a Marker for Lower Respiratory Tract Infections that require antibiotic therapy:    PCT on Admission    Antibiotic Therapy       6-12 Hrs later    >0.5                Strongly Recommended  >0.25 - <0.5        Recommended   0.1 - 0.25          Discouraged              Remeasure/reassess PCT  <0.1                Strongly Discouraged     Remeasure/reassess PCT    As 28 day mortality risk marker: \"Change in Procalcitonin Result\" (>80% or <=80%) if Day 0 (or Day 1) and Day 4 values are available. Refer to [email or web address] [email or web address]-pct-[email or web address]    Change in PCT <=80%  A decrease of PCT levels below or equal to 80% defines a positive change in PCT test result representing a higher risk for 28-day all-cause mortality of patients diagnosed with severe sepsis for septic shock.    Change in PCT >80%  A decrease of PCT levels of more than 80% defines a negative change in PCT result representing a lower risk for 28-day all-cause mortality of patients diagnosed with severe sepsis or septic shock.       Comprehensive Metabolic Panel [[id number]]  (Abnormal) Collected: 01/02/23 1108    Specimen: Blood Updated: 01/02/23 1130     Glucose 130 mg/dL      BUN 16 mg/dL      Creatinine 0.77 mg/dL      Sodium 142 mmol/L      Potassium 3.2 mmol/L      Chloride 102 mmol/L      CO2 25.0 mmol/L      Calcium 9.2 mg/dL      Total Protein 6.7 g/dL      Albumin 3.7 g/dL      ALT (SGPT) 13 U/L      AST (SGOT) 26 U/L      Alkaline Phosphatase 134 U/L      Total Bilirubin 1.0 mg/dL      Globulin 3.0 gm/dL      A/G Ratio 1.2 g/dL      BUN/Creatinine Ratio 20.8     Anion Gap 15.0 mmol/L      eGFR 71.6 mL/min/1.73      Comment: National Kidney Foundation and American Society of Nephrology (ASN) Task Force recommended calculation based on the Chronic Kidney Disease Epidemiology Collaboration (CKD-EPI) equation refit without adjustment for race.       Narrative:      GFR Normal >60  Chronic Kidney Disease <60  Kidney Failure <15    The " GFR formula is only valid for adults with stable renal function between ages 18 and 70.    CK [[id number]]  (Abnormal) Collected: 01/02/23 1108    Specimen: Blood Updated: 01/02/23 1130     Creatine Kinase 520 U/L     Lactic Acid, Plasma [[id number]]  (Abnormal) Collected: 01/02/23 1026    Specimen: Blood Updated: 01/02/23 1051     Lactate 3.8 mmol/L     Urinalysis, Microscopic Only - Urine, Catheter [[id number]]  (Abnormal) Collected: 01/02/23 1029    Specimen: Urine, Catheter Updated: 01/02/23 1045     RBC, UA 6-12 /HPF      WBC, UA 3-5 /HPF      Comment: Urine culture not indicated.        Bacteria, UA None Seen /HPF      Squamous Epithelial Cells, UA 0-2 /HPF      Hyaline Casts, UA None Seen /LPF      Methodology Automated Microscopy    Urinalysis With Culture If Indicated - Urine, Catheter [[id number]]  (Abnormal) Collected: 01/02/23 1029    Specimen: Urine, Catheter Updated: 01/02/23 1045     Color, UA Yellow     Appearance, UA Clear     pH, UA 5.5     Specific Gravity, UA >1.030     Glucose, UA Negative     Ketones, UA Trace     Bilirubin, UA Negative     Blood, UA Moderate (2+)     Protein, UA Trace     Leuk Esterase, UA Negative     Nitrite, UA Negative     Urobilinogen, UA 0.2 E.U./dL    Narrative:      In absence of clinical symptoms, the presence of pyuria, bacteria, and/or nitrites on the urinalysis result does not correlate with infection.    Blood Culture - Blood, Arm, Right [[id number]] Collected: 01/02/23 1027    Specimen: Blood from Arm, Right Updated: 01/02/23 1031    Blood Culture - Blood, Arm, Right [[id number]] Collected: 01/02/23 1015    Specimen: Blood from Arm, Right Updated: 01/02/23 1031    CBC & Differential [[id number]]  (Abnormal) Collected: 01/02/23 0934    Specimen: Blood Updated: 01/02/23 0941    Narrative:      The following orders were created for panel order CBC & Differential.  Procedure                               Abnormality         Status                     ---------                                -----------         ------                     CBC Auto Differential[[id number]]        Abnormal            Final result                 Please view results for these tests on the individual orders.    CBC Auto Differential [[id number]]  (Abnormal) Collected: 01/02/23 0934    Specimen: Blood Updated: 01/02/23 0941     WBC 17.20 10*3/mm3      RBC 4.39 10*6/mm3      Hemoglobin 13.8 g/dL      Hematocrit 43.1 %      MCV 98.2 fL      MCH 31.4 pg      MCHC 32.0 g/dL      RDW 13.6 %      RDW-SD 49.4 fl      MPV 10.0 fL      Platelets 325 10*3/mm3      Neutrophil % 84.1 %      Lymphocyte % 6.9 %      Monocyte % 8.3 %      Eosinophil % 0.0 %      Basophil % 0.2 %      Immature Grans % 0.5 %      Neutrophils, Absolute 14.48 10*3/mm3      Lymphocytes, Absolute 1.18 10*3/mm3      Monocytes, Absolute 1.42 10*3/mm3      Eosinophils, Absolute 0.00 10*3/mm3      Basophils, Absolute 0.03 10*3/mm3      Immature Grans, Absolute 0.09 10*3/mm3      nRBC 0.0 /100 WBC     POC Glucose Once [[id number]]  (Normal) Collected: 01/02/23 0917    Specimen: Blood Updated: 01/02/23 0938     Glucose 130 mg/dL      Comment: : [id number] Tanya Turcioser ID: [id number]           Imaging Results (Last 24 Hours)     Procedure Component Value Units Date/Time    MRI Brain Without Contrast [[id number]] Collected: 01/02/23 1232     Updated: 01/02/23 1241    Narrative:      EXAMINATION: MRI BRAIN WO CONTRAST- 1/2/2023 12:32 PM CST     HISTORY: Mental status change, unknown cause, stroke symptoms.     REPORT: Multiplanar multisequence MR imaging of the brain was performed  without contrast.     COMPARISON: Noncontrast CT of the head 01/02/2023.     There is diffusion restriction in the distribution of the right middle  cerebral artery, primarily involving the insular cortex of the temporal  lobe and right basal ganglia. This is compatible with acute infarction.  Gradient echo images show no associated hemorrhage. There is no  midline  shift. Diffuse atrophy is present. FLAIR images demonstrate increased  confluent signal in the periventricular and subcortical white matter  tracts compatible with mild to moderate chronic small vessel white  matter ischemic disease. T2-weighted images demonstrate the major  intracranial vascular flow-voids to be present. There slight flattening  of the pituitary gland within the pituitary fossa, often seen as a  normal variant.       Impression:      1. Acute versus early subacute nonhemorrhagic MCA territory infarction  primarily involving the insular cortex of the right temporal lobe and  the right basal ganglia. This includes an area of edema measuring up to  4.7 x 2.9 cm. Diffuse cerebral atrophy and chronic small vessel white  matter ischemic changes.  This report was finalized on 01/02/2023 12:37 by Dr. Bobby Juarez MD.    XR Pelvis 1 or 2 View [[id number]] Collected: 01/02/23 1034     Updated: 01/02/23 1039    Narrative:      EXAMINATION: XR PELVIS 1 OR 2 VW- 1/2/2023 10:34 AM CST     HISTORY: injury, pain.     REPORT: An AP view the pelvis was obtained.     COMPARISON: There are no correlative imaging studies for comparison.     The bladder is opacified with excreted contrast. There is previous left  hip arthroplasty with normal alignment and positioning of the  prosthesis, without loosening. No acute fracture is identified. There is  moderate narrowing of the right hip joint space with associated spurring  of the femoral head. Degenerative changes are noted at the lumbosacral  junction.       Impression:      No acute osseous abnormality. Moderate arthritic changes of  the right hip. Evidence previous left hip arthroplasty.  This report was finalized on 01/02/2023 10:36 by Dr. Bobby Juarez MD.    XR Chest 1 View [[id number]] Collected: 01/02/23 0957     Updated: 01/02/23 1001    Narrative:      EXAMINATION: XR CHEST 1 VW- 1/2/2023 9:57 AM CST     HISTORY: Chest pain.     REPORT: A frontal view  of the chest was obtained.     COMPARISON: Chest x-ray 12/02/2022.     There is mild diffuse interstitial prominence as before. No lung  consolidation is identified. Linear opacity over the right base is  favored represent skin fold artifact. No pneumothorax or pleural  effusion is identified. Heart size is normal. The osseous structures are  acutely unremarkable. The upper abdomen appears normal. Cholecystectomy  clips are present.       Impression:      No acute cardiopulmonary abnormality.  This report was finalized on 01/02/2023 09:58 by Dr. Bobby Juarez MD.    CT Angiogram Head [[id number]] Collected: 01/02/23 0955     Updated: 01/02/23 1000    Narrative:      EXAMINATION: CT ANGIOGRAM HEAD- 1/2/2023 9:55 AM CST     HISTORY: Neuro deficit, acute, stroke suspected     DOSE: 288 mGycm (Automatic exposure control technique was implemented in  an effort to keep the radiation dose as low as possible without  compromising image quality)     REPORT: Spiral CT of the head was performed after administration of  intravenous contrast using CTA protocol, which includes reconstructed  coronal, sagittal and 3-D images.     COMPARISON: Noncontrast CT of the head on the same day.     The visualized distal internal carotid arteries are patent and appear  normal caliber. There is moderate right and small volume of calcified  left plaque at the cavernous portion of both distal ICAs. The middle and  anterior cerebral arteries are patent and no LVO is identified. No  intracranial aneurysm is identified. Bilateral vertebral arteries are  patent, the right femoral artery is dominant compared to the left.  Bilateral PICA are patent. The basilar artery and distal branch vessels  of the basilar artery appear patent.       Impression:      No intracranial arterial LVO or significant intracranial  arterial steno-occlusive disease. Small volume of calcified plaque  within the cavernous portion of both distal ICAs.        This report was  finalized on 01/02/2023 09:57 by Dr. Bobby Juarez MD.    CT Angiogram Neck [[id number]] Collected: 01/02/23 0950     Updated: 01/02/23 0958    Narrative:      EXAMINATION: CT ANGIOGRAM NECK- 1/2/2023 9:50 AM CST     HISTORY: Neuro deficit, acute, stroke suspected     DOSE: 288 mGycm (Automatic exposure control technique was implemented in  an effort to keep the radiation dose as low as possible without  compromising image quality)     REPORT: Spiral CT of the neck was performed after administration of  intravenous contrast using CTA protocol, which includes reconstructed  coronal, sagittal and 3-D images.     COMPARISON: There are no correlative imaging studies for comparison.     Grading of carotid stenosis performed with NASCET criteria.        The visualized lungs are clear, there is mild dependent atelectasis.  There is normal caliber of the visualized aortic arch with patency of  the origins of the great vessels demonstrate. Small volume of calcified  plaque is noted at the origin of the left subclavian artery, not  flow-limiting. There is moderate calcified plaque at the origin of the  right subclavian artery with less than 50% stenosis. The right common  carotid artery is patent, normal caliber, without evidence of  dissection. There is heavy calcified plaque the right carotid bulb and  origin of the right ICA, with less than 50% stenosis. The right internal  and external carotid arteries are patent. Moderate calcified plaque is  noted within the cavernous portion of the distal right ICA. The left  common carotid artery is patent, normal caliber, without evidence of  dissection. There is a small volume of calcified eccentric plaque within  the origin of the left ICA, with less than 50% stenosis. The left  external and internal carotid arteries are patent. There is a small  volume of calcified plaque within the cavernous portion of the distal  left ICA. Bilateral vertebral arteries are patent, the right  vertebral  artery is dominant compared to the left. No evidence of vertebral  dissection is identified. Bilateral PICA are patent. The intracranial  arterial vasculature is described in detail in a separate CTA head  report today.       Impression:      1. No evidence of significant arterial steno-occlusive disease or  dissection involving the cervical portion of the carotid and vertebral  arteries, there is calcified plaque at both carotid bifurcations with  less than 50% stenosis. There is moderate calcified plaque within the  right subclavian artery origin, not flow-limiting. A small volume of  calcified plaque is also noted at the origin of the left subclavian  artery.        This report was finalized on 01/02/2023 09:54 by Dr. Bobby Juarez MD.    CT Cervical Spine Without Contrast [[id number]] Collected: 01/02/23 0946     Updated: 01/02/23 0953    Narrative:      EXAMINATION: CT CERVICAL SPINE WO CONTRAST- 1/2/2023 9:46 AM CST     HISTORY: Neck trauma (Age >= 65y)     DOSE: 245 mGycm (Automatic exposure control technique was implemented in  an effort to keep the radiation dose as low as possible without  compromising image quality)     REPORT: Spiral CT of the cervical spine was performed without contrast,  reconstructed coronal and sagittal images are also reviewed.     COMPARISON: There are no correlative imaging studies for comparison.     Sagittal images demonstrate moderate motion artifact, there is  levoscoliosis which may be exaggerated by positioning, no evidence of  spondylolisthesis. There is also rotation of C1 on C2 which is probably  positional. No acute fracture is identified. Degenerative disc space  narrowing is present at C5-6, C6-7, with small endplate spurs. No  osseous spinal canal stenosis is identified. Review of soft tissue  windows demonstrate a normal appearance of the prevertebral soft  tissues. There is no obvious disc herniation. The visualized upper lungs  are clear.        Impression:      No fracture, no acute osseous cervical spine abnormality.  Moderate levoscoliosis and rotation of C1 on C2 may be exaggerated by  positioning. Degenerative changes are present, greatest at C5-6 and  C6-7.              This report was finalized on 01/02/2023 09:50 by Dr. Bobby Juarez MD.    CT Head Without Contrast [[id number]] Collected: 01/02/23 0941     Updated: 01/02/23 0947    Narrative:      EXAMINATION: CT HEAD WO CONTRAST- 1/2/2023 9:41 AM CST     HISTORY: Mental status change, unknown cause.     DOSE: 563 mGycm (Automatic exposure control technique was implemented in  an effort to keep the radiation dose as low as possible without  compromising image quality)     REPORT: Axial CT of the head was performed without contrast,  reconstructed coronal and sagittal images are also reviewed.     Comparison: CT head without contrast 02/09/2020.     No intracranial hemorrhage, mass or mass effect is identified. The  ventricles and basal cisterns are within normal limits. There is mild  diffuse atrophy. There is a chronic appearing lacunar infarct in the  right basal ganglia which was not present in 2020. There is decreased  attenuation in the periventricular white matter tracts compatible with  chronic small vessel white matter ischemic disease. The extracranial  structures appear within normal limits.       Impression:      No acute intracranial abnormality. There is a chronic  appearing right basal ganglia lacunar infarct which was not present in  2020. MRI would be more sensitive for acute ischemia. Diffuse atrophy is  noted and there are moderate chronic small vessel white matter ischemic  changes.  This report was finalized on 01/02/2023 09:44 by Dr. Bobby Juarez MD.        I have personally reviewed and interpreted the radiology studies and ECG obtained at time of admission.     Assessment / Plan     Assessment:   Active Hospital Problems    Diagnosis    • **Acute ischemic right MCA stroke  (HCC)    • PAF (paroxysmal atrial fibrillation) (HCC)    • Elevated CK    • Hypomagnesemia    • Hypokalemia    • Hypertension    • Hyperlipidemia        The patient will be admitted to my service here at [facility].     Medical Decision Making  Number and Complexity of problems:   1) acute right ischemic MCA CVA: Acute problem with high complexity  2) PAF: Chronic problem  3) hypertension: Chronic problem in exacerbation  4) hyperlipidemia: Chronic problem  5) elevated CK: Acute problem with moderate complexity  6) hypomagnesia and hypokalemia: Acute problem with low complexity    Differential Diagnosis:   Hemorrhagic CVA  Seizure disorder    Conditions and Status:        Condition is stable     MDM Data  External documents reviewed: Care everywhere documents including ambulatory summary from [facility], [facility], [facility]  My EKG interpretation: Atrial fibrillation with RVR, rate of 107.  No change from previous EKGs  My interpretation of radiological data is deferred to staff board-certified radiologist  Tests considered but not ordered: EEG     Decision rules/scores evaluated (example [id number], Wells, etc): [id number] score of 6 with 9.7% annual stroke risk     Discussed with: The patient, her nurse and her son     Treatment Plan  Admit to the medical surgical floor  Neurology consultation, done  PT, OT, SLP consultations  High intensity statin  Resume the bulk of home medications  IV normal saline at 75 cc/h for maintenance of hydration  Repeat CPK in a.m.  CMP, magnesium, CBC in a.m.  Discontinue echocardiogram previously ordered as the patient has recently had 1 performed at [facility] on 12/14.    Care Planning  Shared decision making: The patient and her son will be sharing and decision making  Code status and discussions: Full code    Disposition  Social Determinants of Health that impact treatment or disposition: Unknown as yet  Estimated length of  stay is unknown.     I confirmed that the patient's advanced care plan is present, code status is documented, and a surrogate decision maker is listed in the patient's medical record.     The patient's surrogate decision maker is her son.     The patient was seen and examined by me on 1/2/2023 at 1830 hrs.    Electronically signed by Karlo Pereira DO, 01/02/23, 19:19 CST.

## 2023-01-03 NOTE — PROGRESS NOTES
Neurology Progress Note      Chief Complaint:  F/u stroke  Length of Stay:  1   Subjective     Subjective:  Patient lying in bed. Son and daughter in law at bedside. Patient had worked with PT and complained of neck pain and received Toradaol 15 mg IV at 0942. CT CS show no acute process or injury. ST is recommending VFSS for later today. SNF is recommended by PT and referral made to case management. Remains Afib on telemetry.     LDL 54  A1C 5.4  TSH 1,82    Mg was 1.1 on admission and after replacement is 3.3 today.   B12 pending.  K+ 3.9 up from 3.2      Medications:  Current Facility-Administered Medications   Medication Dose Route Frequency Provider Last Rate Last Admin   • acetaminophen (TYLENOL) tablet 650 mg  650 mg Oral Q4H PRN Karlo Pereira DO       • aluminum-magnesium hydroxide-simethicone (MAALOX MAX) 400-400-40 MG/5ML suspension 7.5 mL  7.5 mL Oral Q4H PRN Karlo Pereira DO       • amLODIPine (NORVASC) tablet 5 mg  5 mg Oral Daily Karlo Pereira DO       • aspirin chewable tablet 81 mg  81 mg Oral Daily Karlo Pereira DO        Or   • aspirin suppository 300 mg  300 mg Rectal Daily Karlo Pereira DO   300 mg at 01/03/23 0809   • atorvastatin (LIPITOR) tablet 40 mg  40 mg Oral Nightly Sarah Ojeda, APRN       • bisacodyl (DULCOLAX) suppository 10 mg  10 mg Rectal Daily PRN Karlo Pereira DO       • doxepin (SINEquan) capsule 50 mg  50 mg Oral Nightly Karlo Pereira DO       • ketorolac (TORADOL) injection 15 mg  15 mg Intravenous Q6H PRN Karlo Pereira DO   15 mg at 01/03/23 0942   • latanoprost (XALATAN) 0.005 % ophthalmic solution 1 drop  1 drop Both Eyes Nightly Karlo Pereira DO   1 drop at 01/02/23 2128   • levothyroxine (SYNTHROID, LEVOTHROID) tablet 50 mcg  50 mcg Oral Q AM Karlo Pereira DO       • Magnesium Sulfate 2 gram Bolus, followed by 8 gram infusion (total Mg dose 10 grams)- Mg less than or equal to 1mg/dL  2 g  Intravenous PRN Karlo Pereira DO        Or   • Magnesium Sulfate 2 gram / 50mL Infusion (GIVE X 3 BAGS TO EQUAL 6GM TOTAL DOSE) - Mg 1.1 - 1.5 mg/dl  2 g Intravenous PRN Karlo Pereira DO 25 mL/hr at 01/03/23 0201 2 g at 01/03/23 0201    Or   • Magnesium Sulfate 4 gram infusion- Mg 1.6-1.9 mg/dL  4 g Intravenous PRN Karlo Pereira DO       • metoprolol tartrate (LOPRESSOR) tablet 75 mg  75 mg Oral BID Karlo Pereira DO       • ondansetron (ZOFRAN) injection 4 mg  4 mg Intravenous Q6H PRN Karlo Pereira DO       • potassium chloride (MICRO-K) CR capsule 40 mEq  40 mEq Oral PRN Karlo Pereira DO        Or   • potassium chloride (KLOR-CON) packet 40 mEq  40 mEq Oral PRN Karlo Pereira DO        Or   • potassium chloride 10 mEq in 100 mL IVPB  10 mEq Intravenous Q1H PRN Karlo Pereira  mL/hr at 01/03/23 0020 10 mEq at 01/03/23 0020   • sodium chloride 0.9 % flush 10 mL  10 mL Intravenous Q12H Karlo Pereira DO   10 mL at 01/03/23 0819   • sodium chloride 0.9 % flush 10 mL  10 mL Intravenous PRN Karlo Pereira DO       • sodium chloride 0.9 % infusion 40 mL  40 mL Intravenous PRN Karlo Pereira DO       • sodium chloride 0.9 % infusion  75 mL/hr Intravenous Continuous Kassi Bkaer MD 75 mL/hr at 01/03/23 0807 75 mL/hr at 01/03/23 0807   • valsartan (DIOVAN) tablet 80 mg  80 mg Oral Daily Karlo Pereira DO           Review of Systems:   -A 14 point review of systems is completed and is negative except for neck pain.       Objective      Vital Signs  Temp:  [97.5 °F (36.4 °C)-98.8 °F (37.1 °C)] 97.5 °F (36.4 °C)  Heart Rate:  [79-91] 87  Resp:  [18-20] 18  BP: (119-152)/(47-99) 127/71      Telemetry AF 73-96    Physical Exam:  General Exam:  Head:  Normocephalic, atraumatic  HEENT:  Neck supple  Fundoscopic Exam:  No signs of disc edema  CVS:  Regular rate and rhythm.  No murmurs  Carotid Examination:  No bruits  Lungs:  Clear to  auscultation  Abdomen:  Nontender, nondistended  Extremities:  No signs of peripheral edema  Skin:  No rashes. Significant bruising.     Neurologic Exam:    Mental Status:    -drowsy but arouses easily, Oriented X 3  -No word-finding difficulties  -No aphasia  -No dysarthria  -Follows simple commands    CN II:  Visual fields with blink to threat on right.   Pupils equally reactive to light  CN III, IV, VI:  Right gaze preference with left neglect. Extraocular Muscles full with no signs of nystagmus  CN V:  Facial sensory is symmetric with no asymmetries.  CN VII:  Facial motor asymmetric with left lower facial weakness.   CN VIII:  Gross hearing intact bilaterally  CN IX:  Palate elevates symmetrically  CN X:  Palate elevates symmetrically  CN XI:  Shoulder shrug asymmetric and absent on left   CN XII:  Tongue protrudes to midline  Motor: (strength out of 5:  1= minimal movement, 2 = movement in plane of gravity, 3 = movement against gravity, 4 = movement against some resistance, 5 = full strength)    -Right Upper Ext: Proximal: 5 Distal: 5  -Left Upper Ext: Proximal: 0 Distal: 0    -Right Lower Ext: Proximal: 5 Distal: 5  -Left Lower Ext: Proximal: 0 Distal: 0    DTR:  -Right   Bicep: 2+ Tricep: 2+ Brachioradialis: 2+   Patella: 2+ Ankle: 2+ Neg Babinski  -Left   Bicep: 2+ Tricep: 2+ Brachioradialis: 2+   Patella: 2+ Ankle: 2+  Babinski    Sensory:  -Intact to light touch,     Coordination:  -Finger to nose pass pointing on right, not able to perform on left  -Heel to shin not tested      Gait  -not tested for safety reasons.            Results Review:    I reviewed the patient's new clinical results.    Results from last 7 days   Lab Units 01/03/23  0338 01/02/23  0934   WBC 10*3/mm3 14.65* 17.20*   HEMOGLOBIN g/dL 13.4 13.8   HEMATOCRIT % 41.1 43.1   PLATELETS 10*3/mm3 261 325        Results from last 7 days   Lab Units 01/03/23  0338 01/02/23  1108   SODIUM mmol/L 141 142   POTASSIUM mmol/L 3.9 3.2*   CHLORIDE  mmol/L 104 102   CO2 mmol/L 26.0 25.0   BUN mg/dL 17 16   CREATININE mg/dL 0.81 0.77   CALCIUM mg/dL 8.6 9.2   BILIRUBIN mg/dL 0.9 1.0   ALK PHOS U/L 118* 134*   ALT (SGPT) U/L 12 13   AST (SGOT) U/L 40* 26   GLUCOSE mg/dL 94 130*        Lab Results   Component Value Date    MG 3.3 (H) 01/03/2023    PROTIME 12.9 06/20/2021    INR 0.98 06/20/2021     No components found for: POCGLUC  No components found for: A1C  Lab Results   Component Value Date    HDL 63 (H) 01/03/2023    LDL 54 01/03/2023     No components found for: B12  Lab Results   Component Value Date    TSH 1.820 01/02/2023     TRANSTHORACIC ECHO AT [facility] 12/14/2022    Summary    Abnormal rhythm consistent with atrial fibrillation    Normal left ventricular size and systolic function estimated ejection    fraction 60 to 65%    Normal left ventricular wall thickness with indeterminate diastolic    function because of abnormal rhythm [tissue Doppler suggests significant    diastolic dysfunction]    Mild right atrial enlargement with borderline RV size but with preserved    systolic function    Mild left atrial enlargement    IVC dimension and inspiratory motion are normal consistent with normal    right atrial filling pressures    Mild pulmonic valvular insufficiency    Moderate tricuspid regurgitation with moderate pulmonary hypertension RVSP    estimate 50 mmHg    Mild thickening of a tricuspid aortic valve with adequate cusp separation    neither stenosis or insufficiency    Minimal mitral annular calcification with normally mobile mitral leaflets    and mild regurgitation    Aortic root and ascending segment measure within normal limits    Poor visualization of the aortic valve without demonstrable abnormality    No significant pericardial effusion      Signature      ----------------------------------------------------------------    Electronically signed by King Coulter MD(Interpreting physician)    on 12/15/2022 08:42 AM     ----------------------------------------------------------------       Assessment/Plan     Hospital Problem List      Acute ischemic right MCA stroke (HCC)    PAF (paroxysmal atrial fibrillation) (HCC)    Hypertension    Hyperlipidemia    Elevated CK    Hypomagnesemia    Hypokalemia    Impression:  1. Acute right MCA stroke. NIHSS = 15  2. Chronic afib not on anticoagulation.  3. HLD. LDL 54  4. Hypertension.  5. History of falls and general decline in the last 6 months.  6. Hypothyroid.  7. Dysphagia.  8. Cervicalgia.  9. hypermagnesium      Plan:  1. ASA 81 mg or 300 mg rectally.  2. VFSS per ST recommendations.  3. Discussion with son at bedside that if ST deems not safe for oral nutrition, would patient want to pursue temporary DHT or PEG. He would like to discuss with his brother and patient. Family had reported in this conversation the goal of the last few months was to maintain quality of life and this was discussed with them as well. Discussed also, given patient advanced age, she will have little reserve for recovery.   4. No need to repeat TTE as was done at [facility] 12/14/2022 with report above.  5. Hypokalemia resolved.  6. Defer hypermagnesium to attending.  7. Lipitor 40 mg daily. Continue this for 30 days and then discontinue secondary to patient advanced age.  8. Consult case management for SNF.  9. B12 pending.  10. Family had declined anticoagulation in the past secondary to frequent falls however, if she remains bedfast they are considering starting anticoagulation.   11. Consider Palliative Care consult to discuss goals of care.         Sarah Ojeda, APRN  01/03/23  12:22 CST

## 2023-01-03 NOTE — THERAPY EVALUATION
Patient Name: Erinn Najera  : 1928    MRN: [id number]                              Today's Date: 1/3/2023       Admit Date: 2023    Visit Dx:     ICD-10-CM ICD-9-CM   1. Cerebrovascular accident (CVA), unspecified mechanism (HCC)  I63.9 434.91   2. Chronic atrial fibrillation (HCC)  I48.20 427.31   3. Leukocytosis, unspecified type  D72.829 288.60   4. Dysphagia, unspecified type  R13.10 787.20   5. Muscle weakness  M62.81 728.87     Patient Active Problem List   Diagnosis   • Acute ischemic right MCA stroke (HCC)   • PAF (paroxysmal atrial fibrillation) ([facility])   • Hypertension   • Hyperlipidemia   • Elevated CK   • Hypomagnesemia   • Hypokalemia     Past Medical History:   Diagnosis Date   • Arthritis    • Atrial fibrillation (HCC)    • Cerebral artery occlusion    • Chronic low back pain    • Deep vein thrombosis (DVT) ([facility])    • Glaucoma    • Hyperlipidemia    • Hypertension    • Hypothyroidism    • Insomnia    • Macular degeneration    • Pulmonary embolism (HCC)    • TIA (transient ischemic attack)      Past Surgical History:   Procedure Laterality Date   • APPENDECTOMY     • BREAST SURGERY     • CARPAL TUNNEL RELEASE     • CHOLECYSTECTOMY     • ERCP AND ENDOSCOPY     • FOOT SURGERY Bilateral     Bilateral heel spurs   • HYSTERECTOMY     • TOTAL HIP ARTHROPLASTY Left       General Information     Row Name 23 0840          Physical Therapy Time and Intention    Document Type evaluation;other (see comments)  see MAR  -SMITA     Mode of Treatment physical therapy  -     Row Name 23          General Information    Patient Profile Reviewed yes  -JE     Prior Level of Function independent:;all household mobility;ADL's;home management;cooking;cleaning;feeding;grooming;bathing;dressing;dependent:;driving;min assist:;using stairs;mod assist:;max assist:;shopping  reports she cruises walls/furniture at home  -JE     Existing Precautions/Restrictions fall;other (see comments)  L sided  weakness, L side neglect  -     Barriers to Rehab medically complex;visual deficit;physical barrier  -     Row Name 01/03/23 0840          Living Environment    People in Home alone;other (see comments)  friends,family check on pt daily  -     Row Name 01/03/23 0840          Home Main Entrance    Number of Stairs, Main Entrance --   1/2 a step at back of home no rail; 5 steps w/ 2 rails at front  -     Row Name 01/03/23 0840          Stairs Within Home, Primary    Number of Stairs, Within Home, Primary --   all one level, however added on multiple times w/ unlevel surfaces, multiple small step ups due to this  -     Row Name 01/03/23 0840          Cognition    Orientation Status (Cognition) oriented x 4  -     Row Name 01/03/23 0840          Safety Issues, Functional Mobility    Safety Issues Affecting Function (Mobility) friction/shear risk;safety precaution awareness;insight into deficits/self-awareness  -     Impairments Affecting Function (Mobility) balance;endurance/activity tolerance;muscle tone abnormal;pain;strength;visual/perceptual;sensation/sensory awareness;postural/trunk control  -           User Key  (r) = Recorded By, (t) = Taken By, (c) = Cosigned By    Initials Name Provider Type    Macy Kessler, PT Physical Therapist               Mobility     Row Name 01/03/23 0840          Bed Mobility    Bed Mobility rolling left;scooting/bridging;rolling right  -     Rolling Left [place] (Bed Mobility) maximum assist (25% patient effort);verbal cues;nonverbal cues (demo/gesture)  -     Rolling Right [place] (Bed Mobility) maximum assist (25% patient effort);verbal cues;nonverbal cues (demo/gesture)  -     Scooting/Bridging [place] (Bed Mobility) dependent (less than 25% patient effort);2 person assist;verbal cues  -     Assistive Device (Bed Mobility) draw sheet;bed rails;head of bed elevated  -     Row Name 01/03/23 0840          Transfers    Comment, (Transfers)  unable to safely attempt tfers or out of bed activity at this time  -           User Key  (r) = Recorded By, (t) = Taken By, (c) = Cosigned By    Initials Name Provider Type    Macy Kessler, PT Physical Therapist               Obj/Interventions     Row Name 01/03/23 0840          Range of Motion Comprehensive    Comment, General Range of Motion AROM R U/LE WFLs, PROM L U/LE WFLs; pt tends to keep her head rotated and flexed downwardly to the R  -     Row Name 01/03/23 0840          Strength Comprehensive (MMT)    Comment, General Manual Muscle Testing (MMT) Assessment L side flaccid, R U/LE grossly 4+ to 5/5  -     Row Name 01/03/23 0840          Motor Skills    Motor Skills other (see comments)  intact to finger opposition, heel to shin on R, difficulty w/ FTN - hx of visual deficits (macular degeneration)  -     Row Name 01/03/23 0840          Sensory Assessment (Somatosensory)    Sensory Assessment (Somatosensory) other (see comments)  decreased sensation on L compared to R  -           User Key  (r) = Recorded By, (t) = Taken By, (c) = Cosigned By    Initials Name Provider Type    Macy Kessler, PT Physical Therapist               Goals/Plan     Row Name 01/03/23 0840          Bed Mobility Goal 1 (PT)    Activity/Assistive Device (Bed Mobility Goal 1, PT) rolling to left;scooting;sidelying to sit/sit to sidelying  -     [place] Level/Cues Needed (Bed Mobility Goal 1, PT) moderate assist (50-74% patient effort);minimum assist (75% or more patient effort);verbal cues required  -     Time Frame (Bed Mobility Goal 1, PT) long term goal (LTG);10 days  -     Progress/Outcomes (Bed Mobility Goal 1, PT) goal ongoing  -     Row Name 01/03/23 0840          ROM Goal 1 (PT)    ROM Goal 1 (PT) pt to maintain static/dynamic sitting balance w/ CGA  -     Time Frame (ROM Goal 1, PT) long-term goal (LTG);10 days  -     Progress/Outcome (ROM Goal 1, PT) goal ongoing  -     Row Name  01/03/23 0840          Problem Specific Goal 1 (PT)    Problem Specific Goal 1 (PT) pt able to roll to the R w/ mod/max assist  -JE     Time Frame (Problem Specific Goal 1, PT) long-term goal (LTG);other (see comments)  10 days  -JE     Progress/Outcome (Problem Specific Goal 1, PT) goal ongoing  -     Row Name 01/03/23 0840          Therapy Assessment/Plan (PT)    Planned Therapy Interventions (PT) balance training;bed mobility training;postural re-education;patient/family education;neuromuscular re-education;strengthening;transfer training;other (see comments)  skin protection/pressure relief, safety/falls prevention  -           User Key  (r) = Recorded By, (t) = Taken By, (c) = Cosigned By    Initials Name Provider Type    Macy Kessler, PT Physical Therapist               Clinical Impression     Row Name 01/03/23 0840          Pain    Pretreatment Pain Rating 0/10 - no pain  -JE     Posttreatment Pain Rating 10/10  -     Pain Location - Side/Orientation Right  -     Pain Location posterior  -     Pain Location - neck  -     Pre/Posttreatment Pain Comment notified RN of c/os pain who provided pain meds  -     Pain Intervention(s) Medication (See MAR);Repositioned;Rest  -     Row Name 01/03/23 0840          Plan of Care Review    Plan of Care Reviewed With patient;caregiver;family  -     Progress no change  -     Outcome Evaluation PT eval completed.  Pt resting in bed w/ head of bed elevated and slight R sidelying.  Pt oriented X 4 and follows commands, however keeps eyes closed throughout visit unless directly asked to open them, then they close again very quickly.  Pt w/ L sided facial droop, flaccid L U/LEs, intact to light touch, however decreased on L compared to R, withdrawals from pain in L LE, pt able to perform finger opposition on R and heel to shin w/ R LE, however unable to actively move L side to attempt.  FTN decreased on R, unable on L due to weakness.  Pt w/ macular  degeneration, was able to see this therapist's red jacket, but feel her prior visual deficits were effecting the FTN test.  Pt w/ noted L shld subluxation.  Education re: positioning of this arm at rest.  Pt performed rolling L and R w/ max assist.  Upon rolling attempts pt reported R posterior neck pain and rated this a 10/10.  Notified RN who provided pain meds.  Pt was dependent to scoot up in bed.  Pt motivated and did anything asked of her by this therapist.  Pt will benefit from continued PT services for ther ex, neuromuscular re-education, to improve bed mobility, and progress functional mobility, to assist w/ skin protection/pressure relief and to increase safety awareness/ reduce fall risk.  Recommend continued skilled care at discharge.  At this time is felt pt will most benefit from a stay in SNF for her rehab.  Will follow for progress and needs.  -     Row Name 01/03/23 0840          Therapy Assessment/Plan (PT)    Patient/Family Therapy Goals Statement (PT) improve strength, mobility, and decrease pain  -     Rehab Potential (PT) good, to achieve stated therapy goals  -     Criteria for Skilled Interventions Met (PT) yes;meets criteria;skilled treatment is necessary  -     Therapy Frequency (PT) 2 times/day  -     Predicted Duration of Therapy Intervention (PT) until discharge or goals achieved  -     Row Name 01/03/23 0840          Vital Signs    O2 Delivery Pre Treatment room air  -JE     O2 Delivery Intra Treatment room air  -JE     O2 Delivery Post Treatment room air  -     Pre Patient Position Side Lying  -     Intra Patient Position Supine  -     Post Patient Position Side Lying  -     Row Name 01/03/23 0840          Positioning and Restraints    Pre-Treatment Position in bed  -JE     Post Treatment Position bed  -JE     In Bed notified nsg;side lying left;fowlers;with family/caregiver;encouraged to call for assist;call light within reach;side rails up x2;pillow between  legs;LUE elevated;SCD pump applied  -           User Key  (r) = Recorded By, (t) = Taken By, (c) = Cosigned By    Initials Name Provider Type    Macy Kessler, PT Physical Therapist               Outcome Measures     Row Name 01/03/23 0840          How much help from another person do you currently need...    Turning from your back to your side while in flat bed without using bedrails? 2  -JE     Moving from lying on back to sitting on the side of a flat bed without bedrails? 1  -JE     Moving to and from a bed to a chair (including a wheelchair)? 1  -JE     Standing up from a chair using your arms (e.g., wheelchair, bedside chair)? 1  -JE     Climbing 3-5 steps with a railing? 1  -JE     To walk in hospital room? 1  -     AM-PAC 6 Clicks Score (PT) 7  -     Highest level of mobility 2 --> Bed activities/dependent transfer  -     Row Name 01/03/23 0840          Modified Lei Scale    Pre-Stroke Modified [place] Scale 1 - No significant disability despite symptoms.  Able to carry out all usual duties and activities.  -     Modified [place] Scale 5 - Severe disability.  Bedridden, incontinent, and requiring constant nursing care and attention.  -     Row Name 01/03/23 0840          Functional Assessment    Outcome Measure Options AM-PAC 6 Clicks Basic Mobility (PT);Modified [place]  -           User Key  (r) = Recorded By, (t) = Taken By, (c) = Cosigned By    Initials Name Provider Type    Macy Kessler, PT Physical Therapist                             Physical Therapy Education     Title: PT OT SLP Therapies (In Progress)     Topic: Physical Therapy (In Progress)     Point: Mobility training (Done)     Learning Progress Summary           Patient Acceptance, E,TB,D, VU,NR by SMITA at 1/3/2023 1026    Comment: Education re: purpose of PT/importance of activity, for safety, jt protection, positioning, skin protection, improved tech w/ bed mobility   Family Acceptance, E,TB,D, VU,NR by SMITA at  1/3/2023 1026    Comment: Education re: purpose of PT/importance of activity, for safety, jt protection, positioning, skin protection, improved tech w/ bed mobility                   Point: Home exercise program (Not Started)     Learner Progress:  Not documented in this visit.          Point: Precautions (Done)     Learning Progress Summary           Patient Acceptance, E,TB,D, VU,NR by SMITA at 1/3/2023 1026    Comment: Education re: purpose of PT/importance of activity, for safety, jt protection, positioning, skin protection, improved tech w/ bed mobility   Family Acceptance, E,TB,D, VU,NR by SMITA at 1/3/2023 1026    Comment: Education re: purpose of PT/importance of activity, for safety, jt protection, positioning, skin protection, improved tech w/ bed mobility                               User Key     Initials Effective Dates Name Provider Type Discipline    SMITA 08/02/18 -  Macy Mendoza, PT Physical Therapist PT              PT Recommendation and Plan  Planned Therapy Interventions (PT): balance training, bed mobility training, postural re-education, patient/family education, neuromuscular re-education, strengthening, transfer training, other (see comments) (skin protection/pressure relief, safety/falls prevention)  Plan of Care Reviewed With: patient, caregiver, family  Progress: no change  Outcome Evaluation: PT eval completed.  Pt resting in bed w/ head of bed elevated and slight R sidelying.  Pt oriented X 4 and follows commands, however keeps eyes closed throughout visit unless directly asked to open them, then they close again very quickly.  Pt w/ L sided facial droop, flaccid L U/LEs, intact to light touch, however decreased on L compared to R, withdrawals from pain in L LE, pt able to perform finger opposition on R and heel to shin w/ R LE, however unable to actively move L side to attempt.  FTN decreased on R, unable on L due to weakness.  Pt w/ macular degeneration, was able to see this therapist's red  jacket, but feel her prior visual deficits were effecting the FTN test.  Pt w/ noted L shld subluxation.  Education re: positioning of this arm at rest.  Pt performed rolling L and R w/ max assist.  Upon rolling attempts pt reported R posterior neck pain and rated this a 10/10.  Notified RN who provided pain meds.  Pt was dependent to scoot up in bed.  Pt motivated and did anything asked of her by this therapist.  Pt will benefit from continued PT services for ther ex, neuromuscular re-education, to improve bed mobility, and progress functional mobility, to assist w/ skin protection/pressure relief and to increase safety awareness/ reduce fall risk.  Recommend continued skilled care at discharge.  At this time is felt pt will most benefit from a stay in SNF for her rehab.  Will follow for progress and needs.     Time Calculation:    PT Charges     Row Name 01/03/23 1025             Time Calculation    Start Time 0840  -      Stop Time 0956  -      Time Calculation (min) 76 min  -      PT Received On 01/03/23  -      PT Goal Re-Cert Due Date 01/13/23  -            User Key  (r) = Recorded By, (t) = Taken By, (c) = Cosigned By    Initials Name Provider Type    Macy Kessler, PT Physical Therapist              Therapy Charges for Today     Code Description Service Date Service Provider Modifiers Qty    [id number]  PT EVAL MOD COMPLEXITY 4 1/3/2023 Macy Mendoza, PT GP 1    [id number]  PT THERAPEUTIC ACT EA 15 MIN 1/3/2023 Macy Mendoza, PT GP 1          PT G-Codes  Outcome Measure Options: AM-PAC 6 Clicks Basic Mobility (PT), Modified [place]  AM-PAC 6 Clicks Score (PT): 7  Modified Lei Scale: 5 - Severe disability.  Bedridden, incontinent, and requiring constant nursing care and attention.  PT Discharge Summary  Anticipated Discharge Disposition (PT): skilled nursing facility    Macy Mendoza PT  1/3/2023

## 2023-01-03 NOTE — PLAN OF CARE
Goal Outcome Evaluation:  Plan of Care Reviewed With: patient, caregiver, family        Progress: improving       Swallow treatment completed. The patient was alert but fatigued. Eyes remained closed throughout session; however, patient was conversational when appropriate. Daughter-in-law present. Patient with head down posture to the right. She was able to bring up to midline with min assistance but upright positioning did not continue without constant cues to remain in that position. Patient completed trials of puree, honey thick, and small sip of thin water. Patient had no immediate signs or symptoms of aspiration; however, did exhibit a delayed cough x2 that may have been related to oral residue present post swallow. Patient did have anterior loss out right side of mouth with thin liquids and with honey thick due to head positioning. SLP recommends instrumental evaluation to objectively assess swallow function at this time. Patient to remain NPO for allowance of occasional ice chip with 1:1 assistance by family or RN. Further recommendations pending VFSS in radiology today.     Marlyn Stephens MS CCC-SLP 1/3/2023 08:58 CST

## 2023-01-03 NOTE — PLAN OF CARE
Goal Outcome Evaluation:  Plan of Care Reviewed With: patient, caregiver, family           Outcome Evaluation: OT eval completed. Pt presents lethargic, oriented x4, with son at bedside. She reports at baseline being independent with all adls and mobility. Today she demonstrates impaired L UE/LE strength, slight hypertonia in her R UE, with only muscle contractions noted in L deltoid, L sided neglect/in-attention, motor coontrol and increassed pain. She required Max A for all bed mobility. Pt reports significant pain in neck and B shoulders. She does have a L shoulder subluxation. Throughout session she keeps her head turned to the R, unable to turn head past midline and eyes are unable to cross midline to the L. She was able to identify color and has a hx of MD. She would benefit from skilled OT services to address these deficits. Recommend d/c to SNF at d/c for continued skilled therapy services.

## 2023-01-03 NOTE — PLAN OF CARE
Goal Outcome Evaluation:  Plan of Care Reviewed With: patient           Outcome Evaluation: See MBS note

## 2023-01-03 NOTE — THERAPY TREATMENT NOTE
Acute Care - Speech Language Pathology   Swallow Treatment Note [facility]     Patient Name: Erinn Najera  : 1928  MRN: [id number]  Today's Date: 1/3/2023               Admit Date: 2023  Swallow treatment completed. The patient was alert but fatigued. Eyes remained closed throughout session; however, patient was conversational when appropriate. Daughter-in-law present. Patient with head down posture to the right. She was able to bring up to midline with min assistance but upright positioning did not continue without constant cues to remain in that position. Patient completed trials of puree, honey thick, and small sip of thin water. Patient had no immediate signs or symptoms of aspiration; however, did exhibit a delayed cough x2 that may have been related to oral residue present post swallow. Patient did have anterior loss out right side of mouth with thin liquids and with honey thick due to head positioning. SLP recommends instrumental evaluation to objectively assess swallow function at this time. Patient to remain NPO for allowance of occasional ice chip with 1:1 assistance by family or RN. Further recommendations pending VFSS in radiology today.   Marlyn Stephens, MS CCC-SLP 1/3/2023 09:00 CST    Visit Dx:     ICD-10-CM ICD-9-CM   1. Cerebrovascular accident (CVA), unspecified mechanism ([facility])  I63.9 434.91   2. Chronic atrial fibrillation (HCC)  I48.20 427.31   3. Leukocytosis, unspecified type  D72.829 288.60   4. Dysphagia, unspecified type  R13.10 787.20     Patient Active Problem List   Diagnosis   • Acute ischemic right MCA stroke ([facility])   • PAF (paroxysmal atrial fibrillation) ([facility])   • Hypertension   • Hyperlipidemia   • Elevated CK   • Hypomagnesemia   • Hypokalemia     Past Medical History:   Diagnosis Date   • Arthritis    • Atrial fibrillation (HCC)    • Cerebral artery occlusion    • Chronic low back pain    • Deep vein thrombosis (DVT) ([facility])    • Glaucoma    • Hyperlipidemia    •  Hypertension    • Hypothyroidism    • Insomnia    • Macular degeneration    • Pulmonary embolism (HCC)    • TIA (transient ischemic attack)      Past Surgical History:   Procedure Laterality Date   • APPENDECTOMY     • BREAST SURGERY     • CARPAL TUNNEL RELEASE     • CHOLECYSTECTOMY     • ERCP AND ENDOSCOPY     • FOOT SURGERY Bilateral     Bilateral heel spurs   • HYSTERECTOMY     • TOTAL HIP ARTHROPLASTY Left        SLP Recommendation and Plan                    Recommended Diagnostics: VFSS ([facility]) (01/03/23 0825)                       Daily Summary of Progress (SLP): progress toward functional goals is gradual (01/03/23 0825)               Treatment Assessment (SLP): continued, clinical signs of, suspected, aspiration (01/03/23 0825)  Treatment Assessment Comments (SLP): See note (01/03/23 0825)  Plan for Continued Treatment (SLP): continue treatment per plan of care (01/03/23 0825)         Plan of Care Reviewed With: patient, caregiver, family  Progress: improving      SWALLOW EVALUATION (last 72 hours)     SLP Adult Swallow Evaluation     Row Name 01/03/23 0825 01/02/23 8198                Rehab Evaluation    Document Type therapy note (daily note)  -MG evaluation  -TM       Subjective Information no complaints  -MG fatigue  -TM       Patient Observations alert;cooperative;agree to therapy  -MG cooperative  -TM       Patient/Family/Caregiver Comments/Observations Daughter-in-law present  -MG Multiple family members present.  -TM       Patient Effort adequate  -MG adequate  -TM       Symptoms Noted During/After Treatment fatigue  -MG --          General Information    Patient Profile Reviewed -- yes  -TM       Pertinent History Of Current Problem -- Acute L arm and leg weakness, confusion. MRI brain 01/02/23 showed acute vs subacute non-hemorrhagic CVA in the R temporal lobe and basal ganglia. Hx of a-fib, DVT, glaucoma, HTN, HLD, hypothyroidism, PE, TIA, macular degeneration. CXR 01/02/23 showed no acute process.   -TM       Current Method of Nutrition -- NPO  -TM       Precautions/Limitations, Vision -- vision impairment, bilaterally;other (see comments)  Unable to open L eye at this time, weak opening of R on most attempts despite cues per SLP.  -TM       Precautions/Limitations, Hearing -- WFL  -TM       Prior Level of Function-Communication -- WFL  -TM       Prior Level of Function-Swallowing -- no diet consistency restrictions  -TM       Plans/Goals Discussed with -- patient;family;other (see comments)  RNBrandy  -       Barriers to Rehab -- medically complex  -TM       Patient's Goals for Discharge -- patient did not state  -TM       Family Goals for Discharge -- patient able to return to PO diet  -TM          Pain    Additional Documentation Pain Scale: FACES Pre/Post-Treatment (Group)  -MG Pain Scale: FACES Pre/Post-Treatment (Group)  -TM          Pain Scale: FACES Pre/Post-Treatment    Pain: FACES Scale, Pretreatment 0-->no hurt  -MG 2-->hurts little bit  -TM       Posttreatment Pain Rating 0-->no hurt  -MG 2-->hurts little bit  -TM       Pain Location - Side/Orientation -- Right  -TM       Pain Location - -- hip  -TM       Pre/Posttreatment Pain Comment -- Radiates to shin per pt.  -TM          Oral Motor Structure and Function    Dentition Assessment -- natural, present and adequate  -TM       Secretion Management -- WNL/WFL  -TM       Mucosal Quality -- moist, healthy  -TM       Volitional Swallow -- delayed  -TM       Volitional Cough -- reduced respiratory support  -TM          Oral Musculature and Cranial Nerve Assessment    Oral Motor General Assessment -- oral labial or buccal impairment;lingual impairment;mandibular impairment  -TM       Mandibular Impairment Detail, Cranial Nerve V (Trigeminal) -- reduced strength on left  -TM       Oral Labial or Buccal Impairment, Detail, Cranial Nerve VII (Facial): -- left labial droop  -TM       Lingual Impairment, Detail. Cranial Nerves IX, XII (Glossopharyngeal and  Hypoglossal) -- reduced strength left  -TM          General Eating/Swallowing Observations    Respiratory Support Currently in Use -- room air  -TM       Eating/Swallowing Skills -- fed by SLP  -TM       Positioning During Eating -- upright in bed;needs frequent re-positioning  -TM       Utensils Used -- spoon  -TM       Consistencies Trialed -- pudding thick  -TM          Respiratory    Respiratory Status -- WFL  -TM          Clinical Swallow Eval    Oral Prep Phase -- impaired  -TM       Oral Transit -- impaired  -TM       Oral Residue -- impaired  -TM       Pharyngeal Phase -- suspected pharyngeal impairment  -TM       Esophageal Phase -- unremarkable  -TM       Clinical Swallow Evaluation Summary -- See note.  -TM          Oral Prep Concerns    Oral Prep Concerns -- incomplete or weak lip closure around spoon;incomplete bolus preparation  -TM       Incomplete or Weak Lip Closure Around Spoon -- pudding  -TM       Incomplete Bolus Preparation -- pudding  -TM          Oral Transit Concerns    Oral Transit Concerns -- delayed initiation of bolus transit  -TM       Delayed Intiation of Bolus Transit -- pudding  -TM          Oral Residue Concerns    Oral Residue Concerns -- diffuse residue throughout oral cavity  -TM       Diffuse Residue Throughout Oral Cavity -- pudding  -TM          Pharyngeal Phase Concerns    Pharyngeal Phase Concerns -- cough;multiple swallows  -TM       Multiple Swallows -- pudding  -TM       Cough -- pudding  -TM          SLP Evaluation Clinical Impression    SLP Swallowing Diagnosis -- severe;oral dysphagia;suspected pharyngeal dysphagia  -TM       Functional Impact -- risk of aspiration/pneumonia;risk of malnutrition;risk of dehydration  -TM       Rehab Potential/Prognosis, Swallowing -- adequate, monitor progress closely  -TM       Swallow Criteria for Skilled Therapeutic Interventions Met -- demonstrates skilled criteria  -TM          SLP Treatment Clinical Impressions    Treatment  Assessment (SLP) continued;clinical signs of;suspected;aspiration  -MG --       Treatment Assessment Comments (SLP) See note  -MG --       Daily Summary of Progress (SLP) progress toward functional goals is gradual  -MG --       Barriers to Overall Progress (SLP) Advanced age;Fatigue  -MG --       Plan for Continued Treatment (SLP) continue treatment per plan of care  -MG --       Care Plan Review evaluation/treatment results reviewed;care plan/treatment goals reviewed;risks/benefits reviewed;current/potential barriers reviewed;patient/other agree to care plan  -MG --       Care Plan Review, Other Participant(s) caregiver;family  RN Brandy  -MG --          Recommendations    Therapy Frequency (Swallow) -- daily  -TM       Predicted Duration Therapy Intervention (Days) -- until discharge  -TM       SLP Diet Recommendation -- NPO  -TM       Recommended Diagnostics [place]SS [place]  -MG reassess via clinical swallow evaluation;VFSS ([facility])  -TM       Oral Care Recommendations -- Oral Care BID/PRN;Swab  -TM       SLP Rec. for Method of Medication Administration -- meds via alternate route  -TM       Monitor for Signs of Aspiration -- yes;notify SLP if any concerns;cough;gurgly voice;throat clearing;pneumonia;right lower lobe infiltrates  -TM       Anticipated Discharge Disposition (SLP) -- skilled nursing facility  -TM          Swallow Goals (SLP)    Swallow LTGs Swallow Long Term Goal (free text)  -MG Swallow Long Term Goal (free text)  -TM       Swallow STGs labial strengthening goal selection (SLP);lingual strengthening goal selection (SLP);pharyngeal strengthening exercise goal selection (SLP)  -MG labial strengthening goal selection (SLP);lingual strengthening goal selection (SLP);pharyngeal strengthening exercise goal selection (SLP)  -TM       Labial Strengthening Goal Selection (SLP) labial strengthening, SLP goal 1  -MG labial strengthening, SLP goal 1  -TM       Lingual Strengthening Goal Selection (SLP) lingual  strengthening, SLP goal 1  -MG lingual strengthening, SLP goal 1  -TM       Pharyngeal Strengthening Exercise Goal Selection (SLP) pharyngeal strengthening exercise, SLP goal 1  -MG pharyngeal strengthening exercise, SLP goal 1  -TM          (LTG) Swallow    (LTG) Swallow Pt will tolerate LRD without overt s/s of aspiration.  -MG Pt will tolerate LRD without overt s/s of aspiration.  -TM       [place] (Swallow Long Term Goal) with minimal cues (75-90% accuracy)  -MG with minimal cues (75-90% accuracy)  -TM       Time Frame (Swallow Long Term Goal) by discharge  -MG by discharge  -TM       Barriers (Swallow Long Term Goal) Reduced alertness  -MG Reduced alertness  -TM       Progress/Outcomes (Swallow Long Term Goal) progress slower than expected  -MG new goal;goal ongoing  -TM          (STG) Labial Strengthening Goal 1 (SLP)    Activity (Labial Strengthening Goal 1, SLP) increase labial tone  -MG increase labial tone  -TM       Increase Labial Tone labial resistance exercises  -MG labial resistance exercises  -TM       [place]/Accuracy (Labial Strengthening Goal 1, SLP) with moderate cues (50-74% accuracy)  -MG with moderate cues (50-74% accuracy)  -TM       Time Frame (Labial Strengthening Goal 1, SLP) by discharge  -MG by discharge  -TM       Barriers (Labial Strengthening Goal 1, SLP) L weakness  -MG L weakness  -TM       Progress/Outcomes (Labial Strengthening Goal 1, SLP) progress slower than expected  -MG new goal;goal ongoing  -TM          (STG) Lingual Strengthening Goal 1 (SLP)    Activity (Lingual Strengthening Goal 1, SLP) increase lingual tone/sensation/control/coordination/movement  -MG increase lingual tone/sensation/control/coordination/movement  -TM       Increase Lingual Tone/Sensation/Control/Coordination/Movement lingual movement exercises;lingual resistance exercises  -MG lingual movement exercises;lingual resistance exercises  -TM       [place]/Accuracy (Lingual Strengthening Goal  1, SLP) with moderate cues (50-74% accuracy)  -MG with moderate cues (50-74% accuracy)  -TM       Time Frame (Lingual Strengthening Goal 1, SLP) by discharge  -MG by discharge  -TM       Barriers (Lingual Strengthening Goal 1, SLP) L weakness  -MG L weakness  -TM       Progress/Outcomes (Lingual Strengthening Goal 1, SLP) goal ongoing  -MG new goal;goal ongoing  -TM          (STG) Pharyngeal Strengthening Exercise Goal 1 (SLP)    Activity (Pharyngeal Strengthening Goal 1, SLP) increase squeeze/positive pressure generation;increase tongue base retraction  -MG increase squeeze/positive pressure generation;increase tongue base retraction  -TM       Increase Squeeze/Positive Pressure Generation hard effortful swallow  -MG hard effortful swallow  -TM       Increase Tongue Base Retraction jose  -MG jose  -TM       [place]/Accuracy (Pharyngeal Strengthening Goal 1, SLP) with moderate cues (50-74% accuracy)  -MG with moderate cues (50-74% accuracy)  -TM       Time Frame (Pharyngeal Strengthening Goal 1, SLP) by discharge  -MG by discharge  -TM       Barriers (Pharyngeal Strengthening Goal 1, SLP) L weakness  -MG L weakness  -TM       Progress/Outcomes (Pharyngeal Strengthening Goal 1, SLP) goal ongoing  -MG new goal;goal ongoing  -TM             User Key  (r) = Recorded By, (t) = Taken By, (c) = Cosigned By    Initials Name Effective Dates    TM Luisa Pino, CCC-SLP 06/16/21 -     MG Marlyn Stephens, MS CCC-SLP 08/12/22 -                 EDUCATION  The patient has been educated in the following areas:   Dysphagia (Swallowing Impairment) Oral Care/Hydration NPO rationale.        SLP GOALS     Row Name 01/03/23 0825 01/02/23 1425          (LTG) Swallow    (LTG) Swallow Pt will tolerate LRD without overt s/s of aspiration.  -MG Pt will tolerate LRD without overt s/s of aspiration.  -TM     [place] (Swallow Long Term Goal) with minimal cues (75-90% accuracy)  -MG with minimal cues (75-90% accuracy)  -TM      Time Frame (Swallow Long Term Goal) by discharge  -MG by discharge  -TM     Barriers (Swallow Long Term Goal) Reduced alertness  -MG Reduced alertness  -TM     Progress/Outcomes (Swallow Long Term Goal) progress slower than expected  -MG new goal;goal ongoing  -TM        (STG) Labial Strengthening Goal 1 (SLP)    Activity (Labial Strengthening Goal 1, SLP) increase labial tone  -MG increase labial tone  -TM     Increase Labial Tone labial resistance exercises  -MG labial resistance exercises  -TM     [place]/Accuracy (Labial Strengthening Goal 1, SLP) with moderate cues (50-74% accuracy)  -MG with moderate cues (50-74% accuracy)  -TM     Time Frame (Labial Strengthening Goal 1, SLP) by discharge  -MG by discharge  -TM     Barriers (Labial Strengthening Goal 1, SLP) L weakness  -MG L weakness  -TM     Progress/Outcomes (Labial Strengthening Goal 1, SLP) progress slower than expected  -MG new goal;goal ongoing  -TM        (STG) Lingual Strengthening Goal 1 (SLP)    Activity (Lingual Strengthening Goal 1, SLP) increase lingual tone/sensation/control/coordination/movement  -MG increase lingual tone/sensation/control/coordination/movement  -TM     Increase Lingual Tone/Sensation/Control/Coordination/Movement lingual movement exercises;lingual resistance exercises  -MG lingual movement exercises;lingual resistance exercises  -TM     [place]/Accuracy (Lingual Strengthening Goal 1, SLP) with moderate cues (50-74% accuracy)  -MG with moderate cues (50-74% accuracy)  -TM     Time Frame (Lingual Strengthening Goal 1, SLP) by discharge  -MG by discharge  -TM     Barriers (Lingual Strengthening Goal 1, SLP) L weakness  -MG L weakness  -TM     Progress/Outcomes (Lingual Strengthening Goal 1, SLP) goal ongoing  -MG new goal;goal ongoing  -TM        (STG) Pharyngeal Strengthening Exercise Goal 1 (SLP)    Activity (Pharyngeal Strengthening Goal 1, SLP) increase squeeze/positive pressure generation;increase tongue base  retraction  -MG increase squeeze/positive pressure generation;increase tongue base retraction  -TM     Increase Squeeze/Positive Pressure Generation hard effortful swallow  -MG hard effortful swallow  -TM     Increase Tongue Base Retraction jose  -MG jose  -TM     [place]/Accuracy (Pharyngeal Strengthening Goal 1, SLP) with moderate cues (50-74% accuracy)  -MG with moderate cues (50-74% accuracy)  -TM     Time Frame (Pharyngeal Strengthening Goal 1, SLP) by discharge  -MG by discharge  -TM     Barriers (Pharyngeal Strengthening Goal 1, SLP) L weakness  -MG L weakness  -TM     Progress/Outcomes (Pharyngeal Strengthening Goal 1, SLP) goal ongoing  -MG new goal;goal ongoing  -TM           User Key  (r) = Recorded By, (t) = Taken By, (c) = Cosigned By    Initials Name Provider Type    TM Luisa Pino, CCC-SLP Speech and Language Pathologist    Marlyn Gross MS CCC-SLP Speech and Language Pathologist                   Time Calculation:    Time Calculation- SLP     Row Name 01/03/23 0859             Time Calculation- SLP    SLP Start Time 0820  -MG      SLP Stop Time 0859  -MG      SLP Time Calculation (min) 39 min  -MG      SLP Received On 01/03/23  -MG         Untimed Charges    43852-NK Treatment Swallow Minutes 39  -MG         Total Minutes    Untimed Charges Total Minutes 39  -MG       Total Minutes 39  -MG            User Key  (r) = Recorded By, (t) = Taken By, (c) = Cosigned By    Initials Name Provider Type    Marlyn Gross MS CCC-SLP Speech and Language Pathologist                Therapy Charges for Today     Code Description Service Date Service Provider Modifiers Qty    [id number]  ST TREATMENT SWALLOW 3 1/3/2023 Marlyn Stephens MS CCC-SLP GN 1               Marlyn Stephens MS CCC-[place]  1/3/2023

## 2023-01-03 NOTE — CASE MANAGEMENT/SOCIAL WORK
Continued Stay Note  [facility]     Patient Name: Erinn Najera  MRN: [id number]  Today's Date: 1/3/2023    Admit Date: 1/2/2023    [facility]   Discharge Plan     Row Name 01/03/23 1428       Plan    Plan [facility]    Plan Comments Patient is accepted to [facility]. Three night inpatient stay will be complete on Thursday 1/5. LILI did inform patient's daughter, Radha, of acceptance.     Row Name 01/03/23 1234       Plan    Plan Referral to [facility]    Plan Comments Patient's family would like referral to [facility]. Patient has a family member that is a PT there and family says they really do not have alternate choice at this time. LILI sent referral to Janay in admissions. Will await bed offer. No precert required.                      COLT Simpson

## 2023-01-03 NOTE — THERAPY EVALUATION
Patient Name: Erinn Najera  : 1928    MRN: [id number]                              Today's Date: 1/3/2023       Admit Date: 2023    Visit Dx:     ICD-10-CM ICD-9-CM   1. Cerebrovascular accident (CVA), unspecified mechanism (HCC)  I63.9 434.91   2. Chronic atrial fibrillation (HCC)  I48.20 427.31   3. Leukocytosis, unspecified type  D72.829 288.60   4. Dysphagia, unspecified type  R13.10 787.20   5. Muscle weakness  M62.81 728.87   6. Impaired mobility and ADLs  Z74.09 V49.89    Z78.9      Patient Active Problem List   Diagnosis   • Acute ischemic right MCA stroke ([facility])   • PAF (paroxysmal atrial fibrillation) ([facility])   • Hypertension   • Hyperlipidemia   • Elevated CK   • Hypomagnesemia   • Hypokalemia     Past Medical History:   Diagnosis Date   • Arthritis    • Atrial fibrillation ([facility])    • Cerebral artery occlusion    • Chronic low back pain    • Deep vein thrombosis (DVT) ([facility])    • Glaucoma    • Hyperlipidemia    • Hypertension    • Hypothyroidism    • Insomnia    • Macular degeneration    • Pulmonary embolism ([facility])    • TIA (transient ischemic attack)      Past Surgical History:   Procedure Laterality Date   • APPENDECTOMY     • BREAST SURGERY     • CARPAL TUNNEL RELEASE     • CHOLECYSTECTOMY     • ERCP AND ENDOSCOPY     • FOOT SURGERY Bilateral     Bilateral heel spurs   • HYSTERECTOMY     • TOTAL HIP ARTHROPLASTY Left       General Information     Row Name 23 1421          OT Time and Intention    Document Type evaluation  Dx: Acute versus early subacute nonhemorrhagic MCA territory infarction  primarily involving the insular cortex of the right temporal lobe and  the right basal ganglia. This includes an area of edema measuring up to  4.7 x 2.9 cm.  -JJ     Mode of Treatment occupational therapy  -JJ     Row Name 23 1421          General Information    Patient Profile Reviewed yes  -JJ     Prior Level of Function independent:;all household mobility;transfer;bed  mobility;ADL's;cleaning;cooking;home management  -     Existing Precautions/Restrictions fall;other (see comments)  L sided weakness/neglect.  -     Barriers to Rehab medically complex;visual deficit;physical barrier  -     Row Name 01/03/23 1421          Living Environment    People in Home alone  -     Row Name 01/03/23 1421          Home Main Entrance    Number of Stairs, Main Entrance other (see comments)  1/2 a step at back of home no rail; 5 steps w/ 2 rails at front  -     Row Name 01/03/23 1421          Cognition    Orientation Status (Cognition) oriented x 4  -     Row Name 01/03/23 1421          Safety Issues, Functional Mobility    Safety Issues Affecting Function (Mobility) friction/shear risk;insight into deficits/self-awareness;safety precaution awareness;safety precautions follow-through/compliance  -     Impairments Affecting Function (Mobility) balance;endurance/activity tolerance;muscle tone abnormal;pain;strength;visual/perceptual;sensation/sensory awareness;postural/trunk control  -           User Key  (r) = Recorded By, (t) = Taken By, (c) = Cosigned By    Initials Name Provider Type     Екатерина Melendez, OTR/L, CSRS Occupational Therapist                 Mobility/ADL's     Row Name 01/03/23 1421          Bed Mobility    Bed Mobility rolling left;scooting/bridging;rolling right  -     Rolling Left [place] (Bed Mobility) maximum assist (25% patient effort);verbal cues;nonverbal cues (demo/gesture)  -     Rolling Right [place] (Bed Mobility) maximum assist (25% patient effort);verbal cues;nonverbal cues (demo/gesture)  -     Scooting/Bridging [place] (Bed Mobility) dependent (less than 25% patient effort);2 person assist;verbal cues  -     Bed Mobility, Safety Issues decreased use of arms for pushing/pulling;decreased use of legs for bridging/pushing;impaired trunk control for bed mobility  -     Assistive Device (Bed Mobility) draw sheet;bed rails;head  of bed elevated  -     Row Name 01/03/23 1421          Transfers    Comment, (Transfers) unable to safely complete.  -[facility] Name 01/03/23 1421          Functional Mobility    Functional Mobility- Comment unable to safely complete at this time.  -[facility] Name 01/03/23 1421          Activities of Daily Living    BADL Assessment/Intervention lower body dressing  -[facility] Name 01/03/23 1421          Lower Body Dressing Assessment/Training    [place] Level (Lower Body Dressing) don;socks;dependent (less than 25% patient effort)  -     Position (Lower Body Dressing) edge of bed sitting  -           User Key  (r) = Recorded By, (t) = Taken By, (c) = Cosigned By    Initials Name Provider Type    Екатерина Garzon OTR/L, BHARAT Occupational Therapist               Obj/Interventions     Row Name 01/03/23 1421          Sensory Assessment (Somatosensory)    Sensory Assessment deminished sensation L UE and LE.  -[facility] Name 01/03/23 1421          Vision Assessment/Intervention    Visual Processing Deficit kirsty-inattention/neglect, left;visual search, left  -[facility] Name 01/03/23 1421          Range of Motion Comprehensive    Comment, General Range of Motion AROM R UE WFL. PROM L UE WFL, no active movement noted in L UE.  -[facility] Name 01/03/23 1421          Strength Comprehensive (MMT)    Comment, General Manual Muscle Testing (MMT) Assessment L UE 1/5, R UE strength grossly 4+/5  -     Row Name 01/03/23 1421          Motor Skills    Motor Skills muscle tone  -     Muscle Tone mild impairment;left;upper extremity(s);hypertonia  -           User Key  (r) = Recorded By, (t) = Taken By, (c) = Cosigned By    Initials Name Provider Type    Екатерина Garzon OTR/L, CSRS Occupational Therapist               Goals/Plan     Row Name 01/03/23 1421          Dressing Goal 1 (OT)    Activity/Device (Dressing Goal 1, OT) upper body dressing  -     [place]/Cues Needed (Dressing Goal 1, OT)  moderate assist (50-74% patient effort)  -JJ     Time Frame (Dressing Goal 1, OT) long term goal (LTG);by discharge  -JJ     Progress/Outcome (Dressing Goal 1, OT) new goal  -     Row Name 01/03/23 1421          Grooming Goal 1 (OT)    Activity/Device (Grooming Goal 1, OT) grooming skills, all  -JJ     [place] (Grooming Goal 1, OT) minimum assist (75% or more patient effort)  -JJ     Time Frame (Grooming Goal 1, OT) long term goal (LTG);by discharge  -JJ     Strategies/Barriers (Grooming Goal 1, OT) with attention to L side of body and enviornment.  -JJ     Progress/Outcome (Grooming Goal 1, OT) new goal  -     Row Name 01/03/23 1421          Self-Feeding Goal 1 (OT)    Activity/Device (Self-Feeding Goal 1, OT) self-feeding skills, all  -JJ     [place] Level/Cues Needed (Self-Feeding Goal 1, OT) minimum assist (75% or more patient effort)  -JJ     Time Frame (Self-Feeding Goal 1, OT) long term goal (LTG);by discharge  -JJ     Progress/Outcomes (Self-Feeding Goal 1, OT) new goal  -     Row Name 01/03/23 1421          Strength Goal 1 (OT)    Strength Goal 1 (OT) Pt will increase L UE strength to 2-/5 for improved independence with adls.  -JJ     Time Frame (Strength Goal 1, OT) long term goal (LTG);by discharge  -JJ     Progress/Outcome (Strength Goal 1, OT) new goal  -     Row Name 01/03/23 1421          Therapy Assessment/Plan (OT)    Planned Therapy Interventions (OT) activity tolerance training;adaptive equipment training;BADL retraining;cognitive/visual perception retraining;functional balance retraining;transfer/mobility retraining;strengthening exercise;occupation/activity based interventions;neuromuscular control/coordination retraining;patient/caregiver education/training;ROM/therapeutic exercise  -J           User Key  (r) = Recorded By, (t) = Taken By, (c) = Cosigned By    Initials Name Provider Type    Екатерина Garzon, OTR/L, CSRS Occupational Therapist               Clinical  Impression     Row Name 01/03/23 1421          Pain Assessment    Pretreatment Pain Rating 5/10  -JJ     Posttreatment Pain Rating 5/10  -JJ     Pain Location - Side/Orientation Right  -JJ     Pain Location posterior  -JJ     Pain Location - neck  -JJ     Pain Intervention(s) Medication (See MAR);Repositioned;Ambulation/increased activity  -JJ     Row Name 01/03/23 1421          Plan of Care Review    Plan of Care Reviewed With patient;caregiver;family  -     Outcome Evaluation OT eval completed. Pt presents lethargic, oriented x4, with son at bedside. She reports at baseline being independent with all adls and mobility. Today she demonstrates impaired L UE/LE strength, slight hypertonia in her R UE, with only muscle contractions noted in L deltoid, L sided neglect/in-attention, motor coontrol and increassed pain. She required Max A for all bed mobility. Pt reports significant pain in neck and B shoulders. She does have a L shoulder subluxation. Throughout session she keeps her head turned to the R, unable to turn head past midline and eyes are unable to cross midline to the L. She was able to identify color and has a hx of MD. She would benefit from skilled OT services to address these deficits. Recommend d/c to SNF at d/c for continued skilled therapy services.  -     Row Name 01/03/23 1421          Therapy Assessment/Plan (OT)    Rehab Potential (OT) good, to achieve stated therapy goals  -     Criteria for Skilled Therapeutic Interventions Met (OT) yes;skilled treatment is necessary  -     Therapy Frequency (OT) 5 times/wk  -     Predicted Duration of Therapy Intervention (OT) 10 days  -J     Row Name 01/03/23 1421          Therapy Plan Review/Discharge Plan (OT)    Anticipated Discharge Disposition (OT) skilled nursing facility  -     Row Name 01/03/23 1421          Positioning and Restraints    Pre-Treatment Position in bed  -JJ     Post Treatment Position other  -JJ     Other Position with other  staff  transport for scans.  -           User Key  (r) = Recorded By, (t) = Taken By, (c) = Cosigned By    Initials Name Provider Type    Екатерина Garzon, OTR/L, CSRS Occupational Therapist               Outcome Measures     Row Name 01/03/23 1421          How much help from another is currently needed...    Putting on and taking off regular lower body clothing? 1  -JJ     Bathing (including washing, rinsing, and drying) 2  -JJ     Toileting (which includes using toilet bed pan or urinal) 1  -JJ     Putting on and taking off regular upper body clothing 2  -JJ     Taking care of personal grooming (such as brushing teeth) 2  -JJ     Eating meals 2  -JJ     AM-PAC 6 Clicks Score (OT) 10  -JJ     Row Name 01/03/23 0840 01/03/23 0800       How much help from another person do you currently need...    Turning from your back to your side while in flat bed without using bedrails? 2  -JE 1  -AG    Moving from lying on back to sitting on the side of a flat bed without bedrails? 1  -JE 1  -AG    Moving to and from a bed to a chair (including a wheelchair)? 1  -JE 1  -AG    Standing up from a chair using your arms (e.g., wheelchair, bedside chair)? 1  -JE 1  -AG    Climbing 3-5 steps with a railing? 1  -JE 1  -AG    To walk in hospital room? 1  -JE 1  -AG    AM-PAC 6 Clicks Score (PT) 7  -JE 6  -AG    Highest level of mobility 2 --> Bed activities/dependent transfer  -JE 2 --> Bed activities/dependent transfer  -AG    Row Name 01/03/23 1421 01/03/23 0840       Modified Lei Scale    Pre-Stroke Modified [place] Scale 0 - No Symptoms at all.  -JJ 1 - No significant disability despite symptoms.  Able to carry out all usual duties and activities.  -JE    Modified Lei Scale 5 - Severe disability.  Bedridden, incontinent, and requiring constant nursing care and attention.  -JJ 5 - Severe disability.  Bedridden, incontinent, and requiring constant nursing care and attention.  -    Row Name 01/03/23 1421 01/03/23 0840        Functional Assessment    Outcome Measure Options AM-PAC 6 Clicks Daily Activity (OT);Modified Lei  -BARNEY AM-PAC 6 Clicks Basic Mobility (PT);Modified [place]  -JE          User Key  (r) = Recorded By, (t) = Taken By, (c) = Cosigned By    Initials Name Provider Type     Brandy Funk, RN Registered Nurse    Macy Kessler, PT Physical Therapist    Екатерина Garzon, OTR/L, RODRIGOS Occupational Therapist                Occupational Therapy Education     Title: PT OT SLP Therapies (In Progress)     Topic: Occupational Therapy (In Progress)     Point: ADL training (In Progress)     Description:   Instruct learner(s) on proper safety adaptation and remediation techniques during self care or transfers.   Instruct in proper use of assistive devices.              Learning Progress Summary           Patient Acceptance, E, NR by BARNEY at 1/3/2023 1452   Family Acceptance, E, NR by BARNEY at 1/3/2023 1452                   Point: Home exercise program (Not Started)     Description:   Instruct learner(s) on appropriate technique for monitoring, assisting and/or progressing therapeutic exercises/activities.              Learner Progress:  Not documented in this visit.          Point: Precautions (In Progress)     Description:   Instruct learner(s) on prescribed precautions during self-care and functional transfers.              Learning Progress Summary           Patient Acceptance, E, NR by BARNEY at 1/3/2023 1452   Family Acceptance, E, NR by BARNEY at 1/3/2023 1452                   Point: Body mechanics (Not Started)     Description:   Instruct learner(s) on proper positioning and spine alignment during self-care, functional mobility activities and/or exercises.              Learner Progress:  Not documented in this visit.                      User Key     Initials Effective Dates Name Provider Type Discipline    BARNEY 11/10/21 -  Екатерина Melendez OTR/L, BHARAT Occupational Therapist OT              OT Recommendation and  Plan  Planned Therapy Interventions (OT): activity tolerance training, adaptive equipment training, BADL retraining, cognitive/visual perception retraining, functional balance retraining, transfer/mobility retraining, strengthening exercise, occupation/activity based interventions, neuromuscular control/coordination retraining, patient/caregiver education/training, ROM/therapeutic exercise  Therapy Frequency (OT): 5 times/wk  Plan of Care Review  Plan of Care Reviewed With: patient, caregiver, family  Outcome Evaluation: OT eval completed. Pt presents lethargic, oriented x4, with son at bedside. She reports at baseline being independent with all adls and mobility. Today she demonstrates impaired L UE/LE strength, slight hypertonia in her R UE, with only muscle contractions noted in L deltoid, L sided neglect/in-attention, motor coontrol and increassed pain. She required Max A for all bed mobility. Pt reports significant pain in neck and B shoulders. She does have a L shoulder subluxation. Throughout session she keeps her head turned to the R, unable to turn head past midline and eyes are unable to cross midline to the L. She was able to identify color and has a hx of MD. She would benefit from skilled OT services to address these deficits. Recommend d/c to SNF at d/c for continued skilled therapy services.     Time Calculation:    Time Calculation- OT     Row Name 01/03/23 1548             Time Calculation- OT    OT Start Time 1421  -      OT Stop Time 1502  -      OT Time Calculation (min) 41 min  -      OT Received On 01/03/23  -      OT Goal Re-Cert Due Date 01/13/23  -            User Key  (r) = Recorded By, (t) = Taken By, (c) = Cosigned By    Initials Name Provider Type    Екатерина Garzon OTR/L, CSRS Occupational Therapist              Therapy Charges for Today     Code Description Service Date Service Provider Modifiers Qty    [id number] HC OT EVAL MOD COMPLEXITY 3 1/3/2023 Nora  DARSHAN Willams/RICHARD, CSRS GO 1               DARSHAN Avila/L, CSRS  1/3/2023

## 2023-01-03 NOTE — PLAN OF CARE
Goal Outcome Evaluation:              Outcome Evaluation: Pt admitted from ER to room 409, Pt alert, NIH score 13, left side flaccid, bruising to left anterior hip with abrasion, rt and left arms, rt knee slight edema and reddened, left facial and left eye bruising, SLP evaluated pt and she is to remain NPO, possible dysphagia study tomorrow, prn IV pain med given for generalized pain, VSS, on RA, family at bedside, IVF infusing, purewick in place, bed alarm in use, HR AFL 81-83 with coup/PVC

## 2023-01-03 NOTE — MBS/VFSS/FEES
Acute Care - Speech Language Pathology   Swallow Initial Evaluation [facility]     Patient Name: Erinn Najera  : 1928  MRN: [id number]  Today's Date: 1/3/2023               Admit Date: 2023  SPEECH-LANGUAGE PATHOLOGY EVALUTION - VFSS  Subjective: The patient was seen on this date for a VFSS(Videofluoroscopic Swallowing Study).  Patient was alert and cooperative.    Significant history: Acute R MCA stroke, paroxysmal a-fib, elevated CK, HTN, HLD, hypomagnesemia, hypokalemia.  Objective: Risks/benefits were reviewed with the patient and family, and consent was obtained. The study was completed with SLP and Radiologist present. The patient was seen in lateral view(s). Textures given included pudding thick and honey thick liquid.  Assessment: Consistencies were presented in the following order with the following results:  Honey thick via spoon: Oral holding, prolonged oral transit, and decreased bolus form. Piecemeal oral transit with poor hyolaryngeal elevation/excursion and epiglottic inversion. During second swallow with residue from the oral cavity the pt exhibited initially silent laryngeal penetration and aspiration during the swallow. She did eventually display a delayed cough which cleared residue from the trachea.    Pudding thick via spoon x2: Extensive oral holding, prolonged oral transit, and decreased bolus form. Piecemeal oral transit with poor hyolaryngeal elevation/excursion and epiglottic inversion. Pt took excessive amounts of time to initiate multiple swallows to clear oral residue. No definite laryngeal penetration or aspiration was observed, but oral phase deficits and pharyngeal weakness were severe.     SLP Findings: Patient presents with severe oropharyngeal dysphagia.   Recommendations: Diet Textures: If aggressive measures are desired NPO, with consideration for PEG tube. If less aggressive measures are desired, start a pureed diet and pudding thick liquids, although pt will be  unlikely to maintain adequate nutrition via PO due to oral phase deficits and high possibility for fatigue. Medications should be taken crushed with puree or by alternate means. May have Ice after oral care, under staff or family supervision and with the recommended strategies for safe swallowing for comfort.  Recommended Strategies: Upright for PO. Oral care at least twice per shift and PRN.  Dysphagia therapy is recommended.   Audelia Nuñez, CCC-SLP 1/3/2023 15:52 CST    Visit Dx:     ICD-10-CM ICD-9-CM   1. Cerebrovascular accident (CVA), unspecified mechanism (HCC)  I63.9 434.91   2. Chronic atrial fibrillation (HCC)  I48.20 427.31   3. Leukocytosis, unspecified type  D72.829 288.60   4. Dysphagia, unspecified type  R13.10 787.20   5. Muscle weakness  M62.81 728.87   6. Impaired mobility and ADLs  Z74.09 V49.89    Z78.9      Patient Active Problem List   Diagnosis   • Acute ischemic right MCA stroke ([facility])   • PAF (paroxysmal atrial fibrillation) ([facility])   • Hypertension   • Hyperlipidemia   • Elevated CK   • Hypomagnesemia   • Hypokalemia     Past Medical History:   Diagnosis Date   • Arthritis    • Atrial fibrillation (HCC)    • Cerebral artery occlusion    • Chronic low back pain    • Deep vein thrombosis (DVT) ([facility])    • Glaucoma    • Hyperlipidemia    • Hypertension    • Hypothyroidism    • Insomnia    • Macular degeneration    • Pulmonary embolism (HCC)    • TIA (transient ischemic attack)      Past Surgical History:   Procedure Laterality Date   • APPENDECTOMY     • BREAST SURGERY     • CARPAL TUNNEL RELEASE     • CHOLECYSTECTOMY     • ERCP AND ENDOSCOPY     • FOOT SURGERY Bilateral     Bilateral heel spurs   • HYSTERECTOMY     • TOTAL HIP ARTHROPLASTY Left        SLP Recommendation and Plan  SLP Swallowing Diagnosis: severe, oral dysphagia, pharyngeal dysphagia (01/03/23 1440)  SLP Diet Recommendation: NPO, long term alternate methods of nutrition/hydration, other (see comments) (vs pureed/pudding)  (01/03/23 1440)  Recommended Precautions and Strategies: upright posture during/after eating (01/03/23 1440)  SLP Rec. for Method of Medication Administration: meds crushed, with puree, meds via alternate route (01/03/23 1440)     Monitor for Signs of Aspiration: yes, cough, gurgly voice, throat clearing, notify SLP if any concerns (01/03/23 1440)     Swallow Criteria for Skilled Therapeutic Interventions Met: demonstrates skilled criteria (01/03/23 1440)  Anticipated Discharge Disposition (SLP): skilled nursing facility (01/03/23 1440)  Rehab Potential/Prognosis, Swallowing: re-evaluate goals as necessary (01/03/23 1440)                                              Plan of Care Reviewed With: patient  Outcome Evaluation: See MBS note      SWALLOW EVALUATION (last 72 hours)     SLP Adult Swallow Evaluation     Row Name 01/03/23 1440 01/03/23 0825 01/02/23 1425             Rehab Evaluation    Document Type evaluation  -MB therapy note (daily note)  -MG evaluation  -TM      Subjective Information complains of;pain  -MB no complaints  -MG fatigue  -TM      Patient Observations alert;cooperative  -MB alert;cooperative;agree to therapy  -MG cooperative  -TM      Patient/Family/Caregiver Comments/Observations Son present for education after study  -MB Daughter-in-law present  -MG Multiple family members present.  -TM      Patient Effort -- adequate  -MG adequate  -TM      Symptoms Noted During/After Treatment -- fatigue  -MG --         General Information    Patient Profile Reviewed yes  -MB -- yes  -TM      Pertinent History Of Current Problem Acute R MCA stroke, paroxysmal a-fib, elevated CK, HTN, HLD, hypomagnesemia, hypokalemia.  -MB -- Acute L arm and leg weakness, confusion. MRI brain 01/02/23 showed acute vs subacute non-hemorrhagic CVA in the R temporal lobe and basal ganglia. Hx of a-fib, DVT, glaucoma, HTN, HLD, hypothyroidism, PE, TIA, macular degeneration. CXR 01/02/23 showed no acute process.  -TM       Current Method of Nutrition NPO  -MB -- NPO  -TM      Precautions/Limitations, Vision vision impairment, bilaterally;other (see comments)  -MB -- vision impairment, bilaterally;other (see comments)  Unable to open L eye at this time, weak opening of R on most attempts despite cues per SLP.  -TM      Precautions/Limitations, Hearing WFL  -MB -- WFL  -TM      Prior Level of Function-Communication WFL  -MB -- WFL  -TM      Prior Level of Function-Swallowing no diet consistency restrictions  -MB -- no diet consistency restrictions  -TM      Plans/Goals Discussed with patient and family  -MB -- patient;family;other (see comments)  RNBrandy  -      Barriers to Rehab medically complex  -MB -- medically complex  -TM      Patient's Goals for Discharge patient did not state  -MB -- patient did not state  -      Family Goals for Discharge other (see comments)  Whatever it takes to get adequate nutrition and go home  -MB -- patient able to return to PO diet  -TM         Pain    Additional Documentation Pain Scale: FACES Pre/Post-Treatment (Group)  -MB Pain Scale: FACES Pre/Post-Treatment (Group)  -MG Pain Scale: FACES Pre/Post-Treatment (Group)  -TM         Pain Scale: FACES Pre/Post-Treatment    Pain: FACES Scale, Pretreatment 4-->hurts little more  -MB 0-->no hurt  -MG 2-->hurts little bit  -TM      Posttreatment Pain Rating -- 0-->no hurt  -MG 2-->hurts little bit  -TM      Pain Location - Side/Orientation -- -- Right  -TM      Pain Location - neck  -MB -- hip  -TM      Pre/Posttreatment Pain Comment Only when holding head up  -MB -- Radiates to shin per pt.  -TM         Oral Motor Structure and Function    Dentition Assessment missing teeth  -MB -- natural, present and adequate  -TM      Secretion Management WNL/WFL  -MB -- WNL/WFL  -TM      Mucosal Quality moist, healthy  -MB -- moist, healthy  -TM      Volitional Swallow -- -- delayed  -TM      Volitional Cough -- -- reduced respiratory support  -TM         Oral  Musculature and Cranial Nerve Assessment    Oral Motor General Assessment oral labial or buccal impairment;lingual impairment  -MB -- oral labial or buccal impairment;lingual impairment;mandibular impairment  -TM      Mandibular Impairment Detail, Cranial Nerve V (Trigeminal) -- -- reduced strength on left  -TM      Oral Labial or Buccal Impairment, Detail, Cranial Nerve VII (Facial): left labial droop  -MB -- left labial droop  -TM      Lingual Impairment, Detail. Cranial Nerves IX, XII (Glossopharyngeal and Hypoglossal) reduced strength left  -MB -- reduced strength left  -TM         General Eating/Swallowing Observations    Respiratory Support Currently in Use -- -- room air  -TM      Eating/Swallowing Skills -- -- fed by SLP  -TM      Positioning During Eating -- -- upright in bed;needs frequent re-positioning  -TM      Utensils Used -- -- spoon  -TM      Consistencies Trialed -- -- pudding thick  -TM         Respiratory    Respiratory Status -- -- WFL  -TM         Clinical Swallow Eval    Oral Prep Phase -- -- impaired  -TM      Oral Transit -- -- impaired  -TM      Oral Residue -- -- impaired  -TM      Pharyngeal Phase -- -- suspected pharyngeal impairment  -TM      Esophageal Phase -- -- unremarkable  -TM      Clinical Swallow Evaluation Summary -- -- See note.  -TM         Oral Prep Concerns    Oral Prep Concerns -- -- incomplete or weak lip closure around spoon;incomplete bolus preparation  -TM      Incomplete or Weak Lip Closure Around Spoon -- -- pudding  -TM      Incomplete Bolus Preparation -- -- pudding  -TM         Oral Transit Concerns    Oral Transit Concerns -- -- delayed initiation of bolus transit  -TM      Delayed Intiation of Bolus Transit -- -- pudding  -TM         Oral Residue Concerns    Oral Residue Concerns -- -- diffuse residue throughout oral cavity  -TM      Diffuse Residue Throughout Oral Cavity -- -- pudding  -TM         Pharyngeal Phase Concerns    Pharyngeal Phase Concerns -- --  cough;multiple swallows  -      Multiple Swallows -- -- pudding  -TM      Cough -- -- pudding  -TM         MBS/VFSS    Utensils Used spoon  -MB -- --      Consistencies Trialed honey-thick liquids;pudding thick  -MB -- --         MBS/VFSS Interpretation    Oral Prep Phase impaired oral phase of swallowing  -MB -- --      Oral Transit Phase impaired  -MB -- --      Oral Residue impaired  -MB -- --      VFSS Summary See note  -MB -- --         Oral Preparatory Phase    Oral Preparatory Phase prolonged manipulation;inadequate manipulation  -MB -- --      Prolonged Manipulation all consistencies tested  -MB -- --      Inadequate Manipulation all consistencies tested  -MB -- --         Oral Transit Phase    Impaired Oral Transit Phase increased A-P transit time;tongue pumping;piecemeal oral transit  -MB -- --      Increased A-P Transit Time all consistencies tested  -MB -- --      Tongue Pumping all consistencies tested  -MB -- --      Piecemeal Oral Transit all consistencies tested  -MB -- --         Oral Residue    Impaired Oral Residue diffuse residue throughout oral cavity  -MB -- --      Diffuse Residue throughout Oral Cavity all consistencies tested  -MB -- --         SLP Evaluation Clinical Impression    SLP Swallowing Diagnosis severe;oral dysphagia;pharyngeal dysphagia  -MB -- severe;oral dysphagia;suspected pharyngeal dysphagia  -      Functional Impact risk of aspiration/pneumonia;risk of malnutrition;risk of dehydration  -MB -- risk of aspiration/pneumonia;risk of malnutrition;risk of dehydration  -      Rehab Potential/Prognosis, Swallowing re-evaluate goals as necessary  -MB -- adequate, monitor progress closely  -TM      Swallow Criteria for Skilled Therapeutic Interventions Met demonstrates skilled criteria  -MB -- demonstrates skilled criteria  -         SLP Treatment Clinical Impressions    Treatment Assessment (SLP) -- continued;clinical signs of;suspected;aspiration  -MG --      Treatment  Assessment Comments (SLP) -- See note  -MG --      Daily Summary of Progress (SLP) -- progress toward functional goals is gradual  -MG --      Barriers to Overall Progress (SLP) -- Advanced age;Fatigue  -MG --      Plan for Continued Treatment (SLP) -- continue treatment per plan of care  -MG --      Care Plan Review -- evaluation/treatment results reviewed;care plan/treatment goals reviewed;risks/benefits reviewed;current/potential barriers reviewed;patient/other agree to care plan  -MG --      Care Plan Review, Other Participant(s) -- caregiver;family  RN Brandy  -MG --         Recommendations    Therapy Frequency (Swallow) -- -- daily  -TM      Predicted Duration Therapy Intervention (Days) -- -- until discharge  -      SLP Diet Recommendation NPO;long term alternate methods of nutrition/hydration;other (see comments)  vs pureed/pudding  -MB -- NPO  -      Re[place] Diagnostics -- [place]  - reassess via clinical swallow evaluation;[place]  -      Recommended Precautions and Strategies upright posture during/after eating  -MB -- --      Oral Care Recommendations Oral Care BID/PRN  -MB -- Oral Care BID/PRN;Swab  -      SLP Rec. for Method of Medication Administration meds crushed;with puree;meds via alternate route  -MB -- meds via alternate route  -      Monitor for Signs of Aspiration yes;cough;gurgly voice;throat clearing;notify SLP if any concerns  -MB -- yes;notify SLP if any concerns;cough;gurgly voice;throat clearing;pneumonia;right lower lobe infiltrates  -      Anticipated Discharge Disposition (SLP) skilled nursing facility  -MB -- skilled nursing facility  -         Swallow Goals (SLP)    Swallow LTGs -- Swallow Long Term Goal (free text)  -MG Swallow Long Term Goal (free text)  -TM      Swallow STGs -- labial strengthening goal selection (SLP);lingual strengthening goal selection (SLP);pharyngeal strengthening exercise goal selection (SLP)  -MG labial strengthening goal selection  (SLP);lingual strengthening goal selection (SLP);pharyngeal strengthening exercise goal selection (SLP)  -TM      Labial Strengthening Goal Selection (SLP) -- labial strengthening, SLP goal 1  -MG labial strengthening, SLP goal 1  -TM      Lingual Strengthening Goal Selection (SLP) -- lingual strengthening, SLP goal 1  -MG lingual strengthening, SLP goal 1  -TM      Pharyngeal Strengthening Exercise Goal Selection (SLP) -- pharyngeal strengthening exercise, SLP goal 1  -MG pharyngeal strengthening exercise, SLP goal 1  -TM         (LTG) Swallow    (LTG) Swallow Pt will tolerate LRD without overt s/s of aspiration.  -MB Pt will tolerate LRD without overt s/s of aspiration.  -MG Pt will tolerate LRD without overt s/s of aspiration.  -TM      [place] (Swallow Long Term Goal) with minimal cues (75-90% accuracy)  -MB with minimal cues (75-90% accuracy)  -MG with minimal cues (75-90% accuracy)  -TM      Time Frame (Swallow Long Term Goal) by discharge  -MB by discharge  -MG by discharge  -TM      Barriers (Swallow Long Term Goal) -- Reduced alertness  -MG Reduced alertness  -TM      Progress/Outcomes (Swallow Long Term Goal) -- progress slower than expected  -MG new goal;goal ongoing  -TM         (STG) Labial Strengthening Goal 1 (SLP)    Activity (Labial Strengthening Goal 1, SLP) increase labial tone  -MB increase labial tone  -MG increase labial tone  -TM      Increase Labial Tone labial resistance exercises  -MB labial resistance exercises  -MG labial resistance exercises  -TM      [place]/Accuracy (Labial Strengthening Goal 1, SLP) with moderate cues (50-74% accuracy)  -MB with moderate cues (50-74% accuracy)  -MG with moderate cues (50-74% accuracy)  -TM      Time Frame (Labial Strengthening Goal 1, SLP) by discharge  -MB by discharge  -MG by discharge  -TM      Barriers (Labial Strengthening Goal 1, SLP) -- L weakness  -MG L weakness  -TM      Progress/Outcomes (Labial Strengthening Goal 1, SLP) --  progress slower than expected  -MG new goal;goal ongoing  -TM         (STG) Lingual Strengthening Goal 1 (SLP)    Activity (Lingual Strengthening Goal 1, SLP) increase lingual tone/sensation/control/coordination/movement  -MB increase lingual tone/sensation/control/coordination/movement  -MG increase lingual tone/sensation/control/coordination/movement  -TM      Increase Lingual Tone/Sensation/Control/Coordination/Movement lingual movement exercises;lingual resistance exercises  -MB lingual movement exercises;lingual resistance exercises  -MG lingual movement exercises;lingual resistance exercises  -TM      [place]/Accuracy (Lingual Strengthening Goal 1, SLP) with moderate cues (50-74% accuracy)  -MB with moderate cues (50-74% accuracy)  -MG with moderate cues (50-74% accuracy)  -TM      Time Frame (Lingual Strengthening Goal 1, SLP) by discharge  -MB by discharge  -MG by discharge  -TM      Barriers (Lingual Strengthening Goal 1, SLP) -- L weakness  -MG L weakness  -TM      Progress/Outcomes (Lingual Strengthening Goal 1, SLP) -- goal ongoing  -MG new goal;goal ongoing  -TM         (STG) Pharyngeal Strengthening Exercise Goal 1 (SLP)    Activity (Pharyngeal Strengthening Goal 1, SLP) increase squeeze/positive pressure generation;increase tongue base retraction;increase superior movement of the hyolaryngeal complex;increase anterior movement of the hyolaryngeal complex  -MB increase squeeze/positive pressure generation;increase tongue base retraction  -MG increase squeeze/positive pressure generation;increase tongue base retraction  -TM      Increase Superior Movement of the Hyolaryngeal Complex Mendelsohn;falsetto  -MB -- --      Increase Anterior Movement of the Hyolaryngeal Complex shaker  -MB -- --      Increase Squeeze/Positive Pressure Generation hard effortful swallow  -MB hard effortful swallow  -MG hard effortful swallow  -TM      Increase Tongue Base Retraction jose  -MB jose  -MG jose  -TM       [place]/Accuracy (Pharyngeal Strengthening Goal 1, SLP) with moderate cues (50-74% accuracy)  -MB with moderate cues (50-74% accuracy)  -MG with moderate cues (50-74% accuracy)  -TM      Time Frame (Pharyngeal Strengthening Goal 1, SLP) by discharge  -MB by discharge  -MG by discharge  -TM      Barriers (Pharyngeal Strengthening Goal 1, SLP) -- L weakness  -MG L weakness  -TM      Progress/Outcomes (Pharyngeal Strengthening Goal 1, SLP) -- goal ongoing  -MG new goal;goal ongoing  -TM            User Key  (r) = Recorded By, (t) = Taken By, (c) = Cosigned By    Initials Name Effective Dates    MB Audelia Nuñez, CCC-SLP 06/16/21 -     TM Luisa Pino, CCC-[facility] 06/16/21 -     MG Marlyn Stephens, MS CCC-SLP 08/12/22 -                 EDUCATION  The patient has been educated in the following areas:   Dysphagia (Swallowing Impairment).        SLP GOALS     Row Name 01/03/23 1440 01/03/23 0825 01/02/23 1425       (LTG) Swallow    (LTG) Swallow Pt will tolerate LRD without overt s/s of aspiration.  -MB Pt will tolerate LRD without overt s/s of aspiration.  -MG Pt will tolerate LRD without overt s/s of aspiration.  -TM    [place] (Swallow Long Term Goal) with minimal cues (75-90% accuracy)  -MB with minimal cues (75-90% accuracy)  -MG with minimal cues (75-90% accuracy)  -TM    Time Frame (Swallow Long Term Goal) by discharge  -MB by discharge  -MG by discharge  -TM    Barriers (Swallow Long Term Goal) -- Reduced alertness  -MG Reduced alertness  -TM    Progress/Outcomes (Swallow Long Term Goal) -- progress slower than expected  -MG new goal;goal ongoing  -TM       (STG) Labial Strengthening Goal 1 (SLP)    Activity (Labial Strengthening Goal 1, SLP) increase labial tone  -MB increase labial tone  -MG increase labial tone  -TM    Increase Labial Tone labial resistance exercises  -MB labial resistance exercises  -MG labial resistance exercises  -TM    [place]/Accuracy (Labial Strengthening Goal 1, SLP)  with moderate cues (50-74% accuracy)  -MB with moderate cues (50-74% accuracy)  -MG with moderate cues (50-74% accuracy)  -TM    Time Frame (Labial Strengthening Goal 1, SLP) by discharge  -MB by discharge  -MG by discharge  -TM    Barriers (Labial Strengthening Goal 1, SLP) -- L weakness  -MG L weakness  -TM    Progress/Outcomes (Labial Strengthening Goal 1, SLP) -- progress slower than expected  -MG new goal;goal ongoing  -TM       (STG) Lingual Strengthening Goal 1 (SLP)    Activity (Lingual Strengthening Goal 1, SLP) increase lingual tone/sensation/control/coordination/movement  -MB increase lingual tone/sensation/control/coordination/movement  -MG increase lingual tone/sensation/control/coordination/movement  -TM    Increase Lingual Tone/Sensation/Control/Coordination/Movement lingual movement exercises;lingual resistance exercises  -MB lingual movement exercises;lingual resistance exercises  -MG lingual movement exercises;lingual resistance exercises  -TM    [place]/Accuracy (Lingual Strengthening Goal 1, SLP) with moderate cues (50-74% accuracy)  -MB with moderate cues (50-74% accuracy)  -MG with moderate cues (50-74% accuracy)  -TM    Time Frame (Lingual Strengthening Goal 1, SLP) by discharge  -MB by discharge  -MG by discharge  -TM    Barriers (Lingual Strengthening Goal 1, SLP) -- L weakness  -MG L weakness  -TM    Progress/Outcomes (Lingual Strengthening Goal 1, SLP) -- goal ongoing  -MG new goal;goal ongoing  -TM       (STG) Pharyngeal Strengthening Exercise Goal 1 (SLP)    Activity (Pharyngeal Strengthening Goal 1, SLP) increase squeeze/positive pressure generation;increase tongue base retraction;increase superior movement of the hyolaryngeal complex;increase anterior movement of the hyolaryngeal complex  -MB increase squeeze/positive pressure generation;increase tongue base retraction  -MG increase squeeze/positive pressure generation;increase tongue base retraction  -TM    Increase Superior  Movement of the Hyolaryngeal Complex Mendelsohn;falsetto  -MB -- --    Increase Anterior Movement of the Hyolaryngeal Complex shaker  -MB -- --    Increase Squeeze/Positive Pressure Generation hard effortful swallow  -MB hard effortful swallow  -MG hard effortful swallow  -TM    Increase Tongue Base Retraction jose  -MB jose  -MG jose  -TM    [place]/Accuracy (Pharyngeal Strengthening Goal 1, SLP) with moderate cues (50-74% accuracy)  -MB with moderate cues (50-74% accuracy)  -MG with moderate cues (50-74% accuracy)  -TM    Time Frame (Pharyngeal Strengthening Goal 1, SLP) by discharge  -MB by discharge  -MG by discharge  -TM    Barriers (Pharyngeal Strengthening Goal 1, SLP) -- L weakness  -MG L weakness  -TM    Progress/Outcomes (Pharyngeal Strengthening Goal 1, SLP) -- goal ongoing  -MG new goal;goal ongoing  -TM          User Key  (r) = Recorded By, (t) = Taken By, (c) = Cosigned By    Initials Name Provider Type    Audelia Hall, CCC-SLP Speech and Language Pathologist    Luisa Ordonez, CCC-SLP Speech and Language Pathologist    MG Marlyn Stephens, MS CCC-SLP Speech and Language Pathologist                   Time Calculation:    Time Calculation- SLP     Row Name 01/03/23 1551 01/03/23 0859          Time Calculation- SLP    SLP Start Time 1440  -MB 0820  -MG     SLP Stop Time 1551  -MB 0859  -MG     SLP Time Calculation (min) 71 min  -MB 39 min  -MG     SLP Received On 01/03/23  -MB 01/03/23  -MG     SLP Goal Re-Cert Due Date 01/13/23  -MB --        Untimed Charges    69969-LU Motion Fluoro Eval Swallow Minutes 71  -MB --     14730-CW Treatment Swallow Minutes -- 39  -MG        Total Minutes    Untimed Charges Total Minutes 71  -MB 39  -MG      Total Minutes 71  -MB 39  -MG           User Key  (r) = Recorded By, (t) = Taken By, (c) = Cosigned By    Initials Name Provider Type    Audelia Hall, CCC-SLP Speech and Language Pathologist    MG Marlyn Stephens, MS CCC-SLP Speech  and Language Pathologist                Therapy Charges for Today     Code Description Service Date Service Provider Modifiers Qty    [id number] HC ST MOTION FLUORO EVAL SWALLOW 5 1/3/2023 Audelia Nuñez, CCC-SLP GN 1               Audelia Nuñez CCC-SLP  1/3/2023

## 2023-01-03 NOTE — PLAN OF CARE
Goal Outcome Evaluation:              Outcome Evaluation: VSS, on RA, HR AFL 73-93 with BBB, pt c/o of rt neck pain, NIH score 14, pt does exhibit left sided neglect, does keep head turned to right side, c/o pain when turning from side to side, prn toradol effective and given twice this shift, pt sleepy but arousable afterward, left facial bruising/swelling improved, left anterior hip edematous, abrasion tender, mepilex applied, small bm today, purewick in place, SCD's on, oral care completed, down for dysphagia study, SLP rec NPO with PEG placement, SNF referral to [facility] placed today, IVF infusing, pt continues to oreinted x 4 with some mild slurring

## 2023-01-03 NOTE — CASE MANAGEMENT/SOCIAL WORK
Continued Stay Note   [place]     Patient Name: Ernin Najera  MRN: [id number]  Today's Date: 1/3/2023    Admit Date: 1/2/2023    Plan: [facility]   Discharge Plan     Row Name 01/03/23 1234       Plan    Plan Referral to [facility]    Plan Comments Patient's family would like referral to [facility]. Patient has a family member that is a PT there and family says they really do not have alternate choice at this time. SW sent referral to Janay in admissions. Will await bed offer. No precert required.                      COLT Simpson

## 2023-01-04 ENCOUNTER — APPOINTMENT (OUTPATIENT)
Dept: NEUROLOGY | Facility: HOSPITAL | Age: 88
DRG: 65 | End: 2023-01-04
Payer: MEDICARE

## 2023-01-04 LAB — MAGNESIUM SERPL-MCNC: 2 MG/DL (ref 1.7–2.3)

## 2023-01-04 PROCEDURE — 99223 1ST HOSP IP/OBS HIGH 75: CPT

## 2023-01-04 PROCEDURE — 92526 ORAL FUNCTION THERAPY: CPT | Performed by: SPEECH-LANGUAGE PATHOLOGIST

## 2023-01-04 PROCEDURE — 99497 ADVNCD CARE PLAN 30 MIN: CPT

## 2023-01-04 PROCEDURE — 25010000002 KETOROLAC TROMETHAMINE PER 15 MG: Performed by: FAMILY MEDICINE

## 2023-01-04 PROCEDURE — 95816 EEG AWAKE AND DROWSY: CPT | Performed by: PSYCHIATRY & NEUROLOGY

## 2023-01-04 PROCEDURE — 99233 SBSQ HOSP IP/OBS HIGH 50: CPT | Performed by: CLINICAL NURSE SPECIALIST

## 2023-01-04 PROCEDURE — 83735 ASSAY OF MAGNESIUM: CPT | Performed by: FAMILY MEDICINE

## 2023-01-04 PROCEDURE — 95816 EEG AWAKE AND DROWSY: CPT

## 2023-01-04 RX ADMIN — KETOROLAC TROMETHAMINE 15 MG: 30 INJECTION, SOLUTION INTRAMUSCULAR; INTRAVENOUS at 14:26

## 2023-01-04 RX ADMIN — KETOROLAC TROMETHAMINE 15 MG: 30 INJECTION, SOLUTION INTRAMUSCULAR; INTRAVENOUS at 08:35

## 2023-01-04 RX ADMIN — ASPIRIN 300 MG: 300 SUPPOSITORY RECTAL at 08:35

## 2023-01-04 RX ADMIN — KETOROLAC TROMETHAMINE 15 MG: 30 INJECTION, SOLUTION INTRAMUSCULAR; INTRAVENOUS at 03:04

## 2023-01-04 RX ADMIN — Medication 10 ML: at 21:10

## 2023-01-04 RX ADMIN — KETOROLAC TROMETHAMINE 15 MG: 30 INJECTION, SOLUTION INTRAMUSCULAR; INTRAVENOUS at 21:09

## 2023-01-04 RX ADMIN — SODIUM CHLORIDE 75 ML/HR: 9 INJECTION, SOLUTION INTRAVENOUS at 14:26

## 2023-01-04 NOTE — PLAN OF CARE
Problem: Adult Inpatient Plan of Care  Goal: Plan of Care Review  Outcome: Ongoing, Progressing  Flowsheets (Taken 1/4/2023 0450)  Progress: no change  Plan of Care Reviewed With: patient  Outcome Evaluation: Patient c/o generalized pain, prn Toradol given with relief. Receiving IVF. Remains NPO. Purewick in place. Af  on tele. VSS. Safety maintained. Will notify MD of any changes.

## 2023-01-04 NOTE — CASE MANAGEMENT/SOCIAL WORK
Continued Stay Note   Prashant     Patient Name: Erinn Najera  MRN: [id number]  Today's Date: 1/4/2023    Admit Date: 1/2/2023    Plan: Unclear   Discharge Plan     Row Name 01/04/23 1105       Plan    Plan Unclear    Patient/Family in Agreement with Plan yes    Plan Comments Palliative now on case.  Family is interested in Hospice.  Pt does not want feeding tube.  Janay at [facility] is seeing if pt can come palliative/how much they would have to pay for room and board if they go with hospice.  Crystal Melgar has referral and will meet with family shortly.               Discharge Codes    No documentation.               Expected Discharge Date and Time     Expected Discharge Date Expected Discharge Time    Jan 5, 2023             HUI CampbellW

## 2023-01-04 NOTE — PROGRESS NOTES
[facility] Medicine Services  INPATIENT PROGRESS NOTE    Patient Name: Erinn Najera  Date of Admission: 1/2/2023  Today's Date: 01/04/23  Length of Stay: 2  Primary Care Physician: Елена Cedeno MD    Subjective   Chief Complaint: Comfort measures care  HPI     The patient and her family have decided against PEG tube insertion.  They would like to transition to palliative care and allow the patient to eat those things that would make her happy.  To that end, serial lab draws will be discontinued.  Antihypertensives will be discontinued given the patient's swallowing abnormalities.     Review of Systems   All pertinent negatives and positives are as above. All other systems have been reviewed and are negative unless otherwise stated.     Objective    Temp:  [97.2 °F (36.2 °C)-98.4 °F (36.9 °C)] 97.4 °F (36.3 °C)  Heart Rate:  [] 96  Resp:  [18] 18  BP: (152-166)/(58-81) 164/81  Physical Exam  Constitutional:       Appearance: Normal appearance. She is normal weight.   HENT:      Head: Normocephalic and atraumatic.      Right Ear: External ear normal.      Left Ear: External ear normal.      Nose: Nose normal.      Mouth: Mucous membranes are dry.      Pharynx: Oropharynx is clear.   Eyes:      General: No scleral icterus.     Conjunctiva/sclera: Conjunctivae normal.    Cardiovascular:      Rate and Rhythm: Normal rate. Rhythm irregularly irregular.      Pulses: Normal pulses.      Heart sounds: No murmur heard.  Pulmonary:      Effort: Pulmonary effort is normal. No respiratory distress.      Breath sounds: Normal breath sounds.   Abdominal:      General: Abdomen is flat. Bowel sounds are normal.      Palpations: Abdomen is soft. There is no mass.   Musculoskeletal:         General: Normal range of motion.      Right lower leg: No edema.      Left lower leg: No edema.   Skin:     General: Skin is warm and dry.      Findings: Bruising (Left periorbital area, left  shoulder, left lower abdomen) present.   Neurological:      Mental Status: She is alert and oriented to person, place, and time.      Sensory: No sensory deficit.      Motor: Weakness ( flaccid L side, L facial droop) present.   Psychiatric:         Mood and Affect: Mood normal.         Judgment: Judgment normal.     Results Review:  I have reviewed the labs, radiology results, and diagnostic studies.    Laboratory Data:   Results from last 7 days   Lab Units 01/03/23  0338 01/02/23  0934   WBC 10*3/mm3 14.65* 17.20*   HEMOGLOBIN g/dL 13.4 13.8   HEMATOCRIT % 41.1 43.1   PLATELETS 10*3/mm3 261 325        Results from last 7 days   Lab Units 01/03/23  0338 01/02/23  1108   SODIUM mmol/L 141 142   POTASSIUM mmol/L 3.9 3.2*   CHLORIDE mmol/L 104 102   CO2 mmol/L 26.0 25.0   BUN mg/dL 17 16   CREATININE mg/dL 0.81 0.77   CALCIUM mg/dL 8.6 9.2   BILIRUBIN mg/dL 0.9 1.0   ALK PHOS U/L 118* 134*   ALT (SGPT) U/L 12 13   AST (SGOT) U/L 40* 26   GLUCOSE mg/dL 94 130*       Culture Data:   Blood Culture   Date Value Ref Range Status   01/02/2023 No growth at 2 days  Preliminary   01/02/2023 No growth at 2 days  Preliminary       Radiology Data:   Imaging Results (Last 24 Hours)     ** No results found for the last 24 hours. **          I have reviewed the patient's current medications.     Assessment/Plan   Assessment  Active Hospital Problems    Diagnosis    • **Acute ischemic right MCA stroke (HCC)    • PAF (paroxysmal atrial fibrillation) (HCC)    • Elevated CK    • Hypomagnesemia    • Hypokalemia    • Hypertension    • Hyperlipidemia        Treatment Plan  Continue comfort measures care  Diet changed to puréed with thin liquids per patient wishes  Discharge in a.m. to SNF to continue comfort measures care    Medical Decision Making  Number and Complexity of problems:   1) acute right ischemic MCA CVA: Acute problem with high complexity  2) PAF: Chronic problem  3) hypertension: Chronic problem in exacerbation  4)  hyperlipidemia: Chronic problem  5) elevated CK: Acute problem with moderate complexity  6) hypomagnesia and hypokalemia: Acute problem with low complexity     Differential Diagnosis:   Hemorrhagic CVA  Seizure disorder     Conditions and Status:        Condition is stable     MDM Data  External documents reviewed: Care everywhere documents including ambulatory summary from [facility], [facility], [facility]  My EKG interpretation: Atrial fibrillation with RVR, rate of 107.  No change from previous EKGs  My interpretation of radiological data is deferred to staff board-certified radiologist  Tests considered but not ordered: EEG     Decision rules/scores evaluated (example [id number], Wells, etc): [id number] score of 6 with 9.7% annual stroke risk     Discussed with: The patient, her nurse and her son     Care Planning  Shared decision making: The patient and her 4 children will be sharing decision-making  Code status and discussions: Full code     Disposition  Social Determinants of Health that impact treatment or disposition: Unknown as yet  Estimated length of stay is unknown.      I confirmed that the patient's advanced care plan is present, code status is documented, and a surrogate decision maker is listed in the patient's medical record.     Electronically signed by Karlo Pereira DO, 01/04/23, 17:53 CST.

## 2023-01-04 NOTE — PLAN OF CARE
Goal Outcome Evaluation:  Plan of Care Reviewed With: patient, caregiver, family        Progress: no change       Swallow treatment completed. Notes reviewed from other providers regarding patient/family decision for no feeding tube placement and possible discharge to SNF with hospice. SLP provided education and recommendations for comfort diet/pleasure feedings as requested by patient. Discussed feeding techniques, s/s of aspiration and distress, and oral care. Son and daughter-in-law present. They verbalized understanding.     She completed trials of pudding thick options. She continues to have oral holding and significant delays in swallowing. Occasional throat clear/cough is observed today. Anterior loss of pudding thick mixed with secretions noted. No overt distress noted with PO intake. Patient happy about PO and requested continued bites.     Safest option for PO intake at this time is puree textures with pudding thick liquids. 1:1 assistance should be provided with slow rate, small bite/sip, and standard precautions. Oral care should be completed at least 2x per shift and as needed after that. Discussed possible oral suction with RN for comfort and to assist with secretion management if more PO is consumed.     SLP will be available PRN for continued family/patient education and support as needed.

## 2023-01-04 NOTE — PROGRESS NOTES
Neurology Progress Note      Chief Complaint:  F/u stroke  Length of Stay:  2   Subjective     Subjective:  Patient lying in bed. Both sons at bedside. Patient drowsy but arouses easily. VFSS per ST recommending continue NPO and PEG for nutrition. Family met last PM. Patient does not want PEG and family would like to pursue Hospice/comfort measures. Patient had told family, if she not able to ride her  and mow the grass, she would not want to pursue PEG or other measures and family would like to honor patient wishes. Observed right facial twitching on exam this AM. Patient able to talk and follow commands with episodes. Still with left neglect and left sided paralysis.     LDL 54  A1C 5.4  TSH 1,82    Mg 2.0 down for 3.3 1/3/2023.  B12  459        Medications:  Current Facility-Administered Medications   Medication Dose Route Frequency Provider Last Rate Last Admin   • acetaminophen (TYLENOL) tablet 650 mg  650 mg Oral Q4H PRN Karlo Pereira, DO       • aluminum-magnesium hydroxide-simethicone (MAALOX MAX) 400-400-40 MG/5ML suspension 7.5 mL  7.5 mL Oral Q4H PRN Karlo Pereira DO       • amLODIPine (NORVASC) tablet 5 mg  5 mg Oral Daily Karlo Pereira DO       • aspirin chewable tablet 81 mg  81 mg Oral Daily Karlo Pereira DO        Or   • aspirin suppository 300 mg  300 mg Rectal Daily Karlo Pereira DO   300 mg at 01/04/23 0835   • atorvastatin (LIPITOR) tablet 40 mg  40 mg Oral Nightly Sarah Ojeda, STEVE       • bisacodyl (DULCOLAX) suppository 10 mg  10 mg Rectal Daily PRN Karlo Pereira DO       • doxepin (SINEquan) capsule 50 mg  50 mg Oral Nightly Karlo Pereira DO       • ketorolac (TORADOL) injection 15 mg  15 mg Intravenous Q6H PRN Karlo Pereira DO   15 mg at 01/04/23 0835   • levothyroxine (SYNTHROID, LEVOTHROID) tablet 50 mcg  50 mcg Oral Q AM Karlo Pereira, DO       • Magnesium Sulfate 2 gram Bolus, followed by 8 gram infusion  (total Mg dose 10 grams)- Mg less than or equal to 1mg/dL  2 g Intravenous PRN Karlo Pereira DO        Or   • Magnesium Sulfate 2 gram / 50mL Infusion (GIVE X 3 BAGS TO EQUAL 6GM TOTAL DOSE) - Mg 1.1 - 1.5 mg/dl  2 g Intravenous PRN Karlo Pereira, DO 25 mL/hr at 01/03/23 0201 2 g at 01/03/23 0201    Or   • Magnesium Sulfate 4 gram infusion- Mg 1.6-1.9 mg/dL  4 g Intravenous PRN Karlo Pereira, DO       • metoprolol tartrate (LOPRESSOR) tablet 75 mg  75 mg Oral BID Karlo Pereira DO       • ondansetron (ZOFRAN) injection 4 mg  4 mg Intravenous Q6H PRN Karlo Pereira DO       • potassium chloride (MICRO-K) CR capsule 40 mEq  40 mEq Oral PRN Karlo Pereira DO        Or   • potassium chloride (KLOR-CON) packet 40 mEq  40 mEq Oral PRN Karlo Pereira DO        Or   • potassium chloride 10 mEq in 100 mL IVPB  10 mEq Intravenous Q1H PRN Karlo Pereira  mL/hr at 01/03/23 0020 10 mEq at 01/03/23 0020   • sodium chloride 0.9 % flush 10 mL  10 mL Intravenous Q12H Karlo Pereira DO   10 mL at 01/03/23 2056   • sodium chloride 0.9 % flush 10 mL  10 mL Intravenous PRN Karlo Pereira DO       • sodium chloride 0.9 % infusion 40 mL  40 mL Intravenous PRN Karlo Pereira DO       • sodium chloride 0.9 % infusion  75 mL/hr Intravenous Continuous Kassi Baker MD 75 mL/hr at 01/03/23 2056 75 mL/hr at 01/03/23 2056   • valsartan (DIOVAN) tablet 80 mg  80 mg Oral Daily Karlo Pereira DO           Review of Systems:   -A 14 point review of systems is completed and is negative except for neck pain.       Objective      Vital Signs  Temp:  [97.2 °F (36.2 °C)-98.4 °F (36.9 °C)] 97.6 °F (36.4 °C)  Heart Rate:  [] 88  Resp:  [18] 18  BP: (127-166)/(58-80) 152/72      Telemetry AF 89 - 106 with intermittent BBB    Physical Exam:  General Exam:  Head:  Normocephalic, atraumatic  HEENT:  Neck supple  Fundoscopic Exam:  No signs of disc edema  CVS:   Regular rate and rhythm.  No murmurs  Carotid Examination:  No bruits  Lungs:  Clear to auscultation  Abdomen:  Nontender, nondistended  Extremities:  No signs of peripheral edema  Skin:  No rashes. Significant bruising.     Neurologic Exam:    Mental Status:    -drowsy but arouses easily, Oriented X 3  -No word-finding difficulties  -No aphasia  -No dysarthria  -Follows simple commands    CN II:  Visual fields with blink to threat on right.   Pupils equally reactive to light  CN III, IV, VI:  Right gaze preference with left neglect. Extraocular Muscles full with no signs of nystagmus  CN V:  Facial sensory is symmetric with no asymmetries.  CN VII:  Facial motor asymmetric with left lower facial weakness.   CN VIII:  Gross hearing intact bilaterally  CN IX:  Palate elevates symmetrically  CN X:  Palate elevates symmetrically  CN XI:  Shoulder shrug asymmetric and absent on left   CN XII:  Tongue protrudes to midline  Motor: (strength out of 5:  1= minimal movement, 2 = movement in plane of gravity, 3 = movement against gravity, 4 = movement against some resistance, 5 = full strength)    -Right Upper Ext: Proximal: 5 Distal: 5  -Left Upper Ext: Proximal: 0 Distal: 0    -Right Lower Ext: Proximal: 5 Distal: 5  -Left Lower Ext: Proximal: 0 Distal: 0    DTR:  -Right   Bicep: 2+ Tricep: 2+ Brachioradialis: 2+   Patella: 2+ Ankle: 2+ Neg Babinski  -Left   Bicep: 2+ Tricep: 2+ Brachioradialis: 2+   Patella: 2+ Ankle: 2+  Babinski    Sensory:  -Intact to light touch,     Coordination:  -Finger to nose pass pointing on right, not able to perform on left  -Heel to shin not tested      Gait  -not tested for safety reasons.            Results Review:    I reviewed the patient's new clinical results.    Results from last 7 days   Lab Units 01/03/23  0338 01/02/23  0934   WBC 10*3/mm3 14.65* 17.20*   HEMOGLOBIN g/dL 13.4 13.8   HEMATOCRIT % 41.1 43.1   PLATELETS 10*3/mm3 261 325        Results from last 7 days   Lab Units  01/03/23  0338 01/02/23  1108   SODIUM mmol/L 141 142   POTASSIUM mmol/L 3.9 3.2*   CHLORIDE mmol/L 104 102   CO2 mmol/L 26.0 25.0   BUN mg/dL 17 16   CREATININE mg/dL 0.81 0.77   CALCIUM mg/dL 8.6 9.2   BILIRUBIN mg/dL 0.9 1.0   ALK PHOS U/L 118* 134*   ALT (SGPT) U/L 12 13   AST (SGOT) U/L 40* 26   GLUCOSE mg/dL 94 130*        Lab Results   Component Value Date    MG 2.0 01/04/2023    PROTIME 12.9 06/20/2021    INR 0.98 06/20/2021     No components found for: POCGLUC  No components found for: A1C  Lab Results   Component Value Date    HDL 63 (H) 01/03/2023    LDL 54 01/03/2023     No components found for: B12  Lab Results   Component Value Date    TSH 1.820 01/02/2023     TRANSTHORACIC ECHO AT [facility] 12/14/2022    Summary    Abnormal rhythm consistent with atrial fibrillation    Normal left ventricular size and systolic function estimated ejection    fraction 60 to 65%    Normal left ventricular wall thickness with indeterminate diastolic    function because of abnormal rhythm [tissue Doppler suggests significant    diastolic dysfunction]    Mild right atrial enlargement with borderline RV size but with preserved    systolic function    Mild left atrial enlargement    IVC dimension and inspiratory motion are normal consistent with normal    right atrial filling pressures    Mild pulmonic valvular insufficiency    Moderate tricuspid regurgitation with moderate pulmonary hypertension RVSP    estimate 50 mmHg    Mild thickening of a tricuspid aortic valve with adequate cusp separation    neither stenosis or insufficiency    Minimal mitral annular calcification with normally mobile mitral leaflets    and mild regurgitation    Aortic root and ascending segment measure within normal limits    Poor visualization of the aortic valve without demonstrable abnormality    No significant pericardial effusion      Signature      ----------------------------------------------------------------    Electronically signed by King  Yfn COX(Interpreting physician)    on 12/15/2022 08:42 AM    ----------------------------------------------------------------       Assessment/Plan     Hospital Problem List      Acute ischemic right MCA stroke (HCC)    PAF (paroxysmal atrial fibrillation) (HCC)    Hypertension    Hyperlipidemia    Elevated CK    Hypomagnesemia    Hypokalemia    Impression:  1. Acute right MCA stroke. NIHSS = 15  2. Chronic afib not on anticoagulation.  3. HLD. LDL 54  4. Hypertension.  5. History of falls and general decline in the last 6 months.  6. Hypothyroid.  7. Dysphagia.  8. Cervicalgia.  9. Hypermagnesium. Resolved 2.0 1/4/2023.       Plan:  1. ASA 81 mg or 300 mg rectally.  2. Patient does not want PEG and family wants to follow patient wishes and will pursue Hospice/comfort care.   3. Stat EEG for right facial twitching.   4. No need to repeat TTE as was done at [facility] 12/14/2022 with report above.  5. Hypokalemia resolved.  6.Lipitor 40 mg daily. Continue this for 30 days and then discontinue secondary to patient advanced age.  8. Consult Palliative care.  9. Will likely sign off after EEG and if EEG is nonepileptic.       Sarah Ojeda, APRN  01/04/23  09:12 CST

## 2023-01-04 NOTE — CASE MANAGEMENT/SOCIAL WORK
Continued Stay Note   Prashant     Patient Name: Erinn Najera  MRN: [id number]  Today's Date: 1/4/2023    Admit Date: 1/2/2023    [facility]   Discharge Plan     Row Name 01/04/23 1133       Plan    Plan [facility]    Patient/Family in Agreement with Plan yes    Plan Comments SW spoke to Lawanda at [facility]; they can accept tomorrow under palliative.  Family agrees.  SW will follow for dc tomorrow.    Row Name 01/04/23 1105       Plan    Plan Unclear    Patient/Family in Agreement with Plan yes    Plan Comments Palliative now on case.  Family is interested in Hospice.  Pt does not want feeding tube.  Janay at [facility] is seeing if pt can come palliative/how much they would have to pay for room and board if they go with hospice.  Crystal Melgar has referral and will meet with family shortly.               Discharge Codes    No documentation.               Expected Discharge Date and Time     Expected Discharge Date Expected Discharge Time    Jan 5, 2023             HUI CampbellW

## 2023-01-04 NOTE — PROGRESS NOTES
Nutrition Services    Patient Name:  Erinn Najera  YOB: 1928  MRN: [id number]  Admit Date:  1/2/2023    SLP recommendations and attending notes reviewed. If enteral tube feeding desired, may consult RD for recommendations. NTN will follow per protocol.    Electronically signed by:  Tierra Ravi RD  01/04/23 08:23 CST

## 2023-01-04 NOTE — CONSULTS
[facility] Palliative Care Services  Initial Consult    Attending Physician: Karlo Pereira DO  Referring Provider: Sarah Ojeda APRN / Karlo Pereira DO    Patient Name: Erinn Najera  Date of Admission: 1/2/2023  Today's Date: 01/04/23     Reason for Referral: Goals of Care/Advance Care Planning, Comfort Care and Hospice Referral/Discussion    Code Status and Medical Interventions:   Ordered at: 01/02/23 1246     Level Of Support Discussed With:    Next of Kin (If No Surrogate)     Code Status (Patient has no pulse and is not breathing):    CPR (Attempt to Resuscitate)     Medical Interventions (Patient has pulse or is breathing):    Full Support      Subjective     HPI: 89 y.o. female with past medical history including arthritis, atrial fibrillation, chronic low back pain, DVT, glaucoma, hyperlipidemia, hypertension, hypothyroidism, macular degeneration, PE and TIA.  Additional past medical history as below.  Patient presented to [facility] on 1/2/2023 related to altered mental status, speech changes and focal left-sided weakness.  According to chart review her last known well was at 6:30 PM night prior to presenting to ED however was found around 8:30 AM the next morning by her son and appeared to have fallen.  Work-up in ED revealed few abnormalities in labs including creatinine kinase 520, potassium 3.2, alkaline phosphatase 134, WBC 17.20 and lactate 3.8.  CT of cervical spine obtained which was negative for findings of fracture or acute osseous cervical spine abnormality.  CT of head obtained revealing no acute intracranial abnormality although visualized a chronic appearing right basal ganglia lacunar infarct and diffuse atrophy with moderate chronic small vessel white matter ischemic changes.  CTA of head negative for intracranial arterial LVO or significant intracranial arterial steno-occlusive disease.  MRI revealed acute versus early subacute nonhemorrhagic MCA  territory infarction primarily involving the insular cortex of the right temporal lobe and  the right basal ganglia with an area of edema measuring up to 4.7 x 2.9 cm per radiology report. Diffuse cerebral atrophy and chronic small vessel white matter ischemic changes also visualized on MRI. She was admitted to the medical floor for further work-up and treatment.  Chart review notes she has not been on anticoagulation for atrial fibrillation due to frequent falls.  SLP evaluated on multiple occasions and recommended remaining NPO due to dysphagia.  She had VFSS on 1/3/2023 with SLP which revealed severe oropharyngeal dysphagia.  SLP recommended consideration of PEG tube if aggressive measures desired.  PT and OT evaluated and recommended SNF at discharge for continued skilled therapy services.  According to chart review family has met discussed with patient and have elected to forego PEG to placement and focus on comfort.  She was noted to have right facial twitching this morning by neurology and STAT EEG has been ordered.  Only lab collected today available for review is magnesium which has normalized to 2.0.  She is lying in bed, alert and in no apparent distress at time of exam.  EEG tech is finishing EEG.  Ms. Najera is oriented and appears to have good understanding of prognosis and treatment options.  She denies any pain or discomfort at time of exam.      Advance Care Planning   Advanced Directives: Patient and son unable to recall if she had ever completed any ACP documents in the past.    Advance Care Planning Discussion: Care conference held with Ms. Najera, her son Tessie (Sarabjit) and patient's daughter-in-law (Yosi's spouse).  Noted patient only has 2 children which are Tessie (Sarabjit) and Yosi.  Tessie reports he was referring to 4 of them as making decisions as his mother, him and his brother and his daughter.  Explained to him that if Ms. Najera is unable to make complex medical decisions on her behalf her  technical next of kin would be her sons.  He demonstrated understanding.  We discussed events leading to hospitalization and long-term prognosis.  He appears to have good understanding and reports he and his family were able to talk with Ms. Najera at length last night regarding her wishes.  Ms. Najera also shared she does not wish to undergo feeding tube placement and/or receive artificial nutrition.  We discussed additional medical priorities at length including CPR, intubation, cardioversion, etc. After further discussion she expressed wishes to de-escalate CODE STATUS to include no CPR with limited support interventions.  Treatment options were also discussed at length.  We discussed potentially proceeding with SNF placement either with palliative services/skilled versus hospice.  We discussed need to determine if SNF can accept under palliative as she is unable to tolerate p.o. safely.  Discussed alternative option with hospice however explained insurance would not cover room and board.  He demonstrated understanding and requested additional information.  Have discussed with SW who is in contact with SNF to gather more information for family.  Family report it is not feasible for her to return home with hospice services at this time.  Discussed expectations of days in near future.  Questions answered to patient and family satisfaction.     The patient receives support from her children and extended family. Patient's children are her next of kin.    Due to the palliative care topics discussed including goals of care, treatment options, discharge options, medical priorities and hospice services we will establish an advance care plan.      Review of Systems   Constitutional: Negative for chills, fever and weight loss.   HENT: Negative for congestion, sore throat and stridor.    Eyes: Negative for pain, photophobia and visual disturbance.   Cardiovascular: Negative for chest pain, dyspnea on exertion and  palpitations.   Respiratory: Negative for cough, shortness of breath and sputum production.    Endocrine: Negative for polydipsia, polyphagia and polyuria.   Hematologic/Lymphatic: Negative for adenopathy and bleeding problem. Does not bruise/bleed easily.   Skin: Negative for dry skin, flushing and itching.   Musculoskeletal: Positive for falls. Negative for joint pain and joint swelling.   Gastrointestinal: Positive for dysphagia. Negative for nausea and vomiting.   Genitourinary: Positive for bladder incontinence. Negative for frequency and urgency.   Neurological: Positive for weakness. Negative for headaches and tremors.   Psychiatric/Behavioral: Negative for depression. The patient does not have insomnia and is not nervous/anxious.    Allergic/Immunologic: Negative for environmental allergies, hives and persistent infections.     Pain Assessment  Nonverbal Indicators of Pain: grimace  Pain Location: neck  Past Medical History:   Diagnosis Date   • Arthritis    • Atrial fibrillation (HCC)    • Cerebral artery occlusion    • Chronic low back pain    • Deep vein thrombosis (DVT) (HCC)    • Glaucoma    • Hyperlipidemia    • Hypertension    • Hypothyroidism    • Insomnia    • Macular degeneration    • Pulmonary embolism (HCC)    • TIA (transient ischemic attack)       Past Surgical History:   Procedure Laterality Date   • APPENDECTOMY     • BREAST SURGERY     • CARPAL TUNNEL RELEASE     • CHOLECYSTECTOMY     • ERCP AND ENDOSCOPY     • FOOT SURGERY Bilateral     Bilateral heel spurs   • HYSTERECTOMY     • TOTAL HIP ARTHROPLASTY Left       Social History     Socioeconomic History   • Marital status:    Tobacco Use   • Smoking status: Never   • Smokeless tobacco: Never   Vaping Use   • Vaping Use: Never used   Substance and Sexual Activity   • Alcohol use: Never   • Drug use: Never   • Sexual activity: Defer     Family History   Problem Relation Age of Onset   • Heart disease Mother    • Cancer Father        Allergies   Allergen Reactions   • Morphine Shortness Of Breath   • Stadol Ns [Butorphanol] Anaphylaxis   • Gabapentin Swelling       Objective   Diagnostics: Reviewed    Intake/Output Summary (Last 24 hours) at 1/4/2023 0955  Last data filed at 1/3/2023 1116  Gross per 24 hour   Intake --   Output 400 ml   Net -400 ml       Current medication reviewed for route, type, dose and frequency and are current per MAR at time of dictation.  Current Facility-Administered Medications   Medication Dose Route Frequency Provider Last Rate Last Admin   • acetaminophen (TYLENOL) tablet 650 mg  650 mg Oral Q4H PRN Karlo Pereira DO       • aluminum-magnesium hydroxide-simethicone (MAALOX MAX) 400-400-40 MG/5ML suspension 7.5 mL  7.5 mL Oral Q4H PRN Karlo Pereira DO       • amLODIPine (NORVASC) tablet 5 mg  5 mg Oral Daily Karlo Pereira DO       • aspirin chewable tablet 81 mg  81 mg Oral Daily Karlo Pereira DO        Or   • aspirin suppository 300 mg  300 mg Rectal Daily Karlo Pereira DO   300 mg at 01/04/23 0835   • atorvastatin (LIPITOR) tablet 40 mg  40 mg Oral Nightly Sarah Ojeda, APRN       • bisacodyl (DULCOLAX) suppository 10 mg  10 mg Rectal Daily PRN Karlo Pereira DO       • doxepin (SINEquan) capsule 50 mg  50 mg Oral Nightly Karlo Pereira DO       • ketorolac (TORADOL) injection 15 mg  15 mg Intravenous Q6H PRN Karlo Pereira DO   15 mg at 01/04/23 0835   • levothyroxine (SYNTHROID, LEVOTHROID) tablet 50 mcg  50 mcg Oral Q AM Karlo Pereira DO       • Magnesium Sulfate 2 gram Bolus, followed by 8 gram infusion (total Mg dose 10 grams)- Mg less than or equal to 1mg/dL  2 g Intravenous PRN Karlo Pereira DO        Or   • Magnesium Sulfate 2 gram / 50mL Infusion (GIVE X 3 BAGS TO EQUAL 6GM TOTAL DOSE) - Mg 1.1 - 1.5 mg/dl  2 g Intravenous PRN Karlo Pereira DO 25 mL/hr at 01/03/23 0201 2 g at 01/03/23 0201    Or   • Magnesium Sulfate 4 gram  "infusion- Mg 1.6-1.9 mg/dL  4 g Intravenous PRN Karlo Pereira DO       • metoprolol tartrate (LOPRESSOR) tablet 75 mg  75 mg Oral BID Karlo Pereira DO       • ondansetron (ZOFRAN) injection 4 mg  4 mg Intravenous Q6H PRN Karlo Pereira DO       • potassium chloride (MICRO-K) CR capsule 40 mEq  40 mEq Oral PRN Karlo Pereira DO        Or   • potassium chloride (KLOR-CON) packet 40 mEq  40 mEq Oral PRN Karlo Pereira DO        Or   • potassium chloride 10 mEq in 100 mL IVPB  10 mEq Intravenous Q1H PRN Karlo Pereira  mL/hr at 01/03/23 0020 10 mEq at 01/03/23 0020   • sodium chloride 0.9 % flush 10 mL  10 mL Intravenous Q12H Karlo Pereira DO   10 mL at 01/03/23 2056   • sodium chloride 0.9 % flush 10 mL  10 mL Intravenous PRN Karlo Pereira DO       • sodium chloride 0.9 % infusion 40 mL  40 mL Intravenous PRN Karlo Pereira DO       • sodium chloride 0.9 % infusion  75 mL/hr Intravenous Continuous Mai Kassi Barrera MD 75 mL/hr at 01/03/23 2056 75 mL/hr at 01/03/23 2056   • valsartan (DIOVAN) tablet 80 mg  80 mg Oral Daily Karlo Pereira DO        sodium chloride, 75 mL/hr, Last Rate: 75 mL/hr (01/03/23 2056)     •  acetaminophen  •  aluminum-magnesium hydroxide-simethicone  •  bisacodyl  •  ketorolac  •  magnesium sulfate **OR** magnesium sulfate **OR** magnesium sulfate  •  ondansetron  •  potassium chloride **OR** potassium chloride **OR** potassium chloride  •  sodium chloride  •  sodium chloride  Assessment   /72 (BP Location: Right arm, Patient Position: Lying)   Pulse 88   Temp 97.6 °F (36.4 °C) (Axillary)   Resp 18   Ht 152.4 cm (60\")   Wt 55.8 kg (123 lb 1 oz)   SpO2 94%   BMI 24.03 kg/m²     Physical Exam  Vitals and nursing note reviewed.   Constitutional:       General: She is not in acute distress.     Appearance: She is ill-appearing.   HENT:      Head: Normocephalic and atraumatic.   Eyes:      General: Lids are " normal.      Extraocular Movements: Extraocular movements intact.   Neck:      Vascular: No JVD.      Trachea: Trachea normal.   Cardiovascular:      Rate and Rhythm: Normal rate. Rhythm irregular.   Pulmonary:      Effort: Pulmonary effort is normal.      Breath sounds: Normal breath sounds.   Chest:      Chest wall: No deformity or swelling.   Abdominal:      General: There is no distension.      Palpations: Abdomen is soft.   Genitourinary:     Comments: External urinary catheter present.  Musculoskeletal:      Cervical back: Neck supple.      Comments: Left sided hemiplegia.   Skin:     General: Skin is warm and dry.      Findings: Bruising present.   Neurological:      Mental Status: She is alert and oriented to person, place, and time.      Cranial Nerves: Facial asymmetry present.      Motor: Weakness present.   Psychiatric:         Speech: Speech normal.         Behavior: Behavior is cooperative.         Cognition and Memory: Cognition normal.     Functional status: Palliative Performance Scale Score: Performance 10% based on the following measures: Ambulation: Totally bed bound, Activity and Evidence of Disease: Unable to do any work, extensive evidence of disease, Self-Care: Total care required,  Intake: Mouth care only, LOC: Drowsy or comatose.  Nutritional status: Albumin 3.1. Body mass index is 24.03 kg/m².  Patient status: Disease state: Deteriorating despite treatments.    Impression/Problem List:  1. Acute ischemic right MCA stroke   2. Oropharyngeal dysphagia  3. Impaired mobility and ADLs   4. Atrial fibrillation, chronic   5. Cerebral atrophy and chronic cerebral microvascular disease per CT and MRI  6. Hypertension  7. History of falls     8. Advanced age      Plan / Recommendations     1. Palliative Care Encounter   - Goals of care include CODE STATUS changes to include NO CPR with limited support interventions.    - Prognosis is poor long-term secondary to acute ischemic right MCA stroke,  oropharyngeal dysphagia without wishes to pursue artificial nutrition, impaired mobility and ADLs, atrial fibrillation, cerebral atrophy and chronic microvascular disease, advanced age and other comorbidities listed above.    - Care conference held with Ms. Najera, her son Tessie (Sarabjti) and patient's daughter-in-law (Yosi's spouse).  Noted patient only has 2 children which are Tessie (Sarabjit) and Yosi.  Details of discussion above.    - Ms. Najera expressed wishes to de-escalate CODE STATUS as delineated above.    - Discharge plans pending.  Discussed with attending SW. SW gathering additional information from SNF for family.     - Questions answered to patient and family satisfaction.     Thank you for allowing us to participate in patient's plan of care. Palliative Care Team will continue to follow patient. Will plan to follow up tomorrow or sooner if needed.    Time spent:95 minutes spent reviewing medical and medication records, assessing and examining patient, discussing with family, answering questions, providing some guidance about a plan and documentation of care, and coordinating care with other healthcare members, with > 50% time spent face to face.   20 minutes spent on advance care planning.    Electronically signed by, STEVE Workman, 01/04/23.

## 2023-01-04 NOTE — THERAPY TREATMENT NOTE
Acute Care - Speech Language Pathology   Swallow Treatment Note [facility]     Patient Name: Erinn Najera  : 1928  MRN: [id number]  Today's Date: 2023               Admit Date: 2023  Swallow treatment completed. Notes reviewed from other providers regarding patient/family decision for no feeding tube placement and possible discharge to SNF with hospice. SLP provided education and recommendations for comfort diet/pleasure feedings as requested by patient. Discussed feeding techniques, s/s of aspiration and distress, and oral care. Son and daughter-in-law present. They verbalized understanding.     She completed trials of pudding thick options. She continues to have oral holding and significant delays in swallowing. Occasional throat clear/cough is observed today. Anterior loss of pudding thick mixed with secretions noted. No overt distress noted with PO intake. Patient happy about PO and requested continued bites.     Safest option for PO intake at this time is puree textures with pudding thick liquids. 1:1 assistance should be provided with slow rate, small bite/sip, and standard precautions. Oral care should be completed at least 2x per shift and as needed after that. Discussed possible oral suction with RN for comfort and to assist with secretion management if more PO is consumed.     SLP will be available PRN for continued family/patient education and support as needed.     Marlyn Stephens MS CCC-SLP 2023 14:16 CST    Visit Dx:     ICD-10-CM ICD-9-CM   1. Cerebrovascular accident (CVA), unspecified mechanism (HCC)  I63.9 434.91   2. Chronic atrial fibrillation (HCC)  I48.20 427.31   3. Leukocytosis, unspecified type  D72.829 288.60   4. Dysphagia, unspecified type  R13.10 787.20   5. Muscle weakness  M62.81 728.87   6. Impaired mobility and ADLs  Z74.09 V49.89    Z78.9      Patient Active Problem List   Diagnosis   • Acute ischemic right MCA stroke (HCC)   • PAF (paroxysmal atrial  fibrillation) (HCC)   • Hypertension   • Hyperlipidemia   • Elevated CK   • Hypomagnesemia   • Hypokalemia     Past Medical History:   Diagnosis Date   • Arthritis    • Atrial fibrillation (HCC)    • Cerebral artery occlusion    • Chronic low back pain    • Deep vein thrombosis (DVT) (HCC)    • Glaucoma    • Hyperlipidemia    • Hypertension    • Hypothyroidism    • Insomnia    • Macular degeneration    • Pulmonary embolism (HCC)    • TIA (transient ischemic attack)      Past Surgical History:   Procedure Laterality Date   • APPENDECTOMY     • BREAST SURGERY     • CARPAL TUNNEL RELEASE     • CHOLECYSTECTOMY     • ERCP AND ENDOSCOPY     • FOOT SURGERY Bilateral     Bilateral heel spurs   • HYSTERECTOMY     • TOTAL HIP ARTHROPLASTY Left        SLP Recommendation and Plan     SLP Diet Recommendation: comfort diet, per physician discretion, puree, pudding thick liquids (01/04/23 1316)  Recommended Precautions and Strategies: upright posture during/after eating, small bites of food and sips of liquid, multiple swallows per bite of food, multiple swallows per sip of liquid, check mouth frequently for oral residue/pocketing, liquid via spoon only, general aspiration precautions, fatigue precautions, 1:1 supervision, assist with feeding (01/04/23 1316)  SLP Rec. for Method of Medication Administration: meds crushed, with puree, as tolerated (01/04/23 1316)                                Daily Summary of Progress (SLP): progress toward functional goals is gradual (01/04/23 1316)               Treatment Assessment (SLP): continued (01/04/23 1316)  Treatment Assessment Comments (SLP): See note (01/04/23 1316)  Plan for Continued Treatment (SLP): continue treatment per plan of care (01/04/23 1316)         Plan of Care Reviewed With: patient, caregiver, family  Progress: no change      SWALLOW EVALUATION (last 72 hours)     SLP Adult Swallow Evaluation     Row Name 01/04/23 1316 01/03/23 1440 01/03/23 0825 01/02/23 [id number]           Rehab Evaluation    Document Type therapy note (daily note)  -MG evaluation  -MB therapy note (daily note)  -MG evaluation  -TM     Subjective Information no complaints  -MG complains of;pain  -MB no complaints  -MG fatigue  -TM     Patient Observations alert;cooperative;agree to therapy  -MG alert;cooperative  -MB alert;cooperative;agree to therapy  -MG cooperative  -TM     Patient/Family/Caregiver Comments/Observations Son and daughter-in-law present.  -MG Son present for education after study  -MB Daughter-in-law present  -MG Multiple family members present.  -TM     Patient Effort adequate  -MG -- adequate  -MG adequate  -TM     Symptoms Noted During/After Treatment fatigue  -MG -- fatigue  -MG --        General Information    Patient Profile Reviewed -- yes  -MB -- yes  -TM     Pertinent History Of Current Problem -- Acute R MCA stroke, paroxysmal a-fib, elevated CK, HTN, HLD, hypomagnesemia, hypokalemia.  -MB -- Acute L arm and leg weakness, confusion. MRI brain 01/02/23 showed acute vs subacute non-hemorrhagic CVA in the R temporal lobe and basal ganglia. Hx of a-fib, DVT, glaucoma, HTN, HLD, hypothyroidism, PE, TIA, macular degeneration. CXR 01/02/23 showed no acute process.  -TM     Current Method of Nutrition -- NPO  -MB -- NPO  -TM     Precautions/Limitations, Vision -- vision impairment, bilaterally;other (see comments)  -MB -- vision impairment, bilaterally;other (see comments)  Unable to open L eye at this time, weak opening of R on most attempts despite cues per SLP.  -TM     Precautions/Limitations, Hearing -- WFL  -MB -- WFL  -TM     Prior Level of Function-Communication -- WFL  -MB -- WFL  -TM     Prior Level of Function-Swallowing -- no diet consistency restrictions  -MB -- no diet consistency restrictions  -TM     Plans/Goals Discussed with -- patient and family  -MB -- patient;family;other (see comments)  Brandy LOZADA  -     Barriers to Rehab -- medically complex  -MB -- medically complex  -TM      Patient's Goals for Discharge -- patient did not state  -MB -- patient did not state  -TM     Family Goals for Discharge -- other (see comments)  Whatever it takes to get adequate nutrition and go home  -MB -- patient able to return to PO diet  -TM        Pain    Additional Documentation Pain Scale: FACES Pre/Post-Treatment (Group)  -MG Pain Scale: FACES Pre/Post-Treatment (Group)  -MB Pain Scale: FACES Pre/Post-Treatment (Group)  -MG Pain Scale: FACES Pre/Post-Treatment (Group)  -TM        Pain Scale: FACES Pre/Post-Treatment    Pain: FACES Scale, Pretreatment 0-->no hurt  -MG 4-->hurts little more  -MB 0-->no hurt  -MG 2-->hurts little bit  -TM     Posttreatment Pain Rating 0-->no hurt  -MG -- 0-->no hurt  -MG 2-->hurts little bit  -TM     Pain Location - Side/Orientation -- -- -- Right  -TM     Pain Location - -- neck  -MB -- hip  -TM     Pre/Posttreatment Pain Comment -- Only when holding head up  -MB -- Radiates to shin per pt.  -TM        Oral Motor Structure and Function    Dentition Assessment -- missing teeth  -MB -- natural, present and adequate  -TM     Secretion Management -- WNL/WFL  -MB -- WNL/WFL  -TM     Mucosal Quality -- moist, healthy  -MB -- moist, healthy  -TM     Volitional Swallow -- -- -- delayed  -TM     Volitional Cough -- -- -- reduced respiratory support  -TM        Oral Musculature and Cranial Nerve Assessment    Oral Motor General Assessment -- oral labial or buccal impairment;lingual impairment  -MB -- oral labial or buccal impairment;lingual impairment;mandibular impairment  -TM     Mandibular Impairment Detail, Cranial Nerve V (Trigeminal) -- -- -- reduced strength on left  -TM     Oral Labial or Buccal Impairment, Detail, Cranial Nerve VII (Facial): -- left labial droop  -MB -- left labial droop  -TM     Lingual Impairment, Detail. Cranial Nerves IX, XII (Glossopharyngeal and Hypoglossal) -- reduced strength left  -MB -- reduced strength left  -TM        General Eating/Swallowing  Observations    Respiratory Support Currently in Use -- -- -- room air  -TM     Eating/Swallowing Skills -- -- -- fed by SLP  -TM     Positioning During Eating -- -- -- upright in bed;needs frequent re-positioning  -TM     Utensils Used -- -- -- spoon  -TM     Consistencies Trialed -- -- -- pudding thick  -TM        Respiratory    Respiratory Status -- -- -- WFL  -TM        Clinical Swallow Eval    Oral Prep Phase -- -- -- impaired  -TM     Oral Transit -- -- -- impaired  -TM     Oral Residue -- -- -- impaired  -TM     Pharyngeal Phase -- -- -- suspected pharyngeal impairment  -TM     Esophageal Phase -- -- -- unremarkable  -TM     Clinical Swallow Evaluation Summary -- -- -- See note.  -TM        Oral Prep Concerns    Oral Prep Concerns -- -- -- incomplete or weak lip closure around spoon;incomplete bolus preparation  -TM     Incomplete or Weak Lip Closure Around Spoon -- -- -- pudding  -TM     Incomplete Bolus Preparation -- -- -- pudding  -TM        Oral Transit Concerns    Oral Transit Concerns -- -- -- delayed initiation of bolus transit  -TM     Delayed Intiation of Bolus Transit -- -- -- pudding  -TM        Oral Residue Concerns    Oral Residue Concerns -- -- -- diffuse residue throughout oral cavity  -TM     Diffuse Residue Throughout Oral Cavity -- -- -- pudding  -TM        Pharyngeal Phase Concerns    Pharyngeal Phase Concerns -- -- -- cough;multiple swallows  -TM     Multiple Swallows -- -- -- pudding  -TM     Cough -- -- -- pudding  -TM        MBS/VFSS    Utensils Used -- spoon  -MB -- --     Consistencies Trialed -- honey-thick liquids;pudding thick  -MB -- --        MBS/VFSS Interpretation    Oral Prep Phase -- impaired oral phase of swallowing  -MB -- --     Oral Transit Phase -- impaired  -MB -- --     Oral Residue -- impaired  -MB -- --     VFSS Summary -- See note  -MB -- --        Oral Preparatory Phase    Oral Preparatory Phase -- prolonged manipulation;inadequate manipulation  -MB -- --      Prolonged Manipulation -- all consistencies tested  -MB -- --     Inadequate Manipulation -- all consistencies tested  -MB -- --        Oral Transit Phase    Impaired Oral Transit Phase -- increased A-P transit time;tongue pumping;piecemeal oral transit  -MB -- --     Increased A-P Transit Time -- all consistencies tested  -MB -- --     Tongue Pumping -- all consistencies tested  -MB -- --     Piecemeal Oral Transit -- all consistencies tested  -MB -- --        Oral Residue    Impaired Oral Residue -- diffuse residue throughout oral cavity  -MB -- --     Diffuse Residue throughout Oral Cavity -- all consistencies tested  -MB -- --        Swallowing Quality of Life Assessment    Patient/family preferences Avoid gastrostomy tube  -MG -- -- --     Education and counseling provided Signs of aspiration;Risks of aspiration;Safest diet options;Comfort diet options;Oral care recommendations and rationale;Aspiration precautions;Feeding assistance and techniques  -MG -- -- --        SLP Evaluation Clinical Impression    SLP Swallowing Diagnosis -- severe;oral dysphagia;pharyngeal dysphagia  -MB -- severe;oral dysphagia;suspected pharyngeal dysphagia  -     Functional Impact -- risk of aspiration/pneumonia;risk of malnutrition;risk of dehydration  -MB -- risk of aspiration/pneumonia;risk of malnutrition;risk of dehydration  -     Rehab Potential/Prognosis, Swallowing -- re-evaluate goals as necessary  -MB -- adequate, monitor progress closely  -     Swallow Criteria for Skilled Therapeutic Interventions Met -- demonstrates skilled criteria  -MB -- demonstrates skilled criteria  -        SLP Treatment Clinical Impressions    Treatment Assessment (SLP) continued  -MG -- continued;clinical signs of;suspected;aspiration  -MG --     Treatment Assessment Comments (SLP) See note  -MG -- See note  -MG --     Daily Summary of Progress (SLP) progress toward functional goals is gradual  -MG -- progress toward functional goals is  gradual  -MG --     Barriers to Overall Progress (SLP) Advanced age;Medically complex  -MG -- Advanced age;Fatigue  -MG --     Plan for Continued Treatment (SLP) continue treatment per plan of care  -MG -- continue treatment per plan of care  -MG --     Care Plan Review evaluation/treatment results reviewed;care plan/treatment goals reviewed;risks/benefits reviewed;current/potential barriers reviewed;patient/other agree to care plan  -MG -- evaluation/treatment results reviewed;care plan/treatment goals reviewed;risks/benefits reviewed;current/potential barriers reviewed;patient/other agree to care plan  -MG --     Care Plan Review, Other Participant(s) caregiver;family  -MG -- caregiver;family  RN Brandy  -MG --        Recommendations    Therapy Frequency (Swallow) -- -- -- daily  -TM     Predicted Duration Therapy Intervention (Days) -- -- -- until discharge  -     SLP Diet Recommendation comfort diet, per physician discretion;puree;pudding thick liquids  -MG NPO;long term alternate methods of nutrition/hydration;other (see comments)  vs pureed/pudding  -MB -- NPO  -     Recommended Diagnostics -- -- VFSS ([facility])  - reassess via clinical swallow evaluation;VFSS (MBS)  -TM     Recommended Precautions and Strategies upright posture during/after eating;small bites of food and sips of liquid;multiple swallows per bite of food;multiple swallows per sip of liquid;check mouth frequently for oral residue/pocketing;liquid via spoon only;general aspiration precautions;fatigue precautions;1:1 supervision;assist with feeding  -MG upright posture during/after eating  -MB -- --     Oral Care Recommendations Oral Care BID/PRN;Suction toothbrush  -MG Oral Care BID/PRN  -MB -- Oral Care BID/PRN;Swab  -     SLP Rec. for Method of Medication Administration meds crushed;with puree;as tolerated  -MG meds crushed;with puree;meds via alternate route  -MB -- meds via alternate route  -     Monitor for Signs of Aspiration --  yes;cough;gurgly voice;throat clearing;notify SLP if any concerns  -MB -- yes;notify SLP if any concerns;cough;gurgly voice;throat clearing;pneumonia;right lower lobe infiltrates  -TM     Anticipated Discharge Disposition (SLP) -- skilled nursing facility  -MB -- skilled nursing facility  -TM        Swallow Goals (SLP)    Swallow LTGs Swallow Long Term Goal (free text)  -MG -- Swallow Long Term Goal (free text)  -MG Swallow Long Term Goal (free text)  -TM     Swallow STGs labial strengthening goal selection (SLP);lingual strengthening goal selection (SLP);pharyngeal strengthening exercise goal selection (SLP)  -MG -- labial strengthening goal selection (SLP);lingual strengthening goal selection (SLP);pharyngeal strengthening exercise goal selection (SLP)  -MG labial strengthening goal selection (SLP);lingual strengthening goal selection (SLP);pharyngeal strengthening exercise goal selection (SLP)  -TM     Labial Strengthening Goal Selection (SLP) labial strengthening, SLP goal 1  -MG -- labial strengthening, SLP goal 1  -MG labial strengthening, SLP goal 1  -TM     Lingual Strengthening Goal Selection (SLP) lingual strengthening, SLP goal 1  -MG -- lingual strengthening, SLP goal 1  -MG lingual strengthening, SLP goal 1  -TM     Pharyngeal Strengthening Exercise Goal Selection (SLP) pharyngeal strengthening exercise, SLP goal 1  -MG -- pharyngeal strengthening exercise, SLP goal 1  -MG pharyngeal strengthening exercise, SLP goal 1  -TM        (LTG) Swallow    (LTG) Swallow Pt will tolerate LRD without overt s/s of aspiration.  -MG Pt will tolerate LRD without overt s/s of aspiration.  -MB Pt will tolerate LRD without overt s/s of aspiration.  -MG Pt will tolerate LRD without overt s/s of aspiration.  -TM     [place] (Swallow Long Term Goal) with minimal cues (75-90% accuracy)  -MG with minimal cues (75-90% accuracy)  -MB with minimal cues (75-90% accuracy)  -MG with minimal cues (75-90% accuracy)  -TM     Time  Frame (Swallow Long Term Goal) by discharge  -MG by discharge  -MB by discharge  -MG by discharge  -TM     Barriers (Swallow Long Term Goal) Reduced alertness  -MG -- Reduced alertness  -MG Reduced alertness  -TM     Progress/Outcomes (Swallow Long Term Goal) progress slower than expected  -MG -- progress slower than expected  -MG new goal;goal ongoing  -TM        (STG) Labial Strengthening Goal 1 (SLP)    Activity (Labial Strengthening Goal 1, SLP) increase labial tone  -MG increase labial tone  -MB increase labial tone  -MG increase labial tone  -TM     Increase Labial Tone labial resistance exercises  -MG labial resistance exercises  -MB labial resistance exercises  -MG labial resistance exercises  -TM     [place]/Accuracy (Labial Strengthening Goal 1, SLP) with moderate cues (50-74% accuracy)  -MG with moderate cues (50-74% accuracy)  -MB with moderate cues (50-74% accuracy)  -MG with moderate cues (50-74% accuracy)  -TM     Time Frame (Labial Strengthening Goal 1, SLP) by discharge  -MG by discharge  -MB by discharge  -MG by discharge  -TM     Barriers (Labial Strengthening Goal 1, SLP) L weakness  -MG -- L weakness  -MG L weakness  -TM     Progress/Outcomes (Labial Strengthening Goal 1, SLP) goal ongoing  -MG -- progress slower than expected  -MG new goal;goal ongoing  -TM        (STG) Lingual Strengthening Goal 1 (SLP)    Activity (Lingual Strengthening Goal 1, SLP) increase lingual tone/sensation/control/coordination/movement  -MG increase lingual tone/sensation/control/coordination/movement  -MB increase lingual tone/sensation/control/coordination/movement  -MG increase lingual tone/sensation/control/coordination/movement  -TM     Increase Lingual Tone/Sensation/Control/Coordination/Movement lingual movement exercises;lingual resistance exercises  -MG lingual movement exercises;lingual resistance exercises  -MB lingual movement exercises;lingual resistance exercises  -MG lingual movement  exercises;lingual resistance exercises  -TM     [place]/Accuracy (Lingual Strengthening Goal 1, SLP) with moderate cues (50-74% accuracy)  -MG with moderate cues (50-74% accuracy)  -MB with moderate cues (50-74% accuracy)  -MG with moderate cues (50-74% accuracy)  -TM     Time Frame (Lingual Strengthening Goal 1, SLP) by discharge  -MG by discharge  -MB by discharge  -MG by discharge  -TM     Barriers (Lingual Strengthening Goal 1, SLP) L weakness  -MG -- L weakness  -MG L weakness  -TM     Progress/Outcomes (Lingual Strengthening Goal 1, SLP) goal ongoing  -MG -- goal ongoing  -MG new goal;goal ongoing  -TM        (STG) Pharyngeal Strengthening Exercise Goal 1 (SLP)    Activity (Pharyngeal Strengthening Goal 1, SLP) increase squeeze/positive pressure generation;increase tongue base retraction;increase superior movement of the hyolaryngeal complex;increase anterior movement of the hyolaryngeal complex  -MG increase squeeze/positive pressure generation;increase tongue base retraction;increase superior movement of the hyolaryngeal complex;increase anterior movement of the hyolaryngeal complex  -MB increase squeeze/positive pressure generation;increase tongue base retraction  -MG increase squeeze/positive pressure generation;increase tongue base retraction  -TM     Increase Superior Movement of the Hyolaryngeal Complex Mendelsohn;falsetto  -MG Mendelsohn;falsetto  -MB -- --     Increase Anterior Movement of the Hyolaryngeal Complex shaker  -MG shaker  -MB -- --     Increase Squeeze/Positive Pressure Generation hard effortful swallow  -MG hard effortful swallow  -MB hard effortful swallow  -MG hard effortful swallow  -TM     Increase Tongue Base Retraction jose  -MG jose  -MB jose  -MG jose  -TM     [place]/Accuracy (Pharyngeal Strengthening Goal 1, SLP) with moderate cues (50-74% accuracy)  -MG with moderate cues (50-74% accuracy)  -MB with moderate cues (50-74% accuracy)  -MG with moderate cues  (50-74% accuracy)  -TM     Time Frame (Pharyngeal Strengthening Goal 1, SLP) by discharge  -MG by discharge  -MB by discharge  -MG by discharge  -TM     Barriers (Pharyngeal Strengthening Goal 1, SLP) L weakness  -MG -- L weakness  -MG L weakness  -TM     Progress/Outcomes (Pharyngeal Strengthening Goal 1, SLP) goal ongoing  -MG -- goal ongoing  -MG new goal;goal ongoing  -TM           User Key  (r) = Recorded By, (t) = Taken By, (c) = Cosigned By    Initials Name Effective Dates    MB Audelia Nuñez, CCC-SLP 06/16/21 -     TM Luisa Pino, CCC-[facility] 06/16/21 -     MG Marlyn Stephens, MS CCC-SLP 08/12/22 -                 EDUCATION  The patient has been educated in the following areas:   Dysphagia (Swallowing Impairment) Oral Care/Hydration Modified Diet Instruction.        SLP GOALS     Row Name 01/04/23 1316 01/03/23 1440 01/03/23 0825       (LTG) Swallow    (LTG) Swallow Pt will tolerate LRD without overt s/s of aspiration.  -MG Pt will tolerate LRD without overt s/s of aspiration.  -MB Pt will tolerate LRD without overt s/s of aspiration.  -MG    [place] (Swallow Long Term Goal) with minimal cues (75-90% accuracy)  -MG with minimal cues (75-90% accuracy)  -MB with minimal cues (75-90% accuracy)  -MG    Time Frame (Swallow Long Term Goal) by discharge  -MG by discharge  -MB by discharge  -MG    Barriers (Swallow Long Term Goal) Reduced alertness  -MG -- Reduced alertness  -MG    Progress/Outcomes (Swallow Long Term Goal) progress slower than expected  -MG -- progress slower than expected  -MG       (STG) Labial Strengthening Goal 1 (SLP)    Activity (Labial Strengthening Goal 1, SLP) increase labial tone  -MG increase labial tone  -MB increase labial tone  -MG    Increase Labial Tone labial resistance exercises  -MG labial resistance exercises  -MB labial resistance exercises  -MG    [place]/Accuracy (Labial Strengthening Goal 1, SLP) with moderate cues (50-74% accuracy)  -MG with moderate cues  (50-74% accuracy)  -MB with moderate cues (50-74% accuracy)  -MG    Time Frame (Labial Strengthening Goal 1, SLP) by discharge  -MG by discharge  -MB by discharge  -MG    Barriers (Labial Strengthening Goal 1, SLP) L weakness  -MG -- L weakness  -MG    Progress/Outcomes (Labial Strengthening Goal 1, SLP) goal ongoing  -MG -- progress slower than expected  -MG       (STG) Lingual Strengthening Goal 1 (SLP)    Activity (Lingual Strengthening Goal 1, SLP) increase lingual tone/sensation/control/coordination/movement  -MG increase lingual tone/sensation/control/coordination/movement  -MB increase lingual tone/sensation/control/coordination/movement  -MG    Increase Lingual Tone/Sensation/Control/Coordination/Movement lingual movement exercises;lingual resistance exercises  -MG lingual movement exercises;lingual resistance exercises  -MB lingual movement exercises;lingual resistance exercises  -MG    [place]/Accuracy (Lingual Strengthening Goal 1, SLP) with moderate cues (50-74% accuracy)  -MG with moderate cues (50-74% accuracy)  -MB with moderate cues (50-74% accuracy)  -MG    Time Frame (Lingual Strengthening Goal 1, SLP) by discharge  -MG by discharge  -MB by discharge  -MG    Barriers (Lingual Strengthening Goal 1, SLP) L weakness  -MG -- L weakness  -MG    Progress/Outcomes (Lingual Strengthening Goal 1, SLP) goal ongoing  -MG -- goal ongoing  -MG       (STG) Pharyngeal Strengthening Exercise Goal 1 (SLP)    Activity (Pharyngeal Strengthening Goal 1, SLP) increase squeeze/positive pressure generation;increase tongue base retraction;increase superior movement of the hyolaryngeal complex;increase anterior movement of the hyolaryngeal complex  -MG increase squeeze/positive pressure generation;increase tongue base retraction;increase superior movement of the hyolaryngeal complex;increase anterior movement of the hyolaryngeal complex  -MB increase squeeze/positive pressure generation;increase tongue base retraction   -MG    Increase Superior Movement of the Hyolaryngeal Complex Mendelsohn;falsetto  -MG Mendelsohn;falsetto  -MB --    Increase Anterior Movement of the Hyolaryngeal Complex shaker  -MG shaker  -MB --    Increase Squeeze/Positive Pressure Generation hard effortful swallow  -MG hard effortful swallow  -MB hard effortful swallow  -MG    Increase Tongue Base Retraction jose  -MG jose  -MB jose  -MG    [place]/Accuracy (Pharyngeal Strengthening Goal 1, SLP) with moderate cues (50-74% accuracy)  -MG with moderate cues (50-74% accuracy)  -MB with moderate cues (50-74% accuracy)  -MG    Time Frame (Pharyngeal Strengthening Goal 1, SLP) by discharge  -MG by discharge  -MB by discharge  -MG    Barriers (Pharyngeal Strengthening Goal 1, SLP) L weakness  -MG -- L weakness  -MG    Progress/Outcomes (Pharyngeal Strengthening Goal 1, SLP) goal ongoing  -MG -- goal ongoing  -MG    Row Name 01/02/23 1425             (LTG) Swallow    (LTG) Swallow Pt will tolerate LRD without overt s/s of aspiration.  -TM      [place] (Swallow Long Term Goal) with minimal cues (75-90% accuracy)  -TM      Time Frame (Swallow Long Term Goal) by discharge  -TM      Barriers (Swallow Long Term Goal) Reduced alertness  -TM      Progress/Outcomes (Swallow Long Term Goal) new goal;goal ongoing  -TM         (STG) Labial Strengthening Goal 1 (SLP)    Activity (Labial Strengthening Goal 1, SLP) increase labial tone  -TM      Increase Labial Tone labial resistance exercises  -TM      [place]/Accuracy (Labial Strengthening Goal 1, SLP) with moderate cues (50-74% accuracy)  -TM      Time Frame (Labial Strengthening Goal 1, SLP) by discharge  -TM      Barriers (Labial Strengthening Goal 1, SLP) L weakness  -TM      Progress/Outcomes (Labial Strengthening Goal 1, SLP) new goal;goal ongoing  -TM         (STG) Lingual Strengthening Goal 1 (SLP)    Activity (Lingual Strengthening Goal 1, SLP) increase lingual  tone/sensation/control/coordination/movement  -TM      Increase Lingual Tone/Sensation/Control/Coordination/Movement lingual movement exercises;lingual resistance exercises  -TM      [place]/Accuracy (Lingual Strengthening Goal 1, SLP) with moderate cues (50-74% accuracy)  -TM      Time Frame (Lingual Strengthening Goal 1, SLP) by discharge  -TM      Barriers (Lingual Strengthening Goal 1, SLP) L weakness  -TM      Progress/Outcomes (Lingual Strengthening Goal 1, SLP) new goal;goal ongoing  -TM         (STG) Pharyngeal Strengthening Exercise Goal 1 (SLP)    Activity (Pharyngeal Strengthening Goal 1, SLP) increase squeeze/positive pressure generation;increase tongue base retraction  -TM      Increase Squeeze/Positive Pressure Generation hard effortful swallow  -TM      Increase Tongue Base Retraction jose  -TM      [place]/Accuracy (Pharyngeal Strengthening Goal 1, SLP) with moderate cues (50-74% accuracy)  -TM      Time Frame (Pharyngeal Strengthening Goal 1, SLP) by discharge  -TM      Barriers (Pharyngeal Strengthening Goal 1, SLP) L weakness  -TM      Progress/Outcomes (Pharyngeal Strengthening Goal 1, SLP) new goal;goal ongoing  -TM            User Key  (r) = Recorded By, (t) = Taken By, (c) = Cosigned By    Initials Name Provider Type    Audelia Hall, CCC-SLP Speech and Language Pathologist    TM Luisa Pino, CCC-SLP Speech and Language Pathologist    MG Marlyn Stephens, MS CCC-SLP Speech and Language Pathologist                   Time Calculation:    Time Calculation- SLP     Row Name 01/04/23 1415             Time Calculation- SLP    SLP Start Time 1316  -MG      SLP Stop Time 1416  -MG      SLP Time Calculation (min) 60 min  -MG      SLP Received On 01/04/23  -MG         Untimed Charges    71507-SC Treatment Swallow Minutes 60  -MG         Total Minutes    Untimed Charges Total Minutes 60  -MG       Total Minutes 60  -MG            User Key  (r) = Recorded By, (t) = Taken By, (c)  = Cosigned By    Initials Name Provider Type    MG Marlyn Stephens, MS CCC-SLP Speech and Language Pathologist                Therapy Charges for Today     Code Description Service Date Service Provider Modifiers Qty    10080185287 HC ST TREATMENT SWALLOW 3 1/3/2023 Marlyn Stephens, MS CCC-SLP GN 1    [id number] HC ST TREATMENT SWALLOW 4 1/4/2023 Marlyn Stephens, MS CCC-SLP GN 1               Marlyn Stephens MS CCC-SLP  1/4/2023

## 2023-01-05 VITALS
HEART RATE: 93 BPM | RESPIRATION RATE: 18 BRPM | DIASTOLIC BLOOD PRESSURE: 74 MMHG | BODY MASS INDEX: 24.19 KG/M2 | OXYGEN SATURATION: 94 % | SYSTOLIC BLOOD PRESSURE: 159 MMHG | WEIGHT: 123.19 LBS | HEIGHT: 60 IN | TEMPERATURE: 97.4 F

## 2023-01-05 PROCEDURE — 25010000002 KETOROLAC TROMETHAMINE PER 15 MG: Performed by: FAMILY MEDICINE

## 2023-01-05 PROCEDURE — 99233 SBSQ HOSP IP/OBS HIGH 50: CPT

## 2023-01-05 RX ORDER — KETOROLAC TROMETHAMINE 10 MG/1
10 TABLET, FILM COATED ORAL EVERY 6 HOURS PRN
Status: DISCONTINUED | OUTPATIENT
Start: 2023-01-05 | End: 2023-01-06 | Stop reason: HOSPADM

## 2023-01-05 RX ORDER — KETOROLAC TROMETHAMINE 10 MG/1
10 TABLET, FILM COATED ORAL ONCE
Status: COMPLETED | OUTPATIENT
Start: 2023-01-05 | End: 2023-01-05

## 2023-01-05 RX ORDER — KETOROLAC TROMETHAMINE 10 MG/1
15 TABLET, FILM COATED ORAL EVERY 6 HOURS PRN
Qty: 8 TABLET | Refills: 0
Start: 2023-01-05 | End: 2023-01-10

## 2023-01-05 RX ORDER — ACETAMINOPHEN 325 MG/1
650 TABLET ORAL EVERY 4 HOURS PRN
Start: 2023-01-05

## 2023-01-05 RX ADMIN — ASPIRIN 300 MG: 300 SUPPOSITORY RECTAL at 08:18

## 2023-01-05 RX ADMIN — KETOROLAC TROMETHAMINE 10 MG: 10 TABLET, FILM COATED ORAL at 17:36

## 2023-01-05 RX ADMIN — KETOROLAC TROMETHAMINE 15 MG: 30 INJECTION, SOLUTION INTRAMUSCULAR; INTRAVENOUS at 05:12

## 2023-01-05 RX ADMIN — KETOROLAC TROMETHAMINE 10 MG: 10 TABLET, FILM COATED ORAL at 11:16

## 2023-01-05 RX ADMIN — Medication 10 ML: at 08:19

## 2023-01-05 NOTE — PROGRESS NOTES
"    [facility] Palliative Care Services  Progress Note  Patient Name: Erinn Najera  Date of Admission: 1/2/2023  Today's Date: 01/05/23     Code Status and Medical Interventions:   Ordered at: 01/04/23 1111     Medical Intervention Limits:    NO intubation (DNI)    NO cardioversion    NO dialysis    NO artificial nutrition    NO vasopressors    NO NIPPV (Non-Invasive Positive Pressure Ventilation)     Level Of Support Discussed With:    Next of Kin (If No Surrogate)    Patient     Code Status (Patient has no pulse and is not breathing):    No CPR (Do Not Attempt to Resuscitate)     Medical Interventions (Patient has pulse or is breathing):    Limited Support     Subjective   Chief complaint/Reason for Referral/Visit: Follow up on Goals of Care/Advance Care Planning, Comfort Care and Hospice Referral/Discussion.    Medical record reviewed. Events noted.  EEG results reviewed which reveal abnormal EEG secondary to slowing of her right hemisphere which can be seen with structural lesion or postictal state however no epileptiform activity seen and clinical correlation recommended.  She was started on modified diet yesterday for comfort.  Plans to discharge to SNF today with palliative care and eventual transition to hospice services. She is lying in bed, alert and in no apparent distress time of exam.  Complains of generalized pain stating \"I feel like I been hit by a Mack truck.\"  Discussed with nursing and have ordered one-time dose of p.o. Toradol as it was previously ordered IV and her IV has been removed.  I also modified discharge orders to include p.o. Toradol as needed for 2 days until needs can be reassessed depending on status and goals.  Discussed with patient's two sons and daughter in law at bedside who are appreciative.  MOST form completed to reflect wishes of NO CPR and comfort with assistance of palliative SW and copy in EMR.       Review of Systems   Constitutional: Negative for chills, " fever and weight loss.   HENT: Negative for congestion, sore throat and stridor.    Eyes: Negative for pain, photophobia and visual disturbance.   Cardiovascular: Negative for chest pain, dyspnea on exertion and palpitations.   Respiratory: Negative for cough, shortness of breath and sputum production.    Endocrine: Negative for polydipsia, polyphagia and polyuria.   Hematologic/Lymphatic: Negative for adenopathy and bleeding problem. Does not bruise/bleed easily.   Skin: Negative for dry skin, flushing and itching.   Musculoskeletal: Positive for falls and myalgias. Negative for joint pain and joint swelling.   Gastrointestinal: Positive for dysphagia. Negative for nausea and vomiting.   Genitourinary: Positive for bladder incontinence. Negative for frequency and urgency.   Neurological: Positive for weakness. Negative for headaches and tremors.   Psychiatric/Behavioral: Negative for depression. The patient does not have insomnia and is not nervous/anxious.    Allergic/Immunologic: Negative for environmental allergies, hives and persistent infections.     Pain Assessment  Preferred Pain Scale: number (Numeric Rating Pain Scale)  Nonverbal Indicators of Pain: nonverbal indicators absent  Pain Location: neck  Objective   Diagnostics: Reviewed    No intake or output data in the 24 hours ending 01/05/23 0908  Current Facility-Administered Medications   Medication Dose Route Frequency Provider Last Rate Last Admin   • acetaminophen (TYLENOL) tablet 650 mg  650 mg Oral Q4H PRN Karlo Pereira DO       • aluminum-magnesium hydroxide-simethicone (MAALOX MAX) 400-400-40 MG/5ML suspension 7.5 mL  7.5 mL Oral Q4H PRN Karlo Pereira DO       • aspirin chewable tablet 81 mg  81 mg Oral Daily Karlo Pereira DO        Or   • aspirin suppository 300 mg  300 mg Rectal Daily Karlo Pereira DO   300 mg at 01/05/23 0818   • bisacodyl (DULCOLAX) suppository 10 mg  10 mg Rectal Daily PRN Karlo Pereira DO     "   • ketorolac (TORADOL) injection 15 mg  15 mg Intravenous Q6H PRN Karlo Pereira DO   15 mg at 01/05/23 0512   • ondansetron (ZOFRAN) injection 4 mg  4 mg Intravenous Q6H PRN Karlo Pereira DO       • sodium chloride 0.9 % flush 10 mL  10 mL Intravenous Q12H Karlo Pereira, DO   10 mL at 01/05/23 0819   • sodium chloride 0.9 % flush 10 mL  10 mL Intravenous PRN Karlo Pereira DO       • sodium chloride 0.9 % infusion 40 mL  40 mL Intravenous PRN Karlo Pereira DO            •  acetaminophen  •  aluminum-magnesium hydroxide-simethicone  •  bisacodyl  •  ketorolac  •  ondansetron  •  sodium chloride  •  sodium chloride    Assessment:  Vital Signs: /74 (BP Location: Right arm, Patient Position: Lying)   Pulse 93   Temp 97.4 °F (36.3 °C) (Oral)   Resp 18   Ht 152.4 cm (60\")   Wt 55.9 kg (123 lb 3 oz)   SpO2 94%   BMI 24.06 kg/m²     Physical Exam  Vitals and nursing note reviewed.   Constitutional:       General: She is not in acute distress.     Appearance: She is ill-appearing.   HENT:      Head: Normocephalic.      Comments: Bruising and scabs present.  Eyes:      General: Lids are normal.   Neck:      Vascular: No JVD.      Trachea: Trachea normal.   Cardiovascular:      Rate and Rhythm: Tachycardia present. Rhythm irregular.   Pulmonary:      Effort: Pulmonary effort is normal.      Breath sounds: Normal breath sounds.   Chest:      Chest wall: No deformity or swelling.   Abdominal:      General: There is no distension.      Palpations: Abdomen is soft.   Musculoskeletal:      Cervical back: Neck supple.      Comments: Left sided hemiplegia.   Skin:     General: Skin is warm and dry.      Findings: Bruising present.   Neurological:      Mental Status: She is alert and oriented to person, place, and time.      Cranial Nerves: Facial asymmetry present.      Motor: Weakness present.   Psychiatric:         Speech: Speech normal.         Behavior: Behavior is cooperative. "       Functional status: Palliative Performance Scale Score: Performance 20% based on the following measures: Ambulation: Totally bed bound, Activity and Evidence of Disease: Unable to do any work, extensive evidence of disease, Self-Care: Total care required,  Intake: Minimal sips, LOC: Full, drowsy or confusion.  Nutritional status: Albumin 3.1. Body mass index is 24.06 kg/m².  Patient status: Disease state: No further treatment being pursued.    Impression/Problem List:  1. Acute ischemic right MCA stroke   2. Oropharyngeal dysphagia  3. Impaired mobility and ADLs   4. Generalized pain   5. Atrial fibrillation, chronic   6. Cerebral atrophy and chronic cerebral microvascular disease per CT and MRI  7. Hypertension  8. History of falls     9. Advanced age      Plan / Recommendations     1. Palliative Care Encounter   - Goals of care include CODE STATUS NO CPR with limited support interventions.    - Prognosis is poor long-term secondary to acute ischemic right MCA stroke, oropharyngeal dysphagia without wishes to pursue artificial nutrition, impaired mobility and ADLs, atrial fibrillation, cerebral atrophy and chronic microvascular disease, advanced age and other comorbidities listed above.    - Spoke with Ms. Najera, her 2 sons and daughter-in-law regarding goals of care and expectations.     - Plans to discharge to SNF today with Palliative Care and eventual transition to hospice services.     - MOST form completed to reflect wishes of NO CPR and comfort with assistance of palliative SW and copy in EMR.     - Questions answered to patient and family satisfaction.  Support provided.       2. Generalized Pain  - One time dose of PO Toradol for pain as IV as been removed for discharge.     - Modified discharge orders to include PO Toradol PRN for 2 days until needs can be reassessed depending on status at SNF.      Thank you for allowing us to participate in patient's plan of care. Palliative Care Team will  continue to follow patient while she remains hospitalized.    Time spent:50 minutes spent reviewing medical and medication records, assessing and examining patient, discussing with family, answering questions, providing some guidance about a plan and documentation of care, and coordinating care with other healthcare members, with > 50% time spent face to face.     Electronically signed by, STEVE Workman, 01/05/23.

## 2023-01-05 NOTE — THERAPY DISCHARGE NOTE
Acute Care - Speech Language Pathology Discharge Summary  [facility]       Patient Name: Erinn Najera  : 1928  MRN: [id number]    Today's Date: 2023                   Admit Date: 2023      SLP Recommendation and Plan  Comfort diet/pleasure feedings. Patient d/c with hospice. Puree diet with pudding thick consistency liquids is safest option at this time with 1:1 supervision.     Visit Dx:    ICD-10-CM ICD-9-CM   1. Cerebrovascular accident (CVA), unspecified mechanism (HCC)  I63.9 434.91   2. Chronic atrial fibrillation (HCC)  I48.20 427.31   3. Leukocytosis, unspecified type  D72.829 288.60   4. Dysphagia, unspecified type  R13.10 787.20   5. Muscle weakness  M62.81 728.87   6. Impaired mobility and ADLs  Z74.09 V49.89    Z78.9                 SLP GOALS     Row Name 23 1000 23 1316 23 1440       (LTG) Swallow    (LTG) Swallow Pt will tolerate LRD without overt s/s of aspiration.  -MG Pt will tolerate LRD without overt s/s of aspiration.  -MG Pt will tolerate LRD without overt s/s of aspiration.  -MB    [place] (Swallow Long Term Goal) with minimal cues (75-90% accuracy)  -MG with minimal cues (75-90% accuracy)  -MG with minimal cues (75-90% accuracy)  -MB    Time Frame (Swallow Long Term Goal) by discharge  -MG by discharge  -MG by discharge  -MB    Barriers (Swallow Long Term Goal) Reduced alertness  -MG Reduced alertness  -MG --    Progress/Outcomes (Swallow Long Term Goal) discharged from facility;goal no longer appropriate;goal not met  -MG progress slower than expected  -MG --       (STG) Labial Strengthening Goal 1 (SLP)    Activity (Labial Strengthening Goal 1, SLP) increase labial tone  -MG increase labial tone  -MG increase labial tone  -MB    Increase Labial Tone labial resistance exercises  -MG labial resistance exercises  -MG labial resistance exercises  -MB    [place]/Accuracy (Labial Strengthening Goal 1, SLP) with moderate cues (50-74% accuracy)  -MG  with moderate cues (50-74% accuracy)  -MG with moderate cues (50-74% accuracy)  -MB    Time Frame (Labial Strengthening Goal 1, SLP) by discharge  -MG by discharge  -MG by discharge  -MB    Barriers (Labial Strengthening Goal 1, SLP) L weakness  -MG L weakness  -MG --    Progress/Outcomes (Labial Strengthening Goal 1, SLP) discharged from facility;goal no longer appropriate;goal not met  -MG goal ongoing  -MG --       (STG) Lingual Strengthening Goal 1 (SLP)    Activity (Lingual Strengthening Goal 1, SLP) increase lingual tone/sensation/control/coordination/movement  -MG increase lingual tone/sensation/control/coordination/movement  -MG increase lingual tone/sensation/control/coordination/movement  -MB    Increase Lingual Tone/Sensation/Control/Coordination/Movement lingual movement exercises;lingual resistance exercises  -MG lingual movement exercises;lingual resistance exercises  -MG lingual movement exercises;lingual resistance exercises  -MB    [place]/Accuracy (Lingual Strengthening Goal 1, SLP) with moderate cues (50-74% accuracy)  -MG with moderate cues (50-74% accuracy)  -MG with moderate cues (50-74% accuracy)  -MB    Time Frame (Lingual Strengthening Goal 1, SLP) by discharge  -MG by discharge  -MG by discharge  -MB    Barriers (Lingual Strengthening Goal 1, SLP) L weakness  -MG L weakness  -MG --    Progress/Outcomes (Lingual Strengthening Goal 1, SLP) discharged from facility;goal no longer appropriate;goal not met  -MG goal ongoing  -MG --       (STG) Pharyngeal Strengthening Exercise Goal 1 (SLP)    Activity (Pharyngeal Strengthening Goal 1, SLP) increase squeeze/positive pressure generation;increase tongue base retraction;increase superior movement of the hyolaryngeal complex;increase anterior movement of the hyolaryngeal complex  -MG increase squeeze/positive pressure generation;increase tongue base retraction;increase superior movement of the hyolaryngeal complex;increase anterior movement of  the hyolaryngeal complex  -MG increase squeeze/positive pressure generation;increase tongue base retraction;increase superior movement of the hyolaryngeal complex;increase anterior movement of the hyolaryngeal complex  -MB    Increase Superior Movement of the Hyolaryngeal Complex Mendelso[place];falsetto  -MG [place]el[place];falsetto  -MG Mendelsohn;falsetto  -MB    Increase Anterior Movement of the Hyolaryngeal Complex shaker  -MG shaker  -MG shaker  -MB    Increase Squeeze/Positive Pressure Generation hard effortful swallow  -MG hard effortful swallow  -MG hard effortful swallow  -MB    Increase Tongue Base Retraction jose  -MG jose  -MG jose  -MB    [place]/Accuracy (Pharyngeal Strengthening Goal 1, SLP) with moderate cues (50-74% accuracy)  -MG with moderate cues (50-74% accuracy)  -MG with moderate cues (50-74% accuracy)  -MB    Time Frame (Pharyngeal Strengthening Goal 1, SLP) by discharge  -MG by discharge  -MG by discharge  -MB    Barriers (Pharyngeal Strengthening Goal 1, SLP) L weakness  -MG L weakness  -MG --    Progress/Outcomes (Pharyngeal Strengthening Goal 1, SLP) discharged from facility;goal no longer appropriate;goal not met  -MG goal ongoing  -MG --    Row Name 01/03/23 0825 01/02/23 1425          (LTG) Swallow    (LTG) Swallow Pt will tolerate LRD without overt s/s of aspiration.  -MG Pt will tolerate LRD without overt s/s of aspiration.  -TM     [place] (Swallow Long Term Goal) with minimal cues (75-90% accuracy)  -MG with minimal cues (75-90% accuracy)  -TM     Time Frame (Swallow Long Term Goal) by discharge  -MG by discharge  -TM     Barriers (Swallow Long Term Goal) Reduced alertness  -MG Reduced alertness  -TM     Progress/Outcomes (Swallow Long Term Goal) progress slower than expected  -MG new goal;goal ongoing  -TM        (STG) Labial Strengthening Goal 1 (SLP)    Activity (Labial Strengthening Goal 1, SLP) increase labial tone  -MG increase labial tone  -TM     Increase Labial Tone  labial resistance exercises  -MG labial resistance exercises  -TM     [place]/Accuracy (Labial Strengthening Goal 1, SLP) with moderate cues (50-74% accuracy)  -MG with moderate cues (50-74% accuracy)  -TM     Time Frame (Labial Strengthening Goal 1, SLP) by discharge  -MG by discharge  -TM     Barriers (Labial Strengthening Goal 1, SLP) L weakness  -MG L weakness  -TM     Progress/Outcomes (Labial Strengthening Goal 1, SLP) progress slower than expected  -MG new goal;goal ongoing  -TM        (STG) Lingual Strengthening Goal 1 (SLP)    Activity (Lingual Strengthening Goal 1, SLP) increase lingual tone/sensation/control/coordination/movement  -MG increase lingual tone/sensation/control/coordination/movement  -TM     Increase Lingual Tone/Sensation/Control/Coordination/Movement lingual movement exercises;lingual resistance exercises  -MG lingual movement exercises;lingual resistance exercises  -TM     [place]/Accuracy (Lingual Strengthening Goal 1, SLP) with moderate cues (50-74% accuracy)  -MG with moderate cues (50-74% accuracy)  -TM     Time Frame (Lingual Strengthening Goal 1, SLP) by discharge  -MG by discharge  -TM     Barriers (Lingual Strengthening Goal 1, SLP) L weakness  -MG L weakness  -TM     Progress/Outcomes (Lingual Strengthening Goal 1, SLP) goal ongoing  -MG new goal;goal ongoing  -TM        (STG) Pharyngeal Strengthening Exercise Goal 1 (SLP)    Activity (Pharyngeal Strengthening Goal 1, SLP) increase squeeze/positive pressure generation;increase tongue base retraction  -MG increase squeeze/positive pressure generation;increase tongue base retraction  -TM     Increase Squeeze/Positive Pressure Generation hard effortful swallow  -MG hard effortful swallow  -TM     Increase Tongue Base Retraction jose  -MG jose  -TM     [place]/Accuracy (Pharyngeal Strengthening Goal 1, SLP) with moderate cues (50-74% accuracy)  -MG with moderate cues (50-74% accuracy)  -TM     Time Frame (Pharyngeal  Strengthening Goal 1, SLP) by discharge  -MG by discharge  -TM     Barriers (Pharyngeal Strengthening Goal 1, SLP) L weakness  -MG L weakness  -TM     Progress/Outcomes (Pharyngeal Strengthening Goal 1, SLP) goal ongoing  -MG new goal;goal ongoing  -TM           User Key  (r) = Recorded By, (t) = Taken By, (c) = Cosigned By    Initials Name Provider Type    Audelia Hall, CCC-SLP Speech and Language Pathologist    Luisa Ordonez, CCC-SLP Speech and Language Pathologist    Marlyn Gross, MS CCC-SLP Speech and Language Pathologist                  Therapy Charges for Today     Code Description Service Date Service Provider Modifiers Qty    [id number] HC ST TREATMENT SWALLOW 4 1/4/2023 Marlyn Stephens, MS CCC-SLP GN 1            SLP Discharge Summary  Anticipated Discharge Disposition (SLP): skilled nursing facility  Reason for Discharge: patient/family request discontinuation of services, no further expectation of functional progress, discharge from this facility  Progress Toward Achieving Short/long Term Goals: unable to make functional progress at this time, goals not met within established timelines  Discharge Destination: other (see comments) (D/c orders for hospice)      Mralyn Stephens MS CCC-SLP  1/5/2023

## 2023-01-05 NOTE — DISCHARGE SUMMARY
[facility] Medicine Services  DISCHARGE SUMMARY       Date of Admission: 1/2/2023  Date of Discharge:  1/5/2023  Primary Care Physician: Елена Cedeno MD    Discharge Diagnoses:  Active Hospital Problems    Diagnosis    • **Acute ischemic right MCA stroke (HCC)    • PAF (paroxysmal atrial fibrillation) (HCC)    • Elevated CK    • Hypomagnesemia    • Hypokalemia    • Hypertension    • Hyperlipidemia          Presenting Problem/History of Present Illness:  Cerebrovascular accident (CVA), unspecified mechanism (HCC) [I63.9]     Chief Complaint on Day of Discharge:   Comfort measures care    History of Present Illness on Day of Discharge:   The patient is ready for transfer to nursing facility for end-of-life care.  She has been allowed to eat a puréed diet.  Family is at bedside.    Hospital Course  This 89-year-old female presents to the emergency department today with a chief complaint of inability to use her left side.  The patient was last known well at about 6:30 PM yesterday when she went to bed.  She apparently fell out of bed landing on her left side.  The following morning she was found by her family members back in bed but with a bruised face, left shoulder and left lower abdomen and with inability to use her left side.  Code stroke was not called as the patient had a history of trauma related to falling from her bed.  She was not a tPA candidate as a result.  CT angiograms of the head and neck were obtained and are negative for any large vessel occlusion.  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  There is an area of edema measuring 4.7 x 2.9 cm.  Diffuse cerebral atrophy and chronic small vessel ischemic changes were noted as well.  X-rays of the pelvis showed no acute bony abnormality.  Chest x-ray with unremarkable.  CT of the cervical spine  showed degenerative changes at C5-7 with no fracture or acute bony change.  Laboratory evaluation reveals a normal lactate, procalcitonin, TSH.  CBC shows an elevated white blood cell count 17,200.  Lactate 3.8.  Unremarkable urinalysis.  Magnesium low at 1.1 and replacement has been ordered.  CK elevated at 520.  CMP is unremarkable except potassium low at 3.2.     Recent echocardiogram performed 12/14/2022 at [facility]:   Summary    Abnormal rhythm consistent with atrial fibrillation    Normal left ventricular size and systolic function estimated ejection    fraction 60 to 65%    Normal left ventricular wall thickness with indeterminate diastolic    function because of abnormal rhythm [tissue Doppler suggests significant    diastolic dysfunction]    Mild right atrial enlargement with borderline RV size but with preserved    systolic function    Mild left atrial enlargement    IVC dimension and inspiratory motion are normal consistent with normal    right atrial filling pressures    Mild pulmonic valvular insufficiency    Moderate tricuspid regurgitation with moderate pulmonary hypertension RVSP    estimate 50 mmHg    Mild thickening of a tricuspid aortic valve with adequate cusp separation    neither stenosis or insufficiency    Minimal mitral annular calcification with normally mobile mitral leaflets    and mild regurgitation    Aortic root and ascending segment measure within normal limits    Poor visualization of the aortic valve without demonstrable abnormality    No significant pericardial effusion     Treatment Plan  Admit to the medical surgical floor  Neurology consultation, done  PT, OT, SLP consultations  High intensity statin  Resume the bulk of home medications  IV normal saline at 75 cc/h for maintenance of hydration  Repeat CPK in a.m.  CMP, magnesium, CBC in a.m.  Discontinue echocardiogram previously ordered as the patient has recently had 1 performed at [facility] on 12/14.    Day after  admission, the patient continued to demonstrate left-sided flaccidity.  She was seen and evaluated by neurology.  Speech therapy saw and evaluated the patient noting a significant risk for aspiration with all consistencies.  Recommendation was for an alternate method of nutrition if aggressive treatment was desired.  They mention alternative would be to place patient on a puréed diet which would be unlikely to provide for her nutritional needs leading to decline in her condition.  I had a long discussion with the patient's children.  They would like to see their mother agreed to a PEG tube placement however the patient appears to be opposed to tube feeding.  The family met together with their mother as her group and discussed feeding options with her.  Ultimately, the patient decided not to proceed with PEG tube placement and tube feed nutrition.  Subsequently, palliative care was consulted.  Palliative care noted poor long-term prognosis.  Discussion was held with the family at care conference.  The patient herself expressed wishes to de-escalate her CODE STATUS to no CPR with limited support and interventions.  She has been accepted today to SNF will be transferred to that facility for continued care.    Consults:   Neurology:  Impression     1. Acute right MCA stroke  2. Chronic atrial fib/a flutter  3. Hyperlipidemia  4 Hypertension  5. Hypokalemia  6. Hypothyroid  7. Dysphagia secondary to stroke   8. Leukocytosis     Plan  • Agree with current work up as shown in orders to include telemetry, lipid panel hemoglobin A1C  Will add TSH and B12 and magnesium  • Pt/OT/Speech therapy  • No TPA as she was outside TPA window  • Lipitor high dose statin  Given her age would use 40 mg  • Unclear why she is not on anticoagulation  • NPO   • Start IVF as she had a dye load and is NPO  • Will change orders to rectal aspirin  • No anticoagulation for now due to size of stroke  Family is very hesitant about this But we will  broach this topic later     I discussed the patient's findings and my recommendations with patient, family and nursing staff     Kassi Barrera MD    Palliative care:  Impression/Problem List:  1. Acute ischemic right MCA stroke   2. Oropharyngeal dysphagia  3. Impaired mobility and ADLs   4. Atrial fibrillation, chronic   5. Cerebral atrophy and chronic cerebral microvascular disease per CT and MRI  6. Hypertension  7. History of falls     8. Advanced age       Plan / Recommendations      1. Palliative Care Encounter   - Goals of care include CODE STATUS changes to include NO CPR with limited support interventions.     - Prognosis is poor long-term secondary to acute ischemic right MCA stroke, oropharyngeal dysphagia without wishes to pursue artificial nutrition, impaired mobility and ADLs, atrial fibrillation, cerebral atrophy and chronic microvascular disease, advanced age and other comorbidities listed above.     - Care conference held with Ms. Najera, her son Tessie (Sarabjit) and patient's daughter-in-law (Yosi's spouse).  Noted patient only has 2 children which are Tessie (Sarabjit) and Yosi.  Details of discussion above.     - Ms. Najera expressed wishes to de-escalate CODE STATUS as delineated above.     - Discharge plans pending.  Discussed with attending SW. SW gathering additional information from SNF for family.      - Questions answered to patient and family satisfaction.      Thank you for allowing us to participate in patient's plan of care. Palliative Care Team will continue to follow patient. Will plan to follow up tomorrow or sooner if needed.     Time spent:95 minutes spent reviewing medical and medication records, assessing and examining patient, discussing with family, answering questions, providing some guidance about a plan and documentation of care, and coordinating care with other healthcare members, with > 50% time spent face to face.   20 minutes spent on advance care  "planning.     Electronically signed by, STEVE Workman      Result Review    Result Review:  I have personally reviewed the results from the time of this admission to 1/5/2023 10:57 CST and agree with these findings:  []  Laboratory  []  Microbiology  []  Radiology  []  EKG/Telemetry   []  Cardiology/Vascular   []  Pathology  []  Old records  []  Other:      Condition on Discharge:    Stable but declining    Physical Exam on Discharge:  /74 (BP Location: Right arm, Patient Position: Lying)   Pulse 93   Temp 97.4 °F (36.3 °C) (Oral)   Resp 18   Ht 152.4 cm (60\")   Wt 55.9 kg (123 lb 3 oz)   SpO2 94%   BMI 24.06 kg/m²   Physical Exam     Constitutional:       Appearance: Normal appearance. She is normal weight.   HENT:      Head: Normocephalic and atraumatic.      Right Ear: External ear normal.      Left Ear: External ear normal.      Nose: Nose normal.      Mouth: Mucous membranes are dry.      Pharynx: Oropharynx is clear.   Eyes:      General: No scleral icterus.     Conjunctiva/sclera: Conjunctivae normal.    Cardiovascular:      Rate and Rhythm: Normal rate. Rhythm irregularly irregular.      Pulses: Normal pulses.      Heart sounds: No murmur heard.  Pulmonary:      Effort: Pulmonary effort is normal. No respiratory distress.      Breath sounds: Normal breath sounds.   Abdominal:      General: Abdomen is flat. Bowel sounds are normal.      Palpations: Abdomen is soft. There is no mass.   Musculoskeletal:         General: Normal range of motion.      Right lower leg: No edema.      Left lower leg: No edema.   Skin:     General: Skin is warm and dry.      Findings: Bruising (Left periorbital area, left shoulder, left lower abdomen) present.   Neurological:      Mental Status: She is alert and oriented to person, place, and time.      Sensory: No sensory deficit.      Motor: Weakness ( flaccid L side, L facial droop) present.   Psychiatric:         Mood and Affect: Mood normal. "         Judgment: Judgment normal.     Discharge Disposition:  Skilled Nursing Facility (DC - External)    Discharge Medications:     Discharge Medications      New Medications      Instructions Start Date   acetaminophen 325 MG tablet  Commonly known as: TYLENOL   650 mg, Oral, Every 4 Hours PRN         Stop These Medications    amLODIPine 5 MG tablet  Commonly known as: NORVASC     aspirin 81 MG chewable tablet     atorvastatin 10 MG tablet  Commonly known as: LIPITOR     doxepin 50 MG capsule  Commonly known as: SINEquan     levothyroxine 50 MCG tablet  Commonly known as: SYNTHROID, LEVOTHROID     metoprolol tartrate 50 MG tablet  Commonly known as: LOPRESSOR     Netarsudil-Latanoprost 0.02-0.005 % solution     valsartan 80 MG tablet  Commonly known as: DIOVAN     Vyzulta 0.024 % solution  Generic drug: Latanoprostene Bunod            Discharge Diet:   Diet Instructions     Diet: Dysphagia; Thin Liquids, No Restrictions; Pureed      Discharge Diet: Dysphagia    Fluid Consistency: Thin Liquids, No Restrictions    Pureed Options: Pureed          Discharge Care Plan / Instructions:   Discharge to skilled nursing facility    Activity at Discharge:   Activity Instructions     Activity as Tolerated        **     Electronically signed by Karlo Pereira DO, 01/05/23, 10:57 CST.    Time: Discharge Less than 30 min    Part of this note may be an electronic transcription/translation of spoken language to printed text using the Dragon Dictation system.

## 2023-01-05 NOTE — DISCHARGE INSTRUCTIONS
Comfort diet/pleasure feedings. Patient d/c with hospice. Puree diet with pudding thick consistency liquids is safest option at this time with 1:1 supervision.

## 2023-01-05 NOTE — CASE MANAGEMENT/SOCIAL WORK
Continued Stay Note   [facility]     Patient Name: Erinn Najera  MRN: [id number]  Today's Date: 1/5/2023    Admit Date: 1/2/2023    Plan: [facility]   Discharge Plan     Row Name 01/05/23 0835       Plan    Plan [facility]    Patient/Family in Agreement with Plan yes    Final Discharge Disposition Code 03 - skilled nursing facility (SNF)    Final Note Pt to dc to [facility] today; Lawanda in admissions is aware.  [phone].               Discharge Codes    No documentation.               Expected Discharge Date and Time     Expected Discharge Date Expected Discharge Time    Jan 5, 2023             ROBE Campbell

## 2023-01-05 NOTE — PLAN OF CARE
Goal Outcome Evaluation:  Plan of Care Reviewed With: patient        Progress: no change  Outcome Evaluation: VSS. C/o pain managed with prn toradol. Incontinent with brief on, purwick no longer in place. AF  with 2.04 sec pause on tele. Pt safety was maintained.

## 2023-01-06 NOTE — THERAPY DISCHARGE NOTE
Acute Care - Occupational Therapy Discharge Summary  [facility]     Patient Name: Erinn Najera  : 1928  MRN: [id number]    Today's Date: 2023                 Admit Date: 2023        OT Recommendation and Plan    Visit Dx:    ICD-10-CM ICD-9-CM   1. Cerebrovascular accident (CVA), unspecified mechanism (HCC)  I63.9 434.91   2. Chronic atrial fibrillation (HCC)  I48.20 427.31   3. Leukocytosis, unspecified type  D72.829 288.60   4. Dysphagia, unspecified type  R13.10 787.20   5. Muscle weakness  M62.81 728.87   6. Impaired mobility and ADLs  Z74.09 V49.89    Z78.9                 OT Rehab Goals     Row Name 23 0800             Dressing Goal 1 (OT)    Activity/Device (Dressing Goal 1, OT) upper body dressing  -TS      [place]/Cues Needed (Dressing Goal 1, OT) moderate assist (50-74% patient effort)  -TS      Time Frame (Dressing Goal 1, OT) long term goal (LTG);by discharge  -TS      Progress/Outcome (Dressing Goal 1, OT) goal not met  -TS         Grooming Goal 1 (OT)    Activity/Device (Grooming Goal 1, OT) grooming skills, all  -TS      [place] (Grooming Goal 1, OT) minimum assist (75% or more patient effort)  -TS      Time Frame (Grooming Goal 1, OT) long term goal (LTG);by discharge  -TS      Strategies/Barriers (Grooming Goal 1, OT) with attention to L side of body and enviornment.  -TS      Progress/Outcome (Grooming Goal 1, OT) goal not met  -TS         Self-Feeding Goal 1 (OT)    Activity/Device (Self-Feeding Goal 1, OT) self-feeding skills, all  -TS      [place] Level/Cues Needed (Self-Feeding Goal 1, OT) minimum assist (75% or more patient effort)  -TS      Time Frame (Self-Feeding Goal 1, OT) long term goal (LTG);by discharge  -TS      Progress/Outcomes (Self-Feeding Goal 1, OT) goal not met  -TS         Strength Goal 1 (OT)    Strength Goal 1 (OT) Pt will increase L UE strength to 2-/5 for improved independence with adls.  -TS      Time Frame (Strength Goal 1, OT)  long term goal (LTG);by discharge  -TS      Progress/Outcome (Strength Goal 1, OT) goal not met  -TS            User Key  (r) = Recorded By, (t) = Taken By, (c) = Cosigned By    Initials Name Provider Type Discipline    Kavita Vergara COTA Occupational Therapist Assistant OT                            OT Discharge Summary  Anticipated Discharge Disposition (OT): home with assist  Reason for Discharge: Discharge from facility  Outcomes Achieved: Refer to plan of care for updates on goals achieved  Discharge Destination: Home with assist      RAJEEV Velez  1/6/2023

## 2023-01-06 NOTE — THERAPY DISCHARGE NOTE
Acute Care - Physical Therapy Discharge Summary  [facility]       Patient Name: Erinn Najera  : 1928  MRN: [id number]    Today's Date: 2023                 Admit Date: 2023      PT Recommendation and Plan    Visit Dx:    ICD-10-CM ICD-9-CM   1. Cerebrovascular accident (CVA), unspecified mechanism (HCC)  I63.9 434.91   2. Chronic atrial fibrillation (HCC)  I48.20 427.31   3. Leukocytosis, unspecified type  D72.829 288.60   4. Dysphagia, unspecified type  R13.10 787.20   5. Muscle weakness  M62.81 728.87   6. Impaired mobility and ADLs  Z74.09 V49.89    Z78.9                 PT Rehab Goals     Row Name 23 0859             Bed Mobility Goal 1 (PT)    Activity/Assistive Device (Bed Mobility Goal 1, PT) rolling to left;scooting;sidelying to sit/sit to sidelying  -NW      [place] Level/Cues Needed (Bed Mobility Goal 1, PT) moderate assist (50-74% patient effort);minimum assist (75% or more patient effort);verbal cues required  -NW      Time Frame (Bed Mobility Goal 1, PT) long term goal (LTG);10 days  -NW      Progress/Outcomes (Bed Mobility Goal 1, PT) goal not met  -NW         ROM Goal 1 (PT)    ROM Goal 1 (PT) pt to maintain static/dynamic sitting balance w/ CGA  -NW      Time Frame (ROM Goal 1, PT) long-term goal (LTG);10 days  -NW      Progress/Outcome (ROM Goal 1, PT) goal not met  -NW         Problem Specific Goal 1 (PT)    Problem Specific Goal 1 (PT) pt able to roll to the R w/ mod/max assist  -NW      Time Frame (Problem Specific Goal 1, PT) long-term goal (LTG);other (see comments)  10 days  -NW      Progress/Outcome (Problem Specific Goal 1, PT) goal not met  -NW            User Key  (r) = Recorded By, (t) = Taken By, (c) = Cosigned By    Initials Name Provider Type Discipline    Rebeca Renteria, PTA Physical Therapist Assistant PT                    PT Discharge Summary  Anticipated Discharge Disposition (PT): skilled nursing facility  Reason for Discharge: Discharge from  facility  Outcomes Achieved: Refer to plan of care for updates on goals achieved  Discharge Destination: SNF      Rebeca Dixon, PTA   1/6/2023

## 2023-01-06 NOTE — NURSING NOTE
Patient is awaiting on [facility] to transport her to [facility].

## 2023-01-07 LAB
BACTERIA SPEC AEROBE CULT: NORMAL
BACTERIA SPEC AEROBE CULT: NORMAL

## 2023-06-24 NOTE — TELEPHONE ENCOUNTER
Also called pt and told her to keep taking the Prednisone, and she can take Tylenol as needed. Pt voiced understanding.
English
